# Patient Record
Sex: MALE | Race: WHITE | NOT HISPANIC OR LATINO | Employment: OTHER | URBAN - NONMETROPOLITAN AREA
[De-identification: names, ages, dates, MRNs, and addresses within clinical notes are randomized per-mention and may not be internally consistent; named-entity substitution may affect disease eponyms.]

---

## 2017-01-20 ENCOUNTER — OFFICE VISIT (OUTPATIENT)
Dept: CARDIOLOGY | Facility: CLINIC | Age: 62
End: 2017-01-20

## 2017-01-20 VITALS
BODY MASS INDEX: 41.72 KG/M2 | HEART RATE: 56 BPM | SYSTOLIC BLOOD PRESSURE: 140 MMHG | HEIGHT: 72 IN | WEIGHT: 308 LBS | DIASTOLIC BLOOD PRESSURE: 88 MMHG

## 2017-01-20 DIAGNOSIS — R00.2 PALPITATIONS: Primary | ICD-10-CM

## 2017-01-20 DIAGNOSIS — I34.0 NONRHEUMATIC MITRAL (VALVE) INSUFFICIENCY: ICD-10-CM

## 2017-01-20 DIAGNOSIS — E78.5 HYPERLIPIDEMIA, UNSPECIFIED HYPERLIPIDEMIA TYPE: ICD-10-CM

## 2017-01-20 DIAGNOSIS — I77.810 DILATED AORTIC ROOT (HCC): ICD-10-CM

## 2017-01-20 DIAGNOSIS — R06.02 SOB (SHORTNESS OF BREATH): ICD-10-CM

## 2017-01-20 DIAGNOSIS — I10 ESSENTIAL HYPERTENSION: ICD-10-CM

## 2017-01-20 DIAGNOSIS — I48.0 PAROXYSMAL ATRIAL FIBRILLATION (HCC): ICD-10-CM

## 2017-01-20 PROCEDURE — 99213 OFFICE O/P EST LOW 20 MIN: CPT | Performed by: INTERNAL MEDICINE

## 2017-01-20 PROCEDURE — 93000 ELECTROCARDIOGRAM COMPLETE: CPT | Performed by: INTERNAL MEDICINE

## 2017-01-20 NOTE — MR AVS SNAPSHOT
Raghav Olivier   1/20/2017 9:45 AM   Office Visit    Dept Phone:  [phone]   Encounter #:  [id number]    Provider:  Leo Page MD   Department:  [facility] CARDIOLOGY                Your Full Care Plan              Today's Medication Changes          These changes are accurate as of: 1/20/17 10:16 AM.  If you have any questions, ask your nurse or doctor.               Stop taking medication(s)listed here:     raNITIdine 150 MG tablet   Commonly known as:  ZANTAC   Stopped by:  Leo Page MD                      Your Updated Medication List          This list is accurate as of: 1/20/17 10:16 AM.  Always use your most recent med list.                amiodarone 200 MG tablet   Commonly known as:  PACERONE       aspirin 81 MG chewable tablet       diclofenac 50 MG EC tablet   Commonly known as:  VOLTAREN       hydrochlorothiazide 12.5 MG tablet   Commonly known as:  HYDRODIURIL       ibuprofen 200 MG tablet   Commonly known as:  ADVIL,MOTRIN       losartan 100 MG tablet   Commonly known as:  COZAAR       rosuvastatin 20 MG tablet   Commonly known as:  CRESTOR               We Performed the Following     ECG 12 Lead     Hepatic Function Panel       You Were Diagnosed With        Codes Comments    Palpitations    -  Primary ICD-10-CM: R00.2  ICD-9-CM: 785.1     Dilated aortic root     ICD-10-CM: I77.810  ICD-9-CM: 447.71     Hyperlipidemia, unspecified hyperlipidemia type     ICD-10-CM: E78.5  ICD-9-CM: 272.4     Essential hypertension     ICD-10-CM: I10  ICD-9-CM: 401.9     Nonrheumatic mitral (valve) insufficiency     ICD-10-CM: I34.0  ICD-9-CM: 424.0     SOB (shortness of breath)     ICD-10-CM: R06.02  ICD-9-CM: 786.05     Paroxysmal atrial fibrillation     ICD-10-CM: I48.0  ICD-9-CM: 427.31       Instructions     None    Patient Instructions History      Upcoming Appointments     Visit Type Date Time Department    OFFICE VISIT 1/20/2017  9:45 AM [facility]  "[facility]    FOLLOW UP 2017  9:00 AM [facility]    OFFICE VISIT 2017 10:15 AM [facility]     [facility] [employer] [facility] allows you to send messages to your doctor, view your test results, renew your prescriptions, schedule appointments, and more. To sign up, go to [employer] and click on the Sign Up Now link in the New User? box. Enter your [employer] Activation Code exactly as it appears below along with the last four digits of your Social Security Number and your Date of Birth () to complete the sign-up process. If you do not sign up before the expiration date, you must request a new code.    [employer] Activation Code: [email or web address]  Expires: 2/3/2017 10:16 AM    If you have questions, you can email [email or web address] or call [phone] to talk to our [employer] staff. Remember, [employer] is NOT to be used for urgent needs. For medical emergencies, dial 911.               Other Info from Your Visit           Your Appointments     2017  9:00 AM CDT   Follow Up with SLEEP CLINIC [facility] (--)    [address] [place] [address] [address] [place] [address] [phone]           Arrive 15 minutes prior to appointment.            2017 10:15 AM CDT   Office Visit with Leo Page MD   [facility] CARDIOLOGY (--)    [address] [address]  [facility] [address] [phone]           Arrive 15 minutes prior to appointment.              Allergies     Penicillins      Vancomycin        Vital Signs     Blood Pressure Pulse Height Weight Body Mass Index Smoking Status    140/88 56 72\" (182.9 cm) 308 lb (140 kg) 41.77 kg/m2 Never Smoker      Problems and Diagnoses Noted     Atrial fibrillation (irregular heartbeat)    Dilated aortic root    High blood pressure    High cholesterol or triglycerides    Nonrheumatic mitral (valve) insufficiency    Palpitations    SOB (shortness of breath)        "

## 2017-01-20 NOTE — PROGRESS NOTES
Raghav Olivier  61 y.o. male    01/20/2017  1. Palpitations    2. Dilated aortic root    3. Hyperlipidemia, unspecified hyperlipidemia type    4. Essential hypertension    5. Nonrheumatic mitral (valve) insufficiency    6. SOB (shortness of breath)    7. Paroxysmal atrial fibrillation        History of Present Illness: Routine follow-up with history of paroxysmal atrial fibrillations maintain sinus rhythm with the help of amiodarone and mild to moderately dilated aortic root with diameter 4.2.    61 years old gentleman with a BMI of 41 and history of hypertension and paroxysmal atrial fibrillation who was maintained sinus rhythm with the help of amiodarone.  The last echocardiographic study in July 2016 reported normal left ventricle systolic function with trace mitral and tricuspid regurgitation and dilated aortic root with diameter 4.2.  Had poorly function test done but a year ago reported abnormal PFT.  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  No fever or chills reported.  No palpitations or fluttering nauseous vomiting and diarrhea was reported.  No intermittent claudication was reported.  No dysuria or hematuria syncope or near syncopal episode was reported.  EKG done and finding discussed with the patient.          SUBJECTIVE    Allergies   Allergen Reactions   • Penicillins    • Vancomycin          Past Medical History   Diagnosis Date   • Atrial fibrillation    • Hyperlipidemia    • Hypertension    • Nonrheumatic mitral (valve) insufficiency    • Palpitations    • SOB (shortness of breath)          Past Surgical History   Procedure Laterality Date   • Appendectomy     • Knee surgery     • Cardioversion     • Ep study           No family history on file.      Social History     Social History   • Marital status:      Spouse name: N/A   • Number of children: N/A   • Years of education: N/A     Occupational History   • Not on file.  "    Social History Main Topics   • Smoking status: Never Smoker   • Smokeless tobacco: Never Used   • Alcohol use No   • Drug use: No   • Sexual activity: Defer     Other Topics Concern   • Not on file     Social History Narrative         Current Outpatient Prescriptions   Medication Sig Dispense Refill   • amiodarone (PACERONE) 200 MG tablet      • aspirin 81 MG chewable tablet Chew 81 mg Daily.     • diclofenac (VOLTAREN) 50 MG EC tablet      • hydrochlorothiazide (HYDRODIURIL) 12.5 MG tablet Take 12.5 mg by mouth Daily.     • ibuprofen (ADVIL,MOTRIN) 200 MG tablet Take 200 mg by mouth Every 6 (Six) Hours As Needed for mild pain (1-3).     • losartan (COZAAR) 100 MG tablet      • rosuvastatin (CRESTOR) 20 MG tablet Take 20 mg by mouth Daily.       No current facility-administered medications for this visit.          OBJECTIVE    Visit Vitals   • /88   • Pulse 56   • Ht 72\" (182.9 cm)   • Wt (!) 308 lb (140 kg)   • BMI 41.77 kg/m2           Review of Systems     Constitutional:  Denies recent weight loss, weight gain, fever or chills, no change in exercise tolerance     HENT:  Denies any hearing loss, epistaxis, hoarseness, or difficulty speaking.     Eyes: Wears eyeglasses or contact lenses     Respiratory:  Denies dyspnea with exertion,no cough, wheezing, or hemoptysis.     Cardiovascular:SEE HPI    Gastrointestinal:  Denies change in bowel habits, dyspepsia, ulcer disease, hematochezia, or melena.     Endocrine: Negative for cold intolerance, heat intolerance, polydipsia, polyphagia and polyuria. Denies any history of weight change, heat/cold intolerance, polydipsia, polyuria     Genitourinary: Negative.      Musculoskeletal:  history of arthritic symptoms or back problems     Skin:  Denies any change in hair or nails, rashes, or skin lesions.     Allergic/Immunologic: Negative.  Negative for environmental allergies, food allergies and immunocompromised state.     Neurological:  Denies any history of " recurrent headaches, strokes, TIA, or seizure disorder.     Hematological: Denies any food allergies, seasonal allergies, bleeding disorders, or lymphadenopathy.     Psychiatric/Behavioral: Denies any history of depression, substance abuse, or change in cognitive function.         Physical Exam     Constitutional: Cooperative, alert and oriented, well-developed, well-nourished, in no acute distress.     HENT:   Head: Normocephalic, normal hair patterns, no masses or tenderness.  Ears, Nose, and Throat: No gross abnormalities. No pallor or cyanosis. Dentition good.   Eyes: EOMS intact, PERRL, conjunctivae and lids unremarkable. Fundoscopic exam and visual fields not performed.   Neck: No palpable masses or adenopathy, no thyromegaly, no JVD, carotid pulses are full and equal bilaterally and without  Bruits.     Cardiovascular: Regular rhythm, S1 and S2 normal, no S3 or S4. Apical impulse not displaced. No murmurs, gallops, or rubs detected.     Pulmonary/Chest: Chest: normal symmetry, no tenderness to palpation, normal respiratory excursion, no intercostal retraction, no use of accessory muscles.            Pulmonary: Normal breath sounds. No rales or ronchi.    Abdominal: Abdomen soft, bowel sounds normoactive, no masses, no hepatosplenomegaly, non-tender, no bruits.     Musculoskeletal: No deformities, clubbing, cyanosis, erythema, or edema observed. There are no spinal abnormalities noted. Normal muscle strength and tone. Pulses full and equal in all extremities, no bruits auscultated.     Neurological: No gross motor or sensory deficits noted, affect appropriate, oriented to time, person, place.     Skin: Warm and dry to the touch, no apparent skin lesions or masses noted.     Psychiatric: He has a normal mood and affect. His behavior is normal. Judgment and thought content normal.           ECG 12 Lead  Date/Time: 1/20/2017 10:17 AM  Performed by: SKYE SOTO  Authorized by: SKYE SOTO   Comparison: not  compared with previous ECG   Rhythm: sinus rhythm  Comments: Sinus rhythm at 56 bpm with a nonspecific interventricular conduction delay and QRS duration of 228 ms.  Normal QTc interval.  And normal IL.              No results found for: WBC, HGB, HCT, MCV, PLT  No results found for: GLUCOSE, BUN, CREATININE, EGFRIFNONA, EGFRIFAFRI, BCR, CO2, CALCIUM, PROTENTOTREF, ALBUMIN, LABIL2, AST, ALT  No results found for: CHOL  No results found for: TRIG  No results found for: HDL  No results found for: LDLCALC  No results found for: LDL  No results found for: HDLLDLRATIO  No components found for: CHOLHDL  No results found for: HGBA1C  No results found for: TSH, L0DVZMR, T2PQMKS, THYROIDAB        ASSESSMENT AND PLAN clinically there is no sign of any acute cardiac decompensation.  Good blood pressure control and being treated with the losartan 100 mg.  Amiodarone given the history of paroxysmal atrial fibrillation to keep him in sinus rhythm.  Aspirin 81 mg for same reason. Cresttou for hyperlipidemia.  I will arrange hepatic and lipid and TSH level as a part of amiodarone monitoring and the patient will continue losartan hydrochlorothiazide for hypertension with good blood pressure control.  We'll see him back in 6 month R depends on patient clinical condition.  And arrange an echocardiographic study in 6 month to reassess the aortic root Sr. last echo was in July 2016.  Risk factor lifestyle modification discussed.  Discussed with the patient and the EP study and ablations but he want to continue amiodarone at present.      Diagnoses and all orders for this visit:    Palpitations    Dilated aortic root    Hyperlipidemia, unspecified hyperlipidemia type    Essential hypertension    Nonrheumatic mitral (valve) insufficiency    SOB (shortness of breath)    Paroxysmal atrial fibrillation  -     Hepatic Function Panel  -     TSH; Future  -     ECG 12 Lead        Leo Page MD  1/20/2017  10:13 AM

## 2017-01-25 ENCOUNTER — RESULTS ENCOUNTER (OUTPATIENT)
Dept: CARDIOLOGY | Facility: CLINIC | Age: 62
End: 2017-01-25

## 2017-01-25 DIAGNOSIS — I48.0 PAROXYSMAL ATRIAL FIBRILLATION (HCC): ICD-10-CM

## 2017-07-31 ENCOUNTER — ANESTHESIA EVENT (OUTPATIENT)
Dept: GASTROENTEROLOGY | Facility: HOSPITAL | Age: 62
End: 2017-07-31

## 2017-07-31 ENCOUNTER — HOSPITAL ENCOUNTER (OUTPATIENT)
Facility: HOSPITAL | Age: 62
Setting detail: HOSPITAL OUTPATIENT SURGERY
Discharge: HOME | End: 2017-07-31
Attending: INTERNAL MEDICINE | Admitting: INTERNAL MEDICINE

## 2017-07-31 ENCOUNTER — ANESTHESIA (OUTPATIENT)
Dept: GASTROENTEROLOGY | Facility: HOSPITAL | Age: 62
End: 2017-07-31

## 2017-07-31 VITALS
RESPIRATION RATE: 18 BRPM | SYSTOLIC BLOOD PRESSURE: 110 MMHG | TEMPERATURE: 97.7 F | WEIGHT: 293.25 LBS | DIASTOLIC BLOOD PRESSURE: 65 MMHG | HEART RATE: 58 BPM | HEIGHT: 74 IN | BODY MASS INDEX: 37.64 KG/M2 | OXYGEN SATURATION: 96 %

## 2017-07-31 DIAGNOSIS — K63.5 BENIGN COLON POLYP: ICD-10-CM

## 2017-07-31 PROCEDURE — 25010000002 PROPOFOL 10 MG/ML EMULSION: Performed by: NURSE ANESTHETIST, CERTIFIED REGISTERED

## 2017-07-31 PROCEDURE — 25010000002 MIDAZOLAM PER 1 MG: Performed by: NURSE ANESTHETIST, CERTIFIED REGISTERED

## 2017-07-31 PROCEDURE — 88305 TISSUE EXAM BY PATHOLOGIST: CPT | Performed by: PATHOLOGY

## 2017-07-31 PROCEDURE — 88305 TISSUE EXAM BY PATHOLOGIST: CPT | Performed by: INTERNAL MEDICINE

## 2017-07-31 RX ORDER — ONDANSETRON 2 MG/ML
4 INJECTION INTRAMUSCULAR; INTRAVENOUS ONCE AS NEEDED
Status: DISCONTINUED | OUTPATIENT
Start: 2017-07-31 | End: 2017-07-31 | Stop reason: HOSPADM

## 2017-07-31 RX ORDER — PROMETHAZINE HYDROCHLORIDE 25 MG/ML
12.5 INJECTION, SOLUTION INTRAMUSCULAR; INTRAVENOUS ONCE AS NEEDED
Status: DISCONTINUED | OUTPATIENT
Start: 2017-07-31 | End: 2017-07-31 | Stop reason: HOSPADM

## 2017-07-31 RX ORDER — DEXTROSE AND SODIUM CHLORIDE 5; .45 G/100ML; G/100ML
30 INJECTION, SOLUTION INTRAVENOUS CONTINUOUS
Status: DISCONTINUED | OUTPATIENT
Start: 2017-07-31 | End: 2017-07-31 | Stop reason: HOSPADM

## 2017-07-31 RX ORDER — PROPOFOL 10 MG/ML
VIAL (ML) INTRAVENOUS AS NEEDED
Status: DISCONTINUED | OUTPATIENT
Start: 2017-07-31 | End: 2017-07-31 | Stop reason: SURG

## 2017-07-31 RX ORDER — PROMETHAZINE HYDROCHLORIDE 25 MG/1
25 TABLET ORAL ONCE AS NEEDED
Status: DISCONTINUED | OUTPATIENT
Start: 2017-07-31 | End: 2017-07-31 | Stop reason: HOSPADM

## 2017-07-31 RX ORDER — MIDAZOLAM HYDROCHLORIDE 1 MG/ML
INJECTION INTRAMUSCULAR; INTRAVENOUS AS NEEDED
Status: DISCONTINUED | OUTPATIENT
Start: 2017-07-31 | End: 2017-07-31 | Stop reason: SURG

## 2017-07-31 RX ORDER — PROMETHAZINE HYDROCHLORIDE 25 MG/1
25 SUPPOSITORY RECTAL ONCE AS NEEDED
Status: DISCONTINUED | OUTPATIENT
Start: 2017-07-31 | End: 2017-07-31 | Stop reason: HOSPADM

## 2017-07-31 RX ORDER — DEXAMETHASONE SODIUM PHOSPHATE 4 MG/ML
8 INJECTION, SOLUTION INTRA-ARTICULAR; INTRALESIONAL; INTRAMUSCULAR; INTRAVENOUS; SOFT TISSUE ONCE AS NEEDED
Status: DISCONTINUED | OUTPATIENT
Start: 2017-07-31 | End: 2017-07-31 | Stop reason: HOSPADM

## 2017-07-31 RX ADMIN — PROPOFOL 50 MG: 10 INJECTION, EMULSION INTRAVENOUS at 14:31

## 2017-07-31 RX ADMIN — PROPOFOL 70 MG: 10 INJECTION, EMULSION INTRAVENOUS at 14:24

## 2017-07-31 RX ADMIN — DEXTROSE AND SODIUM CHLORIDE 30 ML/HR: 5; 450 INJECTION, SOLUTION INTRAVENOUS at 13:55

## 2017-07-31 RX ADMIN — MIDAZOLAM 2 MG: 1 INJECTION INTRAMUSCULAR; INTRAVENOUS at 14:20

## 2017-07-31 NOTE — ANESTHESIA POSTPROCEDURE EVALUATION
Patient: Raghav Olivier    Procedure Summary     Date Anesthesia Start Anesthesia Stop Room / Location    07/31/17 1423 1415 [facility] ENDOSCOPY 2 / [facility] ENDOSCOPY       Procedure Diagnosis Surgeon Provider    COLONOSCOPY (N/A ) Benign colon polyp  (Benign colon polyp [K63.5]) DO Cara Vela CRNA          Anesthesia Type: MAC  Last vitals  BP   149/87 (07/31/17 1348)    Temp   97.3 °F (36.3 °C) (07/31/17 1348)    Pulse   56 (07/31/17 1348)   Resp   18 (07/31/17 1348)    SpO2   98 % (07/31/17 1348)      Post Anesthesia Care and Evaluation    Patient location during evaluation: bedside  Level of consciousness: awake  Pain score: 0  Pain management: adequate  Airway patency: patent  Anesthetic complications: No anesthetic complications  PONV Status: none  Cardiovascular status: acceptable  Respiratory status: acceptable  Hydration status: acceptable

## 2017-07-31 NOTE — ANESTHESIA PREPROCEDURE EVALUATION
Anesthesia Evaluation     Patient summary reviewed and Nursing notes reviewed   NPO Solid Status: > 8 hours  NPO Liquid Status: > 4 hours     Airway   Mallampati: III  TM distance: >3 FB  Neck ROM: full  possible difficult intubation  Dental - normal exam     Pulmonary - normal exam   (+) shortness of breath,   Cardiovascular - normal exam    (+) hypertension, dysrhythmias Atrial Fib, PVD, hyperlipidemia    ROS comment: Hx A-fib Ablation    Neuro/Psych  GI/Hepatic/Renal/Endo    (+) obesity,      Musculoskeletal     Abdominal  - normal exam   Substance History      OB/GYN          Other                                      Anesthesia Plan    ASA 3     MAC     Anesthetic plan and risks discussed with patient.

## 2017-08-02 LAB
LAB AP CASE REPORT: NORMAL
Lab: NORMAL
PATH REPORT.FINAL DX SPEC: NORMAL
PATH REPORT.GROSS SPEC: NORMAL

## 2017-08-14 ENCOUNTER — OFFICE VISIT (OUTPATIENT)
Dept: CARDIOLOGY | Facility: CLINIC | Age: 62
End: 2017-08-14

## 2017-08-14 VITALS
BODY MASS INDEX: 38.63 KG/M2 | DIASTOLIC BLOOD PRESSURE: 78 MMHG | WEIGHT: 301 LBS | SYSTOLIC BLOOD PRESSURE: 130 MMHG | HEIGHT: 74 IN | HEART RATE: 80 BPM

## 2017-08-14 DIAGNOSIS — E78.5 HYPERLIPIDEMIA, UNSPECIFIED HYPERLIPIDEMIA TYPE: ICD-10-CM

## 2017-08-14 DIAGNOSIS — I34.0 NONRHEUMATIC MITRAL (VALVE) INSUFFICIENCY: ICD-10-CM

## 2017-08-14 DIAGNOSIS — I77.810 DILATED AORTIC ROOT (HCC): ICD-10-CM

## 2017-08-14 DIAGNOSIS — I48.0 PAROXYSMAL ATRIAL FIBRILLATION (HCC): ICD-10-CM

## 2017-08-14 DIAGNOSIS — I10 ESSENTIAL HYPERTENSION: ICD-10-CM

## 2017-08-14 DIAGNOSIS — R00.2 PALPITATIONS: Primary | ICD-10-CM

## 2017-08-14 DIAGNOSIS — R06.02 SOB (SHORTNESS OF BREATH): ICD-10-CM

## 2017-08-14 PROCEDURE — 93000 ELECTROCARDIOGRAM COMPLETE: CPT | Performed by: INTERNAL MEDICINE

## 2017-08-14 PROCEDURE — 99213 OFFICE O/P EST LOW 20 MIN: CPT | Performed by: INTERNAL MEDICINE

## 2017-08-14 NOTE — PROGRESS NOTES
Raghav Olivier  62 y.o. male    08/14/2017  1. Palpitations    2. Paroxysmal atrial fibrillation    3. Essential hypertension    4. Hyperlipidemia, unspecified hyperlipidemia type    5. Dilated aortic root    6. Nonrheumatic mitral (valve) insufficiency    7. SOB (shortness of breath)        History of Present Illness      62 years old gentleman with a BMI of 38.6 and body weight 137 clinical last and the body weight was 133 kg with a BMI 37.6 and history of hypertension and paroxysmal atrial fibrillation who was maintained sinus rhythm with the help of amiodarone, Discontinued due to history of black lung and associated shortness breath..  The last echocardiographic study in July 2016 reported normal left ventricle systolic function with trace mitral and tricuspid regurgitation and  Mildly dilated aortic root with diameter 4.2. Abnormal  PFT more than one year ago.  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  No fever or chills reported.  No palpitations or fluttering nauseous vomiting and diarrhea was reported.  No intermittent claudication was reported.  No dysuria or hematuria syncope or near syncopal episode was reported.  EKG done and finding discussed with the patient      SUBJECTIVE    Allergies   Allergen Reactions   • Penicillins    • Vancomycin          Past Medical History:   Diagnosis Date   • Atrial fibrillation    • Hyperlipidemia    • Hypertension    • Nonrheumatic mitral (valve) insufficiency    • Palpitations    • Sleep apnea    • SOB (shortness of breath)          Past Surgical History:   Procedure Laterality Date   • APPENDECTOMY     • CARDIOVERSION     • COLONOSCOPY N/A 7/31/2017    Procedure: COLONOSCOPY;  Surgeon: Billy Robbins DO;  Location: [facility] ENDOSCOPY;  Service:    • EP STUDY     • KNEE SURGERY           No family history on file.      Social History     Social History   • Marital status:      Spouse  "name: N/A   • Number of children: N/A   • Years of education: N/A     Occupational History   • Not on file.     Social History Main Topics   • Smoking status: Never Smoker   • Smokeless tobacco: Never Used   • Alcohol use No   • Drug use: No   • Sexual activity: Defer     Other Topics Concern   • Not on file     Social History Narrative         Current Outpatient Prescriptions   Medication Sig Dispense Refill   • diclofenac (VOLTAREN) 50 MG EC tablet      • hydrochlorothiazide (HYDRODIURIL) 12.5 MG tablet Take 12.5 mg by mouth Daily.     • ibuprofen (ADVIL,MOTRIN) 200 MG tablet Take 200 mg by mouth Every 6 (Six) Hours As Needed for mild pain (1-3).     • losartan (COZAAR) 100 MG tablet      • rosuvastatin (CRESTOR) 20 MG tablet Take 20 mg by mouth Daily.       No current facility-administered medications for this visit.          OBJECTIVE    /78  Pulse 80  Ht 74\" (188 cm)  Wt (!) 301 lb (137 kg)  BMI 38.65 kg/m2        Review of Systems     Constitutional:  Denies recent weight loss, weight gain, fever or chills, no change in exercise tolerance     HENT:  Denies any hearing loss, epistaxis, hoarseness, or difficulty speaking.     Eyes: Wears eyeglasses or contact lenses     Respiratory:  Denies dyspnea with exertion,no cough, wheezing, or hemoptysis.     Cardiovascular: Negative for palpations, chest pain, orthopnea, PND, peripheral edema, syncope, or claudication.     Gastrointestinal:  Denies change in bowel habits, dyspepsia, ulcer disease, hematochezia, or melena.     Endocrine: Negative for cold intolerance, heat intolerance, polydipsia, polyphagia and polyuria. Denies any history of weight change, heat/cold intolerance, polydipsia, polyuria     Genitourinary: Negative.      Musculoskeletal: Denies any history of arthritic symptoms or back problems     Skin:  Denies any change in hair or nails, rashes, or skin lesions.     Allergic/Immunologic: Negative.  Negative for environmental allergies, food " allergies and immunocompromised state.     Neurological:  Denies any history of recurrent headaches, strokes, TIA, or seizure disorder.     Hematological: Denies any food allergies, seasonal allergies, bleeding disorders, or lymphadenopathy.     Psychiatric/Behavioral: Denies any history of depression, substance abuse, or change in cognitive function.         Physical Exam     Constitutional: Cooperative, alert and oriented, well-developed, well-nourished, in no acute distress.     HENT:   Head: Normocephalic, normal hair patterns, no masses or tenderness.  Ears, Nose, and Throat: No gross abnormalities. No pallor or cyanosis. Dentition good.   Eyes: EOMS intact, PERRL, conjunctivae and lids unremarkable. Fundoscopic exam and visual fields not performed.   Neck: No palpable masses or adenopathy, no thyromegaly, no JVD, carotid pulses are full and equal bilaterally and without  Bruits.     Cardiovascular: Regular rhythm, S1 and S2 normal, no S3 or S4. Apical impulse not displaced. No murmurs, gallops, or rubs detected.     Pulmonary/Chest: Chest: normal symmetry, no tenderness to palpation, normal respiratory excursion, no intercostal retraction, no use of accessory muscles.            Pulmonary: Normal breath sounds. No rales or ronchi.    Abdominal: Abdomen soft, bowel sounds normoactive, no masses, no hepatosplenomegaly, non-tender, no bruits.     Musculoskeletal: No deformities, clubbing, cyanosis, erythema, or edema observed. There are no spinal abnormalities noted. Normal muscle strength and tone. Pulses full and equal in all extremities, no bruits auscultated.     Neurological: No gross motor or sensory deficits noted, affect appropriate, oriented to time, person, place.     Skin: Warm and dry to the touch, no apparent skin lesions or masses noted.     Psychiatric: He has a normal mood and affect. His behavior is normal. Judgment and thought content normal.           ECG 12 Lead  Date/Time: 8/14/2017 3:02  PM  Performed by: SKYE SOTO  Authorized by: SKYE SOTO   Comparison: not compared with previous ECG   Rhythm: sinus rhythm              No results found for: WBC, HGB, HCT, MCV, PLT  No results found for: GLUCOSE, BUN, CREATININE, EGFRIFNONA, EGFRIFAFRI, BCR, CO2, CALCIUM, PROTENTOTREF, ALBUMIN, LABIL2, AST, ALT  No results found for: CHOL  No results found for: TRIG  No results found for: HDL  No results found for: LDLCALC  No results found for: LDL  No results found for: HDLLDLRATIO  No components found for: CHOLHDL  No results found for: HGBA1C  Lab Results   Component Value Date    TSH 2.07 01/20/2017           ASSESSMENT AND PLan    #1 paroxysmal atrial fibrillation #2 hypertension with hypertensive heart disease #3 mildly dilated aortic root diameter 4.2 #4 Black lung with abnormal PFT is requiring discontinuation of amiodarone No. 4 hyperlipidemia #5 exogenous obesity with BMI 38.6 previous BMI 37.6    Clinically no evidence of any congestive heart failure based the clinical history physical finding.  Evidence of any active ischemia.  EKG done and finding discussed with the patient in normal sinus rhythm without any acute ST-T wave changes.  Patient to is in sinus rhythm without any antiarrhythmic medications.  I briefly discussed with the EP study ablation he is not considering at this stage.  Patient was educated regarding lifestyle modification eating healthy food and cutting the total amount of caloric intake.  He will continue Crestor for management of hyperlipidemia, losartan and diuretics for hypertension with good blood pressure control.  Will arrange an echocardiographic study and see him in 6 month the last echo  in 2016 given the dilated aortic root with diameter 4.2    Diagnoses and all orders for this visit:    Palpitations  -     ECG 12 Lead    Paroxysmal atrial fibrillation  -     ECG 12 Lead    Essential hypertension    Hyperlipidemia, unspecified hyperlipidemia type    Dilated  aortic root    Nonrheumatic mitral (valve) insufficiency    SOB (shortness of breath)        Leo Page MD  8/14/2017  2:55 PM

## 2017-08-30 LAB
BH CV ECHO MEAS - ACS: 2.1 CM
BH CV ECHO MEAS - AO MAX PG (FULL): 5.1 MMHG
BH CV ECHO MEAS - AO MAX PG: 8.9 MMHG
BH CV ECHO MEAS - AO MEAN PG (FULL): 3 MMHG
BH CV ECHO MEAS - AO MEAN PG: 5 MMHG
BH CV ECHO MEAS - AO ROOT AREA (BSA CORRECTED): 1.6
BH CV ECHO MEAS - AO ROOT AREA: 13.2 CM^2
BH CV ECHO MEAS - AO ROOT DIAM: 4.1 CM
BH CV ECHO MEAS - AO V2 MAX: 149 CM/SEC
BH CV ECHO MEAS - AO V2 MEAN: 101 CM/SEC
BH CV ECHO MEAS - AO V2 VTI: 35.6 CM
BH CV ECHO MEAS - BSA(HAYCOCK): 2.7 M^2
BH CV ECHO MEAS - BSA: 2.6 M^2
BH CV ECHO MEAS - BZI_BMI: 38.6 KILOGRAMS/M^2
BH CV ECHO MEAS - BZI_METRIC_HEIGHT: 188 CM
BH CV ECHO MEAS - BZI_METRIC_WEIGHT: 136.5 KG
BH CV ECHO MEAS - EDV(CUBED): 110.6 ML
BH CV ECHO MEAS - EDV(TEICH): 107.5 ML
BH CV ECHO MEAS - EF(CUBED): 67.5 %
BH CV ECHO MEAS - EF(TEICH): 59 %
BH CV ECHO MEAS - EPSS: 0.9 CM
BH CV ECHO MEAS - ESV(CUBED): 35.9 ML
BH CV ECHO MEAS - ESV(TEICH): 44.1 ML
BH CV ECHO MEAS - FS: 31.3 %
BH CV ECHO MEAS - IVS/LVPW: 1.1
BH CV ECHO MEAS - IVSD: 1.4 CM
BH CV ECHO MEAS - LA DIMENSION: 4.4 CM
BH CV ECHO MEAS - LA/AO: 1.1
BH CV ECHO MEAS - LV MASS(C)D: 259.6 GRAMS
BH CV ECHO MEAS - LV MASS(C)DI: 100.4 GRAMS/M^2
BH CV ECHO MEAS - LV MAX PG: 3.8 MMHG
BH CV ECHO MEAS - LV MEAN PG: 2 MMHG
BH CV ECHO MEAS - LV V1 MAX: 97.8 CM/SEC
BH CV ECHO MEAS - LV V1 MEAN: 61.6 CM/SEC
BH CV ECHO MEAS - LV V1 VTI: 24.5 CM
BH CV ECHO MEAS - LVIDD: 4.8 CM
BH CV ECHO MEAS - LVIDS: 3.3 CM
BH CV ECHO MEAS - LVPWD: 1.3 CM
BH CV ECHO MEAS - MR MAX PG: 31.4 MMHG
BH CV ECHO MEAS - MR MAX VEL: 280 CM/SEC
BH CV ECHO MEAS - MV A MAX VEL: 78 CM/SEC
BH CV ECHO MEAS - MV E MAX VEL: 88.4 CM/SEC
BH CV ECHO MEAS - MV E/A: 1.1
BH CV ECHO MEAS - PA MAX PG: 3.8 MMHG
BH CV ECHO MEAS - PA MEAN PG: 2 MMHG
BH CV ECHO MEAS - PA V2 MAX: 97.9 CM/SEC
BH CV ECHO MEAS - PA V2 MEAN: 72.2 CM/SEC
BH CV ECHO MEAS - PA V2 VTI: 26.4 CM
BH CV ECHO MEAS - PI END-D VEL: 99.1 CM/SEC
BH CV ECHO MEAS - RAP SYSTOLE: 10 MMHG
BH CV ECHO MEAS - RVDD: 3.9 CM
BH CV ECHO MEAS - RVSP: 25.6 MMHG
BH CV ECHO MEAS - SI(AO): 181.8 ML/M^2
BH CV ECHO MEAS - SI(CUBED): 28.9 ML/M^2
BH CV ECHO MEAS - SI(TEICH): 24.5 ML/M^2
BH CV ECHO MEAS - SV(AO): 470 ML
BH CV ECHO MEAS - SV(CUBED): 74.7 ML
BH CV ECHO MEAS - SV(TEICH): 63.4 ML
BH CV ECHO MEAS - TR MAX VEL: 197 CM/SEC
LV EF 2D ECHO EST: 60 %

## 2017-09-05 ENCOUNTER — TELEPHONE (OUTPATIENT)
Dept: CARDIOLOGY | Facility: CLINIC | Age: 62
End: 2017-09-05

## 2018-01-16 ENCOUNTER — APPOINTMENT (OUTPATIENT)
Dept: GENERAL RADIOLOGY | Facility: HOSPITAL | Age: 63
End: 2018-01-16

## 2018-01-16 ENCOUNTER — HOSPITAL ENCOUNTER (OUTPATIENT)
Facility: HOSPITAL | Age: 63
Setting detail: OBSERVATION
Discharge: HOME | End: 2018-01-17
Attending: EMERGENCY MEDICINE | Admitting: FAMILY MEDICINE

## 2018-01-16 DIAGNOSIS — R07.9 CHEST PAIN, UNSPECIFIED TYPE: Primary | ICD-10-CM

## 2018-01-16 PROBLEM — R07.89 MID STERNAL CHEST PAIN: Status: ACTIVE | Noted: 2018-01-16

## 2018-01-16 PROBLEM — Z99.89 OSA ON CPAP: Status: ACTIVE | Noted: 2018-01-16

## 2018-01-16 PROBLEM — G47.33 OSA ON CPAP: Status: ACTIVE | Noted: 2018-01-16

## 2018-01-16 PROBLEM — IMO0001 CLASS 2 OBESITY WITH SERIOUS COMORBIDITY AND BODY MASS INDEX (BMI) OF 39.0 TO 39.9 IN ADULT: Status: ACTIVE | Noted: 2018-01-16

## 2018-01-16 LAB
ALBUMIN SERPL-MCNC: 4.4 G/DL (ref 3.4–4.8)
ALBUMIN/GLOB SERPL: 1.6 G/DL (ref 1.1–1.8)
ALP SERPL-CCNC: 43 U/L (ref 38–126)
ALT SERPL W P-5'-P-CCNC: 70 U/L (ref 21–72)
ANION GAP SERPL CALCULATED.3IONS-SCNC: 11 MMOL/L (ref 5–15)
AST SERPL-CCNC: 39 U/L (ref 17–59)
BASOPHILS # BLD AUTO: 0.03 10*3/MM3 (ref 0–0.2)
BASOPHILS NFR BLD AUTO: 0.3 % (ref 0–2)
BILIRUB SERPL-MCNC: 0.3 MG/DL (ref 0.2–1.3)
BUN BLD-MCNC: 21 MG/DL (ref 7–21)
BUN/CREAT SERPL: 16.4 (ref 7–25)
CALCIUM SPEC-SCNC: 9.9 MG/DL (ref 8.4–10.2)
CHLORIDE SERPL-SCNC: 103 MMOL/L (ref 95–110)
CO2 SERPL-SCNC: 25 MMOL/L (ref 22–31)
CREAT BLD-MCNC: 1.28 MG/DL (ref 0.7–1.3)
DEPRECATED RDW RBC AUTO: 42.8 FL (ref 35.1–43.9)
EOSINOPHIL # BLD AUTO: 0.07 10*3/MM3 (ref 0–0.7)
EOSINOPHIL NFR BLD AUTO: 0.7 % (ref 0–7)
ERYTHROCYTE [DISTWIDTH] IN BLOOD BY AUTOMATED COUNT: 13.1 % (ref 11.5–14.5)
GFR SERPL CREATININE-BSD FRML MDRD: 57 ML/MIN/1.73 (ref 49–113)
GLOBULIN UR ELPH-MCNC: 2.7 GM/DL (ref 2.3–3.5)
GLUCOSE BLD-MCNC: 79 MG/DL (ref 60–100)
HCT VFR BLD AUTO: 39.2 % (ref 39–49)
HGB BLD-MCNC: 13.1 G/DL (ref 13.7–17.3)
HOLD SPECIMEN: NORMAL
HOLD SPECIMEN: NORMAL
IMM GRANULOCYTES # BLD: 0.03 10*3/MM3 (ref 0–0.02)
IMM GRANULOCYTES NFR BLD: 0.3 % (ref 0–0.5)
INR PPP: 0.93 (ref 0.8–1.2)
LYMPHOCYTES # BLD AUTO: 2.35 10*3/MM3 (ref 0.6–4.2)
LYMPHOCYTES NFR BLD AUTO: 24.1 % (ref 10–50)
MCH RBC QN AUTO: 29.8 PG (ref 26.5–34)
MCHC RBC AUTO-ENTMCNC: 33.4 G/DL (ref 31.5–36.3)
MCV RBC AUTO: 89.3 FL (ref 80–98)
MONOCYTES # BLD AUTO: 0.71 10*3/MM3 (ref 0–0.9)
MONOCYTES NFR BLD AUTO: 7.3 % (ref 0–12)
NEUTROPHILS # BLD AUTO: 6.55 10*3/MM3 (ref 2–8.6)
NEUTROPHILS NFR BLD AUTO: 67.3 % (ref 37–80)
NT-PROBNP SERPL-MCNC: 34.7 PG/ML (ref 0–900)
PLATELET # BLD AUTO: 223 10*3/MM3 (ref 150–450)
PMV BLD AUTO: 10 FL (ref 8–12)
POTASSIUM BLD-SCNC: 3.9 MMOL/L (ref 3.5–5.1)
PROT SERPL-MCNC: 7.1 G/DL (ref 6.3–8.6)
PROTHROMBIN TIME: 12.4 SECONDS (ref 11.1–15.3)
RBC # BLD AUTO: 4.39 10*6/MM3 (ref 4.37–5.74)
SODIUM BLD-SCNC: 139 MMOL/L (ref 137–145)
TROPONIN I SERPL-MCNC: 0.02 NG/ML
TROPONIN I SERPL-MCNC: 0.03 NG/ML
WBC NRBC COR # BLD: 9.74 10*3/MM3 (ref 3.2–9.8)
WHOLE BLOOD HOLD SPECIMEN: NORMAL
WHOLE BLOOD HOLD SPECIMEN: NORMAL

## 2018-01-16 PROCEDURE — G0378 HOSPITAL OBSERVATION PER HR: HCPCS

## 2018-01-16 PROCEDURE — 85610 PROTHROMBIN TIME: CPT | Performed by: EMERGENCY MEDICINE

## 2018-01-16 PROCEDURE — 93005 ELECTROCARDIOGRAM TRACING: CPT | Performed by: EMERGENCY MEDICINE

## 2018-01-16 PROCEDURE — 84484 ASSAY OF TROPONIN QUANT: CPT | Performed by: EMERGENCY MEDICINE

## 2018-01-16 PROCEDURE — 85025 COMPLETE CBC W/AUTO DIFF WBC: CPT | Performed by: EMERGENCY MEDICINE

## 2018-01-16 PROCEDURE — 99284 EMERGENCY DEPT VISIT MOD MDM: CPT

## 2018-01-16 PROCEDURE — 83880 ASSAY OF NATRIURETIC PEPTIDE: CPT | Performed by: EMERGENCY MEDICINE

## 2018-01-16 PROCEDURE — 80053 COMPREHEN METABOLIC PANEL: CPT | Performed by: EMERGENCY MEDICINE

## 2018-01-16 PROCEDURE — 93010 ELECTROCARDIOGRAM REPORT: CPT | Performed by: INTERNAL MEDICINE

## 2018-01-16 PROCEDURE — 94799 UNLISTED PULMONARY SVC/PX: CPT

## 2018-01-16 PROCEDURE — 94660 CPAP INITIATION&MGMT: CPT

## 2018-01-16 PROCEDURE — 71045 X-RAY EXAM CHEST 1 VIEW: CPT

## 2018-01-16 RX ORDER — SUCRALFATE 1 G/1
1 TABLET ORAL
Status: DISCONTINUED | OUTPATIENT
Start: 2018-01-16 | End: 2018-01-17 | Stop reason: HOSPADM

## 2018-01-16 RX ORDER — ONDANSETRON 4 MG/1
4 TABLET, ORALLY DISINTEGRATING ORAL EVERY 6 HOURS PRN
Status: DISCONTINUED | OUTPATIENT
Start: 2018-01-16 | End: 2018-01-17 | Stop reason: HOSPADM

## 2018-01-16 RX ORDER — PANTOPRAZOLE SODIUM 40 MG/1
40 TABLET, DELAYED RELEASE ORAL
Status: DISCONTINUED | OUTPATIENT
Start: 2018-01-17 | End: 2018-01-17 | Stop reason: HOSPADM

## 2018-01-16 RX ORDER — HYDROCHLOROTHIAZIDE 25 MG/1
12.5 TABLET ORAL DAILY
Status: DISCONTINUED | OUTPATIENT
Start: 2018-01-17 | End: 2018-01-17 | Stop reason: HOSPADM

## 2018-01-16 RX ORDER — SODIUM CHLORIDE 0.9 % (FLUSH) 0.9 %
1-10 SYRINGE (ML) INJECTION AS NEEDED
Status: DISCONTINUED | OUTPATIENT
Start: 2018-01-16 | End: 2018-01-17 | Stop reason: HOSPADM

## 2018-01-16 RX ORDER — ONDANSETRON 4 MG/1
4 TABLET, FILM COATED ORAL EVERY 6 HOURS PRN
Status: DISCONTINUED | OUTPATIENT
Start: 2018-01-16 | End: 2018-01-17 | Stop reason: HOSPADM

## 2018-01-16 RX ORDER — ACETAMINOPHEN 325 MG/1
650 TABLET ORAL EVERY 4 HOURS PRN
Status: DISCONTINUED | OUTPATIENT
Start: 2018-01-16 | End: 2018-01-17 | Stop reason: HOSPADM

## 2018-01-16 RX ORDER — SENNA AND DOCUSATE SODIUM 50; 8.6 MG/1; MG/1
2 TABLET, FILM COATED ORAL NIGHTLY
Status: DISCONTINUED | OUTPATIENT
Start: 2018-01-16 | End: 2018-01-17 | Stop reason: HOSPADM

## 2018-01-16 RX ORDER — SODIUM CHLORIDE 0.9 % (FLUSH) 0.9 %
10 SYRINGE (ML) INJECTION AS NEEDED
Status: DISCONTINUED | OUTPATIENT
Start: 2018-01-16 | End: 2018-01-16

## 2018-01-16 RX ORDER — ASPIRIN 81 MG/1
81 TABLET ORAL DAILY
Status: DISCONTINUED | OUTPATIENT
Start: 2018-01-17 | End: 2018-01-17 | Stop reason: HOSPADM

## 2018-01-16 RX ORDER — ROSUVASTATIN CALCIUM 20 MG/1
20 TABLET, COATED ORAL DAILY
Status: DISCONTINUED | OUTPATIENT
Start: 2018-01-16 | End: 2018-01-17 | Stop reason: HOSPADM

## 2018-01-16 RX ORDER — NITROGLYCERIN 0.4 MG/1
0.4 TABLET SUBLINGUAL
Status: DISCONTINUED | OUTPATIENT
Start: 2018-01-16 | End: 2018-01-17 | Stop reason: HOSPADM

## 2018-01-16 RX ORDER — ASPIRIN 325 MG
325 TABLET ORAL ONCE
Status: DISCONTINUED | OUTPATIENT
Start: 2018-01-16 | End: 2018-01-16

## 2018-01-16 RX ORDER — LOSARTAN POTASSIUM 50 MG/1
100 TABLET ORAL
Status: DISCONTINUED | OUTPATIENT
Start: 2018-01-16 | End: 2018-01-17 | Stop reason: HOSPADM

## 2018-01-16 RX ORDER — ASPIRIN 81 MG/1
81 TABLET, CHEWABLE ORAL ONCE
Status: COMPLETED | OUTPATIENT
Start: 2018-01-16 | End: 2018-01-16

## 2018-01-16 RX ORDER — ONDANSETRON 2 MG/ML
4 INJECTION INTRAMUSCULAR; INTRAVENOUS EVERY 6 HOURS PRN
Status: DISCONTINUED | OUTPATIENT
Start: 2018-01-16 | End: 2018-01-17 | Stop reason: HOSPADM

## 2018-01-16 RX ADMIN — LOSARTAN POTASSIUM 100 MG: 50 TABLET, FILM COATED ORAL at 22:21

## 2018-01-16 RX ADMIN — SENNOSIDES AND DOCUSATE SODIUM 2 TABLET: 8.6; 5 TABLET ORAL at 22:09

## 2018-01-16 RX ADMIN — ROSUVASTATIN CALCIUM 20 MG: 20 TABLET, FILM COATED ORAL at 22:22

## 2018-01-16 RX ADMIN — ASPIRIN 81 MG 81 MG: 81 TABLET ORAL at 22:19

## 2018-01-16 RX ADMIN — SUCRALFATE 1 G: 1 TABLET ORAL at 22:09

## 2018-01-16 NOTE — ED PROVIDER NOTES
Subjective   HPI Comments: Patient presents from her Department chief complaint of chest pain patient has a history of hypertension hyperlipidemia A. Fib.  He woke up this morning with substernal pressure burning that was nonradiating associated with some slight nausea this and is lasted about 30 minutes and then resolved he was out shoveling snow this afternoon when he developed the pain again again substernal burning pain was nonradiating with associated shortness of breath of the symptoms as I have started to usha at this time the second episode of pain started around 2:30 in the afternoon he denies any diaphoresis syncope palpitations he denies any lower show any swelling trauma or injury to his chest denies any cough runny nose respiratory complaints he is unsure of his last stress test      History provided by:  Patient   used: No        Review of Systems   Constitutional: Negative.    HENT: Negative.    Eyes: Negative.    Respiratory: Positive for shortness of breath. Negative for apnea, cough, choking, chest tightness, wheezing and stridor.    Gastrointestinal: Positive for nausea. Negative for abdominal distention, abdominal pain, anal bleeding, blood in stool, constipation, diarrhea, rectal pain and vomiting.   Genitourinary: Negative.    Musculoskeletal: Negative.    Skin: Negative.    Neurological: Negative.        Past Medical History:   Diagnosis Date   • Atrial fibrillation    • Hyperlipidemia    • Hypertension    • Nonrheumatic mitral (valve) insufficiency    • Palpitations    • Sleep apnea    • SOB (shortness of breath)        Allergies   Allergen Reactions   • Penicillins    • Vancomycin        Past Surgical History:   Procedure Laterality Date   • APPENDECTOMY     • CARDIOVERSION     • COLONOSCOPY N/A 7/31/2017    Procedure: COLONOSCOPY;  Surgeon: Billy Robbins DO;  Location: [facility] ENDOSCOPY;  Service:    • EP STUDY     • KNEE SURGERY         History reviewed. No  pertinent family history.    Social History     Social History   • Marital status:      Spouse name: N/A   • Number of children: N/A   • Years of education: N/A     Social History Main Topics   • Smoking status: Never Smoker   • Smokeless tobacco: Never Used   • Alcohol use No   • Drug use: No   • Sexual activity: Defer     Other Topics Concern   • None     Social History Narrative           Objective   Physical Exam   Constitutional: He is oriented to person, place, and time. He appears well-developed and well-nourished.   HENT:   Head: Normocephalic and atraumatic.   Nose: Nose normal.   Mouth/Throat: Oropharynx is clear and moist.   Eyes: Conjunctivae and EOM are normal. Pupils are equal, round, and reactive to light.   Neck: Normal range of motion. Neck supple. No JVD present.   Cardiovascular: Normal rate, regular rhythm, normal heart sounds and intact distal pulses.  Exam reveals no gallop and no friction rub.    No murmur heard.  Pulmonary/Chest: Effort normal and breath sounds normal. No stridor. No respiratory distress. He has no wheezes. He has no rales. He exhibits no tenderness.   Abdominal: Soft. Bowel sounds are normal. He exhibits no distension and no mass. There is no tenderness. There is no rebound and no guarding. No hernia.   Musculoskeletal: Normal range of motion. He exhibits no edema, tenderness or deformity.   Neurological: He is alert and oriented to person, place, and time.   Skin: Skin is warm and dry. No rash noted. No erythema. No pallor.   Nursing note and vitals reviewed.      ECG 12 Lead    Date/Time: 1/16/2018 5:32 PM  Performed by: NEO SILVEIRA  Authorized by: NEO SILVEIRA   Rhythm: sinus rhythm  Rate: normal  BPM: 73  Clinical impression: non-specific ECG               ED Course  ED Course        Labs Reviewed   CBC WITH AUTO DIFFERENTIAL - Abnormal; Notable for the following:        Result Value    Hemoglobin 13.1 (*)     Immature Grans, Absolute 0.03 (*)      All other components within normal limits   TROPONIN (IN-HOUSE) - Normal   COMPREHENSIVE METABOLIC PANEL - Normal   BNP (IN-HOUSE) - Normal   PROTIME-INR - Normal    Narrative:     Therapeutic range for most indications is 2.0-3.0 INR,  or 2.5-3.5 for mechanical heart valves.   RAINBOW DRAW    Narrative:     The following orders were created for panel order [place] Draw.  Procedure                               Abnormality         Status                     ---------                               -----------         ------                     Light Blue Top[[id number]]                                   Final result               Green Top (Gel)[[id number]]                                  Final result               Lavender Top[[id number]]                                     Final result               Gold Top - SST[[id number]]                                   Final result                 Please view results for these tests on the individual orders.   TROPONIN (IN-HOUSE)   CBC AND DIFFERENTIAL    Narrative:     The following orders were created for panel order CBC & Differential.  Procedure                               Abnormality         Status                     ---------                               -----------         ------                     CBC Auto Differential[[id number]]        Abnormal            Final result                 Please view results for these tests on the individual orders.   LIGHT BLUE TOP   GREEN TOP   LAVENDER TOP   GOLD TOP - SST       XR Chest 1 View   Final Result   CONCLUSION: No evidence of active disease.      Electronically signed by:  Vadim Giordano MD  1/16/2018 4:44 PM [facility]   Workstation: [id number]                        MDM  Number of Diagnoses or Management Options  Chest pain, unspecified type:   Patient Progress  Patient progress: (Patient's pain is improved at this time at this time plan is to admit to [facility] service for chest pain rule out and have aspirin prior  to)      Final diagnoses:   Chest pain, unspecified type            Augie Chery MD  01/16/18 9827

## 2018-01-17 VITALS
WEIGHT: 306.44 LBS | BODY MASS INDEX: 39.33 KG/M2 | SYSTOLIC BLOOD PRESSURE: 157 MMHG | TEMPERATURE: 99 F | DIASTOLIC BLOOD PRESSURE: 70 MMHG | HEART RATE: 77 BPM | RESPIRATION RATE: 16 BRPM | OXYGEN SATURATION: 98 % | HEIGHT: 74 IN

## 2018-01-17 PROBLEM — R07.89 MID STERNAL CHEST PAIN: Status: RESOLVED | Noted: 2018-01-16 | Resolved: 2018-01-17

## 2018-01-17 LAB
ANION GAP SERPL CALCULATED.3IONS-SCNC: 10 MMOL/L (ref 5–15)
BASOPHILS # BLD AUTO: 0.03 10*3/MM3 (ref 0–0.2)
BASOPHILS NFR BLD AUTO: 0.4 % (ref 0–2)
BUN BLD-MCNC: 20 MG/DL (ref 7–21)
BUN/CREAT SERPL: 17.4 (ref 7–25)
CALCIUM SPEC-SCNC: 9.5 MG/DL (ref 8.4–10.2)
CHLORIDE SERPL-SCNC: 103 MMOL/L (ref 95–110)
CO2 SERPL-SCNC: 27 MMOL/L (ref 22–31)
CREAT BLD-MCNC: 1.15 MG/DL (ref 0.7–1.3)
DEPRECATED RDW RBC AUTO: 43.9 FL (ref 35.1–43.9)
EOSINOPHIL # BLD AUTO: 0.14 10*3/MM3 (ref 0–0.7)
EOSINOPHIL NFR BLD AUTO: 1.8 % (ref 0–7)
ERYTHROCYTE [DISTWIDTH] IN BLOOD BY AUTOMATED COUNT: 13.3 % (ref 11.5–14.5)
GFR SERPL CREATININE-BSD FRML MDRD: 64 ML/MIN/1.73 (ref 49–113)
GLUCOSE BLD-MCNC: 113 MG/DL (ref 60–100)
HCT VFR BLD AUTO: 39.7 % (ref 39–49)
HGB BLD-MCNC: 12.9 G/DL (ref 13.7–17.3)
IMM GRANULOCYTES # BLD: 0.04 10*3/MM3 (ref 0–0.02)
IMM GRANULOCYTES NFR BLD: 0.5 % (ref 0–0.5)
LYMPHOCYTES # BLD AUTO: 1.84 10*3/MM3 (ref 0.6–4.2)
LYMPHOCYTES NFR BLD AUTO: 24 % (ref 10–50)
MCH RBC QN AUTO: 29.3 PG (ref 26.5–34)
MCHC RBC AUTO-ENTMCNC: 32.5 G/DL (ref 31.5–36.3)
MCV RBC AUTO: 90.2 FL (ref 80–98)
MONOCYTES # BLD AUTO: 0.66 10*3/MM3 (ref 0–0.9)
MONOCYTES NFR BLD AUTO: 8.6 % (ref 0–12)
NEUTROPHILS # BLD AUTO: 4.97 10*3/MM3 (ref 2–8.6)
NEUTROPHILS NFR BLD AUTO: 64.7 % (ref 37–80)
PLATELET # BLD AUTO: 208 10*3/MM3 (ref 150–450)
PMV BLD AUTO: 9.6 FL (ref 8–12)
POTASSIUM BLD-SCNC: 4.1 MMOL/L (ref 3.5–5.1)
RBC # BLD AUTO: 4.4 10*6/MM3 (ref 4.37–5.74)
SODIUM BLD-SCNC: 140 MMOL/L (ref 137–145)
TROPONIN I SERPL-MCNC: 0.02 NG/ML
TROPONIN I SERPL-MCNC: 0.02 NG/ML
WBC NRBC COR # BLD: 7.68 10*3/MM3 (ref 3.2–9.8)

## 2018-01-17 PROCEDURE — 84484 ASSAY OF TROPONIN QUANT: CPT | Performed by: FAMILY MEDICINE

## 2018-01-17 PROCEDURE — 85025 COMPLETE CBC W/AUTO DIFF WBC: CPT | Performed by: FAMILY MEDICINE

## 2018-01-17 PROCEDURE — 80048 BASIC METABOLIC PNL TOTAL CA: CPT | Performed by: FAMILY MEDICINE

## 2018-01-17 PROCEDURE — 99219 PR INITIAL OBSERVATION CARE/DAY 50 MINUTES: CPT | Performed by: FAMILY MEDICINE

## 2018-01-17 PROCEDURE — 94799 UNLISTED PULMONARY SVC/PX: CPT

## 2018-01-17 PROCEDURE — G0378 HOSPITAL OBSERVATION PER HR: HCPCS

## 2018-01-17 RX ORDER — ASPIRIN 81 MG/1
81 TABLET ORAL DAILY
Qty: 30 TABLET | Refills: 2 | Status: SHIPPED | OUTPATIENT
Start: 2018-01-18 | End: 2021-07-27

## 2018-01-17 RX ORDER — NITROGLYCERIN 0.4 MG/1
0.4 TABLET SUBLINGUAL
Qty: 30 TABLET | Refills: 1 | Status: SHIPPED | OUTPATIENT
Start: 2018-01-17

## 2018-01-17 RX ADMIN — SUCRALFATE 1 G: 1 TABLET ORAL at 06:33

## 2018-01-17 RX ADMIN — PANTOPRAZOLE SODIUM 40 MG: 40 TABLET, DELAYED RELEASE ORAL at 06:33

## 2018-01-17 RX ADMIN — HYDROCHLOROTHIAZIDE 12.5 MG: 25 TABLET ORAL at 08:56

## 2018-01-17 RX ADMIN — ASPIRIN 81 MG: 81 TABLET, COATED ORAL at 08:55

## 2018-01-17 NOTE — DISCHARGE SUMMARY
DISCHARGE SUMMARY    NAME: Raghav Olivier   PHYSICIAN: Jacquie Kenney MD  : 1955  MRN: [id number]    ADMITTED: 2018   DISCHARGED: 18    ADMISSION DIAGNOSES:  Active Hospital Problems (** Indicates Principal Problem)    Diagnosis Date Noted   • TAMARA on CPAP [G47.33, Z99.89] 2018   • Class 2 obesity with serious comorbidity and body mass index (BMI) of 39.0 to 39.9 in adult [E66.9, Z68.39] 2018   • Essential hypertension [I10] 2017   • Hyperlipidemia [E78.5] 2017      Resolved Hospital Problems    Diagnosis Date Noted Date Resolved   • **Mid sternal chest pain [R07.89] 2018     DISCHARGE DIAGNOSES:   Active Hospital Problems (** Indicates Principal Problem)    Diagnosis Date Noted   • TAMARA on CPAP [G47.33, Z99.89] 2018   • Class 2 obesity with serious comorbidity and body mass index (BMI) of 39.0 to 39.9 in adult [E66.9, Z68.39] 2018   • Essential hypertension [I10] 2017   • Hyperlipidemia [E78.5] 2017      Resolved Hospital Problems    Diagnosis Date Noted Date Resolved   • **Mid sternal chest pain [R07.89] 2018       SERVICE: Family Medicine. Attending Dr Carnes, Resident Jacquie Kenney MD    CONSULTS:   Consult Orders (all)     Start     Ordered    18  Inpatient Consult to Nutrition  Once     Comments:  Patient is approaching morbid obesity and has multiple cardiac risk factors. May benefit from information of appropriate diet.   Provider:  (Not yet assigned)    18 2353          PROCEDURES:   Xr Chest 1 View    Result Date: 2018  Chest single view. CLINICAL INDICATION: Shortness of breath . Chest pain protocol. COMPARISON: None FINDINGS: Cardiac silhouette is normal in size. Pulmonary vascularity is unremarkable. No focal infiltrate or consolidation.  No pleural effusion.  No pneumothorax.     CONCLUSION: No evidence of active disease. Electronically signed by:  Vadim Giordano MD   1/16/2018 4:44 PM [facility] Workstation: [id number]    HISTORY OF PRESENT ILLNESS:   Raghav Olivier is a 62 y.o. male who presents for chest pain that started yesterday. Patient stated that the pain was mainly localized to the central portion of his chest, described as a pressure-like sensation, that is intermittent in nature and non radiating. He stated symptoms started after he was shoveling snow for approximately 2-3 hours. In addition, he stated that he did also eat a heavy meal today that could have flared up GERD-like symptoms. With his chest pain, he denied any dizziness, lightheadedness, palpitations, or SOA. In the ED, cardiac enzymes were negative and ECG revealed no ST-T wave abnormalities.         DIAGNOSTIC DATA:   Lab Results (last 24 hours)     Procedure Component Value Units Date/Time    Troponin [[id number]]  (Normal) Collected:  01/16/18 1638    Specimen:  Blood Updated:  01/16/18 1723     Troponin I 0.023 ng/mL     CBC & Differential [[id number]] Collected:  01/16/18 1638    Specimen:  Blood Updated:  01/16/18 1701    Narrative:       The following orders were created for panel order CBC & Differential.  Procedure                               Abnormality         Status                     ---------                               -----------         ------                     CBC Auto Differential[[id number]]        Abnormal            Final result                 Please view results for these tests on the individual orders.    Comprehensive Metabolic Panel [[id number]]  (Normal) Collected:  01/16/18 1638    Specimen:  Blood Updated:  01/16/18 1712     Glucose 79 mg/dL      BUN 21 mg/dL      Creatinine 1.28 mg/dL      Sodium 139 mmol/L      Potassium 3.9 mmol/L      Chloride 103 mmol/L      CO2 25.0 mmol/L      Calcium 9.9 mg/dL      Total Protein 7.1 g/dL      Albumin 4.40 g/dL      ALT (SGPT) 70 U/L      AST (SGOT) 39 U/L      Alkaline Phosphatase 43 U/L      Total Bilirubin 0.3 mg/dL      eGFR Non   Amer 57 mL/min/1.73      Globulin 2.7 gm/dL      A/G Ratio 1.6 g/dL      BUN/Creatinine Ratio 16.4     Anion Gap 11.0 mmol/L     BNP [[id number]]  (Normal) Collected:  01/16/18 1638    Specimen:  Blood Updated:  01/16/18 1723     proBNP 34.7 pg/mL     Protime-INR [[id number]]  (Normal) Collected:  01/16/18 1638    Specimen:  Blood Updated:  01/16/18 1718     Protime 12.4 Seconds      INR 0.93    Narrative:       Therapeutic range for most indications is 2.0-3.0 INR,  or 2.5-3.5 for mechanical heart valves.    CBC Auto Differential [[id number]]  (Abnormal) Collected:  01/16/18 1638    Specimen:  Blood Updated:  01/16/18 1701     WBC 9.74 10*3/mm3      RBC 4.39 10*6/mm3      Hemoglobin 13.1 (L) g/dL      Hematocrit 39.2 %      MCV 89.3 fL      MCH 29.8 pg      MCHC 33.4 g/dL      RDW 13.1 %      RDW-SD 42.8 fl      MPV 10.0 fL      Platelets 223 10*3/mm3      Neutrophil % 67.3 %      Lymphocyte % 24.1 %      Monocyte % 7.3 %      Eosinophil % 0.7 %      Basophil % 0.3 %      Immature Grans % 0.3 %      Neutrophils, Absolute 6.55 10*3/mm3      Lymphocytes, Absolute 2.35 10*3/mm3      Monocytes, Absolute 0.71 10*3/mm3      Eosinophils, Absolute 0.07 10*3/mm3      Basophils, Absolute 0.03 10*3/mm3      Immature Grans, Absolute 0.03 (H) 10*3/mm3     Troponin [[id number]]  (Normal) Collected:  01/16/18 2007    Specimen:  Blood Updated:  01/16/18 2047     Troponin I 0.031 ng/mL     Troponin [[id number]]  (Normal) Collected:  01/17/18 0251    Specimen:  Blood Updated:  01/17/18 0336     Troponin I 0.019 ng/mL     Basic Metabolic Panel [[id number]]  (Abnormal) Collected:  01/17/18 0751    Specimen:  Blood Updated:  01/17/18 0812     Glucose 113 (H) mg/dL      BUN 20 mg/dL      Creatinine 1.15 mg/dL      Sodium 140 mmol/L      Potassium 4.1 mmol/L      Chloride 103 mmol/L      CO2 27.0 mmol/L      Calcium 9.5 mg/dL      eGFR Non African Amer 64 mL/min/1.73      BUN/Creatinine Ratio 17.4     Anion Gap 10.0 mmol/L     CBC Auto  Differential [[id number]]  (Abnormal) Collected:  01/17/18 0751    Specimen:  Blood Updated:  01/17/18 0801     WBC 7.68 10*3/mm3      RBC 4.40 10*6/mm3      Hemoglobin 12.9 (L) g/dL      Hematocrit 39.7 %      MCV 90.2 fL      MCH 29.3 pg      MCHC 32.5 g/dL      RDW 13.3 %      RDW-SD 43.9 fl      MPV 9.6 fL      Platelets 208 10*3/mm3      Neutrophil % 64.7 %      Lymphocyte % 24.0 %      Monocyte % 8.6 %      Eosinophil % 1.8 %      Basophil % 0.4 %      Immature Grans % 0.5 %      Neutrophils, Absolute 4.97 10*3/mm3      Lymphocytes, Absolute 1.84 10*3/mm3      Monocytes, Absolute 0.66 10*3/mm3      Eosinophils, Absolute 0.14 10*3/mm3      Basophils, Absolute 0.03 10*3/mm3      Immature Grans, Absolute 0.04 (H) 10*3/mm3     Troponin [[id number]]  (Normal) Collected:  01/17/18 0751    Specimen:  Blood Updated:  01/17/18 0823     Troponin I 0.017 ng/mL            HOSPITAL COURSE:  He was observed overnight. He had a heart score of 3 based on age and risk factors. EKG was normal. He was monitored on telemetry with on acute events. He was continued on aspirin and statin. He was provided with CPAP as he uses at home. He has no more events of chest pain in the hospital. He was started on nitroglycerin as needed for chest pain. He will need to follow up with PCP within a week after discharge. Patient stated he sees a cardiologist, and has an appointment in February.     DISCHARGE CONDITION:   Stable    DISPOSITION:  Home or Self Care    DISCHARGE MEDICATIONS   Richard Olivierald Gera   Home Medication Instructions WILLIAM:[id number]    Printed on:01/17/18 8148   Medication Information                      aspirin 81 MG EC tablet  Take 1 tablet by mouth Daily.             diclofenac (VOLTAREN) 50 MG EC tablet               hydrochlorothiazide (HYDRODIURIL) 12.5 MG tablet  Take 12.5 mg by mouth Daily.             ibuprofen (ADVIL,MOTRIN) 200 MG tablet  Take 200 mg by mouth Every 6 (Six) Hours As Needed for mild pain (1-3).              losartan (COZAAR) 100 MG tablet               nitroglycerin (NITROSTAT) 0.4 MG SL tablet  Place 1 tablet under the tongue Every 5 (Five) Minutes As Needed for Chest Pain. Take no more than 3 doses in 15 minutes.             rosuvastatin (CRESTOR) 20 MG tablet  Take 20 mg by mouth Daily.                 INSTRUCTIONS:  Activity:   Activity Instructions     Activity as Tolerated                   Diet:   Diet Instructions     Advance Diet As Tolerated                   Special instructions: Patient instructed to call MD or return to ED with worsening shortness of breath, chest pain, fever greater than 100.4 degrees F or any other medical concerns..    FOLLOW UP:   Follow-up Information     Follow up with Kerline Clifford MD. Go on 1/24/2018.    Specialty:  Family Medicine    Why:  WEDNESDAY JANUARY 24TH 3:30 HOSPITAL FOLLOW UP    Contact information:    [facility] [facility] [address] [phone]            PENDING TEST RESULTS AT DISCHARGE      Time: 30 minutes    Dr Carnes is the attending at time of discharge, he is aware of the patient's status and agrees with the above discharge summary.              This document has been electronically signed by Jacquie Kenney MD on January 17, 2018 1:49 PM

## 2018-01-17 NOTE — PROGRESS NOTES
FAMILY MEDICINE DAILY PROGRESS NOTE  NAME: Raghav Olivier  : 1955  MRN: [id number]      LOS: 0 days     PROVIDER OF SERVICE: Jacquie Kenney MD    Chief Complaint: Mid sternal chest pain    Subjective:     Interval History:  History taken from: patient    Patient did not have any chest pain overnight. He has no complaints.     Review of Systems:   Review of Systems   Constitutional: Negative for chills, diaphoresis and fever.   HENT: Negative for sneezing and sore throat.    Eyes: Negative for pain and discharge.   Respiratory: Negative for cough and shortness of breath.    Gastrointestinal: Negative for constipation, diarrhea, nausea and vomiting.   Endocrine: Negative for cold intolerance and heat intolerance.   Genitourinary: Negative for difficulty urinating, dysuria, frequency and urgency.   Musculoskeletal: Negative for arthralgias and myalgias.   Skin: Negative for color change and pallor.   Allergic/Immunologic: Negative for environmental allergies and food allergies.   Neurological: Negative for dizziness, syncope and weakness.   Psychiatric/Behavioral: Negative for confusion and sleep disturbance.       Objective:     Vital Signs  Temp:  [97.7 °F (36.5 °C)-98.2 °F (36.8 °C)] 98.2 °F (36.8 °C)  Heart Rate:  [50-74] 65  Resp:  [16-20] 16  BP: ()/(51-80) 133/75  Body mass index is 39.34 kg/(m^2).    Physical Exam  Physical Exam   Constitutional: He is oriented to person, place, and time. He appears well-developed and well-nourished. No distress.   HENT:   Head: Normocephalic and atraumatic.   Nose: Nose normal.   Eyes: Conjunctivae and EOM are normal. Pupils are equal, round, and reactive to light.   Neck: Normal range of motion. Neck supple.   Cardiovascular: Normal rate, regular rhythm and normal heart sounds.    No murmur heard.  Pulmonary/Chest: Effort normal and breath sounds normal. No respiratory distress. He has no wheezes. He has no rales.   Abdominal: Soft. Bowel sounds  are normal. He exhibits no distension. There is no tenderness. There is no guarding.   Musculoskeletal: Normal range of motion.   Neurological: He is alert and oriented to person, place, and time.   Skin: Skin is warm and dry.   Psychiatric: He has a normal mood and affect. His behavior is normal.   Vitals reviewed.      Medication Review    Current Facility-Administered Medications:   •  acetaminophen (TYLENOL) tablet 650 mg, 650 mg, Oral, Q4H PRN, Megan Mckinney MD  •  [COMPLETED] aspirin chewable tablet 81 mg, 81 mg, Oral, Once, 81 mg at 01/16/18 2219 **AND** aspirin EC tablet 81 mg, 81 mg, Oral, Daily, Megan Mckinney MD  •  hydrochlorothiazide (HYDRODIURIL) tablet 12.5 mg, 12.5 mg, Oral, Daily, Megan Mckinney MD  •  losartan (COZAAR) tablet 100 mg, 100 mg, Oral, Q24H, Megan Mckinney MD, 100 mg at 01/16/18 2221  •  metoprolol tartrate (LOPRESSOR) injection 5 mg, 5 mg, Intravenous, Q15 Min PRN, Megan Mckinney MD  •  nitroglycerin (NITROSTAT) SL tablet 0.4 mg, 0.4 mg, Sublingual, Q5 Min PRN, Megan Mckinney MD  •  ondansetron (ZOFRAN) tablet 4 mg, 4 mg, Oral, Q6H PRN **OR** ondansetron ODT (ZOFRAN-ODT) disintegrating tablet 4 mg, 4 mg, Oral, Q6H PRN **OR** ondansetron (ZOFRAN) injection 4 mg, 4 mg, Intravenous, Q6H PRN, Megan Mckinney MD  •  pantoprazole (PROTONIX) EC tablet 40 mg, 40 mg, Oral, Q AM, Megan Mckinney MD, 40 mg at 01/17/18 0633  •  rosuvastatin (CRESTOR) tablet 20 mg, 20 mg, Oral, Daily, Megan Mckinney MD, 20 mg at 01/16/18 2222  •  sennosides-docusate sodium (SENOKOT-S) 8.6-50 MG tablet 2 tablet, 2 tablet, Oral, Nightly, Megan Mckinney MD, 2 tablet at 01/16/18 2268  •  sodium chloride 0.9 % flush 1-10 mL, 1-10 mL, Intravenous, PRN, Megan Mckinney MD  •  sucralfate (CARAFATE) tablet 1 g, 1 g, Oral, 4x Daily AC & at Bedtime, Megan Mckinney MD, 1 g at 01/17/18 0603     Diagnostic Data    Lab Results (last 24 hours)     Procedure  Component Value Units Date/Time    CBC & Differential [[id number]] Collected:  01/16/18 1638    Specimen:  Blood Updated:  01/16/18 1701    Narrative:       The following orders were created for panel order CBC & Differential.  Procedure                               Abnormality         Status                     ---------                               -----------         ------                     CBC Auto Differential[[id number]]        Abnormal            Final result                 Please view results for these tests on the individual orders.    CBC Auto Differential [[id number]]  (Abnormal) Collected:  01/16/18 1638    Specimen:  Blood Updated:  01/16/18 1701     WBC 9.74 10*3/mm3      RBC 4.39 10*6/mm3      Hemoglobin 13.1 (L) g/dL      Hematocrit 39.2 %      MCV 89.3 fL      MCH 29.8 pg      MCHC 33.4 g/dL      RDW 13.1 %      RDW-SD 42.8 fl      MPV 10.0 fL      Platelets 223 10*3/mm3      Neutrophil % 67.3 %      Lymphocyte % 24.1 %      Monocyte % 7.3 %      Eosinophil % 0.7 %      Basophil % 0.3 %      Immature Grans % 0.3 %      Neutrophils, Absolute 6.55 10*3/mm3      Lymphocytes, Absolute 2.35 10*3/mm3      Monocytes, Absolute 0.71 10*3/mm3      Eosinophils, Absolute 0.07 10*3/mm3      Basophils, Absolute 0.03 10*3/mm3      Immature Grans, Absolute 0.03 (H) 10*3/mm3     Comprehensive Metabolic Panel [[id number]]  (Normal) Collected:  01/16/18 1638    Specimen:  Blood Updated:  01/16/18 1712     Glucose 79 mg/dL      BUN 21 mg/dL      Creatinine 1.28 mg/dL      Sodium 139 mmol/L      Potassium 3.9 mmol/L      Chloride 103 mmol/L      CO2 25.0 mmol/L      Calcium 9.9 mg/dL      Total Protein 7.1 g/dL      Albumin 4.40 g/dL      ALT (SGPT) 70 U/L      AST (SGOT) 39 U/L      Alkaline Phosphatase 43 U/L      Total Bilirubin 0.3 mg/dL      eGFR Non African Amer 57 mL/min/1.73      Globulin 2.7 gm/dL      A/G Ratio 1.6 g/dL      BUN/Creatinine Ratio 16.4     Anion Gap 11.0 mmol/L     Protime-INR [[id number]]   (Normal) Collected:  01/16/18 1638    Specimen:  Blood Updated:  01/16/18 1718     Protime 12.4 Seconds      INR 0.93    Narrative:       Therapeutic range for most indications is 2.0-3.0 INR,  or 2.5-3.5 for mechanical heart valves.    Troponin [[id number]]  (Normal) Collected:  01/16/18 1638    Specimen:  Blood Updated:  01/16/18 1723     Troponin I 0.023 ng/mL     BNP [[id number]]  (Normal) Collected:  01/16/18 1638    Specimen:  Blood Updated:  01/16/18 1723     proBNP 34.7 pg/mL     [place] Draw [[id number]] Collected:  01/16/18 1638    Specimen:  Blood Updated:  01/16/18 1746    Narrative:       The following orders were created for panel order [place] Draw.  Procedure                               Abnormality         Status                     ---------                               -----------         ------                     Light Blue Top[[id number]]                                   Final result               Green Top (Gel)[[id number]]                                  Final result               Lavender Top[[id number]]                                     Final result               Gold Top - SST[[id number]]                                   Final result                 Please view results for these tests on the individual orders.    Light Blue Top [[id number]] Collected:  01/16/18 1638    Specimen:  Blood Updated:  01/16/18 1746     Extra Tube hold for add-on      Auto resulted       Green Top (Gel) [[id number]] Collected:  01/16/18 1638    Specimen:  Blood Updated:  01/16/18 1746     Extra Tube Hold for add-ons.      Auto resulted.       Lavender Top [[id number]] Collected:  01/16/18 1638    Specimen:  Blood Updated:  01/16/18 1746     Extra Tube hold for add-on      Auto resulted       Gold Top - SST [[id number]] Collected:  01/16/18 1638    Specimen:  Blood Updated:  01/16/18 1746     Extra Tube Hold for add-ons.      Auto resulted.       Troponin [[id number]]  (Normal) Collected:  01/16/18 2007    Specimen:   Blood Updated:  01/16/18 2047     Troponin I 0.031 ng/mL     Troponin [[id number]]  (Normal) Collected:  01/17/18 0251    Specimen:  Blood Updated:  01/17/18 0336     Troponin I 0.019 ng/mL     Basic Metabolic Panel [[id number]] Collected:  01/17/18 0751    Specimen:  Blood Updated:  01/17/18 0755    CBC Auto Differential [[id number]] Collected:  01/17/18 0751    Specimen:  Blood Updated:  01/17/18 0755    Troponin [[id number]] Collected:  01/17/18 0751    Specimen:  Blood Updated:  01/17/18 0755            I reviewed the patient's new clinical results.    Assessment/Plan:     Active Hospital Problems (** Indicates Principal Problem)    Diagnosis Date Noted   • **Mid sternal chest pain [R07.89] 01/16/2018     -Heart score: 3 based on age and risk factors (obesity, hypertension, hyperlipidemia)  -EKG in the ED revealed normal sinus rhythm and no ST-T segment abnormalities  -Initial troponin in the ED was negative; we'll continue to monitor troponin levels  -Continue antiplatelet therapy with aspirin, continue statin therapy  -We'll monitor cardiac activity via telemetry  -We'll provide oxygen therapy as needed and nitroglycerin as needed  -We'll provide GI cocktail for potential GI etiology to chest pain     • TAMARA on CPAP [G47.33, Z99.89] 01/16/2018     -Patient is compliant with CPAP at home; will provide CPAP during inpatient course     • Class 2 obesity with serious comorbidity and body mass index (BMI) of 39.0 to 39.9 in adult [E66.9, Z68.39] 01/16/2018     -We'll consult dietitian for information on weight loss and calorie counting     • Essential hypertension [I10] 01/20/2017     -Continue home antihypertensive therapy; losartan 100 mg and hydrochlorothiazide 12.5 mg  -Monitor blood pressure per hospital protocol  -When necessary hydralazine for systolic blood pressure greater than 160     • Hyperlipidemia [E78.5] 01/20/2017     -Continue statin therapy  -Last lipid panel was in October 2017; cholesterol appeared  to be well controlled with statin therapy  Component Name Value Ref Range   Total Cholesterol 156 0 - 200 mg/dL   Triglycerides 157 20 - 200 mg/dL   HDL Cholesterol 29 29 - 71 mg/dL   LDL 96  mg/dL           Resolved Hospital Problems    Diagnosis Date Noted Date Resolved   No resolved problems to display.       DVT prophylaxis: SCD/TEDS  Code status is Full Code    Plan for disposition:home possibly today       Time: 30 minutes        This document has been electronically signed by Jacquie Kenney MD on January 17, 2018 7:57 AM

## 2018-01-17 NOTE — H&P
HISTORY AND PHYSICAL  NAME: Raghav Olivier  : 1955  MRN: [id number]    DATE OF ADMISSION: 18    DATE & TIME SEEN: 18 6:59 PM    PCP: Kerline Clifford MD    CODE STATUS: Full Code    CHIEF COMPLAINT  Chief Complaint   Patient presents with   • Chest Pain     HPI:  Raghav Olivier is a 62 y.o. obese  male who presents with complaints of non radiating, midsternal chest pain that awoke him from his sleep this morning and lasted approximately 2-3 seconds; though he does express some chest comfort that persisted throughout the day.  Patient experienced the midsternal chest pain again, lasting a few seconds, while he was shoveling snow this afternoon.  He denies any unexplained diaphoresis, dizziness, lightheadedness, nausea, or bitter/metallic taste in his mouth.  He took Lennie-[place], an aspirin, and Prilosec but cannot comment on any alleviating or exacerbating factors.  He does report a history of GERD that was treated with antacids prior to starting therapy with his CPAP for TAMARA resolved his GI symptoms.  Patient did not have anything to eat today but he did have a heavy spaghetti dinner with red sauce and onions.    Given his cardiac risk factors of paroxysmal atrial fibrillation, essential hypertension, hyperlipidemia, and obesity patient came to the ED with his wife for further evaluation.  The patient's initial workup in the ED was unremarkable.  EKG revealed normal sinus rhythm with no ST segment abnormalities.  Initial troponin was negative.  And patient's reported chest pain had resolved by the time he was evaluated by the admission team.  Given his cardiac risk factors, decision was made to admit the patient overnight for observation.    CONCURRENT MEDICAL HISTORY:  Past Medical History:   Diagnosis Date   • Atrial fibrillation    • Hyperlipidemia    • Hypertension    • Nonrheumatic mitral (valve) insufficiency    • Palpitations    • Sleep apnea    • SOB (shortness of breath)         PAST SURGICAL HISTORY:  Past Surgical History:   Procedure Laterality Date   • APPENDECTOMY     • CARDIOVERSION     • COLONOSCOPY N/A 7/31/2017    Procedure: COLONOSCOPY;  Surgeon: Billy Robbins DO;  Location: [facility] ENDOSCOPY;  Service:    • EP STUDY     • KNEE SURGERY Right    • SHOULDER SURGERY Left 2009       FAMILY HISTORY:  Family History   Problem Relation Age of Onset   • Cancer Mother      colon   • Cancer Father      prostate   • Dementia Father         SOCIAL HISTORY:  Social History     Social History   • Marital status:      Spouse name: N/A   • Number of children: N/A   • Years of education: N/A     Occupational History   • Not on file.     Social History Main Topics   • Smoking status: Former Smoker     Packs/day: 0.25     Years: 4.00     Types: Cigarettes     Start date: 1969     Quit date: 1973   • Smokeless tobacco: Never Used   • Alcohol use No   • Drug use: No   • Sexual activity: Yes     Partners: Female     Other Topics Concern   • Not on file     Social History Narrative    Patient is a retired . 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        HOME MEDICATIONS:  No current facility-administered medications on file prior to encounter.      Current Outpatient Prescriptions on File Prior to Encounter   Medication Sig Dispense Refill   • diclofenac (VOLTAREN) 50 MG EC tablet      • hydrochlorothiazide (HYDRODIURIL) 12.5 MG tablet Take 12.5 mg by mouth Daily.     • losartan (COZAAR) 100 MG tablet      • rosuvastatin (CRESTOR) 20 MG tablet Take 20 mg by mouth Daily.     • ibuprofen (ADVIL,MOTRIN) 200 MG tablet Take 200 mg by mouth Every 6 (Six) Hours As Needed for mild pain (1-3).         ALLERGIES:  Penicillins and Vancomycin    REVIEW OF SYSTEMS  Review of Systems   Constitutional: Positive for diaphoresis (while shoveling snow). Negative for chills, fatigue and fever.   HENT: Negative for rhinorrhea, sneezing and sore throat.    Eyes: Negative for discharge, redness and visual  disturbance.   Respiratory: Negative for cough and shortness of breath.    Cardiovascular: Positive for chest pain and leg swelling. Negative for palpitations.   Gastrointestinal: Negative for constipation, diarrhea, nausea and vomiting.   Genitourinary: Negative for difficulty urinating, dysuria and hematuria.   Neurological: Positive for headaches (due to lack of coffee). Negative for dizziness and light-headedness.   Psychiatric/Behavioral: Negative for agitation, confusion and sleep disturbance (sleeping well with CPAP).       PHYSICAL EXAM:  Temp:  [97.9 °F (36.6 °C)] 97.9 °F (36.6 °C)  Heart Rate:  [60-74] 60  Resp:  [20] 20  BP: (140-176)/(77-80) 140/77  Body mass index is 39.42 kg/(m^2).  Physical Exam   Constitutional: He is oriented to person, place, and time. He appears well-developed and well-nourished. No distress.   HENT:   Head: Normocephalic and atraumatic.   Right Ear: Tympanic membrane and ear canal normal.   Left Ear: Tympanic membrane and ear canal normal.   Nose: Nose normal.   Mouth/Throat: Oropharynx is clear and moist. No oropharyngeal exudate.   Eyes: Conjunctivae and EOM are normal. Pupils are equal, round, and reactive to light. No scleral icterus.   Neck: Normal range of motion. Neck supple. No tracheal deviation present. No thyromegaly present.   Cardiovascular: Normal rate, regular rhythm, normal heart sounds and intact distal pulses.    Pulmonary/Chest: Effort normal and breath sounds normal. He has no wheezes. He has no rales.   Abdominal: Bowel sounds are normal. There is tenderness in the epigastric area.   Obese abdomen.    Musculoskeletal: He exhibits no tenderness or deformity.   Lymphadenopathy:     He has no cervical adenopathy.   Neurological: He is alert and oriented to person, place, and time. No cranial nerve deficit.   Skin: Skin is warm and dry. He is not diaphoretic.   Psychiatric: He has a normal mood and affect. His behavior is normal. Judgment and thought content  normal.   Nursing note and vitals reviewed.      DIAGNOSTIC DATA:   Lab Results (last 24 hours)     Procedure Component Value Units Date/Time    CBC & Differential [[id number]] Collected:  01/16/18 1638    Specimen:  Blood Updated:  01/16/18 1701    Narrative:       The following orders were created for panel order CBC & Differential.  Procedure                               Abnormality         Status                     ---------                               -----------         ------                     CBC Auto Differential[[id number]]        Abnormal            Final result                 Please view results for these tests on the individual orders.    CBC Auto Differential [[id number]]  (Abnormal) Collected:  01/16/18 1638    Specimen:  Blood Updated:  01/16/18 1701     WBC 9.74 10*3/mm3      RBC 4.39 10*6/mm3      Hemoglobin 13.1 (L) g/dL      Hematocrit 39.2 %      MCV 89.3 fL      MCH 29.8 pg      MCHC 33.4 g/dL      RDW 13.1 %      RDW-SD 42.8 fl      MPV 10.0 fL      Platelets 223 10*3/mm3      Neutrophil % 67.3 %      Lymphocyte % 24.1 %      Monocyte % 7.3 %      Eosinophil % 0.7 %      Basophil % 0.3 %      Immature Grans % 0.3 %      Neutrophils, Absolute 6.55 10*3/mm3      Lymphocytes, Absolute 2.35 10*3/mm3      Monocytes, Absolute 0.71 10*3/mm3      Eosinophils, Absolute 0.07 10*3/mm3      Basophils, Absolute 0.03 10*3/mm3      Immature Grans, Absolute 0.03 (H) 10*3/mm3     Comprehensive Metabolic Panel [[id number]]  (Normal) Collected:  01/16/18 1638    Specimen:  Blood Updated:  01/16/18 1712     Glucose 79 mg/dL      BUN 21 mg/dL      Creatinine 1.28 mg/dL      Sodium 139 mmol/L      Potassium 3.9 mmol/L      Chloride 103 mmol/L      CO2 25.0 mmol/L      Calcium 9.9 mg/dL      Total Protein 7.1 g/dL      Albumin 4.40 g/dL      ALT (SGPT) 70 U/L      AST (SGOT) 39 U/L      Alkaline Phosphatase 43 U/L      Total Bilirubin 0.3 mg/dL      eGFR Non African Amer 57 mL/min/1.73      Globulin 2.7 gm/dL       A/G Ratio 1.6 g/dL      BUN/Creatinine Ratio 16.4     Anion Gap 11.0 mmol/L     Protime-INR [[id number]]  (Normal) Collected:  01/16/18 1638    Specimen:  Blood Updated:  01/16/18 1718     Protime 12.4 Seconds      INR 0.93    Narrative:       Therapeutic range for most indications is 2.0-3.0 INR,  or 2.5-3.5 for mechanical heart valves.    Troponin [[id number]]  (Normal) Collected:  01/16/18 1638    Specimen:  Blood Updated:  01/16/18 1723     Troponin I 0.023 ng/mL     BNP [[id number]]  (Normal) Collected:  01/16/18 1638    Specimen:  Blood Updated:  01/16/18 1723     proBNP 34.7 pg/mL     [place] Draw [[id number]] Collected:  01/16/18 1638    Specimen:  Blood Updated:  01/16/18 1746    Narrative:       The following orders were created for panel order [place] Draw.  Procedure                               Abnormality         Status                     ---------                               -----------         ------                     Light Blue Top[[id number]]                                   Final result               Green Top (Gel)[[id number]]                                  Final result               Lavender Top[[id number]]                                     Final result               Gold Top - SST[[id number]]                                   Final result                 Please view results for these tests on the individual orders.    Light Blue Top [[id number]] Collected:  01/16/18 1638    Specimen:  Blood Updated:  01/16/18 1746     Extra Tube hold for add-on      Auto resulted       Green Top (Gel) [[id number]] Collected:  01/16/18 1638    Specimen:  Blood Updated:  01/16/18 1746     Extra Tube Hold for add-ons.      Auto resulted.       Lavender Top [[id number]] Collected:  01/16/18 1638    Specimen:  Blood Updated:  01/16/18 1746     Extra Tube hold for add-on      Auto resulted       Gold Top - SST [[id number]] Collected:  01/16/18 1638    Specimen:  Blood Updated:  01/16/18 1746     Extra Tube Hold for  add-ons.      Auto resulted.              Imaging Results (last 24 hours)     Procedure Component Value Units Date/Time    XR Chest 1 View [[id number]] Collected:  01/16/18 1625     Updated:  01/16/18 1645    Narrative:       Chest single view.       CLINICAL INDICATION: Shortness of breath . Chest pain protocol.    COMPARISON: None    FINDINGS: Cardiac silhouette is normal in size. Pulmonary  vascularity is unremarkable.     No focal infiltrate or consolidation.  No pleural effusion.  No  pneumothorax.      Impression:       CONCLUSION: No evidence of active disease.    Electronically signed by:  Vadim Giordano MD  1/16/2018 4:44 PM CST  Workstation: [id number]        I reviewed the patient's new clinical results.    ASSESSMENT AND PLAN: This is a 62 y.o. male with:    Active Hospital Problems (** Indicates Principal Problem)    Diagnosis Date Noted   • **Mid sternal chest pain [R07.89] 01/16/2018     Priority: High     -Heart score: 3 based on age and risk factors (obesity, hypertension, hyperlipidemia)  -EKG in the ED revealed normal sinus rhythm and no ST-T segment abnormalities  -Initial troponin in the ED was negative; we'll continue to monitor troponin levels  -Continue antiplatelet therapy with aspirin, continue statin therapy  -We'll monitor cardiac activity via telemetry  -We'll provide oxygen therapy as needed and nitroglycerin as needed  -We'll provide GI cocktail for potential GI etiology to chest pain     • TAMARA on CPAP [G47.33, Z99.89] 01/16/2018     Priority: Medium     -Patient is compliant with CPAP at home; will provide CPAP during inpatient course     • Class 2 obesity with serious comorbidity and body mass index (BMI) of 39.0 to 39.9 in adult [E66.9, Z68.39] 01/16/2018     Priority: Medium     -We'll consult dietitian for information on weight loss and calorie counting     • Essential hypertension [I10] 01/20/2017     Priority: Medium     -Continue home antihypertensive therapy; losartan 100 mg and  hydrochlorothiazide 12.5 mg  -Monitor blood pressure per hospital protocol  -When necessary hydralazine for systolic blood pressure greater than 160     • Hyperlipidemia [E78.5] 01/20/2017     Priority: Medium     -Continue statin therapy  -Last lipid panel was in October 2017; cholesterol appeared to be well controlled with statin therapy  Component Name Value Ref Range   Total Cholesterol 156 0 - 200 mg/dL   Triglycerides 157 20 - 200 mg/dL   HDL Cholesterol 29 29 - 71 mg/dL   LDL 96  mg/dL           Resolved Hospital Problems    Diagnosis Date Noted Date Resolved   No resolved problems to display.       We will continue to monitor the patient's course and adjust our care accordingly.     DVT prophylaxis: SCDs/DIAMANTE  Code status is Full Code  [facility] # [id number], reviewed and consistent with patient reported medications.    Anticipated length of stay: 1 day    I discussed the patients findings and my recommendations with patient, family and primary care team.     Dr. Javier Kimball is the attending on record at time of admission; he is aware of the patient's status and agrees with the above history and physical.    Signature  Megan Mckinney MD  [facility] Family Medicine Resident, PGYII        This document has been electronically signed by Megan Mckinney MD on January 16, 2018 6:59 PM

## 2018-01-17 NOTE — CONSULTS
"Adult Nutrition  Assessment    Patient Name:  Raghav Olivier  YOB: 1955  MRN: [id number]  Admit Date:  1/16/2018    Assessment Date:  1/17/2018    Comments:  BMI 39 with pt at 158% IBW.  Making [facility] Choices for a Healthier Weight used to provide ed regarding Wt Loss diet and diet copy given.  Pt discharged.          Reason for Assessment       01/17/18 1347    Reason for Assessment    Reason For Assessment/Visit physician consult   Wt Loss diet    Identified At Risk By Screening Criteria need for education                  Anthropometrics       01/17/18 1348    Anthropometrics    Height 188 cm (74.02\")    Weight (!)  139 kg (306 lb 7 oz)    Ideal Body Weight (IBW)    Ideal Body Weight (IBW), Male (kg) 87.7    % Ideal Body Weight 158.82    Body Mass Index (BMI)    BMI (kg/m2) 39.41    BMI Grade 35 - 39.9 - obesity - grade II                Estimated/Assessed Needs       01/17/18 1349    Calculation Measurements    Weight Used For Calculations 99.5 kg (219 lb 5.7 oz)    Height Used for Calculations 1.88 m (6' 2.02\")    Estimated/Assessed Energy Needs    Energy Need Method [place]-St Jeor    Age 62    RMR ([place]-St. Jeor Equation) 1855    Activity Factors ([place] St Jeor)  Out of bed, ambulatory  1.3    Total estimated needs ([place] St. Jeor) 2411    Estimated/Assessed Protein Needs    Weight Used for Protein Calculation 99.5 kg (219 lb 5.7 oz)    Estimated Protein Range 99 - 119    Estimated/Assessed Fluid Needs    Fluid Need Method --   2985                Electronically signed by:  Adia Howard RD  01/17/18 1:54 PM  "

## 2018-01-17 NOTE — PLAN OF CARE
Problem: Patient Care Overview (Adult)  Goal: Plan of Care Review   01/16/18 2021 01/17/18 0451   Patient Care Overview   Progress --  improving   Coping/Psychosocial Response Interventions   Plan Of Care Reviewed With patient;spouse --        Problem: Acute Coronary Syndrome (ACS) (Adult)  Goal: Signs and Symptoms of Listed Potential Problems Will be Absent or Manageable (Acute Coronary Syndrome)  Outcome: Ongoing (interventions implemented as appropriate)   01/17/18 0451   Acute Coronary Syndrome (ACS)   Problems Assessed (Acute Coronary Syndrome (ACS)) all   Problems Present (Acute Coronary Syndrome (ACS)) none

## 2018-01-17 NOTE — H&P
Mid sternal chest pain    Date of Admission: 1/16/2018  Subjective:     Raghav Olivier is a 62 y.o. male who presents for chest pain that started yesterday. Patient states that the pain was mainly localized to the central portion of his chest, described as a pressure-like sensation, that is intermittent in nature and non radiating. He states symptoms started after he was shoveling snow for approximately 2-3 hours. In addition, he states that he did also eat a heavy meal today that could have flared up GERD-like symptoms. With his chest pain, he denies any dizziness, lightheadedness, palpitations, or SOA. In the ED, cardiac enzymes were negative and ECG revealed no ST-T wave abnormalities.       The following portions of the patient's history were reviewed and updated as appropriate: allergies, current medications, past family history, past medical history, past social history, past surgical history and problem list.    Concurrent Medical Hx:  Past Medical History:   Diagnosis Date   • Atrial fibrillation    • Hyperlipidemia    • Hypertension    • Nonrheumatic mitral (valve) insufficiency    • Palpitations    • Sleep apnea    • SOB (shortness of breath)        Past Surgical Hx:  Past Surgical History:   Procedure Laterality Date   • APPENDECTOMY     • CARDIOVERSION     • COLONOSCOPY N/A 7/31/2017    Procedure: COLONOSCOPY;  Surgeon: Billy Robbins DO;  Location: [facility] ENDOSCOPY;  Service:    • EP STUDY     • KNEE SURGERY Right    • SHOULDER SURGERY Left 2009     Family Hx:  Family History   Problem Relation Age of Onset   • Cancer Mother      colon   • Cancer Father      prostate   • Dementia Father       Social History:  Social History     Social History   • Marital status:      Spouse name: N/A   • Number of children: N/A   • Years of education: N/A     Occupational History   • Not on file.     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        Home Medications:  No current facility-administered medications on file prior to encounter.      Current Outpatient Prescriptions on File Prior to Encounter   Medication Sig Dispense Refill   • diclofenac (VOLTAREN) 50 MG EC tablet      • hydrochlorothiazide (HYDRODIURIL) 12.5 MG tablet Take 12.5 mg by mouth Daily.     • losartan (COZAAR) 100 MG tablet      • rosuvastatin (CRESTOR) 20 MG tablet Take 20 mg by mouth Daily.     • ibuprofen (ADVIL,MOTRIN) 200 MG tablet Take 200 mg by mouth Every 6 (Six) Hours As Needed for mild pain (1-3).         Allergies:  Penicillins and Vancomycin  I reviewed the patient's new clinical results.  I reviewed the patient's other test results and agree with the interpretation  Review of Systems  Review of Systems   Constitutional: Negative for appetite change, chills and fever.   HENT: Negative for congestion, ear pain, rhinorrhea, sneezing and sore throat.    Respiratory: Negative for cough and shortness of breath.    Cardiovascular: Positive for chest pain. Negative for palpitations and leg swelling.   Gastrointestinal: Negative for diarrhea, nausea and vomiting.   Endocrine: Negative for polyphagia and polyuria.   Musculoskeletal: Negative for back pain and neck pain.   Skin: Negative for color change and rash.   Neurological: Negative for dizziness, syncope and weakness.   Psychiatric/Behavioral: Negative for agitation, confusion and dysphoric mood.       Objective:     /75 (BP Location: Left arm, Patient Position: Lying)  Pulse 65  Temp 98.2 °F (36.8 °C) (Temporal Artery )   Resp 16  Ht 188 cm (74\")  Wt (!) 139 kg (306 lb 6.4 oz)  SpO2 98%  BMI 39.34 " kg/m2  Physical Exam   Constitutional: He is oriented to person, place, and time. He appears well-developed and well-nourished.   Cardiovascular: Normal rate, regular rhythm and normal heart sounds.  Exam reveals no gallop and no friction rub.    No murmur heard.  Pulmonary/Chest: Effort normal and breath sounds normal. No respiratory distress. He has no wheezes. He has no rales.   Abdominal: Soft. Bowel sounds are normal. There is no tenderness.   Musculoskeletal: Normal range of motion. He exhibits no edema.   Neurological: He is alert and oriented to person, place, and time.   Skin: Skin is warm and dry.   Psychiatric: He has a normal mood and affect. His behavior is normal.   Vitals reviewed.    I reviewed the patient's new clinical results.  I reviewed the patient's other test results.  Assessment/Plan:     Active Hospital Problems (** Indicates Principal Problem)    Diagnosis Date Noted   • **Mid sternal chest pain [R07.89] 01/16/2018     -Heart score: 3 based on age and risk factors (obesity, hypertension, hyperlipidemia)  -EKG in the ED revealed normal sinus rhythm and no ST-T segment abnormalities  -Continue antiplatelet therapy with aspirin, continue statin therapy  -We'll monitor cardiac activity via telemetry  -We'll provide oxygen therapy as needed and nitroglycerin as needed  -We'll provide GI cocktail for potential GI etiology to chest pain     • TAMARA on CPAP [G47.33, Z99.89] 01/16/2018     -Patient is compliant with CPAP at home; will provide CPAP during inpatient course     • Class 2 obesity with serious comorbidity and body mass index (BMI) of 39.0 to 39.9 in adult [E66.9, Z68.39] 01/16/2018     -We'll consult dietitian for information on weight loss and calorie counting     • Essential hypertension [I10] 01/20/2017     -Continue home antihypertensive therapy; losartan 100 mg and hydrochlorothiazide 12.5 mg  -Monitor blood pressure per hospital protocol  -When necessary hydralazine for systolic  blood pressure greater than 160     • Hyperlipidemia [E78.5] 01/20/2017     -Continue statin therapy  -Last lipid panel was in October 2017; cholesterol appeared to be well controlled with statin therapy  Component Name Value Ref Range   Total Cholesterol 156 0 - 200 mg/dL   Triglycerides 157 20 - 200 mg/dL   HDL Cholesterol 29 29 - 71 mg/dL   LDL 96  mg/dL           Resolved Hospital Problems    Diagnosis Date Noted Date Resolved   No resolved problems to display.         I have seen and examined the patient.  I have reviewed the notes, assessments, and/or procedures performed by Dr. Mckinney, I concur with her/his documentation and assessment and plan for Raghav Olivier.      This document has been electronically signed by Javier Kimball MD on January 17, 2018 10:18 AM

## 2018-04-18 ENCOUNTER — OFFICE VISIT (OUTPATIENT)
Dept: CARDIOLOGY | Facility: CLINIC | Age: 63
End: 2018-04-18

## 2018-04-18 VITALS
SYSTOLIC BLOOD PRESSURE: 159 MMHG | WEIGHT: 312 LBS | HEART RATE: 83 BPM | BODY MASS INDEX: 40.04 KG/M2 | OXYGEN SATURATION: 98 % | HEIGHT: 74 IN | DIASTOLIC BLOOD PRESSURE: 80 MMHG

## 2018-04-18 DIAGNOSIS — Z99.89 OSA ON CPAP: ICD-10-CM

## 2018-04-18 DIAGNOSIS — I77.810 DILATED AORTIC ROOT (HCC): ICD-10-CM

## 2018-04-18 DIAGNOSIS — I10 ESSENTIAL HYPERTENSION: ICD-10-CM

## 2018-04-18 DIAGNOSIS — IMO0001 CLASS 2 OBESITY DUE TO EXCESS CALORIES WITH SERIOUS COMORBIDITY AND BODY MASS INDEX (BMI) OF 39.0 TO 39.9 IN ADULT: ICD-10-CM

## 2018-04-18 DIAGNOSIS — G47.33 OSA ON CPAP: ICD-10-CM

## 2018-04-18 DIAGNOSIS — I34.0 NONRHEUMATIC MITRAL (VALVE) INSUFFICIENCY: ICD-10-CM

## 2018-04-18 DIAGNOSIS — I48.0 PAROXYSMAL ATRIAL FIBRILLATION (HCC): ICD-10-CM

## 2018-04-18 DIAGNOSIS — R07.9 CHEST PAIN, UNSPECIFIED TYPE: Primary | ICD-10-CM

## 2018-04-18 DIAGNOSIS — R00.2 PALPITATIONS: Primary | ICD-10-CM

## 2018-04-18 DIAGNOSIS — E78.5 HYPERLIPIDEMIA, UNSPECIFIED HYPERLIPIDEMIA TYPE: ICD-10-CM

## 2018-04-18 PROCEDURE — 93000 ELECTROCARDIOGRAM COMPLETE: CPT | Performed by: INTERNAL MEDICINE

## 2018-04-18 PROCEDURE — 99214 OFFICE O/P EST MOD 30 MIN: CPT | Performed by: INTERNAL MEDICINE

## 2018-04-18 RX ORDER — METOPROLOL SUCCINATE 50 MG/1
50 TABLET, EXTENDED RELEASE ORAL DAILY
Qty: 2 TABLET | Refills: 0 | Status: SHIPPED | OUTPATIENT
Start: 2018-04-18 | End: 2018-10-22

## 2018-04-18 NOTE — PROGRESS NOTES
Raghav Olivier  62 y.o. male    04/18/2018  1. Palpitations    2. Paroxysmal atrial fibrillation    3. Nonrheumatic mitral (valve) insufficiency    4. Essential hypertension    5. Hyperlipidemia, unspecified hyperlipidemia type    6. Class 2 obesity due to excess calories with serious comorbidity and body mass index (BMI) of 39.0 to 39.9 in adult    7. TAMARA on CPAP    8. Dilated aortic root    9      epigastric and retrosternal chest discomfort    History of Present Illness    62 years old gentleman with a BMI of 40 and previous BMI 37.6Presented for evaluations of epigastric and lower retrosternal chest pain described as a nagging discomfort with a similar episode in December 2017.  The patient was offered risk stratification however he refused to get it done.  He having this discomfort off-and-on for about 2 week or so and never experienced such symptoms previously.   with history of hypertension and paroxysmal atrial fibrillation who was maintained sinus rhythm with the help of amiodarone, Discontinued due to history of black lung and associated shortness breath..  The last echocardiographic study in July 2016 reported normal left ventricle systolic function with trace mitral and tricuspid regurgitation and  Mildly dilated aortic root with diameter 4.2. Abnormal  PFT more than one year ago.  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  No fever or chills reported.  No palpitations or fluttering nauseous vomiting and diarrhea was reported.  No intermittent claudication was reported.  No dysuria or hematuria syncope or near syncopal episode was reported.      · ECHO 8/2014  ·   · Left ventricular systolic function is normal. Estimated EF = 60%.  · Left atrial cavity size is mildly dilated.  · The left ventricular cavity is borderline dilated.  · Left ventricular diastolic dysfunction (grade I) consistent with impaired  relaxation.  SUBJECTIVE    Allergies   Allergen Reactions   • Penicillins    • Vancomycin          Past Medical History:   Diagnosis Date   • Atrial fibrillation    • Hyperlipidemia    • Hypertension    • Nonrheumatic mitral (valve) insufficiency    • Palpitations    • Sleep apnea    • SOB (shortness of breath)          Past Surgical History:   Procedure Laterality Date   • APPENDECTOMY     • CARDIOVERSION     • COLONOSCOPY N/A 7/31/2017    Procedure: COLONOSCOPY;  Surgeon: Billy Robbins DO;  Location: [facility] ENDOSCOPY;  Service:    • EP STUDY     • KNEE SURGERY Right    • SHOULDER SURGERY Left 2009         Family History   Problem Relation Age of Onset   • Cancer Mother      colon   • Cancer Father      prostate   • Dementia Father          Social History     Social History   • Marital status:      Spouse name: N/A   • Number of children: N/A   • Years of education: N/A     Occupational History   • Not on file.     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          Current Outpatient Prescriptions   Medication Sig Dispense Refill   • aspirin 81 MG EC tablet Take 1 tablet by mouth Daily. 30 tablet 2   • diclofenac (VOLTAREN) 50 MG EC tablet      • hydrochlorothiazide (HYDRODIURIL) 12.5 MG tablet Take 12.5 mg by mouth Daily.     • ibuprofen (ADVIL,MOTRIN) 200 MG tablet Take 200 mg by mouth Every 6 (Six) Hours As Needed for mild pain (1-3).     • nitroglycerin (NITROSTAT) 0.4 MG SL tablet Place 1 tablet under the tongue Every 5 (Five) Minutes As Needed for Chest Pain. Take no more than 3 doses in 15 minutes. 30 tablet 1   • losartan (COZAAR)  "100 MG tablet      • rosuvastatin (CRESTOR) 20 MG tablet Take 20 mg by mouth Daily.       No current facility-administered medications for this visit.          OBJECTIVE    /80   Pulse 83   Ht 188 cm (74.02\")   Wt (!) 142 kg (312 lb)   SpO2 98%   BMI 40.04 kg/m²         Review of Systems     Constitutional:  Denies recent weight loss, weight gain, fever or chills, no change in exercise tolerance     HENT:  Denies any hearing loss, epistaxis, hoarseness, or difficulty speaking.     Eyes: Wears eyeglasses or contact lenses     Respiratory:  Denies dyspnea with exertion,no cough, wheezing, or hemoptysis.     Cardiovascular: See H&P.     Gastrointestinal:  Denies change in bowel habits, dyspepsia, ulcer disease, hematochezia, or melena.     Endocrine: Negative for cold intolerance, heat intolerance, polydipsia, polyphagia and polyuria. Denies any history of weight change, heat/cold intolerance, polydipsia, polyuria     Genitourinary: Negative.      Musculoskeletal: Denies any history of arthritic symptoms or back problems     Skin:  Denies any change in hair or nails, rashes, or skin lesions.     Allergic/Immunologic: Negative.  Negative for environmental allergies, food allergies and immunocompromised state.     Neurological:  Denies any history of recurrent headaches, strokes, TIA, or seizure disorder.     Hematological: Denies any food allergies, seasonal allergies, bleeding disorders, or lymphadenopathy.     Psychiatric/Behavioral: Denies any history of depression, substance abuse, or change in cognitive function.         Physical Exam     Constitutional: Cooperative, alert and oriented, well-developed, well-nourished, in no acute distress.     HENT:   Head: Normocephalic, normal hair patterns, no masses or tenderness.  Ears, Nose, and Throat: No gross abnormalities. No pallor or cyanosis. Dentition good.   Eyes: EOMS intact, PERRL, conjunctivae and lids unremarkable. Fundoscopic exam and visual fields " not performed.   Neck: No palpable masses or adenopathy, no thyromegaly, no JVD, carotid pulses are full and equal bilaterally and without  Bruits.     Cardiovascular: Regular rhythm, S1 and S2 normal, no S3 or S4. Apical impulse not displaced. No murmurs, gallops, or rubs detected.     Pulmonary/Chest: Chest: normal symmetry, no tenderness to palpation, normal respiratory excursion, no intercostal retraction, no use of accessory muscles.            Pulmonary: Normal breath sounds. No rales or ronchi.    Abdominal: Abdomen soft, bowel sounds normoactive, no masses, no hepatosplenomegaly, non-tender, no bruits.     Musculoskeletal: No deformities, clubbing, cyanosis, erythema, or edema observed. There are no spinal abnormalities noted. Normal muscle strength and tone. Pulses full and equal in all extremities, no bruits auscultated.     Neurological: No gross motor or sensory deficits noted, affect appropriate, oriented to time, person, place.     Skin: Warm and dry to the touch, no apparent skin lesions or masses noted.     Psychiatric: He has a normal mood and affect. His behavior is normal. Judgment and thought content normal.           ECG 12 Lead  Date/Time: 4/18/2018 11:55 AM  Performed by: SKYE SOTO  Authorized by: SKYE SOTO   Comparison: not compared with previous ECG   Rhythm: sinus rhythm              Lab Results   Component Value Date    WBC 7.68 01/17/2018    HGB 12.9 (L) 01/17/2018    HCT 39.7 01/17/2018    MCV 90.2 01/17/2018     01/17/2018     Lab Results   Component Value Date    GLUCOSE 113 (H) 01/17/2018    BUN 20 01/17/2018    CREATININE 1.15 01/17/2018    EGFRIFNONA 64 01/17/2018    BCR 17.4 01/17/2018    CO2 27.0 01/17/2018    CALCIUM 9.5 01/17/2018    ALBUMIN 4.40 01/16/2018    LABIL2 1.6 01/16/2018    AST 39 01/16/2018    ALT 70 01/16/2018     No results found for: CHOL  No results found for: TRIG  No results found for: HDL  No components found for: LDLCALC  No results found  for: LDL  No results found for: HDLLDLRATIO  No components found for: CHOLHDL  No results found for: HGBA1C  Lab Results   Component Value Date    TSH 2.07 01/20/2017           ASSESSMENT AND PLAN    #1 paroxysmal atrial fibrillation #2 hypertension with hypertensive heart disease #3 mildly dilated aortic root diameter 4.2 #4 Black lung with abnormal PFT is requiring discontinuation of amiodarone No. 4 hyperlipidemia #5 exogenous obesity with BMI 40 previous BMI 37.6#6 epigastric and retrosternal chest discomfort described as a nagging feeling     Clinically no evidence of any congestive heart failure based the clinical history physical finding.  No Evidence of any active ischemia.  EKG done and finding discussed with the patient in normal sinus rhythm without any acute ST-T wave changes.  Patient to is in sinus rhythm without any antiarrhythmic medications.  I briefly discussed with the EP study ablation he is not considering at this stage.  Patient was educated regarding lifestyle modification eating healthy food and cutting the total amount of caloric intake.  He will continue Crestor for management of hyperlipidemia, losartan and diuretics for hypertension with good blood pressure control.  Will arrange an echocardiographic study  the last echo  in 2016 given the dilated aortic root with diameter 4.  Given the risk factors of hypertension, hyperlipidemia, obesity with BMI of 40 male gender and age of 58 with chest and epigastric discomfort seemed appropriate to further risk stratify.  We'll arrange CT coronary angiogram for definitive  ruling out CAD.  Pros and cons and risk related to contrast discussed with the patient' and pros and cons and risks associated with treadmill stress test . Risk Factor lifestyle modification discussed.-Diet explained given the history of hypertension and hyperlipidemia EKG done and finding discussed the patient.  Sinus rhythm with a nonspecific ST-T wave changes.  Raghav was seen  today for follow-up.    Diagnoses and all orders for this visit:    Palpitations    Paroxysmal atrial fibrillation    Nonrheumatic mitral (valve) insufficiency    Essential hypertension    Hyperlipidemia, unspecified hyperlipidemia type    Class 2 obesity due to excess calories with serious comorbidity and body mass index (BMI) of 39.0 to 39.9 in adult    TAMARA on CPAP    Dilated aortic root        Leo Page MD  4/18/2018  11:34 AM

## 2018-04-27 ENCOUNTER — LAB (OUTPATIENT)
Dept: LAB | Facility: HOSPITAL | Age: 63
End: 2018-04-27

## 2018-04-27 ENCOUNTER — HOSPITAL ENCOUNTER (OUTPATIENT)
Dept: CT IMAGING | Facility: HOSPITAL | Age: 63
Discharge: HOME | End: 2018-04-27
Admitting: INTERNAL MEDICINE

## 2018-04-27 DIAGNOSIS — R07.9 CHEST PAIN, UNSPECIFIED TYPE: ICD-10-CM

## 2018-04-27 LAB
ALBUMIN SERPL-MCNC: 4.2 G/DL (ref 3.4–4.8)
ALBUMIN/GLOB SERPL: 1.4 G/DL (ref 1.1–1.8)
ALP SERPL-CCNC: 36 U/L (ref 38–126)
ALT SERPL W P-5'-P-CCNC: 69 U/L (ref 21–72)
ANION GAP SERPL CALCULATED.3IONS-SCNC: 11 MMOL/L (ref 5–15)
AST SERPL-CCNC: 41 U/L (ref 17–59)
BILIRUB SERPL-MCNC: 0.3 MG/DL (ref 0.2–1.3)
BUN BLD-MCNC: 21 MG/DL (ref 7–21)
BUN/CREAT SERPL: 17.9 (ref 7–25)
CALCIUM SPEC-SCNC: 8.9 MG/DL (ref 8.4–10.2)
CHLORIDE SERPL-SCNC: 103 MMOL/L (ref 95–110)
CO2 SERPL-SCNC: 27 MMOL/L (ref 22–31)
CREAT BLD-MCNC: 1.17 MG/DL (ref 0.7–1.3)
GFR SERPL CREATININE-BSD FRML MDRD: 63 ML/MIN/1.73 (ref 49–113)
GLOBULIN UR ELPH-MCNC: 3 GM/DL (ref 2.3–3.5)
GLUCOSE BLD-MCNC: 104 MG/DL (ref 60–100)
POTASSIUM BLD-SCNC: 4.1 MMOL/L (ref 3.5–5.1)
PROT SERPL-MCNC: 7.2 G/DL (ref 6.3–8.6)
SODIUM BLD-SCNC: 141 MMOL/L (ref 137–145)

## 2018-04-27 PROCEDURE — 36415 COLL VENOUS BLD VENIPUNCTURE: CPT

## 2018-04-27 PROCEDURE — 75574 CT ANGIO HRT W/3D IMAGE: CPT

## 2018-04-27 PROCEDURE — 80053 COMPREHEN METABOLIC PANEL: CPT

## 2018-04-27 PROCEDURE — 0 IOPAMIDOL PER 1 ML: Performed by: INTERNAL MEDICINE

## 2018-04-27 RX ADMIN — IOPAMIDOL 90 ML: 755 INJECTION, SOLUTION INTRAVENOUS at 10:41

## 2018-05-09 ENCOUNTER — TELEPHONE (OUTPATIENT)
Dept: CARDIOLOGY | Facility: CLINIC | Age: 63
End: 2018-05-09

## 2018-10-22 ENCOUNTER — OFFICE VISIT (OUTPATIENT)
Dept: FAMILY MEDICINE CLINIC | Facility: CLINIC | Age: 63
End: 2018-10-22

## 2018-10-22 VITALS
OXYGEN SATURATION: 97 % | SYSTOLIC BLOOD PRESSURE: 142 MMHG | HEART RATE: 72 BPM | WEIGHT: 313.4 LBS | HEIGHT: 74 IN | DIASTOLIC BLOOD PRESSURE: 82 MMHG | RESPIRATION RATE: 18 BRPM | BODY MASS INDEX: 40.22 KG/M2 | TEMPERATURE: 98.2 F

## 2018-10-22 DIAGNOSIS — E66.01 CLASS 3 SEVERE OBESITY DUE TO EXCESS CALORIES WITH SERIOUS COMORBIDITY AND BODY MASS INDEX (BMI) OF 40.0 TO 44.9 IN ADULT (HCC): ICD-10-CM

## 2018-10-22 DIAGNOSIS — J30.2 SEASONAL ALLERGIES: Primary | ICD-10-CM

## 2018-10-22 PROBLEM — E66.813 CLASS 3 SEVERE OBESITY DUE TO EXCESS CALORIES WITH SERIOUS COMORBIDITY AND BODY MASS INDEX (BMI) OF 40.0 TO 44.9 IN ADULT: Status: ACTIVE | Noted: 2018-01-16

## 2018-10-22 PROCEDURE — 99203 OFFICE O/P NEW LOW 30 MIN: CPT | Performed by: NURSE PRACTITIONER

## 2018-10-22 RX ORDER — CETIRIZINE HYDROCHLORIDE 10 MG/1
10 TABLET ORAL DAILY
Qty: 30 TABLET | Refills: 0 | Status: SHIPPED | OUTPATIENT
Start: 2018-10-22 | End: 2019-09-04

## 2018-10-22 NOTE — PATIENT INSTRUCTIONS
"DASH Eating Plan  DASH stands for \"Dietary Approaches to Stop Hypertension.\" The DASH eating plan is a healthy eating plan that has been shown to reduce high blood pressure (hypertension). It may also reduce your risk for type 2 diabetes, heart disease, and stroke. The DASH eating plan may also help with weight loss.  What are tips for following this plan?  General guidelines  · Avoid eating more than 2,300 mg (milligrams) of salt (sodium) a day. If you have hypertension, you may need to reduce your sodium intake to 1,500 mg a day.  · Limit alcohol intake to no more than 1 drink a day for nonpregnant women and 2 drinks a day for men. One drink equals 12 oz of beer, 5 oz of wine, or 1½ oz of hard liquor.  · Work with your health care provider to maintain a healthy body weight or to lose weight. Ask what an ideal weight is for you.  · Get at least 30 minutes of exercise that causes your heart to beat faster (aerobic exercise) most days of the week. Activities may include walking, swimming, or biking.  · Work with your health care provider or diet and nutrition specialist (dietitian) to adjust your eating plan to your individual calorie needs.  Reading food labels  · Check food labels for the amount of sodium per serving. Choose foods with less than 5 percent of the Daily Value of sodium. Generally, foods with less than 300 mg of sodium per serving fit into this eating plan.  · To find whole grains, look for the word \"whole\" as the first word in the ingredient list.  Shopping  · Buy products labeled as \"low-sodium\" or \"no salt added.\"  · Buy fresh foods. Avoid canned foods and premade or frozen meals.  Cooking  · Avoid adding salt when cooking. Use salt-free seasonings or herbs instead of table salt or sea salt. Check with your health care provider or pharmacist before using salt substitutes.  · Do not tellez foods. Cook foods using healthy methods such as baking, boiling, grilling, and broiling instead.  · Cook with " heart-healthy oils, such as olive, canola, soybean, or sunflower oil.  Meal planning    · Eat a balanced diet that includes:  ? 5 or more servings of fruits and vegetables each day. At each meal, try to fill half of your plate with fruits and vegetables.  ? Up to 6-8 servings of whole grains each day.  ? Less than 6 oz of lean meat, poultry, or fish each day. A 3-oz serving of meat is about the same size as a deck of cards. One egg equals 1 oz.  ? 2 servings of low-fat dairy each day.  ? A serving of nuts, seeds, or beans 5 times each week.  ? Heart-healthy fats. Healthy fats called Omega-3 fatty acids are found in foods such as flaxseeds and coldwater fish, like sardines, salmon, and mackerel.  · Limit how much you eat of the following:  ? Canned or prepackaged foods.  ? Food that is high in trans fat, such as fried foods.  ? Food that is high in saturated fat, such as fatty meat.  ? Sweets, desserts, sugary drinks, and other foods with added sugar.  ? Full-fat dairy products.  · Do not salt foods before eating.  · Try to eat at least 2 vegetarian meals each week.  · Eat more home-cooked food and less restaurant, buffet, and fast food.  · When eating at a restaurant, ask that your food be prepared with less salt or no salt, if possible.  What foods are recommended?  The items listed may not be a complete list. Talk with your dietitian about what dietary choices are best for you.  Grains  Whole-grain or whole-wheat bread. Whole-grain or whole-wheat pasta. Brown rice. Oatmeal. Quinoa. Bulgur. Whole-grain and low-sodium cereals. Bailey bread. Low-fat, low-sodium crackers. Whole-wheat flour tortillas.  Vegetables  Fresh or frozen vegetables (raw, steamed, roasted, or grilled). Low-sodium or reduced-sodium tomato and vegetable juice. Low-sodium or reduced-sodium tomato sauce and tomato paste. Low-sodium or reduced-sodium canned vegetables.  Fruits  All fresh, dried, or frozen fruit. Canned fruit in natural juice (without  added sugar).  Meat and other protein foods  Skinless chicken or turkey. Ground chicken or turkey. Pork with fat trimmed off. Fish and seafood. Egg whites. Dried beans, peas, or lentils. Unsalted nuts, nut butters, and seeds. Unsalted canned beans. Lean cuts of beef with fat trimmed off. Low-sodium, lean deli meat.  Dairy  Low-fat (1%) or fat-free (skim) milk. Fat-free, low-fat, or reduced-fat cheeses. Nonfat, low-sodium ricotta or cottage cheese. Low-fat or nonfat yogurt. Low-fat, low-sodium cheese.  Fats and oils  Soft margarine without trans fats. Vegetable oil. Low-fat, reduced-fat, or light mayonnaise and salad dressings (reduced-sodium). Canola, safflower, olive, soybean, and sunflower oils. Avocado.  Seasoning and other foods  Herbs. Spices. Seasoning mixes without salt. Unsalted popcorn and pretzels. Fat-free sweets.  What foods are not recommended?  The items listed may not be a complete list. Talk with your dietitian about what dietary choices are best for you.  Grains  Baked goods made with fat, such as croissants, muffins, or some breads. Dry pasta or rice meal packs.  Vegetables  Creamed or fried vegetables. Vegetables in a cheese sauce. Regular canned vegetables (not low-sodium or reduced-sodium). Regular canned tomato sauce and paste (not low-sodium or reduced-sodium). Regular tomato and vegetable juice (not low-sodium or reduced-sodium). Pickles. Olives.  Fruits  Canned fruit in a light or heavy syrup. Fried fruit. Fruit in cream or butter sauce.  Meat and other protein foods  Fatty cuts of meat. Ribs. Fried meat. White. Sausage. Bologna and other processed lunch meats. Salami. Fatback. Hotdogs. Bratwurst. Salted nuts and seeds. Canned beans with added salt. Canned or smoked fish. Whole eggs or egg yolks. Chicken or turkey with skin.  Dairy  Whole or 2% milk, cream, and half-and-half. Whole or full-fat cream cheese. Whole-fat or sweetened yogurt. Full-fat cheese. Nondairy creamers. Whipped toppings.  Processed cheese and cheese spreads.  Fats and oils  Butter. Stick margarine. Lard. Shortening. Ghee. White fat. Tropical oils, such as coconut, palm kernel, or palm oil.  Seasoning and other foods  Salted popcorn and pretzels. Onion salt, garlic salt, seasoned salt, table salt, and sea salt. Worcestershire sauce. Tartar sauce. Barbecue sauce. Teriyaki sauce. Soy sauce, including reduced-sodium. Steak sauce. Canned and packaged gravies. Fish sauce. Oyster sauce. Cocktail sauce. Horseradish that you find on the shelf. Ketchup. Mustard. Meat flavorings and tenderizers. Bouillon cubes. Hot sauce and Tabasco sauce. Premade or packaged marinades. Premade or packaged taco seasonings. Relishes. Regular salad dressings.  Where to find more information:  · National Heart, Lung, and Blood [place]: www.nhlbi.nih.gov  · American Heart Association: www.heart.org  Summary  · The DASH eating plan is a healthy eating plan that has been shown to reduce high blood pressure (hypertension). It may also reduce your risk for type 2 diabetes, heart disease, and stroke.  · With the DASH eating plan, you should limit salt (sodium) intake to 2,300 mg a day. If you have hypertension, you may need to reduce your sodium intake to 1,500 mg a day.  · When on the DASH eating plan, aim to eat more fresh fruits and vegetables, whole grains, lean proteins, low-fat dairy, and heart-healthy fats.  · Work with your health care provider or diet and nutrition specialist (dietitian) to adjust your eating plan to your individual calorie needs.  This information is not intended to replace advice given to you by your health care provider. Make sure you discuss any questions you have with your health care provider.  Document Released: 12/06/2012 Document Revised: 12/11/2017 Document Reviewed: 12/11/2017  [employer] Interactive Patient Education © 2018 [employer]  Allergic Rhinitis, Adult  Allergic rhinitis is an allergic reaction that affects the mucous  membrane inside the nose. It causes sneezing, a runny or stuffy nose, and the feeling of mucus going down the back of the throat (postnasal drip). Allergic rhinitis can be mild to severe.  There are two types of allergic rhinitis:  · Seasonal. This type is also called hay fever. It happens only during certain seasons.  · Perennial. This type can happen at any time of the year.    What are the causes?  This condition happens when the body's defense system (immune system) responds to certain harmless substances called allergens as though they were germs.   Seasonal allergic rhinitis is triggered by pollen, which can come from grasses, trees, and weeds. Perennial allergic rhinitis may be caused by:  · House dust mites.  · Pet dander.  · Mold spores.    What are the signs or symptoms?  Symptoms of this condition include:  · Sneezing.  · Runny or stuffy nose (nasal congestion).  · Postnasal drip.  · Itchy nose.  · Tearing of the eyes.  · Trouble sleeping.  · Daytime sleepiness.    How is this diagnosed?  This condition may be diagnosed based on:  · Your medical history.  · A physical exam.  · Tests to check for related conditions, such as:  ? Asthma.  ? Pink eye.  ? Ear infection.  ? Upper respiratory infection.  · Tests to find out which allergens trigger your symptoms. These may include skin or blood tests.    How is this treated?  There is no cure for this condition, but treatment can help control symptoms. Treatment may include:  · Taking medicines that block allergy symptoms, such as antihistamines. Medicine may be given as a shot, nasal spray, or pill.  · Avoiding the allergen.  · Desensitization. This treatment involves getting ongoing shots until your body becomes less sensitive to the allergen. This treatment may be done if other treatments do not help.  · If taking medicine and avoiding the allergen does not work, new, stronger medicines may be prescribed.    Follow these instructions at home:  · Find out what  you are allergic to. Common allergens include smoke, dust, and pollen.  · Avoid the things you are allergic to. These are some things you can do to help avoid allergens:  ? Replace carpet with wood, tile, or vinyl yemi. Carpet can trap dander and dust.  ? Do not smoke. Do not allow smoking in your home.  ? Change your heating and air conditioning filter at least once a month.  ? During allergy season:  § Keep windows closed as much as possible.  § Plan outdoor activities when pollen counts are lowest. This is usually during the evening hours.  § When coming indoors, change clothing and shower before sitting on furniture or bedding.  · Take over-the-counter and prescription medicines only as told by your health care provider.  · Keep all follow-up visits as told by your health care provider. This is important.  Contact a health care provider if:  · You have a fever.  · You develop a persistent cough.  · You make whistling sounds when you breathe (you wheeze).  · Your symptoms interfere with your normal daily activities.  Get help right away if:  · You have shortness of breath.  Summary  · This condition can be managed by taking medicines as directed and avoiding allergens.  · Contact your health care provider if you develop a persistent cough or fever.  · During allergy season, keep windows closed as much as possible.  This information is not intended to replace advice given to you by your health care provider. Make sure you discuss any questions you have with your health care provider.  Document Released: 09/12/2002 Document Revised: 01/25/2018 Document Reviewed: 01/25/2018  [employer] Interactive Patient Education © 2018 [employer]

## 2018-10-22 NOTE — PROGRESS NOTES
"Chief Complaint   Patient presents with   • Eye Drainage     Bilateral eyes itching.        HISTORY/ HPI:  Raghav Olivier is a 63 y.o.  male who presents today for an acute complaint of bilateral occular pruritis with intermittent clear lacrimation, gradual onset over the past 2 weeks; has used OTC eye drop and Zyrtec only with minimal relief; denies aggravating factors; symptoms are worse in the mornings; additionally reports \"scabbed, crusted areas\" to bilateral upper eyelids; denies diplopia or blurred vision, photophobia, purulent occular drainage, or matting of eyelashes; currently rates severity of symptoms 5 /10, states symptoms have decreased since initial onset; no known exposure to similar symptoms; reports intermittent seasonal allergies; additional medical history of Atrial fibrillation; Hyperlipidemia; Hypertension; mitral (valve) insufficiency; Palpitations; Morbid obesity, and Sleep apnea; former smokes, denies use of ETOH or illicit drugs; denies recent illnesses or additional concerns at this time.    Raghav Olivier  has a past medical history of Atrial fibrillation (CMS/[facility]); Hyperlipidemia; Hypertension; Nonrheumatic mitral (valve) insufficiency; Palpitations; Sleep apnea; and SOB (shortness of breath).    Allergies   Allergen Reactions   • Penicillins    • Vancomycin        Current Outpatient Prescriptions:   •  aspirin 81 MG EC tablet, Take 1 tablet by mouth Daily., Disp: 30 tablet, Rfl: 2  •  diclofenac (VOLTAREN) 50 MG EC tablet, , Disp: , Rfl:   •  hydrochlorothiazide (HYDRODIURIL) 12.5 MG tablet, Take 12.5 mg by mouth Daily., Disp: , Rfl:   •  ibuprofen (ADVIL,MOTRIN) 200 MG tablet, Take 200 mg by mouth Every 6 (Six) Hours As Needed for mild pain (1-3)., Disp: , Rfl:   •  losartan (COZAAR) 100 MG tablet, , Disp: , Rfl:   •  nitroglycerin (NITROSTAT) 0.4 MG SL tablet, Place 1 tablet under the tongue Every 5 (Five) Minutes As Needed for Chest Pain. Take no more than 3 doses in " 15 minutes., Disp: 30 tablet, Rfl: 1  Past Medical History:   Diagnosis Date   • Atrial fibrillation (CMS/HCC)    • Hyperlipidemia    • Hypertension    • Nonrheumatic mitral (valve) insufficiency    • Palpitations    • Sleep apnea    • SOB (shortness of breath)      Past Surgical History:   Procedure Laterality Date   • APPENDECTOMY     • CARDIOVERSION     • COLONOSCOPY N/A 7/31/2017    Procedure: COLONOSCOPY;  Surgeon: Billy Robbins DO;  Location: [facility] ENDOSCOPY;  Service:    • EP STUDY     • KNEE SURGERY Right    • SHOULDER SURGERY Left 2009     Social History     Social History   • Marital status:      Social History Main Topics   • Smoking status: Former Smoker     Packs/day: 0.25     Years: 4.00     Types: Cigarettes     Start date: 1969     Quit date: 1973   • Smokeless tobacco: Never Used   • Alcohol use No   • Drug use: No   • Sexual activity: Yes     Partners: Female     Other Topics Concern   • Not on file     Social History Narrative    Patient is a retired . 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      Family History   Problem Relation Age of Onset   • Cancer Mother         colon   • Cancer Father         prostate   • Dementia Father      Family history, surgical history, past medical history, Allergies and med's reviewed with patient today and updated in [facility] EMR.     ROS:  Review of Systems   Constitutional: Negative.  Negative for activity change, appetite change, chills, diaphoresis, fatigue, fever and unexpected weight change.   HENT: Positive for rhinorrhea (clear) and sneezing. Negative for congestion, ear discharge, ear pain, facial swelling, hearing loss, postnasal drip, sinus pain, sinus pressure, sore throat and trouble swallowing.    Eyes: Positive for discharge, redness and itching. Negative for photophobia, pain and visual disturbance.   Respiratory: Negative.  Negative for cough, chest tightness, shortness of breath and wheezing.    Cardiovascular: Negative.  Negative for  "chest pain, palpitations and leg swelling.   Gastrointestinal: Negative.    Endocrine: Negative.    Genitourinary: Negative.    Musculoskeletal: Negative.  Negative for joint swelling, neck pain and neck stiffness.   Skin: Negative.  Negative for color change, pallor, rash and wound.   Allergic/Immunologic: Positive for environmental allergies.   Neurological: Negative.  Negative for dizziness, syncope, weakness, light-headedness, numbness and headaches.   Hematological: Negative.  Negative for adenopathy. Does not bruise/bleed easily.   Psychiatric/Behavioral: Negative.  Negative for self-injury, sleep disturbance and suicidal ideas. The patient is not nervous/anxious.    All other systems reviewed and are negative.    OBJECTIVE:  Vitals:    10/22/18 1047   BP: 142/82   BP Location: Left arm   Patient Position: Sitting   Cuff Size: Large Adult   Pulse: 72   Resp: 18   Temp: 98.2 °F (36.8 °C)   TempSrc: Oral   SpO2: 97%   Weight: (!) 142 kg (313 lb 6.4 oz)   Height: 188 cm (74.02\")     Physical Exam   Constitutional: He is oriented to person, place, and time. He appears well-developed and well-nourished. He is cooperative.  Non-toxic appearance. He does not have a sickly appearance. He does not appear ill. No distress.   Weight 313.6 LBS; BMI 40.2   HENT:   Head: Normocephalic.   Right Ear: Hearing, external ear and ear canal normal. No drainage, swelling or tenderness. No foreign bodies. No mastoid tenderness. Tympanic membrane is injected. Tympanic membrane is not scarred, not perforated, not erythematous, not retracted and not bulging. No middle ear effusion. No decreased hearing is noted.   Left Ear: Hearing, tympanic membrane, external ear and ear canal normal. No drainage, swelling or tenderness. No foreign bodies. No mastoid tenderness. Tympanic membrane is not injected, not scarred, not perforated, not erythematous, not retracted and not bulging.  No middle ear effusion. No decreased hearing is noted. "   Nose: Mucosal edema (boggy) present. No rhinorrhea. Right sinus exhibits no maxillary sinus tenderness and no frontal sinus tenderness. Left sinus exhibits no maxillary sinus tenderness and no frontal sinus tenderness.   Mouth/Throat: Uvula is midline, oropharynx is clear and moist and mucous membranes are normal. He does not have dentures. No oral lesions. No uvula swelling or dental caries. No oropharyngeal exudate, posterior oropharyngeal edema, posterior oropharyngeal erythema or tonsillar abscesses. Tonsils are 0 on the right. Tonsils are 0 on the left. No tonsillar exudate.   Eyes: Pupils are equal, round, and reactive to light. EOM and lids are normal. Right eye exhibits no discharge, no exudate and no hordeolum. No foreign body present in the right eye. Left eye exhibits no discharge, no exudate and no hordeolum. No foreign body present in the left eye. Right conjunctiva is injected (mildly injected ). Right conjunctiva has no hemorrhage. Left conjunctiva is injected (mildly injected). Left conjunctiva has no hemorrhage. No scleral icterus. Right eye exhibits normal extraocular motion and no nystagmus. Left eye exhibits normal extraocular motion and no nystagmus.   Lichenification of bilateral upper eyelids.  No warmth. No discharge or drainage observed.    Neck: Trachea normal and full passive range of motion without pain. Neck supple. No tracheal tenderness, no spinous process tenderness and no muscular tenderness present. No neck rigidity. Normal range of motion present. No Brudzinski's sign noted. No thyroid mass and no thyromegaly present.   Cardiovascular: Normal rate, regular rhythm, normal heart sounds and normal pulses.  PMI is not displaced.  Exam reveals no gallop, no distant heart sounds and no friction rub.    No murmur heard.  Pulmonary/Chest: Effort normal and breath sounds normal. No accessory muscle usage. No apnea, no tachypnea and no bradypnea. No respiratory distress. He has no  decreased breath sounds. He has no wheezes. He has no rhonchi. He has no rales. He exhibits no tenderness.   Lymphadenopathy:     He has no cervical adenopathy.   Neurological: He is alert and oriented to person, place, and time.   Skin: Skin is warm, dry and intact. No rash noted. He is not diaphoretic. No cyanosis. Nails show no clubbing.   Psychiatric: He has a normal mood and affect. His speech is normal and behavior is normal. Thought content normal.   Nursing note and vitals reviewed.    ASSESSMENT/ PLAN:  Raghav was seen today for eye drainage.    Diagnoses and all orders for this visit:    Seasonal allergies  -     cetirizine (zyrTEC) 10 MG tablet; Take 1 tablet by mouth Daily.  -     Olopatadine HCl (PAZEO) 0.7 % solution; Apply 1 drop to eye(s) as directed by provider Daily.    Class 3 severe obesity due to excess calories with serious comorbidity and body mass index (BMI) of 40.0 to 44.9 in adult (CMS/[facility])    Orders Placed Today:   New Medications Ordered This Visit   Medications   • cetirizine (zyrTEC) 10 MG tablet     Sig: Take 1 tablet by mouth Daily.     Dispense:  30 tablet     Refill:  0   • Olopatadine HCl (PAZEO) 0.7 % solution     Sig: Apply 1 drop to eye(s) as directed by provider Daily.     Dispense:  0.5 mL     Refill:  0     NDC: 8562-7312-46  LOT: [id number]  EXP: 08/2019      Management Plan:     1.  Seasonal allergies  Rx provided for Zyrtec.  Sample Rx of Pazeo provided.  B/R/AE and use discussed.  Discussed oral antibiotics not typically recommended for this process.  The patient voiced understanding.  Allergy recommendations discussed.  Would change pillowcases and sheets minimum weekly.  Consider anti-allergenic coverings for sheets/pillowcases.  Keep pets out of room of sleeping as able.  Use home circulation instead of windows open.  I encouraged a thin application of OTC antibiotic ointment to be applied to bilateral eyelids BID.  Avoiding direct contact with eye.  Apply cool  compresses to bilateral eyes when able for itch.  Avoid touching or rubbing your eyes. Proper hand washing encouraged.  Return to the clinic as needed for any additional concerns or if symptoms do not resolve or worsen.    2.  Obesity  The patient is ill today, we will continue to educate the patient on the topics of diet and exercise with the goal of weight loss and preventative illness with each scheduled appointment.  Information about the DASH diet provided in AVS.    Risks/benefits of current and new medications discussed with the patient and or family today.  The patient/family are aware and accept that if there any side effects they should call or return to clinic as soon as possible.  Appropriate F/U discussed for topics addressed today. All questions were answered to the satisfactory state of patient/family.  Should symptoms fail to improve or worsen they agree to call or return to clinic or to go to the ER. Education handouts were offered on any new Rx if requested.  Discussed the importance of following up with any needed screening tests/labs/specialist appointments and any requested follow-up recommended by me today.  Importance of maintaining follow-up discussed and patient accepts that missed appointments can delay diagnosis and potentially lead to worsening of conditions.    An After Visit Summary was printed and given to the patient at discharge.    Follow-up: Return if symptoms worsen or fail to improve.    Neo Fernandez, EMPERATRIZ 10/22/2018 10:51 AM  This note was electronically signed.

## 2018-10-30 ENCOUNTER — OFFICE VISIT (OUTPATIENT)
Dept: FAMILY MEDICINE CLINIC | Facility: CLINIC | Age: 63
End: 2018-10-30

## 2018-10-30 VITALS
HEIGHT: 74 IN | WEIGHT: 315 LBS | OXYGEN SATURATION: 98 % | RESPIRATION RATE: 18 BRPM | SYSTOLIC BLOOD PRESSURE: 140 MMHG | DIASTOLIC BLOOD PRESSURE: 80 MMHG | HEART RATE: 56 BPM | BODY MASS INDEX: 40.43 KG/M2 | TEMPERATURE: 98.5 F

## 2018-10-30 DIAGNOSIS — Z91.09 ENVIRONMENTAL ALLERGIES: Primary | ICD-10-CM

## 2018-10-30 DIAGNOSIS — H01.116 CONTACT DERMATITIS OF LEFT EYELID: ICD-10-CM

## 2018-10-30 DIAGNOSIS — H01.113 CONTACT DERMATITIS OF RIGHT EYELID: ICD-10-CM

## 2018-10-30 PROCEDURE — 99213 OFFICE O/P EST LOW 20 MIN: CPT | Performed by: FAMILY MEDICINE

## 2018-10-30 PROCEDURE — 96372 THER/PROPH/DIAG INJ SC/IM: CPT | Performed by: FAMILY MEDICINE

## 2018-10-30 RX ORDER — RANITIDINE HCL 75 MG
75 TABLET ORAL 2 TIMES DAILY PRN
Qty: 60 TABLET | Refills: 0 | Status: SHIPPED | OUTPATIENT
Start: 2018-10-30 | End: 2019-06-11

## 2018-10-30 RX ORDER — DEXAMETHASONE SODIUM PHOSPHATE 10 MG/ML
10 INJECTION INTRAMUSCULAR; INTRAVENOUS ONCE
Status: COMPLETED | OUTPATIENT
Start: 2018-10-30 | End: 2018-10-30

## 2018-10-30 RX ADMIN — DEXAMETHASONE SODIUM PHOSPHATE 10 MG: 10 INJECTION INTRAMUSCULAR; INTRAVENOUS at 08:01

## 2018-10-30 NOTE — PROGRESS NOTES
"Subjective cc: eyelid red and swollen   Raghav Olivier is a 63 y.o. male who presents with complaint of itchy red eyelids. He was seen approximately 1 week ago and has tried OTC eye drops, antibacterial drops, 2 doses of left over prednisone and zyrtec without much relief. He is scratching them so much he had developed small scabs on his eyelids.      HTN  HLD  TAMARA  Obesity  Atrial Fibrillation  No diabetes     Eye Problem    Both eyes are affected.This is a new problem. The current episode started 1 to 4 weeks ago. The problem occurs constantly. The problem has been unchanged. There was no injury mechanism. The pain is at a severity of 0/10. The patient is experiencing no pain. There is no known exposure to pink eye. He does not wear contacts. Associated symptoms include an eye discharge (watery) and itching. Pertinent negatives include no blurred vision, double vision, eye redness, fever, foreign body sensation, nausea, photophobia, recent URI or vomiting. He has tried eye drops for the symptoms. The treatment provided no relief.        The following portions of the patient's history were reviewed and updated as appropriate: allergies, current medications, past family history, past medical history, past social history, past surgical history and problem list.        Review of Systems   Constitutional: Negative for chills and fever.   Eyes: Positive for discharge (watery) and itching. Negative for blurred vision, double vision, photophobia, pain, redness and visual disturbance.   Gastrointestinal: Negative for nausea and vomiting.       Objective   Blood pressure 140/80, pulse 56, temperature 98.5 °F (36.9 °C), temperature source Oral, resp. rate 18, height 188 cm (74.01\"), weight (!) 143 kg (315 lb 6.4 oz), SpO2 98 %.  Physical Exam   Constitutional: He is oriented to person, place, and time. He appears well-developed and well-nourished. He is active.  Non-toxic appearance. He has a sickly appearance. No " distress.   HENT:   Head: Normocephalic and atraumatic.   Right Ear: Hearing, external ear and ear canal normal. Tympanic membrane is scarred.   Left Ear: Hearing, external ear and ear canal normal. Tympanic membrane is scarred.   Nose: Nose normal. Right sinus exhibits no maxillary sinus tenderness and no frontal sinus tenderness. Left sinus exhibits no maxillary sinus tenderness and no frontal sinus tenderness.   Mouth/Throat: Oropharynx is clear and moist and mucous membranes are normal. No oropharyngeal exudate.   Eyes: Pupils are equal, round, and reactive to light. EOM are normal. Right eye exhibits discharge. Right eye exhibits no chemosis and no hordeolum. No foreign body present in the right eye. Left eye exhibits discharge. Left eye exhibits no chemosis and no hordeolum. No foreign body present in the left eye. Right conjunctiva is injected. Left conjunctiva is injected. No scleral icterus.   Eyelids red, puffy bilaterally, pt continuously itching and dabbing his eyes with kleenex   Neck: Normal range of motion. No tracheal deviation present. No thyromegaly present.   Cardiovascular: Normal rate and regular rhythm.    Pulmonary/Chest: Effort normal and breath sounds normal. No stridor. No respiratory distress. He has no wheezes.   Lymphadenopathy:     He has no cervical adenopathy.   Neurological: He is alert and oriented to person, place, and time.   Skin: Skin is warm and dry. He is not diaphoretic.   Psychiatric: He has a normal mood and affect. His behavior is normal. Judgment and thought content normal.   Nursing note and vitals reviewed.      Assessment/Plan   Problems Addressed this Visit     None      Visit Diagnoses     Environmental allergies    -  Primary    Contact dermatitis of left eyelid        Relevant Medications    dexamethasone (DECADRON) injection 10 mg (Completed)    Contact dermatitis of right eyelid        Relevant Medications    dexamethasone (DECADRON) injection 10 mg (Completed)           PLAN:     #1 environmental allergies: new, not controlled, advised patient that I feel this is allergies due to the redness, itching, and swelling of his bilateral eyelids.  Patient denies putting anything new on his face.  Advised to use cold compresses, change from Zyrtec to either Claritin or Allegra, Eucrisa topically to the eyelids, steroid shot in office today.  Advised on risk benefits and side effects of these medications.  Advised that if patient was to develop any photophobia or visual complaints he should return to the office.  Considered corneal abrasion, glaucoma, or other ocular complaints but feels is unlikely given patient's denial of photophobia, foreign body sensation, or any visual decline.          This document has been electronically signed by Stephanie Sebastian MD on October 30, 2018 8:40 AM

## 2018-11-15 ENCOUNTER — TELEPHONE (OUTPATIENT)
Dept: FAMILY MEDICINE CLINIC | Facility: CLINIC | Age: 63
End: 2018-11-15

## 2018-11-26 DIAGNOSIS — J30.2 SEASONAL ALLERGIES: ICD-10-CM

## 2018-11-26 RX ORDER — RANITIDINE HCL 75 MG
TABLET ORAL
Qty: 60 TABLET | Refills: 0 | OUTPATIENT
Start: 2018-11-26

## 2018-11-26 RX ORDER — CETIRIZINE HYDROCHLORIDE 10 MG/1
TABLET ORAL
Qty: 30 TABLET | Refills: 0 | OUTPATIENT
Start: 2018-11-26

## 2018-11-30 ENCOUNTER — OFFICE VISIT (OUTPATIENT)
Dept: FAMILY MEDICINE CLINIC | Facility: CLINIC | Age: 63
End: 2018-11-30

## 2018-11-30 VITALS
BODY MASS INDEX: 39.94 KG/M2 | DIASTOLIC BLOOD PRESSURE: 80 MMHG | OXYGEN SATURATION: 98 % | WEIGHT: 311.2 LBS | SYSTOLIC BLOOD PRESSURE: 120 MMHG | HEIGHT: 74 IN | TEMPERATURE: 98.2 F | HEART RATE: 72 BPM

## 2018-11-30 DIAGNOSIS — B00.1 FEVER BLISTER: ICD-10-CM

## 2018-11-30 DIAGNOSIS — I10 ESSENTIAL HYPERTENSION: Primary | ICD-10-CM

## 2018-11-30 DIAGNOSIS — M15.9 PRIMARY OSTEOARTHRITIS INVOLVING MULTIPLE JOINTS: ICD-10-CM

## 2018-11-30 DIAGNOSIS — E78.2 MIXED HYPERLIPIDEMIA: ICD-10-CM

## 2018-11-30 PROCEDURE — 99214 OFFICE O/P EST MOD 30 MIN: CPT | Performed by: FAMILY MEDICINE

## 2018-11-30 RX ORDER — ROSUVASTATIN CALCIUM 20 MG/1
20 TABLET, COATED ORAL DAILY
Qty: 90 TABLET | Refills: 1 | Status: SHIPPED | OUTPATIENT
Start: 2018-11-30 | End: 2019-03-20 | Stop reason: SDUPTHER

## 2018-11-30 RX ORDER — LORATADINE 10 MG/1
10 TABLET ORAL DAILY
COMMUNITY
End: 2019-01-04 | Stop reason: ALTCHOICE

## 2018-11-30 RX ORDER — LOSARTAN POTASSIUM 100 MG/1
100 TABLET ORAL DAILY
Qty: 90 TABLET | Refills: 1 | Status: SHIPPED | OUTPATIENT
Start: 2018-11-30 | End: 2019-06-11

## 2018-11-30 RX ORDER — VALACYCLOVIR HYDROCHLORIDE 1 G/1
1000 TABLET, FILM COATED ORAL DAILY
Qty: 30 TABLET | Refills: 1 | Status: SHIPPED | OUTPATIENT
Start: 2018-11-30 | End: 2019-09-04

## 2018-11-30 RX ORDER — VALACYCLOVIR HYDROCHLORIDE 1 G/1
1000 TABLET, FILM COATED ORAL
COMMUNITY
Start: 2017-06-09 | End: 2018-11-30 | Stop reason: SDUPTHER

## 2018-11-30 RX ORDER — HYDROCHLOROTHIAZIDE 12.5 MG/1
12.5 TABLET ORAL DAILY
Qty: 90 TABLET | Refills: 1 | Status: SHIPPED | OUTPATIENT
Start: 2018-11-30 | End: 2019-06-15 | Stop reason: SDUPTHER

## 2018-12-30 ENCOUNTER — APPOINTMENT (OUTPATIENT)
Dept: CT IMAGING | Facility: HOSPITAL | Age: 63
End: 2018-12-30

## 2018-12-30 ENCOUNTER — HOSPITAL ENCOUNTER (EMERGENCY)
Facility: HOSPITAL | Age: 63
Discharge: HOME | End: 2018-12-30
Attending: EMERGENCY MEDICINE | Admitting: EMERGENCY MEDICINE

## 2018-12-30 VITALS
WEIGHT: 229.4 LBS | HEART RATE: 63 BPM | OXYGEN SATURATION: 96 % | DIASTOLIC BLOOD PRESSURE: 92 MMHG | RESPIRATION RATE: 18 BRPM | HEIGHT: 75 IN | BODY MASS INDEX: 28.52 KG/M2 | SYSTOLIC BLOOD PRESSURE: 162 MMHG | TEMPERATURE: 98.3 F

## 2018-12-30 DIAGNOSIS — N20.0 KIDNEY STONE: Primary | ICD-10-CM

## 2018-12-30 LAB
ALBUMIN SERPL-MCNC: 4.2 G/DL (ref 3.5–5)
ALBUMIN/GLOB SERPL: 1.4 G/DL (ref 1.1–2.5)
ALP SERPL-CCNC: 33 U/L (ref 24–120)
ALT SERPL W P-5'-P-CCNC: 55 U/L (ref 0–54)
ANION GAP SERPL CALCULATED.3IONS-SCNC: 11 MMOL/L (ref 4–13)
AST SERPL-CCNC: 44 U/L (ref 7–45)
BACTERIA UR QL AUTO: ABNORMAL /HPF
BASOPHILS # BLD AUTO: 0.06 10*3/MM3 (ref 0–0.2)
BASOPHILS NFR BLD AUTO: 0.7 % (ref 0–2)
BILIRUB SERPL-MCNC: 0.5 MG/DL (ref 0.1–1)
BILIRUB UR QL STRIP: NEGATIVE
BUN BLD-MCNC: 19 MG/DL (ref 5–21)
BUN/CREAT SERPL: 15.3 (ref 7–25)
CALCIUM SPEC-SCNC: 9.2 MG/DL (ref 8.4–10.4)
CHLORIDE SERPL-SCNC: 105 MMOL/L (ref 98–110)
CLARITY UR: CLEAR
CO2 SERPL-SCNC: 27 MMOL/L (ref 24–31)
COLOR UR: YELLOW
CREAT BLD-MCNC: 1.24 MG/DL (ref 0.5–1.4)
DEPRECATED RDW RBC AUTO: 42.3 FL (ref 40–54)
EOSINOPHIL # BLD AUTO: 0.06 10*3/MM3 (ref 0–0.7)
EOSINOPHIL NFR BLD AUTO: 0.7 % (ref 0–4)
ERYTHROCYTE [DISTWIDTH] IN BLOOD BY AUTOMATED COUNT: 12.9 % (ref 12–15)
GFR SERPL CREATININE-BSD FRML MDRD: 59 ML/MIN/1.73
GLOBULIN UR ELPH-MCNC: 3.1 GM/DL
GLUCOSE BLD-MCNC: 110 MG/DL (ref 70–100)
GLUCOSE UR STRIP-MCNC: NEGATIVE MG/DL
HCT VFR BLD AUTO: 40.3 % (ref 40–52)
HGB BLD-MCNC: 13.2 G/DL (ref 14–18)
HGB UR QL STRIP.AUTO: ABNORMAL
HYALINE CASTS UR QL AUTO: ABNORMAL /LPF
IMM GRANULOCYTES # BLD AUTO: 0.04 10*3/MM3 (ref 0–0.03)
IMM GRANULOCYTES NFR BLD AUTO: 0.5 % (ref 0–5)
KETONES UR QL STRIP: NEGATIVE
LEUKOCYTE ESTERASE UR QL STRIP.AUTO: NEGATIVE
LYMPHOCYTES # BLD AUTO: 1.52 10*3/MM3 (ref 0.72–4.86)
LYMPHOCYTES NFR BLD AUTO: 17.5 % (ref 15–45)
MCH RBC QN AUTO: 29.5 PG (ref 28–32)
MCHC RBC AUTO-ENTMCNC: 32.8 G/DL (ref 33–36)
MCV RBC AUTO: 90 FL (ref 82–95)
MONOCYTES # BLD AUTO: 0.73 10*3/MM3 (ref 0.19–1.3)
MONOCYTES NFR BLD AUTO: 8.4 % (ref 4–12)
NEUTROPHILS # BLD AUTO: 6.27 10*3/MM3 (ref 1.87–8.4)
NEUTROPHILS NFR BLD AUTO: 72.2 % (ref 39–78)
NITRITE UR QL STRIP: NEGATIVE
NRBC BLD AUTO-RTO: 0 /100 WBC (ref 0–0)
PH UR STRIP.AUTO: 5.5 [PH] (ref 5–8)
PLATELET # BLD AUTO: 244 10*3/MM3 (ref 130–400)
PMV BLD AUTO: 10 FL (ref 6–12)
POTASSIUM BLD-SCNC: 4.1 MMOL/L (ref 3.5–5.3)
PROT SERPL-MCNC: 7.3 G/DL (ref 6.3–8.7)
PROT UR QL STRIP: NEGATIVE
RBC # BLD AUTO: 4.48 10*6/MM3 (ref 4.8–5.9)
RBC # UR: ABNORMAL /HPF
REF LAB TEST METHOD: ABNORMAL
SODIUM BLD-SCNC: 143 MMOL/L (ref 135–145)
SP GR UR STRIP: 1.02 (ref 1–1.03)
SQUAMOUS #/AREA URNS HPF: ABNORMAL /HPF
UROBILINOGEN UR QL STRIP: ABNORMAL
WBC NRBC COR # BLD: 8.68 10*3/MM3 (ref 4.8–10.8)
WBC UR QL AUTO: ABNORMAL /HPF

## 2018-12-30 PROCEDURE — 85025 COMPLETE CBC W/AUTO DIFF WBC: CPT | Performed by: EMERGENCY MEDICINE

## 2018-12-30 PROCEDURE — 80053 COMPREHEN METABOLIC PANEL: CPT | Performed by: EMERGENCY MEDICINE

## 2018-12-30 PROCEDURE — 99283 EMERGENCY DEPT VISIT LOW MDM: CPT

## 2018-12-30 PROCEDURE — 81001 URINALYSIS AUTO W/SCOPE: CPT | Performed by: EMERGENCY MEDICINE

## 2018-12-30 PROCEDURE — 74176 CT ABD & PELVIS W/O CONTRAST: CPT

## 2018-12-30 RX ORDER — SODIUM CHLORIDE 0.9 % (FLUSH) 0.9 %
10 SYRINGE (ML) INJECTION AS NEEDED
Status: DISCONTINUED | OUTPATIENT
Start: 2018-12-30 | End: 2018-12-30 | Stop reason: HOSPADM

## 2018-12-30 RX ORDER — HYDROCODONE BITARTRATE AND ACETAMINOPHEN 7.5; 325 MG/1; MG/1
1 TABLET ORAL EVERY 8 HOURS PRN
Qty: 12 TABLET | Refills: 0 | Status: SHIPPED | OUTPATIENT
Start: 2018-12-30 | End: 2019-06-11

## 2018-12-30 RX ORDER — ONDANSETRON 4 MG/1
4 TABLET, ORALLY DISINTEGRATING ORAL EVERY 8 HOURS PRN
Qty: 12 TABLET | Refills: 0 | Status: SHIPPED | OUTPATIENT
Start: 2018-12-30 | End: 2019-06-11

## 2018-12-30 RX ORDER — TAMSULOSIN HYDROCHLORIDE 0.4 MG/1
1 CAPSULE ORAL DAILY
Qty: 30 CAPSULE | Refills: 0 | Status: SHIPPED | OUTPATIENT
Start: 2018-12-30 | End: 2019-06-11

## 2018-12-30 RX ADMIN — SODIUM CHLORIDE 500 ML: 9 INJECTION, SOLUTION INTRAVENOUS at 14:09

## 2019-01-03 ENCOUNTER — NURSE TRIAGE (OUTPATIENT)
Dept: CALL CENTER | Facility: HOSPITAL | Age: 64
End: 2019-01-03

## 2019-01-04 ENCOUNTER — TELEPHONE (OUTPATIENT)
Dept: FAMILY MEDICINE CLINIC | Facility: CLINIC | Age: 64
End: 2019-01-04

## 2019-01-04 ENCOUNTER — OFFICE VISIT (OUTPATIENT)
Dept: FAMILY MEDICINE CLINIC | Facility: CLINIC | Age: 64
End: 2019-01-04

## 2019-01-04 VITALS
BODY MASS INDEX: 39.17 KG/M2 | SYSTOLIC BLOOD PRESSURE: 146 MMHG | WEIGHT: 315 LBS | HEART RATE: 58 BPM | DIASTOLIC BLOOD PRESSURE: 80 MMHG | RESPIRATION RATE: 18 BRPM | OXYGEN SATURATION: 97 % | HEIGHT: 75 IN | TEMPERATURE: 98.1 F

## 2019-01-04 DIAGNOSIS — B34.9 ACUTE VIRAL SYNDROME: Primary | ICD-10-CM

## 2019-01-04 DIAGNOSIS — R68.83 CHILLS: ICD-10-CM

## 2019-01-04 DIAGNOSIS — R39.9 LOWER URINARY TRACT SYMPTOMS (LUTS): ICD-10-CM

## 2019-01-04 LAB
BILIRUB BLD-MCNC: NEGATIVE MG/DL
CLARITY, POC: CLEAR
COLOR UR: YELLOW
EXPIRATION DATE: NORMAL
FLUAV AG NPH QL: NEGATIVE
FLUBV AG NPH QL: NEGATIVE
GLUCOSE UR STRIP-MCNC: NEGATIVE MG/DL
INTERNAL CONTROL: NORMAL
KETONES UR QL: NEGATIVE
LEUKOCYTE EST, POC: NEGATIVE
Lab: NORMAL
NITRITE UR-MCNC: NEGATIVE MG/ML
PH UR: 6 [PH] (ref 5–8)
PROT UR STRIP-MCNC: NEGATIVE MG/DL
RBC # UR STRIP: NEGATIVE /UL
SP GR UR: 1.02 (ref 1–1.03)
UROBILINOGEN UR QL: NORMAL

## 2019-01-04 PROCEDURE — 87804 INFLUENZA ASSAY W/OPTIC: CPT | Performed by: FAMILY MEDICINE

## 2019-01-04 PROCEDURE — 99213 OFFICE O/P EST LOW 20 MIN: CPT | Performed by: FAMILY MEDICINE

## 2019-01-04 PROCEDURE — 81003 URINALYSIS AUTO W/O SCOPE: CPT | Performed by: FAMILY MEDICINE

## 2019-01-04 NOTE — TELEPHONE ENCOUNTER
Called and spoke to arthur. Dr. Perez's recommendation was to go to the ER because he would need a CT if he has kidney stones.

## 2019-01-04 NOTE — PROGRESS NOTES
Subjective cc: fever and chills   Raghav Olivier is a 63 y.o. male who presents that he felt bad yesterday, temp up to 101 yesterday, chills. He was advised to go to ED last night but he did not want to go. He reports some pain in mid to lower back at the time - not severe per pt. No fever or chills today.  He feels back to normal.     He also complains of right shoulder pain from a previous injury.     Chills   This is a new problem. The current episode started yesterday. The problem occurs rarely. The problem has been resolved. Associated symptoms include arthralgias (chronic right shoulder pain), chills and a fever (yesterday). Pertinent negatives include no abdominal pain, change in bowel habit, chest pain, congestion, coughing, diaphoresis, headaches, rash, sore throat, swollen glands, urinary symptoms, vomiting or weakness. Nothing aggravates the symptoms. He has tried nothing for the symptoms. The treatment provided significant relief.        The following portions of the patient's history were reviewed and updated as appropriate: allergies, current medications, past family history, past medical history, past social history, past surgical history and problem list.        Review of Systems   Constitutional: Positive for chills and fever (yesterday). Negative for diaphoresis.   HENT: Negative for congestion and sore throat.    Respiratory: Negative for cough.    Cardiovascular: Negative for chest pain.   Gastrointestinal: Negative for abdominal pain, blood in stool, change in bowel habit, constipation, diarrhea and vomiting.   Genitourinary: Negative for decreased urine volume, difficulty urinating, dysuria, flank pain, hematuria and urgency.   Musculoskeletal: Positive for arthralgias (chronic right shoulder pain) and back pain.   Skin: Negative for rash.   Neurological: Negative for weakness and headaches.       Objective   Blood pressure 146/80, pulse 58, temperature 98.1 °F (36.7 °C), temperature source  "Oral, resp. rate 18, height 190.5 cm (75\"), weight (!) 143 kg (315 lb 9.6 oz), SpO2 97 %.  Physical Exam   Constitutional: He is oriented to person, place, and time. He appears well-developed and well-nourished. He is active and cooperative.  Non-toxic appearance. He does not have a sickly appearance. No distress.   HENT:   Head: Normocephalic and atraumatic.   Right Ear: Hearing, tympanic membrane, external ear and ear canal normal.   Left Ear: Hearing, tympanic membrane, external ear and ear canal normal.   Nose: Nose normal.   Mouth/Throat: Oropharynx is clear and moist. No oropharyngeal exudate.   Eyes: Conjunctivae and EOM are normal. Right eye exhibits no discharge. Left eye exhibits no discharge.   Neck: Normal range of motion. No tracheal deviation present. No thyromegaly present.   Cardiovascular: Normal rate, regular rhythm and intact distal pulses.   Pulmonary/Chest: Effort normal and breath sounds normal. No stridor. No respiratory distress. He has no wheezes. He exhibits no tenderness.   Abdominal: Soft. Bowel sounds are normal. He exhibits no distension. There is no tenderness.   Lymphadenopathy:     He has no cervical adenopathy.   Neurological: He is alert and oriented to person, place, and time.   Skin: Skin is warm and dry. He is not diaphoretic.   Psychiatric: He has a normal mood and affect. His behavior is normal. Judgment and thought content normal.   Nursing note and vitals reviewed.    Lab Results (most recent)     Procedure Component Value Units Date/Time    POCT Influenza A/B [[id number]]  (Normal) Collected:  01/04/19 1556    Specimen:  Swab Updated:  01/04/19 1556     Rapid Influenza A Ag Negative     Rapid Influenza B Ag Negative     Internal Control Passed     Lot Number [id number]     Expiration Date 03 21 2021    POCT urinalysis dipstick, automated [[id number]]  (Normal) Collected:  01/04/19 1517    Specimen:  Urine Updated:  01/04/19 1519     Color Yellow     Clarity, UA Clear     " Specific Gravity  1.020     pH, Urine 6.0     Leukocytes Negative     Nitrite, UA Negative     Protein, POC Negative mg/dL      Glucose, UA Negative mg/dL      Ketones, UA Negative     Urobilinogen, UA Normal     Bilirubin Negative     Blood, UA Negative          Assessment/Plan   Problems Addressed this Visit     None      Visit Diagnoses     Acute viral syndrome    -  Primary    Lower urinary tract symptoms (LUTS)        Relevant Orders    POCT urinalysis dipstick, automated (Completed)    Chills        Relevant Orders    POCT Influenza A/B (Completed)        PLAN:     #1 acute viral syndrome: new, resolved, pt reports symptoms that would be consistent with viral syndrome, due to abrupt onset and chills did flu swab which was negative, UA was done due to recent dx of kidney stone and back pain - no blood, advised pt that it is possible he could have passed a stone, however there is no blood in his urine currently, he appears well on exam, states he feels back to normal, vitals stable, UA negative for signs of infection, he is passing urine without difficulty, advised on warning signs to watch for and return if they occur    Pt complains of right shoulder pain from an old injury - not acute - advised pt to make an appt to discuss this further.           This document has been electronically signed by Stephanie Sebastian MD on January 16, 2019 10:36 AM

## 2019-01-04 NOTE — TELEPHONE ENCOUNTER
Pt's wife called and stated pt is having severe pain with kidney stone and a fever and needed to know what they needed to do.  Hospital stated if Pt can't get in immediately then they need to go to the ER.    They would like a call back asap.      Thanks.

## 2019-01-04 NOTE — TELEPHONE ENCOUNTER
"States he was seen 12/30/18 for kidney stone in ER. States he was given pain meds, nausea meds, and pain meds. States left flank discomfort earlier today. Denies vomiting, nausea. States he is tired and weak, states has fever and chills. States no frequency, pain with urination, no strong or foul urine odor. States chills and fever started this afternoon. Discussed care advice. States she is going to see if any other symptoms develop or if fever improves. Instructed s/s that require immediate attention, she verbalized understanding.    Reason for Disposition  • Fever > 100.5 F (38.1 C)    Additional Information  • Negative: Shock suspected (e.g., cold/pale/clammy skin, too weak to stand, low BP, rapid pulse)  • Negative: Sounds like a life-threatening emergency to the triager  • Negative: Followed a genital injury (e.g., penis, scrotum)  • Negative: Taking antibiotic for urinary tract infection (UTI)  • Negative: Pain in scrotum is main symptom  • Negative: [1] Unable to urinate (or only a few drops) > 4 hours AND     [2] bladder feels very full (e.g., feels blocked with strong urge to urinate; palpable bladder)  • Negative: [1] Constant pain in scrotum or testicle AND [2] present > 1 hour  • Negative: Swollen scrotum  • Negative: Patient sounds very sick or weak to the triager  • Negative: [1] SEVERE pain with urination  (e.g., excruciating) AND [2] not improved after 2 hours of pain medicine (e.g., acetaminophen or ibuprofen)    Answer Assessment - Initial Assessment Questions  1. SEVERITY: \"How bad is the pain?\"  (e.g., Scale 1-10; mild, moderate, or severe)    - MILD (1-3): complains slightly about urination hurting    - MODERATE (4-7): interferes with normal activities      - SEVERE (8-10): excruciating, unwilling or unable to urinate because of the pain       2  2. FREQUENCY: \"How many times have you had painful urination today?\"       no  3. PATTERN: \"Is pain present every time you urinate or just sometimes?\" " "      no  4. ONSET: \"When did the painful urination start?\"       no  5. FEVER: \"Do you have a fever?\" If so, ask: \"What is your temperature, how was it measured, and when did it start?\"      101  6. PAST UTI: \"Have you had a urine infection before?\" If so, ask: \"When was the last time?\" and \"What happened that time?\"       unknown  7. CAUSE: \"What do you think is causing the painful urination?\"       Has stone  8. OTHER SYMPTOMS: \"Do you have any other symptoms?\" (e.g., flank pain, penile discharge, scrotal pain, blood in urine)      n/a    Protocols used: URINATION PAIN - MALE-ADULT-AH      "

## 2019-01-09 DIAGNOSIS — N20.0 KIDNEY STONE: Primary | ICD-10-CM

## 2019-01-09 NOTE — PROGRESS NOTES
Mr. Olivier is 63 y.o. male    CHIEF COMPLAINT: I am here for a kidney stone.    HPI  Urolithiasis  Patient complains of right flank pain. This started 11 days(s) ago. The pain is described as colicky with radiation to the lower abdomen. Associated manifestations include nausea and vomiting. Severity, when pain is present,  is rated at 9/10 moderate relief has been experienced from watchful waiting. The patient has no pior history of urolithiasis.     The following portions of the patient's history were reviewed and updated as appropriate: allergies, current medications, past family history, past medical history, past social history, past surgical history and problem list.      Review of Systems   Constitutional: Negative for appetite change and fever.   HENT: Negative for hearing loss and sore throat.    Eyes: Negative for pain and redness.   Respiratory: Negative for cough and shortness of breath.    Cardiovascular: Negative for chest pain and leg swelling.   Gastrointestinal: Negative for anal bleeding, nausea and vomiting.   Endocrine: Negative for cold intolerance and heat intolerance.   Genitourinary: Positive for flank pain (right side). Negative for dysuria, frequency, hematuria and urgency.   Musculoskeletal: Negative for joint swelling and myalgias.   Skin: Negative for color change and rash.   Allergic/Immunologic: Negative for food allergies and immunocompromised state.   Neurological: Negative for dizziness and speech difficulty.   Hematological: Negative for adenopathy. Does not bruise/bleed easily.   Psychiatric/Behavioral: Negative for dysphoric mood and suicidal ideas.     Current Outpatient Medications:   •  aspirin 81 MG EC tablet, Take 1 tablet by mouth Daily., Disp: 30 tablet, Rfl: 2  •  cetirizine (zyrTEC) 10 MG tablet, Take 1 tablet by mouth Daily., Disp: 30 tablet, Rfl: 0  •  diclofenac (VOLTAREN) 50 MG EC tablet, Take 1 tablet by mouth 2 (Two) Times a Day., Disp: 180 tablet, Rfl: 1  •   hydrochlorothiazide (HYDRODIURIL) 12.5 MG tablet, Take 1 tablet by mouth Daily., Disp: 90 tablet, Rfl: 1  •  HYDROcodone-acetaminophen (NORCO) 7.5-325 MG per tablet, Take 1 tablet by mouth Every 8 (Eight) Hours As Needed for Moderate Pain ., Disp: 12 tablet, Rfl: 0  •  ibuprofen (ADVIL,MOTRIN) 200 MG tablet, Take 200 mg by mouth Every 6 (Six) Hours As Needed for mild pain (1-3)., Disp: , Rfl:   •  losartan (COZAAR) 100 MG tablet, Take 1 tablet by mouth Daily., Disp: 90 tablet, Rfl: 1  •  nitroglycerin (NITROSTAT) 0.4 MG SL tablet, Place 1 tablet under the tongue Every 5 (Five) Minutes As Needed for Chest Pain. Take no more than 3 doses in 15 minutes., Disp: 30 tablet, Rfl: 1  •  Olopatadine HCl (PAZEO) 0.7 % solution, Apply 1 drop to eye(s) as directed by provider Daily., Disp: 0.5 mL, Rfl: 0  •  ondansetron ODT (ZOFRAN-ODT) 4 MG disintegrating tablet, Take 1 tablet by mouth Every 8 (Eight) Hours As Needed for Nausea or Vomiting., Disp: 12 tablet, Rfl: 0  •  raNITIdine (ZANTAC) 75 MG tablet, Take 1 tablet by mouth 2 (Two) Times a Day As Needed (allergy)., Disp: 60 tablet, Rfl: 0  •  rosuvastatin (CRESTOR) 20 MG tablet, Take 1 tablet by mouth Daily., Disp: 90 tablet, Rfl: 1  •  tamsulosin (FLOMAX) 0.4 MG capsule 24 hr capsule, Take 1 capsule by mouth Daily., Disp: 30 capsule, Rfl: 0  •  valACYclovir (VALTREX) 1000 MG tablet, Take 1 tablet by mouth Daily., Disp: 30 tablet, Rfl: 1    Past Medical History:   Diagnosis Date   • Atrial fibrillation (CMS/HCC)    • Hyperlipidemia    • Hypertension    • Nonrheumatic mitral (valve) insufficiency    • Palpitations    • Sleep apnea    • SOB (shortness of breath)        Past Surgical History:   Procedure Laterality Date   • APPENDECTOMY     • CARDIOVERSION     • CHOLECYSTECTOMY     • COLONOSCOPY N/A 7/31/2017    Procedure: COLONOSCOPY;  Surgeon: Billy Robbins DO;  Location: [facility] ENDOSCOPY;  Service:    • EP STUDY     • KNEE SURGERY Right    • SHOULDER SURGERY Left 2009  "      Social History     Socioeconomic History   • Marital status:      Spouse name: Not on file   • Number of children: Not on file   • Years of education: Not on file   • Highest education level: GED or equivalent   Social Needs   • Financial resource strain: Not hard at all   • Food insecurity - worry: Never true   • Food insecurity - inability: Never true   • Transportation needs - medical: No   • Transportation needs - non-medical: No   Tobacco Use   • Smoking status: Former Smoker     Packs/day: 0.25     Years: 4.00     Pack years: 1.00     Types: Cigarettes     Start date:      Last attempt to quit:      Years since quittin.0   • Smokeless tobacco: Never Used   Substance and Sexual Activity   • Alcohol use: No     Frequency: Never     Binge frequency: Never   • Drug use: No   • Sexual activity: Yes     Partners: Female   Social History Narrative    Patient is a retired . 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        Family History   Problem Relation Age of Onset   • Cancer Mother         colon   • Cancer Father         prostate   • Dementia Father          /70   Temp 98 °F (36.7 °C)   Ht 190.5 cm (75\")   Wt (!) 141 kg (311 lb 6.4 oz)   BMI 38.92 kg/m²       Physical Exam  Constitutional: Well nourished, Well developed; No apparent distress; Vital reviewed as above  Psychiatric: Appropriate affect; Alert and oriented  Eyes: Unremarkable  Musculoskeletal: Normal gait and station  GI: Abdomen is soft, non-tender  Respiratory: No distress; Unlabored movement; No accessory musculature needed with symmetric movements  Skin: No pallor or diaphoresis  Lymphatic: No adenopathy neck or groin    Data  Results for orders placed or performed in visit on 01/10/19   POC Urinalysis Dipstick, Multipro   Result Value Ref Range    Color Yellow Yellow, Straw, Dark Yellow, Jeri    Clarity, UA Clear Clear    Glucose, UA Negative Negative, 1000 mg/dL (3+) mg/dL    Bilirubin Negative Negative    " Ketones, UA Trace (A) Negative    Specific Gravity  1.025 1.005 - 1.030    Blood, UA Negative Negative    pH, Urine 6.0 5.0 - 8.0    Protein, POC Negative Negative mg/dL    Urobilinogen, UA Normal Normal    Nitrite, UA Negative Negative    Leukocytes Negative Negative     EXAM: CT Abdomen and Pelvis without contrast      12/30/2018 3:20 PM CST  INDICATION: Flank pain, stone disease suspected  COMPARISON: None  TECHNIQUE:  Abdomen and pelvis were scanned utilizing a multidetector helical  scanner from the diaphragm to the ischial tuberosities without  administration of IV contrast. Coronal and sagittal reformations were  obtained. [Routine protocol is performed.]  Radiation dose equals DLP 1019 mGy-cm.  Automated exposure control dose  reduction technique was implemented.   FINDINGS:  LINES and TUBES: None.  LOWER THORAX: No focal consolidation. No pleural effusion. No  pneumothorax.  HEPATOBILIARY:  No focal hepatic lesions.   No liver laceration.   No  biliary ductal dilatation. Severe hepatosteatosis.  GALLBLADDER: Surgically absent.  SPLEEN:  No splenomegaly.    No splenic laceration.   PANCREAS:  No focal masses or ductal dilatation.   ADRENALS:  No adrenal nodules.   KIDNEYS/URETERS: No hydronephrosis.   A 6 cm hypodense lesion along the  upper pole of the right kidney is favored to be cysts. There is also a  4.4 cm hypodense lesion associated with the left renal pelvis, favored  to be a parapelvic cysts..  A 3 mm stone at the right UVJ (image 138,  series 2 and 107, series 3), without significant hydroureter or  hydronephrosis..   GI TRACT:  No abnormal distention, wall thickening, or evidence of bowel  obstruction.   There is no acute appendicitis. Significant colonic  diverticulosis, without convincing evidence of acute diverticulitis..   PELVIC ORGANS/BLADDER:  Otherwise, no acute pathology.   LYMPH NODES:  No pelvic wall, retroperitoneal or mesenteric  lymphadenopathy by size criteria. No inguinal  lymphadenopathy by size  criteria..   VESSELS:  Atherosclerotic disease. No aortic aneurysm. No evidence of  acute aortic injury..   PERITONEUM / RETROPERITONEUM:  No free air or fluid.   BONES:  No acute osseous pathology. Multilevel degenerative changes,  worse at L5-S1..   SOFT TISSUES:  Unremarkable.      IMPRESSION:  1.  3 mm stone at the right UVJ, without obstructive changes.  2.  Severe hepatosteatosis.  3.  Colonic diverticulosis, without evidence of acute diverticulitis.  4.  Bilateral renal hypodensities, most likely cysts. If indicated a  nonemergent renal ultrasound may be performed.     This report was finalized on 12/30/2018 15:26 by Dr. Deanna Mckinnon MD.  These images were made available to me to review independently.  I did review the radiologist's report described above with regard to the urologic findings.  This is a very small stone with mild hydronephrosis.  I will see any other stones.  I agree that the bilateral hypodensities.  B renal cysts and did not need further evaluation.  /Raghav Pablo MD        Patient's Body mass index is 38.92 kg/m². BMI is above normal parameters. Recommendations include: educational material.      Assessment and Plan  Diagnoses and all orders for this visit:    Right ureteral calculus  -     POC Urinalysis Dipstick, Multipro    Renal cyst, right    - He appears to have passed this stone.  - Needs renal US to show there is no residual hydronephrosis and confirm the cystic nature of the renal masses. If solid component will need a contrasted CT.     (Please note that portions of this note were completed with a voice recognition program.)  Raghav Pablo MD  01/10/19  10:55 AM      Cc: Kerilne Clifford MD

## 2019-01-10 ENCOUNTER — OFFICE VISIT (OUTPATIENT)
Dept: UROLOGY | Facility: CLINIC | Age: 64
End: 2019-01-10

## 2019-01-10 ENCOUNTER — HOSPITAL ENCOUNTER (OUTPATIENT)
Dept: GENERAL RADIOLOGY | Facility: HOSPITAL | Age: 64
Discharge: HOME | End: 2019-01-10
Attending: UROLOGY | Admitting: UROLOGY

## 2019-01-10 VITALS
SYSTOLIC BLOOD PRESSURE: 160 MMHG | BODY MASS INDEX: 38.72 KG/M2 | HEIGHT: 75 IN | TEMPERATURE: 98 F | WEIGHT: 311.4 LBS | DIASTOLIC BLOOD PRESSURE: 70 MMHG

## 2019-01-10 DIAGNOSIS — N20.1 RIGHT URETERAL CALCULUS: Primary | ICD-10-CM

## 2019-01-10 DIAGNOSIS — N28.1 RENAL CYST, RIGHT: ICD-10-CM

## 2019-01-10 LAB
BILIRUB BLD-MCNC: NEGATIVE MG/DL
CLARITY, POC: CLEAR
COLOR UR: YELLOW
GLUCOSE UR STRIP-MCNC: NEGATIVE MG/DL
KETONES UR QL: ABNORMAL
LEUKOCYTE EST, POC: NEGATIVE
NITRITE UR-MCNC: NEGATIVE MG/ML
PH UR: 6 [PH] (ref 5–8)
PROT UR STRIP-MCNC: NEGATIVE MG/DL
RBC # UR STRIP: NEGATIVE /UL
SP GR UR: 1.02 (ref 1–1.03)
UROBILINOGEN UR QL: NORMAL

## 2019-01-10 PROCEDURE — 99204 OFFICE O/P NEW MOD 45 MIN: CPT | Performed by: UROLOGY

## 2019-01-10 PROCEDURE — 81003 URINALYSIS AUTO W/O SCOPE: CPT | Performed by: UROLOGY

## 2019-01-10 PROCEDURE — 74018 RADEX ABDOMEN 1 VIEW: CPT

## 2019-01-10 NOTE — PATIENT INSTRUCTIONS
BMI for Adults  Body mass index (BMI) is a number that is calculated from a person's weight and height. In most adults, the number is used to find how much of an adult's weight is made up of fat. BMI is not as accurate as a direct measure of body fat.  How is BMI calculated?  BMI is calculated by dividing weight in kilograms by height in meters squared. It can also be calculated by dividing weight in pounds by height in inches squared, then multiplying the resulting number by 703. Charts are available to help you find your BMI quickly and easily without doing this calculation.  How is BMI interpreted?  Health care professionals use BMI charts to identify whether an adult is underweight, at a normal weight, or overweight based on the following guidelines:  · Underweight: BMI less than 18.5.  · Normal weight: BMI between 18.5 and 24.9.  · Overweight: BMI between 25 and 29.9.  · Obese: BMI of 30 and above.    BMI is usually interpreted the same for males and females.  Weight includes both fat and muscle, so someone with a muscular build, such as an athlete, may have a BMI that is higher than 24.9. In cases like these, BMI may not accurately depict body fat. To determine if excess body fat is the cause of a BMI of 25 or higher, further assessments may need to be done by a health care provider.  Why is BMI a useful tool?  BMI is used to identify a possible weight problem that may be related to a medical problem or may increase the risk for medical problems. BMI can also be used to promote changes to reach a healthy weight.  This information is not intended to replace advice given to you by your health care provider. Make sure you discuss any questions you have with your health care provider.  Document Released: 08/29/2005 Document Revised: 04/27/2017 Document Reviewed: 05/15/2015  [employer] Patient Education © 2018 [employer]  \  You should hydrate with water, lemonade, and soft drinks that are high in  citrate (Diet Sprite, Diet 7-UP, Diet Fresca, Diet Mountain Dew, Diet John Yellow) to increase the urine volume as well as the amount of citrate that is in the urine. Avoid tea, excessive amounts of caffeine and alcohol.     You should consider measuring your urine output to make at least 2.5 liters of urine per day. Be sure to hydrate when urine appears dark, concentrated or excessively colored.     Most patients do not need to restrict calcium in your diet. A moderate amount of calcium is believed to reduce calcium oxalate absorption in the intestine.     Limit the amount of non-dairy protein consumed to two palm sized servings per day.     Limit sodium intake to 2 grams each day.     Increase number of servings with fruits and vegetables to make urine less acidic (more basic) and increase citrate in the urine.     High-oxalate foods to limit, if you eat them, are:   Spinach.   Bran flakes.   Rhubarb.   Beets.   Potato chips.   French fries.   Nuts and nut butters.  Tea

## 2019-01-11 ENCOUNTER — HOSPITAL ENCOUNTER (OUTPATIENT)
Dept: ULTRASOUND IMAGING | Facility: HOSPITAL | Age: 64
Discharge: HOME | End: 2019-01-11
Attending: UROLOGY | Admitting: UROLOGY

## 2019-01-11 DIAGNOSIS — N20.1 RIGHT URETERAL CALCULUS: ICD-10-CM

## 2019-01-11 DIAGNOSIS — N28.1 RENAL CYST, RIGHT: ICD-10-CM

## 2019-01-11 PROCEDURE — 76775 US EXAM ABDO BACK WALL LIM: CPT

## 2019-03-20 DIAGNOSIS — E78.2 MIXED HYPERLIPIDEMIA: ICD-10-CM

## 2019-03-21 RX ORDER — ROSUVASTATIN CALCIUM 20 MG/1
20 TABLET, COATED ORAL DAILY
Qty: 90 TABLET | Refills: 1 | Status: SHIPPED | OUTPATIENT
Start: 2019-03-21 | End: 2019-10-07 | Stop reason: SDUPTHER

## 2019-06-11 ENCOUNTER — OFFICE VISIT (OUTPATIENT)
Dept: FAMILY MEDICINE CLINIC | Facility: CLINIC | Age: 64
End: 2019-06-11

## 2019-06-11 ENCOUNTER — RESULTS ENCOUNTER (OUTPATIENT)
Dept: FAMILY MEDICINE CLINIC | Facility: CLINIC | Age: 64
End: 2019-06-11

## 2019-06-11 VITALS
HEART RATE: 64 BPM | DIASTOLIC BLOOD PRESSURE: 87 MMHG | WEIGHT: 314.4 LBS | HEIGHT: 75 IN | SYSTOLIC BLOOD PRESSURE: 152 MMHG | BODY MASS INDEX: 39.09 KG/M2 | RESPIRATION RATE: 19 BRPM | TEMPERATURE: 97.8 F | OXYGEN SATURATION: 95 %

## 2019-06-11 DIAGNOSIS — I10 ESSENTIAL HYPERTENSION: ICD-10-CM

## 2019-06-11 DIAGNOSIS — R73.9 HYPERGLYCEMIA: ICD-10-CM

## 2019-06-11 DIAGNOSIS — R53.83 FATIGUE, UNSPECIFIED TYPE: ICD-10-CM

## 2019-06-11 DIAGNOSIS — E66.01 MORBID OBESITY (HCC): ICD-10-CM

## 2019-06-11 DIAGNOSIS — L08.9 INFECTED ABRASION OF RIGHT FOOT, INITIAL ENCOUNTER: Primary | ICD-10-CM

## 2019-06-11 DIAGNOSIS — Z12.5 SPECIAL SCREENING FOR MALIGNANT NEOPLASM OF PROSTATE: ICD-10-CM

## 2019-06-11 DIAGNOSIS — S90.811A INFECTED ABRASION OF RIGHT FOOT, INITIAL ENCOUNTER: Primary | ICD-10-CM

## 2019-06-11 PROCEDURE — 99214 OFFICE O/P EST MOD 30 MIN: CPT | Performed by: NURSE PRACTITIONER

## 2019-06-11 RX ORDER — LOSARTAN POTASSIUM 50 MG/1
50 TABLET ORAL DAILY
Qty: 90 TABLET | Refills: 1 | Status: SHIPPED | OUTPATIENT
Start: 2019-06-11 | End: 2020-06-09

## 2019-06-11 NOTE — PROGRESS NOTES
Chief Complaint   Patient presents with   • Fatigue        Subjective   Raghav Olivier is a 64 y.o.  male who presents today for fatigue.    HPI:  FATIGUE:  Two weeks ago he had profound fatigue while on vacation.  He would sit down and go to sleep.  He was taking blood pressure medication (losartan) and he thinks this was the problem.  His wife was checking his b/p once they got home and it was low (110/60).  He stopped taking his medicine and the fatigue has resolved.   MED REFILLS:  He needs refills on all his medications.  SORE FOOT:  Patient describes a sore area on the sole of the right foot.  It hurts to walk.  He has used antibiotic ointment on it.  There is no drainage.    Raghav Olivier  has a past medical history of Atrial fibrillation (CMS/HCC), Diverticulitis, Hyperlipidemia, Hypertension, Kidney stones, Nonrheumatic mitral (valve) insufficiency, Palpitations, Sleep apnea, and SOB (shortness of breath).    Allergies   Allergen Reactions   • Penicillins Unknown (See Comments)     Pt states is was a childhood allergy   • Vancomycin Itching       Current Outpatient Medications:   •  aspirin 81 MG EC tablet, Take 1 tablet by mouth Daily., Disp: 30 tablet, Rfl: 2  •  cetirizine (zyrTEC) 10 MG tablet, Take 1 tablet by mouth Daily. (Patient taking differently: Take 10 mg by mouth Every Other Day.), Disp: 30 tablet, Rfl: 0  •  hydrochlorothiazide (HYDRODIURIL) 12.5 MG tablet, Take 1 tablet by mouth Daily., Disp: 90 tablet, Rfl: 1  •  ibuprofen (ADVIL,MOTRIN) 200 MG tablet, Take 200 mg by mouth Every 6 (Six) Hours As Needed for mild pain (1-3)., Disp: , Rfl:   •  nitroglycerin (NITROSTAT) 0.4 MG SL tablet, Place 1 tablet under the tongue Every 5 (Five) Minutes As Needed for Chest Pain. Take no more than 3 doses in 15 minutes., Disp: 30 tablet, Rfl: 1  •  rosuvastatin (CRESTOR) 20 MG tablet, TAKE 1 TABLET BY MOUTH DAILY, Disp: 90 tablet, Rfl: 1  •  valACYclovir (VALTREX) 1000 MG tablet, Take 1  tablet by mouth Daily., Disp: 30 tablet, Rfl: 1  •  diclofenac (VOLTAREN) 50 MG EC tablet, Take 1 tablet by mouth 2 (Two) Times a Day., Disp: 180 tablet, Rfl: 1  •  losartan (COZAAR) 50 MG tablet, Take 1 tablet by mouth Daily., Disp: 90 tablet, Rfl: 1  •  silver sulfadiazine (SILVADENE) 1 % cream, Apply  topically to the appropriate area as directed 2 (Two) Times a Day., Disp: 50 g, Rfl: 5  Past Medical History:   Diagnosis Date   • Atrial fibrillation (CMS/HCC)    • Diverticulitis    • Hyperlipidemia    • Hypertension    • Kidney stones    • Nonrheumatic mitral (valve) insufficiency    • Palpitations    • Sleep apnea    • SOB (shortness of breath)      Past Surgical History:   Procedure Laterality Date   • APPENDECTOMY     • CARDIOVERSION     • CHOLECYSTECTOMY     • COLONOSCOPY N/A 2017    Procedure: COLONOSCOPY;  Surgeon: Billy Robbins DO;  Location: [facility] ENDOSCOPY;  Service:    • EP STUDY     • KNEE SURGERY Right    • SHOULDER SURGERY Left      Social History     Socioeconomic History   • Marital status:      Spouse name: Not on file   • Number of children: Not on file   • Years of education: Not on file   • Highest education level: GED or equivalent   Social Needs   • Financial resource strain: Not hard at all   • Food insecurity:     Worry: Never true     Inability: Never true   • Transportation needs:     Medical: No     Non-medical: No   Tobacco Use   • Smoking status: Former Smoker     Packs/day: 0.25     Years: 4.00     Pack years: 1.00     Types: Cigarettes     Start date:      Last attempt to quit:      Years since quittin.4   • Smokeless tobacco: Never Used   Substance and Sexual Activity   • Alcohol use: No     Frequency: Never     Binge frequency: Never   • Drug use: No   • Sexual activity: Yes     Partners: Female   Lifestyle   • Physical activity:     Days per week: 0 days     Minutes per session: 0 min   • Stress: Only a little   Social History Narrative    Patient  "is a retired . 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      Family History   Problem Relation Age of Onset   • Cancer Mother         colon   • Cancer Father         prostate   • Dementia Father        Family history, surgical history, past medical history, Allergies and med's reviewed with patient today and updated in [facility] EMR.     ROS:  Review of Systems   Constitutional: Positive for fatigue. Negative for fever and unexpected weight change.   HENT: Negative.  Negative for facial swelling, sore throat and trouble swallowing.    Eyes: Negative.  Negative for photophobia, discharge and visual disturbance.   Respiratory: Negative.  Negative for cough, chest tightness and shortness of breath.    Cardiovascular: Negative.  Negative for chest pain and palpitations.   Gastrointestinal: Negative.  Negative for abdominal pain, diarrhea, nausea and vomiting.   Endocrine: Negative.  Negative for polydipsia, polyphagia and polyuria.   Genitourinary: Negative.  Negative for dysuria, flank pain and frequency.   Musculoskeletal: Positive for arthralgias. Negative for back pain, gait problem and neck pain.   Skin: Negative.  Negative for rash.   Allergic/Immunologic: Negative.    Neurological: Negative.  Negative for dizziness, light-headedness and headaches.   Hematological: Negative.    Psychiatric/Behavioral: Negative.  Negative for self-injury and suicidal ideas.       OBJECTIVE:  Vitals:    06/11/19 0831   BP: 152/87   BP Location: Left arm   Patient Position: Sitting   Cuff Size: Large Adult   Pulse: 64   Resp: 19   Temp: 97.8 °F (36.6 °C)   TempSrc: Temporal   SpO2: 95%   Weight: (!) 143 kg (314 lb 6.4 oz)   Height: 190.5 cm (75\")     Physical Exam   Constitutional: He is oriented to person, place, and time. He appears well-developed and well-nourished. No distress.   HENT:   Head: Normocephalic and atraumatic.   Eyes: Conjunctivae and EOM are normal. Pupils are equal, round, and reactive to light.   Neck: Normal range of " motion. Neck supple.   Cardiovascular: Normal rate, regular rhythm, normal heart sounds and intact distal pulses.   No murmur heard.  Pulmonary/Chest: Effort normal and breath sounds normal. No respiratory distress.   Abdominal: Soft. Bowel sounds are normal. He exhibits no distension. There is no tenderness.   Musculoskeletal: Normal range of motion. He exhibits no edema.   Neurological: He is alert and oriented to person, place, and time.   Skin: Skin is warm and dry. Capillary refill takes less than 2 seconds. He is not diaphoretic. No erythema.   The mid plantar surface of the right foot, lateral side, has a horizontal fissure ( 2cm ) within a calloused area.  There is no erythema.  There is no drainage.   Psychiatric: He has a normal mood and affect. His behavior is normal. Judgment and thought content normal.   Nursing note and vitals reviewed.      ASSESSMENT/ PLAN:    Raghav was seen today for fatigue.    Diagnoses and all orders for this visit:    Infected abrasion of right foot, initial encounter  -     silver sulfadiazine (SILVADENE) 1 % cream; Apply  topically to the appropriate area as directed 2 (Two) Times a Day.    Essential hypertension  -     losartan (COZAAR) 50 MG tablet; Take 1 tablet by mouth Daily.  -     Comprehensive metabolic panel; Future  -     CBC w AUTO Differential; Future    Fatigue, unspecified type  -     Comprehensive metabolic panel; Future  -     CBC w AUTO Differential; Future  -     T4; Future  -     TSH; Future    Hyperglycemia  -     Comprehensive metabolic panel; Future  -     Hemoglobin A1c; Future    Morbid obesity (CMS/HCC)    Special screening for malignant neoplasm of prostate  -     PSA SCREENING; Future        Orders Placed Today:     New Medications Ordered This Visit   Medications   • silver sulfadiazine (SILVADENE) 1 % cream     Sig: Apply  topically to the appropriate area as directed 2 (Two) Times a Day.     Dispense:  50 g     Refill:  5   • losartan (COZAAR) 50  MG tablet     Sig: Take 1 tablet by mouth Daily.     Dispense:  90 tablet     Refill:  1     Patient does not need at present.  He will take 1/2 of remaining prescription daily.        Management Plan:     An After Visit Summary was printed and given to the patient at discharge.    Follow-up: Return in about 3 months (around 9/11/2019) for Next scheduled follow up.    Jeannette Kinney, EMPERATRIZ 6/11/2019 9:37 AM  This note was electronically signed.

## 2019-06-15 DIAGNOSIS — I10 ESSENTIAL HYPERTENSION: ICD-10-CM

## 2019-06-17 RX ORDER — HYDROCHLOROTHIAZIDE 12.5 MG/1
TABLET ORAL
Qty: 30 TABLET | Refills: 0 | Status: SHIPPED | OUTPATIENT
Start: 2019-06-17 | End: 2019-06-26 | Stop reason: SDUPTHER

## 2019-06-26 DIAGNOSIS — I10 ESSENTIAL HYPERTENSION: ICD-10-CM

## 2019-06-26 RX ORDER — HYDROCHLOROTHIAZIDE 12.5 MG/1
12.5 TABLET ORAL DAILY
Qty: 90 TABLET | Refills: 1 | Status: SHIPPED | OUTPATIENT
Start: 2019-06-26 | End: 2020-01-13

## 2019-08-01 DIAGNOSIS — M15.9 PRIMARY OSTEOARTHRITIS INVOLVING MULTIPLE JOINTS: ICD-10-CM

## 2019-08-01 NOTE — TELEPHONE ENCOUNTER
Pt called to discuss medications. He advised that he was previously on a medication that he would like to resume daily. Pt would like new script of this medication called in to [facility]. Pt has been using aleve over the counter to help with issues that prior medication treats. He is checking to see if resuming this medication is okay with Mrs. Jeannette Kinney.

## 2019-09-04 ENCOUNTER — OFFICE VISIT (OUTPATIENT)
Dept: FAMILY MEDICINE CLINIC | Facility: CLINIC | Age: 64
End: 2019-09-04

## 2019-09-04 VITALS
BODY MASS INDEX: 38.67 KG/M2 | SYSTOLIC BLOOD PRESSURE: 140 MMHG | OXYGEN SATURATION: 97 % | WEIGHT: 311 LBS | HEART RATE: 88 BPM | DIASTOLIC BLOOD PRESSURE: 80 MMHG | HEIGHT: 75 IN

## 2019-09-04 DIAGNOSIS — M54.2 CERVICAL PAIN: ICD-10-CM

## 2019-09-04 DIAGNOSIS — J01.00 ACUTE NON-RECURRENT MAXILLARY SINUSITIS: ICD-10-CM

## 2019-09-04 DIAGNOSIS — M62.838 CERVICAL PARASPINAL MUSCLE SPASM: Primary | ICD-10-CM

## 2019-09-04 DIAGNOSIS — R59.0 SUBMANDIBULAR LYMPHADENOPATHY: ICD-10-CM

## 2019-09-04 DIAGNOSIS — H65.03 BILATERAL ACUTE SEROUS OTITIS MEDIA, RECURRENCE NOT SPECIFIED: ICD-10-CM

## 2019-09-04 PROCEDURE — 99214 OFFICE O/P EST MOD 30 MIN: CPT | Performed by: FAMILY MEDICINE

## 2019-09-04 RX ORDER — DESLORATADINE 5 MG/1
5 TABLET ORAL DAILY
Qty: 30 TABLET | Refills: 5 | Status: SHIPPED | OUTPATIENT
Start: 2019-09-04

## 2019-09-04 RX ORDER — SULFAMETHOXAZOLE AND TRIMETHOPRIM 800; 160 MG/1; MG/1
1 TABLET ORAL 2 TIMES DAILY
Qty: 20 TABLET | Refills: 0 | Status: SHIPPED | OUTPATIENT
Start: 2019-09-04 | End: 2021-07-27

## 2019-09-04 RX ORDER — CYCLOBENZAPRINE HCL 10 MG
10 TABLET ORAL 3 TIMES DAILY PRN
Qty: 30 TABLET | Refills: 0 | Status: SHIPPED | OUTPATIENT
Start: 2019-09-04 | End: 2021-11-05

## 2019-09-04 NOTE — PROGRESS NOTES
"OFFICE VISIT NOTE:    Raghav Olivier is a 64 y.o. male who presents today for Neck Pain and Rash.     Had a sore throat about a week ago, and after that developed a left-sided neck pain and now down both sides of the skull and back of neck. Went to [facility] before the neck pain began, for the URI and was given a shot without help for the neck, and no trauma recalled. \"Just woke up one day with the nick pain.\" No numbness or tingling in arms. No fever noted by him, but low grade noted at [facility] then. Is feeling a bit better today.   Both ears, more on left, feel full, and is having a bit of a clear to mucoid cough lately.       Neck Pain    This is a new problem. The current episode started in the past 7 days. The problem occurs daily. The problem has been waxing and waning. The pain is associated with nothing. The pain is present in the left side, right side and anterior neck. The quality of the pain is described as aching, cramping and burning. The pain is severe. The symptoms are aggravated by position, bending and twisting. The pain is worse during the day. Stiffness is present all day. Associated symptoms include headaches. Pertinent negatives include no chest pain, fever or weight loss. He has tried acetaminophen, bed rest and home exercises for the symptoms. The treatment provided moderate relief.   Facial Swelling   This is a new (below left jaw) problem. The current episode started 1 to 4 weeks ago. Associated symptoms include headaches and neck pain. Pertinent negatives include no abdominal pain, chest pain, fatigue, fever or rash. He has tried nothing for the symptoms. The treatment provided mild relief.        Past medical/surgical history, Family history, Social history, Allergies and Medications have been reviewed with the patient today and are updated in [facility] EMR. See below.    Past Medical History:   Diagnosis Date   • Atrial fibrillation (CMS/HCC)    • Diverticulitis    • Hyperlipidemia    • " Hypertension    • Kidney stones    • Nonrheumatic mitral (valve) insufficiency    • Palpitations    • Sleep apnea    • SOB (shortness of breath)      Past Surgical History:   Procedure Laterality Date   • APPENDECTOMY     • CARDIOVERSION     • CHOLECYSTECTOMY     • COLONOSCOPY N/A 2017    Procedure: COLONOSCOPY;  Surgeon: Billy Robbins DO;  Location: [facility] ENDOSCOPY;  Service:    • EP STUDY     • KNEE SURGERY Right    • SHOULDER SURGERY Left      Family History   Problem Relation Age of Onset   • Cancer Mother         colon   • Cancer Father         prostate   • Dementia Father      Social History     Tobacco Use   • Smoking status: Former Smoker     Packs/day: 0.25     Years: 4.00     Pack years: 1.00     Types: Cigarettes     Start date:      Last attempt to quit:      Years since quittin.7   • Smokeless tobacco: Never Used   Substance Use Topics   • Alcohol use: No     Frequency: Never     Binge frequency: Never   • Drug use: No       Allergies:  Penicillins and Vancomycin    Current Meds:    Current Outpatient Medications:   •  aspirin 81 MG EC tablet, Take 1 tablet by mouth Daily., Disp: 30 tablet, Rfl: 2  •  diclofenac (VOLTAREN) 50 MG EC tablet, Take 1 tablet by mouth 2 (Two) Times a Day., Disp: 180 tablet, Rfl: 1  •  hydrochlorothiazide (HYDRODIURIL) 12.5 MG tablet, Take 1 tablet by mouth Daily., Disp: 90 tablet, Rfl: 1  •  ibuprofen (ADVIL,MOTRIN) 200 MG tablet, Take 200 mg by mouth Every 6 (Six) Hours As Needed for mild pain (1-3)., Disp: , Rfl:   •  losartan (COZAAR) 50 MG tablet, Take 1 tablet by mouth Daily., Disp: 90 tablet, Rfl: 1  •  nitroglycerin (NITROSTAT) 0.4 MG SL tablet, Place 1 tablet under the tongue Every 5 (Five) Minutes As Needed for Chest Pain. Take no more than 3 doses in 15 minutes., Disp: 30 tablet, Rfl: 1  •  Pseudoephedrine-Ibuprofen (ADVIL COLD & SINUS LIQUI-GELS PO), Take 1 capsule by mouth 2 (Two) Times a Day As Needed., Disp: , Rfl:   •  rosuvastatin  "(CRESTOR) 20 MG tablet, TAKE 1 TABLET BY MOUTH DAILY, Disp: 90 tablet, Rfl: 1  •  silver sulfadiazine (SILVADENE) 1 % cream, Apply  topically to the appropriate area as directed 2 (Two) Times a Day., Disp: 50 g, Rfl: 5  •  cyclobenzaprine (FLEXERIL) 10 MG tablet, Take 1 tablet by mouth 3 (Three) Times a Day As Needed for Muscle Spasms., Disp: 30 tablet, Rfl: 0  •  desloratadine (CLARINEX) 5 MG tablet, Take 1 tablet by mouth Daily., Disp: 30 tablet, Rfl: 5  •  sulfamethoxazole-trimethoprim (BACTRIM DS,SEPTRA DS) 800-160 MG per tablet, Take 1 tablet by mouth 2 (Two) Times a Day., Disp: 20 tablet, Rfl: 0    Review of Systems:  Review of Systems   Constitutional: Negative for activity change, appetite change, fatigue, fever, unexpected weight gain and unexpected weight loss.   HENT: Positive for facial swelling.    Respiratory: Negative for shortness of breath.    Cardiovascular: Negative for chest pain.   Gastrointestinal: Negative for abdominal pain.   Genitourinary: Negative for difficulty urinating.   Musculoskeletal: Positive for neck pain.   Skin: Negative for rash.   Neurological: Negative for syncope and headache.       Physical Examination:  Vital Signs:  /80 (BP Location: Left arm, Patient Position: Sitting, Cuff Size: Adult)   Pulse 88   Ht 190.5 cm (75\")   Wt (!) 141 kg (311 lb)   SpO2 97%   BMI 38.87 kg/m²   Physical Exam   Constitutional: He is oriented to person, place, and time. He appears well-developed and well-nourished. No distress.   HENT:   Head: Normocephalic and atraumatic.   Mouth/Throat: Oropharynx is clear and moist.   Neck: Normal range of motion. Neck supple. No JVD present. No tracheal deviation present.   Cardiovascular: Normal rate, regular rhythm, normal heart sounds and intact distal pulses.   Pulmonary/Chest: Effort normal and breath sounds normal. No respiratory distress.   Abdominal: Soft. He exhibits no distension. There is no tenderness.   Musculoskeletal: Normal range " of motion. He exhibits no edema.   Lymphadenopathy:     He has no cervical adenopathy.   Neurological: He is alert and oriented to person, place, and time. No cranial nerve deficit.   Skin: Skin is warm and dry. Capillary refill takes less than 2 seconds. No rash noted.   Psychiatric: He has a normal mood and affect. His behavior is normal.   Nursing note and vitals reviewed.      Procedures    ASSESSMENT/ PLAN:        Problem List Items Addressed This Visit     None      Visit Diagnoses     Cervical paraspinal muscle spasm    -  Primary    Submandibular lymphadenopathy        Relevant Medications    sulfamethoxazole-trimethoprim (BACTRIM DS,SEPTRA DS) 800-160 MG per tablet    desloratadine (CLARINEX) 5 MG tablet    cyclobenzaprine (FLEXERIL) 10 MG tablet    Acute non-recurrent maxillary sinusitis        Relevant Medications    sulfamethoxazole-trimethoprim (BACTRIM DS,SEPTRA DS) 800-160 MG per tablet    desloratadine (CLARINEX) 5 MG tablet    cyclobenzaprine (FLEXERIL) 10 MG tablet    Bilateral acute serous otitis media, recurrence not specified        Relevant Medications    sulfamethoxazole-trimethoprim (BACTRIM DS,SEPTRA DS) 800-160 MG per tablet    desloratadine (CLARINEX) 5 MG tablet    cyclobenzaprine (FLEXERIL) 10 MG tablet    Cervical pain        Relevant Orders    XR Spine Cervical Complete 4 or 5 View (Completed)                   Specific Patient Instructions:  MEDICATION Instructions: Encouraged patient to continue routine medicines as prescribed and maintain compliance. Patient instructed to report any adverse side effects or reactions to medicines promptly to the office. Patient instructed to make us aware of any OTC or herbal meds or supplement use.  DIET Recommendations: No new recommendations regarding diet/restrictions.  EXERCISE Instructions: No new recommendations.    Patient's Body mass index is 38.87 kg/m². BMI is above normal parameters. Recommendations include: exercise counseling and  nutrition counseling.      SMOKING Recommendations: N/A  HEALTH MAINTENANCE:  N/A  MISCELLANEOUS Instructions: N/A      Medications ordered or changed this visit:  New Medications Ordered This Visit   Medications   • sulfamethoxazole-trimethoprim (BACTRIM DS,SEPTRA DS) 800-160 MG per tablet     Sig: Take 1 tablet by mouth 2 (Two) Times a Day.     Dispense:  20 tablet     Refill:  0   • desloratadine (CLARINEX) 5 MG tablet     Sig: Take 1 tablet by mouth Daily.     Dispense:  30 tablet     Refill:  5   • cyclobenzaprine (FLEXERIL) 10 MG tablet     Sig: Take 1 tablet by mouth 3 (Three) Times a Day As Needed for Muscle Spasms.     Dispense:  30 tablet     Refill:  0        FOLLOW-UP:  Return if symptoms worsen or fail to improve, for Recheck.    I discussed the patients findings and my recommendations with patient.  An After Visit Summary (AVS) was printed and given to the patient at discharge.      Jay Perez MD, FAAFP  9/6/2019

## 2019-09-04 NOTE — PATIENT INSTRUCTIONS
Sinusitis, Adult  Sinusitis is soreness and inflammation of your sinuses. Sinuses are hollow spaces in the bones around your face. Your sinuses are located:  · Around your eyes.  · In the middle of your forehead.  · Behind your nose.  · In your cheekbones.  Your sinuses and nasal passages are lined with a stringy fluid (mucus). Mucus normally drains out of your sinuses. When your nasal tissues become inflamed or swollen, mucus can become trapped or blocked. Blocked or trapped mucus makes it difficult for air to flow through your sinuses. This allows bacteria, viruses, and funguses to grow, which leads to infection. Most infections of the sinuses are caused by a virus.  Sinusitis can develop quickly. It can last for 7?10 days (acute) or for more than 12 weeks (chronic). Sinusitis often develops after a cold.  What are the causes?  This condition is caused by anything that creates swelling in the sinuses or stops mucus from draining, including:  · Allergies.  · Asthma.  · Bacterial or viral infection.  · Abnormally shaped bones between the nasal passages.  · Nasal growths that contain mucus (nasal polyps).  · Narrow sinus openings.  · Pollutants, such as chemicals or irritants in the air.  · A foreign object stuck in the nose.  · A fungal infection. This is rare.  What increases the risk?  The following factors may make you more likely to develop this condition:  · Having allergies or asthma.  · Having had a recent cold or respiratory tract infection.  · Having structural deformities or blockages in your nose or sinuses.  · Having a weak immune system.  · Doing a lot of swimming or diving.  · Overusing nasal sprays.  · Smoking.  What are the signs or symptoms?  The main symptoms of this condition are pain and a feeling of pressure around the affected sinuses. Other symptoms include:  · Upper toothache.  · Earache.  · Headache.  · Bad breath.  · Decreased sense of smell and taste.  · A cough that may get worse at  night.  · Fatigue.  · Fever.  · Thick drainage from your nose. The drainage is often green and it may contain pus (purulent).  · Stuffy nose or congestion.  · Postnasal drip. This is when extra mucus collects in the throat or back of the nose.  · Swelling and warmth over the affected sinuses.  · Sore throat.  · Sensitivity to light.  How is this diagnosed?  This condition is diagnosed based on symptoms, a medical history, and a physical exam. To find out if your condition is acute or chronic, your health care provider may:  · Look in your nose for signs of nasal polyps.  · Tap over the affected sinus to check for signs of infection.  · View the inside of your sinuses using an imaging device that has a light attached (endoscope).  If your health care provider suspects that you have chronic sinusitis, you may also:  · Be tested for allergies.  · Have a sample of mucus taken from your nose (nasal culture) and checked for bacteria.  · Have a mucus sample examined to see if your sinusitis is related to an allergy.  If your sinusitis does not respond to treatment and it lasts longer than 8 weeks, you may have an MRI or CT scan to check your sinuses. These scans also help to determine how severe your infection is.  In rare cases, a bone biopsy may be done to rule out more serious types of fungal sinus disease.  How is this treated?  Treatment for sinusitis depends on the cause and whether your condition is chronic or acute. If a virus is causing your sinusitis, your symptoms will go away on their own within 10 days. You may be given medicines to relieve your symptoms, including:  · Topical nasal decongestants. They shrink swollen nasal passages and let mucus drain from your sinuses.  · Antihistamines. These drugs block inflammation that is triggered by allergies. This can help to ease swelling in your nose and sinuses.  · Topical nasal corticosteroids. These are nasal sprays that ease inflammation and swelling in your nose  and sinuses.  · Nasal saline washes. These rinses can help to get rid of thick mucus in your nose.  If your condition is caused by bacteria, your health care provider may recommend waiting to see if your symptoms improve. Most bacterial infections will get better without antibiotic medicine. If you have a severe infection or a weak immune system, you may be prescribed antibiotics.  Surgery may be needed to correct underlying conditions, such as narrow nasal passages. Surgery may also be needed to remove polyps.  Follow these instructions at home:  Medicines  · Take, use, or apply over-the-counter and prescription medicines only as told by your health care provider. These may include nasal sprays.  · If you were prescribed an antibiotic, take it as told by your health care provider. Do not stop taking the antibiotic even if you start to feel better.  Hydrate and Humidify  · Drink enough water to keep your urine clear or pale yellow. Staying hydrated will help to thin your mucus.  · Use a cool mist humidifier to keep the humidity level in your home above 50%.  · Inhale steam for 10-15 minutes, 3-4 times a day or as told by your health care provider. You can do this in the bathroom while a hot shower is running.  · Limit your exposure to cool or dry air.  Rest  · Rest as much as possible.  · Sleep with your head raised (elevated).  · Make sure to get enough sleep each night.  General instructions  · Apply a warm, moist washcloth to your face 3-4 times a day or as told by your health care provider. This will help with discomfort.  · Wash your hands often with soap and water to reduce your exposure to viruses and other germs. If soap and water are not available, use hand .  · Do not smoke. Avoid being around people who are smoking (secondhand smoke).  · Keep all follow-up visits as told by your health care provider. This is important.  Contact a health care provider if:  · You have a fever.  · Your symptoms get  worse.  · Your symptoms do not improve within 10 days.  Get help right away if:  · You have a severe headache.  · You have persistent vomiting.  · You have pain or swelling around your face or eyes.  · You have vision problems.  · You develop confusion.  · Your neck is stiff.  · You have trouble breathing.  This information is not intended to replace advice given to you by your health care provider. Make sure you discuss any questions you have with your health care provider.  Document Released: 12/18/2006 Document Revised: 06/28/2018 Document Reviewed: 10/12/2016  [employer] Interactive Patient Education © 2019 [employer]      Cervical Strain and Sprain Rehab  Ask your health care provider which exercises are safe for you. Do exercises exactly as told by your health care provider and adjust them as directed. It is normal to feel mild stretching, pulling, tightness, or discomfort as you do these exercises, but you should stop right away if you feel sudden pain or your pain gets worse. Do not begin these exercises until told by your health care provider.  Stretching and range of motion exercises  These exercises warm up your muscles and joints and improve the movement and flexibility of your neck. These exercises also help to relieve pain, numbness, and tingling.  Exercise A: Cervical side bend    1. Using good posture, sit on a stable chair or stand up.  2. Without moving your shoulders, slowly tilt your left / right ear to your shoulder until you feel a stretch in your neck muscles. You should be looking straight ahead.  3. Hold for __________ seconds.  4. Repeat with the other side of your neck.  Repeat __________ times. Complete this exercise __________ times a day.  Exercise B: Cervical rotation    1. Using good posture, sit on a stable chair or stand up.  2. Slowly turn your head to the side as if you are looking over your left / right shoulder.  ? Keep your eyes level with the ground.  ? Stop when you feel a  stretch along the side and the back of your neck.  3. Hold for __________ seconds.  4. Repeat this by turning to your other side.  Repeat __________ times. Complete this exercise __________ times a day.  Exercise C: Thoracic extension and pectoral stretch  1. Roll a towel or a small blanket so it is about 4 inches (10 cm) in diameter.  2. Lie down on your back on a firm surface.  3. Put the towel lengthwise, under your spine in the middle of your back. It should not be not under your shoulder blades. The towel should line up with your spine from your middle back to your lower back.  4. Put your hands behind your head and let your elbows fall out to your sides.  5. Hold for __________ seconds.  Repeat __________ times. Complete this exercise __________ times a day.  Strengthening exercises  These exercises build strength and endurance in your neck. Endurance is the ability to use your muscles for a long time, even after your muscles get tired.  Exercise D: Upper cervical flexion, isometric  1. Lie on your back with a thin pillow behind your head and a small rolled-up towel under your neck.  2. Gently tuck your chin toward your chest and nod your head down to look toward your feet. Do not lift your head off the pillow.  3. Hold for __________ seconds.  4. Release the tension slowly. Relax your neck muscles completely before you repeat this exercise.  Repeat __________ times. Complete this exercise __________ times a day.  Exercise E: Cervical extension, isometric    1. Stand about 6 inches (15 cm) away from a wall, with your back facing the wall.  2. Place a soft object, about 6-8 inches (15-20 cm) in diameter, between the back of your head and the wall. A soft object could be a small pillow, a ball, or a folded towel.  3. Gently tilt your head back and press into the soft object. Keep your jaw and forehead relaxed.  4. Hold for __________ seconds.  5. Release the tension slowly. Relax your neck muscles completely  before you repeat this exercise.  Repeat __________ times. Complete this exercise __________ times a day.  Posture and body mechanics    Body mechanics refers to the movements and positions of your body while you do your daily activities. Posture is part of body mechanics. Good posture and healthy body mechanics can help to relieve stress in your body's tissues and joints. Good posture means that your spine is in its natural S-curve position (your spine is neutral), your shoulders are pulled back slightly, and your head is not tipped forward. The following are general guidelines for applying improved posture and body mechanics to your everyday activities.  Standing  · When standing, keep your spine neutral and keep your feet about hip-width apart. Keep a slight bend in your knees. Your ears, shoulders, and hips should line up.  · When you do a task in which you  one place for a long time, place one foot up on a stable object that is 2-4 inches (5-10 cm) high, such as a footstool. This helps keep your spine neutral.  Sitting    · When sitting, keep your spine neutral and your keep feet flat on the floor. Use a footrest, if necessary, and keep your thighs parallel to the floor. Avoid rounding your shoulders, and avoid tilting your head forward.  · When working at a desk or a computer, keep your desk at a height where your hands are slightly lower than your elbows. Slide your chair under your desk so you are close enough to maintain good posture.  · When working at a computer, place your monitor at a height where you are looking straight ahead and you do not have to tilt your head forward or downward to look at the screen.  Resting  When lying down and resting, avoid positions that are most painful for you. Try to support your neck in a neutral position. You can use a contour pillow or a small rolled-up towel. Your pillow should support your neck but not push on it.  This information is not intended to replace  advice given to you by your health care provider. Make sure you discuss any questions you have with your health care provider.  Document Released: 12/18/2006 Document Revised: 08/24/2017 Document Reviewed: 11/23/2016  [employer] Interactive Patient Education © 2019 [employer]

## 2019-10-07 DIAGNOSIS — E78.2 MIXED HYPERLIPIDEMIA: ICD-10-CM

## 2019-10-07 RX ORDER — ROSUVASTATIN CALCIUM 20 MG/1
TABLET, COATED ORAL
Qty: 90 TABLET | Refills: 1 | Status: SHIPPED | OUTPATIENT
Start: 2019-10-07 | End: 2020-04-01

## 2019-12-15 DIAGNOSIS — B00.1 FEVER BLISTER: ICD-10-CM

## 2019-12-16 RX ORDER — VALACYCLOVIR HYDROCHLORIDE 1 G/1
TABLET, FILM COATED ORAL
Qty: 30 TABLET | Refills: 1 | Status: SHIPPED | OUTPATIENT
Start: 2019-12-16 | End: 2020-01-10

## 2020-01-09 DIAGNOSIS — B00.1 FEVER BLISTER: ICD-10-CM

## 2020-01-10 RX ORDER — VALACYCLOVIR HYDROCHLORIDE 1 G/1
TABLET, FILM COATED ORAL
Qty: 30 TABLET | Refills: 1 | Status: SHIPPED | OUTPATIENT
Start: 2020-01-10

## 2020-01-13 DIAGNOSIS — I10 ESSENTIAL HYPERTENSION: ICD-10-CM

## 2020-01-13 RX ORDER — HYDROCHLOROTHIAZIDE 12.5 MG/1
TABLET ORAL
Qty: 90 TABLET | Refills: 1 | Status: SHIPPED | OUTPATIENT
Start: 2020-01-13 | End: 2022-12-02 | Stop reason: SDUPTHER

## 2020-01-19 ENCOUNTER — APPOINTMENT (OUTPATIENT)
Dept: GENERAL RADIOLOGY | Facility: HOSPITAL | Age: 65
End: 2020-01-19

## 2020-01-19 ENCOUNTER — APPOINTMENT (OUTPATIENT)
Dept: CT IMAGING | Facility: HOSPITAL | Age: 65
End: 2020-01-19

## 2020-01-19 ENCOUNTER — HOSPITAL ENCOUNTER (EMERGENCY)
Facility: HOSPITAL | Age: 65
Discharge: HOME | End: 2020-01-19
Attending: EMERGENCY MEDICINE | Admitting: EMERGENCY MEDICINE

## 2020-01-19 VITALS
DIASTOLIC BLOOD PRESSURE: 75 MMHG | TEMPERATURE: 98.4 F | HEIGHT: 75 IN | WEIGHT: 300 LBS | OXYGEN SATURATION: 93 % | SYSTOLIC BLOOD PRESSURE: 134 MMHG | HEART RATE: 71 BPM | BODY MASS INDEX: 37.3 KG/M2 | RESPIRATION RATE: 17 BRPM

## 2020-01-19 DIAGNOSIS — J18.9 PNEUMONIA OF LEFT LOWER LOBE DUE TO INFECTIOUS ORGANISM: Primary | ICD-10-CM

## 2020-01-19 DIAGNOSIS — M15.9 PRIMARY OSTEOARTHRITIS INVOLVING MULTIPLE JOINTS: ICD-10-CM

## 2020-01-19 LAB
APPEARANCE CSF: CLEAR
APTT PPP: 33.6 SECONDS (ref 24.1–35)
BILIRUB UR QL STRIP: NEGATIVE
CLARITY UR: CLEAR
COLOR CSF: COLORLESS
COLOR SPUN CSF: COLORLESS
COLOR UR: YELLOW
D-LACTATE SERPL-SCNC: 1.4 MMOL/L (ref 0.5–2)
GLUCOSE CSF-MCNC: 78 MG/DL (ref 40–70)
GLUCOSE UR STRIP-MCNC: NEGATIVE MG/DL
HGB UR QL STRIP.AUTO: NEGATIVE
HOLD SPECIMEN: NORMAL
INR PPP: 1.09 (ref 0.91–1.09)
KETONES UR QL STRIP: NEGATIVE
LEUKOCYTE ESTERASE UR QL STRIP.AUTO: NEGATIVE
METHOD: ABNORMAL
NITRITE UR QL STRIP: NEGATIVE
NUC CELL # CSF MANUAL: 3 /MM3 (ref 0–8)
PH UR STRIP.AUTO: 7.5 [PH] (ref 5–8)
PROT CSF-MCNC: 33.3 MG/DL (ref 15–45)
PROT UR QL STRIP: NEGATIVE
PROTHROMBIN TIME: 14.5 SECONDS (ref 11.9–14.6)
RBC # CSF MANUAL: 13 /MM3 (ref 0–0)
S PYO AG THROAT QL: NEGATIVE
SP GR UR STRIP: 1.01 (ref 1–1.03)
SPECIMEN VOL CSF: 7 ML
TUBE # CSF: 4
UROBILINOGEN UR QL STRIP: NORMAL

## 2020-01-19 PROCEDURE — 85730 THROMBOPLASTIN TIME PARTIAL: CPT | Performed by: NURSE PRACTITIONER

## 2020-01-19 PROCEDURE — 80053 COMPREHEN METABOLIC PANEL: CPT | Performed by: NURSE PRACTITIONER

## 2020-01-19 PROCEDURE — 99285 EMERGENCY DEPT VISIT HI MDM: CPT

## 2020-01-19 PROCEDURE — 96375 TX/PRO/DX INJ NEW DRUG ADDON: CPT

## 2020-01-19 PROCEDURE — 87070 CULTURE OTHR SPECIMN AEROBIC: CPT | Performed by: NURSE PRACTITIONER

## 2020-01-19 PROCEDURE — 89050 BODY FLUID CELL COUNT: CPT | Performed by: NURSE PRACTITIONER

## 2020-01-19 PROCEDURE — 25010000002 CEFTRIAXONE PER 250 MG: Performed by: NURSE PRACTITIONER

## 2020-01-19 PROCEDURE — 25010000003 LIDOCAINE 1 % SOLUTION

## 2020-01-19 PROCEDURE — 87015 SPECIMEN INFECT AGNT CONCNTJ: CPT | Performed by: NURSE PRACTITIONER

## 2020-01-19 PROCEDURE — 85610 PROTHROMBIN TIME: CPT | Performed by: NURSE PRACTITIONER

## 2020-01-19 PROCEDURE — 83605 ASSAY OF LACTIC ACID: CPT | Performed by: NURSE PRACTITIONER

## 2020-01-19 PROCEDURE — 84157 ASSAY OF PROTEIN OTHER: CPT | Performed by: NURSE PRACTITIONER

## 2020-01-19 PROCEDURE — 82945 GLUCOSE OTHER FLUID: CPT | Performed by: NURSE PRACTITIONER

## 2020-01-19 PROCEDURE — 96365 THER/PROPH/DIAG IV INF INIT: CPT

## 2020-01-19 PROCEDURE — 25010000002 DEXAMETHASONE PER 1 MG: Performed by: NURSE PRACTITIONER

## 2020-01-19 PROCEDURE — 87205 SMEAR GRAM STAIN: CPT | Performed by: NURSE PRACTITIONER

## 2020-01-19 PROCEDURE — 85025 COMPLETE CBC W/AUTO DIFF WBC: CPT | Performed by: NURSE PRACTITIONER

## 2020-01-19 PROCEDURE — 87081 CULTURE SCREEN ONLY: CPT | Performed by: NURSE PRACTITIONER

## 2020-01-19 PROCEDURE — 70450 CT HEAD/BRAIN W/O DYE: CPT

## 2020-01-19 PROCEDURE — 81003 URINALYSIS AUTO W/O SCOPE: CPT | Performed by: NURSE PRACTITIONER

## 2020-01-19 PROCEDURE — 87040 BLOOD CULTURE FOR BACTERIA: CPT | Performed by: NURSE PRACTITIONER

## 2020-01-19 PROCEDURE — 71045 X-RAY EXAM CHEST 1 VIEW: CPT

## 2020-01-19 PROCEDURE — 87880 STREP A ASSAY W/OPTIC: CPT | Performed by: NURSE PRACTITIONER

## 2020-01-19 RX ORDER — ACETAMINOPHEN 500 MG
1000 TABLET ORAL ONCE
Status: COMPLETED | OUTPATIENT
Start: 2020-01-19 | End: 2020-01-19

## 2020-01-19 RX ORDER — CEFUROXIME AXETIL 500 MG/1
500 TABLET ORAL 2 TIMES DAILY
Qty: 20 TABLET | Refills: 0 | Status: SHIPPED | OUTPATIENT
Start: 2020-01-19 | End: 2021-07-27

## 2020-01-19 RX ORDER — SODIUM CHLORIDE 0.9 % (FLUSH) 0.9 %
10 SYRINGE (ML) INJECTION AS NEEDED
Status: DISCONTINUED | OUTPATIENT
Start: 2020-01-19 | End: 2020-01-19 | Stop reason: HOSPADM

## 2020-01-19 RX ORDER — DEXAMETHASONE SODIUM PHOSPHATE 10 MG/ML
10 INJECTION INTRAMUSCULAR; INTRAVENOUS ONCE
Status: COMPLETED | OUTPATIENT
Start: 2020-01-19 | End: 2020-01-19

## 2020-01-19 RX ORDER — AZITHROMYCIN 250 MG/1
250 TABLET, FILM COATED ORAL DAILY
Qty: 6 TABLET | Refills: 0 | Status: SHIPPED | OUTPATIENT
Start: 2020-01-19 | End: 2020-11-03

## 2020-01-19 RX ADMIN — DEXAMETHASONE SODIUM PHOSPHATE 10 MG: 10 INJECTION INTRAMUSCULAR; INTRAVENOUS at 11:45

## 2020-01-19 RX ADMIN — SODIUM CHLORIDE 500 ML: 9 INJECTION, SOLUTION INTRAVENOUS at 12:09

## 2020-01-19 RX ADMIN — ACETAMINOPHEN 1000 MG: 500 TABLET, FILM COATED ORAL at 11:40

## 2020-01-19 RX ADMIN — CEFTRIAXONE SODIUM 2 G: 2 INJECTION, POWDER, FOR SOLUTION INTRAMUSCULAR; INTRAVENOUS at 12:08

## 2020-01-21 LAB — BACTERIA SPEC AEROBE CULT: NORMAL

## 2020-01-22 LAB
BACTERIA SPEC AEROBE CULT: NORMAL
GRAM STN SPEC: NORMAL
GRAM STN SPEC: NORMAL

## 2020-01-24 LAB
BACTERIA SPEC AEROBE CULT: NORMAL
BACTERIA SPEC AEROBE CULT: NORMAL

## 2020-04-01 DIAGNOSIS — E78.2 MIXED HYPERLIPIDEMIA: ICD-10-CM

## 2020-04-01 RX ORDER — ROSUVASTATIN CALCIUM 20 MG/1
TABLET, COATED ORAL
Qty: 90 TABLET | Refills: 1 | Status: SHIPPED | OUTPATIENT
Start: 2020-04-01

## 2020-06-09 ENCOUNTER — OFFICE VISIT (OUTPATIENT)
Dept: CARDIOLOGY | Facility: CLINIC | Age: 65
End: 2020-06-09

## 2020-06-09 VITALS
OXYGEN SATURATION: 98 % | WEIGHT: 311.8 LBS | HEIGHT: 75 IN | DIASTOLIC BLOOD PRESSURE: 76 MMHG | SYSTOLIC BLOOD PRESSURE: 110 MMHG | HEART RATE: 67 BPM | BODY MASS INDEX: 38.77 KG/M2

## 2020-06-09 DIAGNOSIS — E78.2 MIXED HYPERLIPIDEMIA: ICD-10-CM

## 2020-06-09 DIAGNOSIS — I10 ESSENTIAL HYPERTENSION: ICD-10-CM

## 2020-06-09 DIAGNOSIS — R00.2 PALPITATIONS: ICD-10-CM

## 2020-06-09 DIAGNOSIS — I48.0 PAROXYSMAL ATRIAL FIBRILLATION (HCC): Primary | ICD-10-CM

## 2020-06-09 DIAGNOSIS — I77.810 DILATED AORTIC ROOT (HCC): ICD-10-CM

## 2020-06-09 DIAGNOSIS — I34.0 NONRHEUMATIC MITRAL (VALVE) INSUFFICIENCY: ICD-10-CM

## 2020-06-09 PROCEDURE — 99213 OFFICE O/P EST LOW 20 MIN: CPT | Performed by: INTERNAL MEDICINE

## 2020-06-09 PROCEDURE — 93000 ELECTROCARDIOGRAM COMPLETE: CPT | Performed by: INTERNAL MEDICINE

## 2020-06-09 RX ORDER — VALSARTAN 80 MG/1
80 TABLET ORAL DAILY
COMMUNITY
End: 2021-09-23 | Stop reason: SDUPTHER

## 2020-06-09 NOTE — PROGRESS NOTES
Raghav Olivier  65 y.o. male    06/09/2020  1. Paroxysmal atrial fibrillation (CMS/HCC)    2. Palpitations    3. Nonrheumatic mitral (valve) insufficiency    4. Essential hypertension    5. Mixed hyperlipidemia    6. Dilated aortic root (CMS/HCC)        History of Present Illness:    Body mass index is 38.97 kg/m². BMI is above normal parameters. Recommendations include: exercise counseling, nutrition counseling and referral to primary care.        65 years old gentleman with with previous history of nagging discomfort in the epigastric region discomfort with a similar episode in December 2017.    Patient refuses risk stratification.  But CT coronary angiogram in 2018 no significant CAD reported    with history of hypertension and paroxysmal atrial fibrillation who was maintained sinus rhythm with the help of amiodarone, Discontinued due to history of black lung and associated shortness breath..  The last echocardiographic study in July 2016 reported normal left ventricle systolic function with trace mitral and tricuspid regurgitation and  Mildly dilated aortic root with diameter 4.2.  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  No fever or chills reported.  No palpitations or fluttering nauseous vomiting and diarrhea was reported.  No intermittent claudication was reported.  No dysuria or hematuria syncope or near syncopal episode was reported.       CT coronary angiogram 2018    CT FUNCTIONAL ANALYSIS:  Ejection Fraction     56 %  Diastolic Volume     188 ml  Systolic Volume      82 ml  Stroke Volume        106 ml  Cardiac Output        5.1 L/minute     IMPRESSION:  CONCLUSION:   1.  Examination is limited due to poor contrast concentration in  the coronary arteries, however the left main, LAD, circumflex,  and RCA all appear patent. Due to small vessel size and  limitations  due to contrast, cannot exclude severe stenosis in  the distal LAD, and there is a questionable moderate to severe  stenosis in the mid RCA due to noncalcific plaque.  2.  Circumflex there is no definite stenosis.  3.  Two ramus intermedius arteries are present, both of which  appear patent.  4.  Normal ejection fraction of 56%.  5.  Codominant coronary anatomy.    Echo 5/4/2018  · Left ventricular systolic function is normal. Estimated EF = 65%.  · Left ventricular diastolic dysfunction (grade I) consistent with impaired relaxation.  · Left atrial cavity size is borderline dilated.  · Borderline dilation of the aortic root is present (3.9 cms)    · ECHO 8/2014  ·    · Left ventricular systolic function is normal. Estimated EF = 60%.  · Left atrial cavity size is mildly dilated.  · The left ventricular cavity is borderline dilated.  · Left ventricular diastolic dysfunction (grade I) consistent with impaired relaxation.    SUBJECTIVE:    Allergies   Allergen Reactions   • Penicillins Unknown (See Comments)     Pt states is was a childhood allergy   • Vancomycin Itching         Past Medical History:   Diagnosis Date   • Atrial fibrillation (CMS/HCC)    • Diverticulitis    • Hyperlipidemia    • Hypertension    • Kidney stones    • Nonrheumatic mitral (valve) insufficiency    • Palpitations    • Sleep apnea    • SOB (shortness of breath)          Past Surgical History:   Procedure Laterality Date   • APPENDECTOMY     • CARDIOVERSION     • CHOLECYSTECTOMY     • COLONOSCOPY N/A 7/31/2017    Procedure: COLONOSCOPY;  Surgeon: Billy Robbins DO;  Location: [facility] ENDOSCOPY;  Service:    • EP STUDY     • KNEE SURGERY Right    • SHOULDER SURGERY Left 2009         Family History   Problem Relation Age of Onset   • Cancer Mother         colon   • Cancer Father         prostate   • Dementia Father          Social History     Socioeconomic History   • Marital status:      Spouse name: Not on file   • Number of children:  Not on file   • Years of education: Not on file   • Highest education level: GED or equivalent   Social Needs   • Financial resource strain: Not hard at all   • Food insecurity:     Worry: Never true     Inability: Never true   • Transportation needs:     Medical: No     Non-medical: No   Tobacco Use   • Smoking status: Former Smoker     Packs/day: 0.25     Years: 4.00     Pack years: 1.00     Types: Cigarettes     Start date:      Last attempt to quit:      Years since quittin.4   • Smokeless tobacco: Never Used   Substance and Sexual Activity   • Alcohol use: No     Frequency: Never     Binge frequency: Never   • Drug use: No   • Sexual activity: Yes     Partners: Female   Lifestyle   • Physical activity:     Days per week: 0 days     Minutes per session: 0 min   • Stress: Only a little   Social History Narrative    Patient is a retired . 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          Current Outpatient Medications   Medication Sig Dispense Refill   • aspirin 81 MG EC tablet Take 1 tablet by mouth Daily. 30 tablet 2   • diclofenac (VOLTAREN) 50 MG EC tablet TAKE 1 TABLET BY MOUTH TWICE A DAY (Patient taking differently: Daily.) 180 tablet 1   • hydroCHLOROthiazide (HYDRODIURIL) 12.5 MG tablet TAKE 1 TABLET BY MOUTH EVERY DAY 90 tablet 1   • nitroglycerin (NITROSTAT) 0.4 MG SL tablet Place 1 tablet under the tongue Every 5 (Five) Minutes As Needed for Chest Pain. Take no more than 3 doses in 15 minutes. 30 tablet 1   • rosuvastatin (CRESTOR) 20 MG tablet TAKE 1 TABLET BY MOUTH EVERY DAY 90 tablet 1   • valACYclovir (VALTREX) 1000 MG tablet TAKE 1 TABLET BY MOUTH EVERY DAY 30 tablet 1   • azithromycin (ZITHROMAX) 250 MG tablet Take 1 tablet by mouth Daily. Take 2 tablets the first day, then 1 tablet daily for 4 days. 6 tablet 0   • cefuroxime (CEFTIN) 500 MG tablet Take 1 tablet by mouth 2 (Two) Times a Day. 20 tablet 0   • cyclobenzaprine (FLEXERIL) 10 MG tablet Take 1 tablet by mouth 3 (Three)  "Times a Day As Needed for Muscle Spasms. 30 tablet 0   • desloratadine (CLARINEX) 5 MG tablet Take 1 tablet by mouth Daily. 30 tablet 5   • silver sulfadiazine (SILVADENE) 1 % cream Apply  topically to the appropriate area as directed 2 (Two) Times a Day. 50 g 5   • sulfamethoxazole-trimethoprim (BACTRIM DS,SEPTRA DS) 800-160 MG per tablet Take 1 tablet by mouth 2 (Two) Times a Day. 20 tablet 0   • valsartan (DIOVAN) 80 MG tablet Take 80 mg by mouth Daily.       No current facility-administered medications for this visit.            Review of Systems:     Constitutional:  Denies recent weight loss, weight gain, fever or chills, no change in exercise tolerance.     HENT:  Denies any hearing loss, epistaxis, hoarseness, or difficulty speaking.     Eyes: No blurring    Respiratory: Baseline shortness of breath    Cardiovascular: See H&P    Gastrointestinal:  Denies change in bowel habits, dyspepsia, ulcer disease, hematochezia, or melena.     Endocrine: Negative for cold intolerance, heat intolerance, polydipsia, polyphagia and polyuria. Denies any history of weight change, polydipsia, polyuria.     Genitourinary: Negative.      Musculoskeletal: Denies any history of arthritic symptoms or back problems.     Skin:  Denies any change in hair or nails, rashes, or skin lesions.     Allergic/Immunologic: Negative.  Negative for environmental allergies, food allergies and immunocompromised state.     Neurological:  Denies any history of recurrent headaches, strokes, TIA, or seizure disorder.     Hematological: Denies any food allergies, seasonal allergies, bleeding disorders, or lymphadenopathy.     Psychiatric/Behavioral: Denies any history of depression, substance abuse, or change in cognitive function.       OBJECTIVE:    /76 (BP Location: Left arm, Patient Position: Sitting, Cuff Size: Adult)   Pulse 67   Ht 190.5 cm (75\")   Wt (!) 141 kg (311 lb 12.8 oz)   SpO2 98%   BMI 38.97 kg/m²       Physical Exam: "     Constitutional: Cooperative, alert and oriented, well-developed, well-nourished, in no acute distress.     HENT:   Head: Normocephalic, normal hair patterns, no masses or tenderness.  Ears, Nose, and Throat: No gross abnormalities. No pallor or cyanosis. Dentition good.   Eyes: EOMS intact, PERRL, conjunctivae and lids unremarkable. Fundoscopic exam and visual fields not performed.   Neck: No palpable masses or adenopathy, no thyromegaly, no JVD, carotid pulses are full and equal bilaterally and without  Bruits.     Cardiovascular: Regular rhythm, S1 and S2 normal, no S3 or S4. Apical impulse not displaced. No murmurs, gallops, or rubs detected.     Pulmonary/Chest: Chest: normal symmetry, no tenderness to palpation, normal respiratory excursion, no intercostal retraction, no use of accessory muscles. Pulmonary: Normal breath sounds. No rales or rhonchi.    Abdominal: Abdomen soft, bowel sounds normoactive, no masses, no hepatosplenomegaly, non-tender, no bruits.     Musculoskeletal: No deformities, clubbing, cyanosis, erythema, or edema observed. There are no spinal abnormalities noted. Normal muscle strength and tone. Pulses full and equal in all extremities, no bruits auscultated.     Neurological: No gross motor or sensory deficits noted, affect appropriate, oriented to time, person, place.     Skin: Warm and dry to the touch, no apparent skin lesions or masses noted.     Psychiatric: He has a normal mood and affect. His behavior is normal. Judgment and thought content normal.         Procedures      Lab Results   Component Value Date    WBC 24.16 (H) 01/19/2020    HGB 14.2 01/19/2020    HCT 42.2 01/19/2020    MCV 89.2 01/19/2020     01/19/2020     Lab Results   Component Value Date    GLUCOSE 140 (H) 01/19/2020    BUN 15 01/19/2020    CREATININE 1.42 (H) 01/19/2020    EGFRIFNONA 50 (L) 01/19/2020    BCR 10.6 01/19/2020    CO2 29.0 01/19/2020    CALCIUM 9.5 01/19/2020    ALBUMIN 4.60 01/19/2020    AST  34 01/19/2020    ALT 51 (H) 01/19/2020     No results found for: CHOL  No results found for: TRIG  No results found for: HDL  No components found for: LDLCALC  No results found for: LDL  No results found for: HDLLDLRATIO  No components found for: CHOLHDL  No results found for: HGBA1C  Lab Results   Component Value Date    TSH 2.07 01/20/2017           ASSESSMENT AND PLAN:    #1 paroxysmal atrial fibrillation   #2 hypertension with hypertensive heart disease   #3 mildly dilated aortic root diameter 4.2 #4 Black lung with abnormal PFT is requiring discontinuation of amiodarone   No. 4 hyperlipidemia   #5 exogenous obesity    65 years old patient last time was evaluated by me in 2018 with borderline dilated aortic root on previous echo with history of chest pain underwent CT coronary angiogram no CAD reported had history of paroxysmal atrial fibrillation not interested in long-term oral anticoagulation pros and cons of this option discussed.  He want to continue aspirin at this stage.  He was recently switched from losartan to Diovan and think is causing his a problem of palpitations.  From clinical description most likely mechanism seem to be PVCs the way he described.  I offered further evaluation with event monitor again not interested.  The significant of low carbohydrate, low-fat, DASH diet graded exercise discussed with the patient.  Recommend to take over-the-counter Mg OXIDE 400 mg on alternate day.  Currently the patient is on Crestor for hyperlipidemia, valsartan for management of hypertension with hypertensive heart disease significant of low carbohydrate, low-fat, DASH diet and graded exercise discussed with the patient        Raghav was seen today for irregular heart beat.    Diagnoses and all orders for this visit:    Paroxysmal atrial fibrillation (CMS/HCC)  -     ECG 12 Lead    Palpitations    Nonrheumatic mitral (valve) insufficiency    Essential hypertension    Mixed hyperlipidemia    Dilated aortic  root (CMS/[facility])          Leo Page MD  6/9/2020  14:05

## 2020-07-13 DIAGNOSIS — M15.9 PRIMARY OSTEOARTHRITIS INVOLVING MULTIPLE JOINTS: ICD-10-CM

## 2020-07-13 NOTE — TELEPHONE ENCOUNTER
Patient over due for annual since 2017.  Please schedule.  Only 30 day supply will be given until appt is completed

## 2020-08-17 ENCOUNTER — APPOINTMENT (OUTPATIENT)
Dept: GENERAL RADIOLOGY | Facility: HOSPITAL | Age: 65
End: 2020-08-17

## 2020-08-17 ENCOUNTER — HOSPITAL ENCOUNTER (EMERGENCY)
Facility: HOSPITAL | Age: 65
Discharge: HOME | End: 2020-08-17
Attending: INTERNAL MEDICINE | Admitting: INTERNAL MEDICINE

## 2020-08-17 VITALS
SYSTOLIC BLOOD PRESSURE: 130 MMHG | HEIGHT: 74 IN | HEART RATE: 97 BPM | WEIGHT: 308 LBS | BODY MASS INDEX: 39.53 KG/M2 | RESPIRATION RATE: 14 BRPM | OXYGEN SATURATION: 97 % | TEMPERATURE: 98.7 F | DIASTOLIC BLOOD PRESSURE: 90 MMHG

## 2020-08-17 DIAGNOSIS — I20.8 STABLE ANGINA (HCC): Primary | ICD-10-CM

## 2020-08-17 LAB
ALBUMIN SERPL-MCNC: 4.3 G/DL (ref 3.5–5.2)
ALBUMIN/GLOB SERPL: 1.6 G/DL
ALP SERPL-CCNC: 35 U/L (ref 39–117)
ALT SERPL W P-5'-P-CCNC: 43 U/L (ref 1–41)
ANION GAP SERPL CALCULATED.3IONS-SCNC: 13 MMOL/L (ref 5–15)
AST SERPL-CCNC: 40 U/L (ref 1–40)
BASOPHILS # BLD AUTO: 0.07 10*3/MM3 (ref 0–0.2)
BASOPHILS NFR BLD AUTO: 0.8 % (ref 0–1.5)
BILIRUB SERPL-MCNC: 0.2 MG/DL (ref 0–1.2)
BUN SERPL-MCNC: 22 MG/DL (ref 8–23)
BUN/CREAT SERPL: 16.4 (ref 7–25)
CALCIUM SPEC-SCNC: 10 MG/DL (ref 8.6–10.5)
CHLORIDE SERPL-SCNC: 106 MMOL/L (ref 98–107)
CO2 SERPL-SCNC: 27 MMOL/L (ref 22–29)
CREAT SERPL-MCNC: 1.34 MG/DL (ref 0.76–1.27)
D DIMER PPP FEU-MCNC: 0.38 MG/L (FEU) (ref 0–0.5)
DEPRECATED RDW RBC AUTO: 44.4 FL (ref 37–54)
EOSINOPHIL # BLD AUTO: 0.1 10*3/MM3 (ref 0–0.4)
EOSINOPHIL NFR BLD AUTO: 1.2 % (ref 0.3–6.2)
ERYTHROCYTE [DISTWIDTH] IN BLOOD BY AUTOMATED COUNT: 13.5 % (ref 12.3–15.4)
GFR SERPL CREATININE-BSD FRML MDRD: 53 ML/MIN/1.73
GLOBULIN UR ELPH-MCNC: 2.7 GM/DL
GLUCOSE SERPL-MCNC: 116 MG/DL (ref 65–99)
HCT VFR BLD AUTO: 42 % (ref 37.5–51)
HGB BLD-MCNC: 13.8 G/DL (ref 13–17.7)
HOLD SPECIMEN: NORMAL
HOLD SPECIMEN: NORMAL
IMM GRANULOCYTES # BLD AUTO: 0.03 10*3/MM3 (ref 0–0.05)
IMM GRANULOCYTES NFR BLD AUTO: 0.4 % (ref 0–0.5)
LYMPHOCYTES # BLD AUTO: 2.36 10*3/MM3 (ref 0.7–3.1)
LYMPHOCYTES NFR BLD AUTO: 28.4 % (ref 19.6–45.3)
MCH RBC QN AUTO: 29.6 PG (ref 26.6–33)
MCHC RBC AUTO-ENTMCNC: 32.9 G/DL (ref 31.5–35.7)
MCV RBC AUTO: 90.1 FL (ref 79–97)
MONOCYTES # BLD AUTO: 0.77 10*3/MM3 (ref 0.1–0.9)
MONOCYTES NFR BLD AUTO: 9.3 % (ref 5–12)
NEUTROPHILS NFR BLD AUTO: 4.99 10*3/MM3 (ref 1.7–7)
NEUTROPHILS NFR BLD AUTO: 59.9 % (ref 42.7–76)
NRBC BLD AUTO-RTO: 0 /100 WBC (ref 0–0.2)
PLATELET # BLD AUTO: 243 10*3/MM3 (ref 140–450)
PMV BLD AUTO: 10 FL (ref 6–12)
POTASSIUM SERPL-SCNC: 4.4 MMOL/L (ref 3.5–5.2)
PROT SERPL-MCNC: 7 G/DL (ref 6–8.5)
RBC # BLD AUTO: 4.66 10*6/MM3 (ref 4.14–5.8)
SODIUM SERPL-SCNC: 146 MMOL/L (ref 136–145)
TROPONIN T SERPL-MCNC: <0.01 NG/ML (ref 0–0.03)
TROPONIN T SERPL-MCNC: <0.01 NG/ML (ref 0–0.03)
WBC # BLD AUTO: 8.32 10*3/MM3 (ref 3.4–10.8)
WHOLE BLOOD HOLD SPECIMEN: NORMAL
WHOLE BLOOD HOLD SPECIMEN: NORMAL

## 2020-08-17 PROCEDURE — 93005 ELECTROCARDIOGRAM TRACING: CPT | Performed by: INTERNAL MEDICINE

## 2020-08-17 PROCEDURE — 93010 ELECTROCARDIOGRAM REPORT: CPT | Performed by: INTERNAL MEDICINE

## 2020-08-17 PROCEDURE — 71045 X-RAY EXAM CHEST 1 VIEW: CPT

## 2020-08-17 PROCEDURE — 85025 COMPLETE CBC W/AUTO DIFF WBC: CPT

## 2020-08-17 PROCEDURE — 84484 ASSAY OF TROPONIN QUANT: CPT | Performed by: INTERNAL MEDICINE

## 2020-08-17 PROCEDURE — 99284 EMERGENCY DEPT VISIT MOD MDM: CPT

## 2020-08-17 PROCEDURE — 85379 FIBRIN DEGRADATION QUANT: CPT | Performed by: INTERNAL MEDICINE

## 2020-08-17 PROCEDURE — 36415 COLL VENOUS BLD VENIPUNCTURE: CPT

## 2020-08-17 PROCEDURE — 84484 ASSAY OF TROPONIN QUANT: CPT

## 2020-08-17 PROCEDURE — 80053 COMPREHEN METABOLIC PANEL: CPT

## 2020-08-17 PROCEDURE — 93005 ELECTROCARDIOGRAM TRACING: CPT | Performed by: EMERGENCY MEDICINE

## 2020-08-17 RX ORDER — ASPIRIN 81 MG/1
324 TABLET, CHEWABLE ORAL ONCE
Status: DISCONTINUED | OUTPATIENT
Start: 2020-08-17 | End: 2020-08-17

## 2020-08-17 RX ORDER — ASPIRIN 81 MG/1
243 TABLET, CHEWABLE ORAL ONCE
Status: COMPLETED | OUTPATIENT
Start: 2020-08-17 | End: 2020-08-17

## 2020-08-17 RX ORDER — SODIUM CHLORIDE 0.9 % (FLUSH) 0.9 %
10 SYRINGE (ML) INJECTION AS NEEDED
Status: DISCONTINUED | OUTPATIENT
Start: 2020-08-17 | End: 2020-08-17 | Stop reason: HOSPADM

## 2020-08-17 RX ADMIN — ASPIRIN 243 MG: 81 TABLET, CHEWABLE ORAL at 19:47

## 2020-08-18 NOTE — ED PROVIDER NOTES
Subjective   Mr. Olivier is a very pleasant 65-year-old gentleman with known history of atrial fibrillation and nonrheumatic valvular disease who also has hypertension and hyperlipidemia and follows with Dr. Holcomb from cardiology who states that over the last couple of days he has had a couple of episodes of chest pain that was short-lived he states that today that him and his wife had been going up and down the stairs down to the basement to work on doing some eusebio and he had had some chest pain that was retrosternal and radiating into his back it resolved on its own but given the fact of its persistence the patient came to the emergency room for further evaluation.           Review of Systems   Constitutional: Negative for chills and fever.   HENT: Negative for congestion, ear pain and sinus pressure.    Eyes: Negative for photophobia, pain and visual disturbance.   Respiratory: Positive for shortness of breath. Negative for cough, chest tightness and wheezing.    Cardiovascular: Positive for chest pain. Negative for palpitations.   Gastrointestinal: Negative for abdominal pain, diarrhea and nausea.   Endocrine: Negative for cold intolerance and heat intolerance.   Genitourinary: Negative for difficulty urinating and urgency.   Musculoskeletal: Negative for arthralgias, joint swelling and myalgias.   Skin: Negative for color change and wound.   Neurological: Negative for dizziness and headaches.   Hematological: Negative for adenopathy. Does not bruise/bleed easily.   Psychiatric/Behavioral: Negative for agitation, behavioral problems, confusion and decreased concentration.       Past Medical History:   Diagnosis Date   • Atrial fibrillation (CMS/HCC)    • Diverticulitis    • Hyperlipidemia    • Hypertension    • Kidney stones    • Nonrheumatic mitral (valve) insufficiency    • Palpitations    • Sleep apnea    • SOB (shortness of breath)        Allergies   Allergen Reactions   • Penicillins Unknown (See Comments)      Pt states is was a childhood allergy   • Vancomycin Itching       Past Surgical History:   Procedure Laterality Date   • APPENDECTOMY     • CARDIOVERSION     • CHOLECYSTECTOMY     • COLONOSCOPY N/A 2017    Procedure: COLONOSCOPY;  Surgeon: Billy Robbins DO;  Location: [facility] ENDOSCOPY;  Service:    • EP STUDY     • KNEE SURGERY Right    • SHOULDER SURGERY Left        Family History   Problem Relation Age of Onset   • Cancer Mother         colon   • Cancer Father         prostate   • Dementia Father        Social History     Socioeconomic History   • Marital status:      Spouse name: Not on file   • Number of children: Not on file   • Years of education: Not on file   • Highest education level: GED or equivalent   Social Needs   • Financial resource strain: Not hard at all   • Food insecurity:     Worry: Never true     Inability: Never true   • Transportation needs:     Medical: No     Non-medical: No   Tobacco Use   • Smoking status: Former Smoker     Packs/day: 0.25     Years: 4.00     Pack years: 1.00     Types: Cigarettes     Start date:      Last attempt to quit:      Years since quittin.6   • Smokeless tobacco: Never Used   Substance and Sexual Activity   • Alcohol use: No     Frequency: Never     Binge frequency: Never   • Drug use: No   • Sexual activity: Yes     Partners: Female   Lifestyle   • Physical activity:     Days per week: 0 days     Minutes per session: 0 min   • Stress: Only a little   Social History Narrative    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            Objective   Physical Exam   Constitutional: He is oriented to person, place, and time. He appears well-developed and well-nourished.   HENT:   Head: Normocephalic and atraumatic.   Mouth/Throat: Oropharynx is clear and moist.   Eyes: Pupils are equal, round, and reactive to light. EOM are normal.   Neck: Normal range of motion. Neck supple.   Cardiovascular: Normal rate, regular  rhythm, normal heart sounds and intact distal pulses.   Pulmonary/Chest: Effort normal and breath sounds normal.   Abdominal: Soft. Bowel sounds are normal. There is no tenderness.   Musculoskeletal: Normal range of motion. He exhibits no edema.   Neurological: He is alert and oriented to person, place, and time. He has normal reflexes. No cranial nerve deficit.   Skin: Skin is warm and dry. No rash noted.   Psychiatric: He has a normal mood and affect. His behavior is normal. Thought content normal.   Nursing note and vitals reviewed.      Procedures           ED Course  ED Course as of Aug 17 2004   Mon Aug 17, 2020   2003  Patient is now been here and been observed for 3-1/2 hours and is now chest pain-free he has 2 sets of cardiac biomarkers that are within normal limits I have discussed with the patient that he needs some type of stress imaging and that we could either admit him to the hospital to have a stress test tomorrow or he can follow-up with his primary care provider for an outpatient stress test the patient states that he is pain-free and feels good he would prefer to go home but states that if he has any recurrence of symptoms he will present back to the hospital for further evaluation.    [RW]   2003 Patient's heart score is 5    [RW]      ED Course User Index  [RW] Russell Carballo MD                                           [facility]    Final diagnoses:   Stable angina (CMS/[facility])            Russell Carballo MD  08/17/20 2003       Russell Carballo MD  08/17/20 2004

## 2020-10-19 ENCOUNTER — APPOINTMENT (OUTPATIENT)
Dept: CARDIOLOGY | Facility: HOSPITAL | Age: 65
End: 2020-10-19

## 2020-10-19 ENCOUNTER — APPOINTMENT (OUTPATIENT)
Dept: CT IMAGING | Facility: HOSPITAL | Age: 65
End: 2020-10-19

## 2020-10-19 ENCOUNTER — HOSPITAL ENCOUNTER (EMERGENCY)
Facility: HOSPITAL | Age: 65
Discharge: HOME | End: 2020-10-19
Admitting: EMERGENCY MEDICINE

## 2020-10-19 ENCOUNTER — ANESTHESIA (OUTPATIENT)
Dept: CARDIOLOGY | Facility: HOSPITAL | Age: 65
End: 2020-10-19

## 2020-10-19 ENCOUNTER — ANESTHESIA EVENT (OUTPATIENT)
Dept: CARDIOLOGY | Facility: HOSPITAL | Age: 65
End: 2020-10-19

## 2020-10-19 ENCOUNTER — APPOINTMENT (OUTPATIENT)
Dept: GENERAL RADIOLOGY | Facility: HOSPITAL | Age: 65
End: 2020-10-19

## 2020-10-19 VITALS
TEMPERATURE: 98.3 F | HEIGHT: 74 IN | WEIGHT: 306 LBS | HEART RATE: 62 BPM | BODY MASS INDEX: 39.27 KG/M2 | OXYGEN SATURATION: 98 % | DIASTOLIC BLOOD PRESSURE: 80 MMHG | RESPIRATION RATE: 20 BRPM | SYSTOLIC BLOOD PRESSURE: 122 MMHG

## 2020-10-19 DIAGNOSIS — I48.0 PAROXYSMAL ATRIAL FIBRILLATION (HCC): ICD-10-CM

## 2020-10-19 DIAGNOSIS — I48.91 ATRIAL FIBRILLATION, UNSPECIFIED TYPE (HCC): Primary | ICD-10-CM

## 2020-10-19 LAB
ALBUMIN SERPL-MCNC: 4.3 G/DL (ref 3.5–5.2)
ALBUMIN/GLOB SERPL: 1.6 G/DL
ALP SERPL-CCNC: 37 U/L (ref 39–117)
ALT SERPL W P-5'-P-CCNC: 57 U/L (ref 1–41)
ANION GAP SERPL CALCULATED.3IONS-SCNC: 14 MMOL/L (ref 5–15)
ARTERIAL PATENCY WRIST A: ABNORMAL
AST SERPL-CCNC: 42 U/L (ref 1–40)
ATMOSPHERIC PRESS: 754 MMHG
BASE EXCESS BLDA CALC-SCNC: -3.3 MMOL/L (ref 0–2)
BASOPHILS # BLD AUTO: 0.04 10*3/MM3 (ref 0–0.2)
BASOPHILS NFR BLD AUTO: 0.5 % (ref 0–1.5)
BDY SITE: ABNORMAL
BILIRUB SERPL-MCNC: <0.2 MG/DL (ref 0–1.2)
BODY TEMPERATURE: 37 C
BUN SERPL-MCNC: 19 MG/DL (ref 8–23)
BUN/CREAT SERPL: 17 (ref 7–25)
CALCIUM SPEC-SCNC: 9.4 MG/DL (ref 8.6–10.5)
CHLORIDE SERPL-SCNC: 105 MMOL/L (ref 98–107)
CO2 SERPL-SCNC: 21 MMOL/L (ref 22–29)
CREAT SERPL-MCNC: 1.12 MG/DL (ref 0.76–1.27)
DEPRECATED RDW RBC AUTO: 43.3 FL (ref 37–54)
EOSINOPHIL # BLD AUTO: 0.08 10*3/MM3 (ref 0–0.4)
EOSINOPHIL NFR BLD AUTO: 1.1 % (ref 0.3–6.2)
ERYTHROCYTE [DISTWIDTH] IN BLOOD BY AUTOMATED COUNT: 13.5 % (ref 12.3–15.4)
GFR SERPL CREATININE-BSD FRML MDRD: 65 ML/MIN/1.73
GLOBULIN UR ELPH-MCNC: 2.7 GM/DL
GLUCOSE SERPL-MCNC: 117 MG/DL (ref 65–99)
HCO3 BLDA-SCNC: 20.7 MMOL/L (ref 20–26)
HCT VFR BLD AUTO: 43.7 % (ref 37.5–51)
HGB BLD-MCNC: 14.5 G/DL (ref 13–17.7)
IMM GRANULOCYTES # BLD AUTO: 0.03 10*3/MM3 (ref 0–0.05)
IMM GRANULOCYTES NFR BLD AUTO: 0.4 % (ref 0–0.5)
INR PPP: 1.94 (ref 0.91–1.09)
LYMPHOCYTES # BLD AUTO: 1.93 10*3/MM3 (ref 0.7–3.1)
LYMPHOCYTES NFR BLD AUTO: 25.8 % (ref 19.6–45.3)
Lab: ABNORMAL
MCH RBC QN AUTO: 29.2 PG (ref 26.6–33)
MCHC RBC AUTO-ENTMCNC: 33.2 G/DL (ref 31.5–35.7)
MCV RBC AUTO: 88.1 FL (ref 79–97)
MODALITY: ABNORMAL
MONOCYTES # BLD AUTO: 0.67 10*3/MM3 (ref 0.1–0.9)
MONOCYTES NFR BLD AUTO: 9 % (ref 5–12)
NEUTROPHILS NFR BLD AUTO: 4.73 10*3/MM3 (ref 1.7–7)
NEUTROPHILS NFR BLD AUTO: 63.2 % (ref 42.7–76)
NRBC BLD AUTO-RTO: 0 /100 WBC (ref 0–0.2)
NT-PROBNP SERPL-MCNC: 170.9 PG/ML (ref 0–900)
PCO2 BLDA: 33.8 MM HG (ref 35–45)
PCO2 TEMP ADJ BLD: 33.8 MM HG (ref 35–45)
PH BLDA: 7.4 PH UNITS (ref 7.35–7.45)
PH, TEMP CORRECTED: 7.4 PH UNITS (ref 7.35–7.45)
PLATELET # BLD AUTO: 243 10*3/MM3 (ref 140–450)
PMV BLD AUTO: 10.1 FL (ref 6–12)
PO2 BLDA: 115 MM HG (ref 83–108)
PO2 TEMP ADJ BLD: 115 MM HG (ref 83–108)
POTASSIUM SERPL-SCNC: 3.9 MMOL/L (ref 3.5–5.2)
PROT SERPL-MCNC: 7 G/DL (ref 6–8.5)
PROTHROMBIN TIME: 21.9 SECONDS (ref 11.9–14.6)
RBC # BLD AUTO: 4.96 10*6/MM3 (ref 4.14–5.8)
SAO2 % BLDCOA: 98.9 % (ref 94–99)
SARS-COV-2 RNA PNL SPEC NAA+PROBE: NOT DETECTED
SODIUM SERPL-SCNC: 140 MMOL/L (ref 136–145)
TROPONIN T SERPL-MCNC: <0.01 NG/ML (ref 0–0.03)
TROPONIN T SERPL-MCNC: <0.01 NG/ML (ref 0–0.03)
TSH SERPL DL<=0.05 MIU/L-ACNC: 2.53 UIU/ML (ref 0.27–4.2)
VENTILATOR MODE: ABNORMAL
WBC # BLD AUTO: 7.48 10*3/MM3 (ref 3.4–10.8)

## 2020-10-19 PROCEDURE — 36600 WITHDRAWAL OF ARTERIAL BLOOD: CPT

## 2020-10-19 PROCEDURE — 85025 COMPLETE CBC W/AUTO DIFF WBC: CPT | Performed by: NURSE PRACTITIONER

## 2020-10-19 PROCEDURE — 80053 COMPREHEN METABOLIC PANEL: CPT | Performed by: NURSE PRACTITIONER

## 2020-10-19 PROCEDURE — 92960 CARDIOVERSION ELECTRIC EXT: CPT

## 2020-10-19 PROCEDURE — 82803 BLOOD GASES ANY COMBINATION: CPT

## 2020-10-19 PROCEDURE — 84443 ASSAY THYROID STIM HORMONE: CPT | Performed by: NURSE PRACTITIONER

## 2020-10-19 PROCEDURE — 84484 ASSAY OF TROPONIN QUANT: CPT | Performed by: NURSE PRACTITIONER

## 2020-10-19 PROCEDURE — 87635 SARS-COV-2 COVID-19 AMP PRB: CPT | Performed by: NURSE PRACTITIONER

## 2020-10-19 PROCEDURE — 85610 PROTHROMBIN TIME: CPT | Performed by: NURSE PRACTITIONER

## 2020-10-19 PROCEDURE — 99283 EMERGENCY DEPT VISIT LOW MDM: CPT | Performed by: NURSE PRACTITIONER

## 2020-10-19 PROCEDURE — 99285 EMERGENCY DEPT VISIT HI MDM: CPT

## 2020-10-19 PROCEDURE — 92960 CARDIOVERSION ELECTRIC EXT: CPT | Performed by: INTERNAL MEDICINE

## 2020-10-19 PROCEDURE — 93010 ELECTROCARDIOGRAM REPORT: CPT | Performed by: INTERNAL MEDICINE

## 2020-10-19 PROCEDURE — 71045 X-RAY EXAM CHEST 1 VIEW: CPT

## 2020-10-19 PROCEDURE — 25010000002 PROPOFOL 10 MG/ML EMULSION: Performed by: NURSE ANESTHETIST, CERTIFIED REGISTERED

## 2020-10-19 PROCEDURE — 0 IOPAMIDOL PER 1 ML: Performed by: NURSE PRACTITIONER

## 2020-10-19 PROCEDURE — 83880 ASSAY OF NATRIURETIC PEPTIDE: CPT | Performed by: NURSE PRACTITIONER

## 2020-10-19 PROCEDURE — 71275 CT ANGIOGRAPHY CHEST: CPT

## 2020-10-19 PROCEDURE — 93005 ELECTROCARDIOGRAM TRACING: CPT

## 2020-10-19 RX ORDER — AMIODARONE HYDROCHLORIDE 200 MG/1
200 TABLET ORAL 2 TIMES DAILY
Qty: 60 TABLET | Refills: 3 | Status: SHIPPED | OUTPATIENT
Start: 2020-10-19 | End: 2021-01-11

## 2020-10-19 RX ORDER — SODIUM CHLORIDE 0.9 % (FLUSH) 0.9 %
10 SYRINGE (ML) INJECTION AS NEEDED
Status: DISCONTINUED | OUTPATIENT
Start: 2020-10-19 | End: 2020-10-19 | Stop reason: HOSPADM

## 2020-10-19 RX ORDER — SODIUM CHLORIDE 9 MG/ML
INJECTION, SOLUTION INTRAVENOUS CONTINUOUS PRN
Status: DISCONTINUED | OUTPATIENT
Start: 2020-10-19 | End: 2020-10-19 | Stop reason: SURG

## 2020-10-19 RX ORDER — PROPOFOL 10 MG/ML
VIAL (ML) INTRAVENOUS AS NEEDED
Status: DISCONTINUED | OUTPATIENT
Start: 2020-10-19 | End: 2020-10-19 | Stop reason: SURG

## 2020-10-19 RX ORDER — SENNOSIDES 8.6 MG
650 CAPSULE ORAL EVERY 8 HOURS PRN
COMMUNITY

## 2020-10-19 RX ADMIN — IOPAMIDOL 100 ML: 755 INJECTION, SOLUTION INTRAVENOUS at 09:35

## 2020-10-19 RX ADMIN — PROPOFOL 30 MG: 10 INJECTION, EMULSION INTRAVENOUS at 16:21

## 2020-10-19 RX ADMIN — SODIUM CHLORIDE: 9 INJECTION, SOLUTION INTRAVENOUS at 16:11

## 2020-10-19 RX ADMIN — PROPOFOL 70 MG: 10 INJECTION, EMULSION INTRAVENOUS at 16:14

## 2020-10-19 RX ADMIN — PROPOFOL 50 MG: 10 INJECTION, EMULSION INTRAVENOUS at 16:17

## 2020-10-19 RX ADMIN — LIDOCAINE HYDROCHLORIDE 50 MG: 20 INJECTION, SOLUTION INTRAVENOUS at 16:14

## 2020-10-19 NOTE — ANESTHESIA POSTPROCEDURE EVALUATION
Patient: Raghav Olivier    Procedure Summary     Date: 10/19/20 Room / Location: [facility] CATH LAB    Anesthesia Start: 1609 Anesthesia Stop: 1625    Procedure: CARDIOVERSION EXTERNAL IN CARDIOLOGY DEPARTMENT Diagnosis: (persistent atrial fibrillation)    Scheduled Providers:  Provider: Jack Perez CRNA    Anesthesia Type: MAC ASA Status: 3          Anesthesia Type: MAC    Vitals  No vitals data found for the desired time range.          Post Anesthesia Care and Evaluation    Patient location during evaluation: PHASE II  Patient participation: complete - patient participated  Level of consciousness: awake and awake and alert  Pain score: 0  Pain management: adequate  Airway patency: patent  Anesthetic complications: No anesthetic complications  PONV Status: none  Cardiovascular status: acceptable  Respiratory status: acceptable  Hydration status: acceptable

## 2020-10-19 NOTE — ANESTHESIA PREPROCEDURE EVALUATION
Anesthesia Evaluation     Patient summary reviewed and Nursing notes reviewed   no history of anesthetic complications:  NPO Solid Status: > 8 hours  NPO Liquid Status: > 8 hours           Airway   Mallampati: I  TM distance: >3 FB  Neck ROM: full  No difficulty expected  Dental      Pulmonary    (+) sleep apnea on CPAP,   Cardiovascular   Exercise tolerance: poor (<4 METS)    (+) hypertension, valvular problems/murmurs MR, dysrhythmias Atrial Fib, hyperlipidemia,     ROS comment: Echo 5/4/18  · Left ventricular systolic function is normal. Estimated EF = 65%.  · Left ventricular diastolic dysfunction (grade I) consistent with impaired relaxation.  · Left atrial cavity size is borderline dilated.  · Borderline dilation of the aortic root is present (3.9 cms)       Recent coronary CT concerning for CAD. Plan for heart cath if patient has ongoing symptoms unable to be treated medically after cardioversion. He complains of dyspnea since diagnosis of afib 1 month ago.  ALso of note, cardiac enzymes today negative x 2    Neuro/Psych  (-) seizures, TIA, CVA  GI/Hepatic/Renal/Endo    (+) obesity,   renal disease stones,   (-) liver disease, diabetes    Musculoskeletal     Abdominal    Substance History      OB/GYN          Other                      Anesthesia Plan    ASA 3     MAC     intravenous induction     Anesthetic plan, all risks, benefits, and alternatives have been provided, discussed and informed consent has been obtained with: patient.

## 2020-10-19 NOTE — DISCHARGE INSTRUCTIONS
- Stop taking metoprolol succinate 25 mg daily.  Is been discussed with the patient and his wife.  They have verbalized understanding.    - Continue Xarelto 20 mg daily with food.

## 2020-10-19 NOTE — ED PROVIDER NOTES
Subjective   Patient is a 65-year-old white male presents the emergency department with shortness of breath and palpitations.  Patient states he does have a history of atrial fibrillation and states he has been in A. fib for about the past month.  He states he started feeling very short of breath this morning.  He denies any chest pain.  He denies any recent illness.  No cough or congestion.  No known exposure to COVID-19.  He denies any nausea or vomiting.  Patient follows with Dr. Kramer.      History provided by:  Patient   used: No        Review of Systems   Constitutional: Negative.    HENT: Negative.    Eyes: Negative.    Respiratory: Negative.    Cardiovascular:        Patient is a 65-year-old white male presents the emergency department with shortness of breath and palpitations.  Patient states he does have a history of atrial fibrillation and states he has been in A. fib for about the past month.  He states he started feeling very short of breath this morning.  He denies any chest pain.  He denies any recent illness.  No cough or congestion.  No known exposure to COVID-19.  He denies any nausea or vomiting.  Patient follows with Dr. Kramer.     Gastrointestinal: Negative.    Endocrine: Negative.    Genitourinary: Negative.    Musculoskeletal: Negative.    Skin: Negative.    Allergic/Immunologic: Negative.    Neurological: Negative.    Hematological: Negative.    Psychiatric/Behavioral: Negative.    All other systems reviewed and are negative.      Past Medical History:   Diagnosis Date   • Atrial fibrillation (CMS/HCC)    • Diverticulitis    • Hyperlipidemia    • Hypertension    • Kidney stones    • Nonrheumatic mitral (valve) insufficiency    • Palpitations    • Sleep apnea    • SOB (shortness of breath)        Allergies   Allergen Reactions   • Penicillins Unknown (See Comments)     Pt states is was a childhood allergy   • Vancomycin Itching       Past Surgical History:   Procedure  Laterality Date   • APPENDECTOMY     • CARDIOVERSION     • CHOLECYSTECTOMY     • COLONOSCOPY N/A 2017    Procedure: COLONOSCOPY;  Surgeon: Billy Robbins DO;  Location: [facility] ENDOSCOPY;  Service:    • EP STUDY     • KNEE SURGERY Right    • SHOULDER SURGERY Left        Family History   Problem Relation Age of Onset   • Cancer Mother         colon   • Cancer Father         prostate   • Dementia Father        Social History     Socioeconomic History   • Marital status:      Spouse name: Not on file   • Number of children: Not on file   • Years of education: Not on file   • Highest education level: GED or equivalent   Social Needs   • Financial resource strain: Not hard at all   • Food insecurity     Worry: Never true     Inability: Never true   • Transportation needs     Medical: No     Non-medical: No   Tobacco Use   • Smoking status: Former Smoker     Packs/day: 0.25     Years: 4.00     Pack years: 1.00     Types: Cigarettes     Start date:      Quit date:      Years since quittin.8   • Smokeless tobacco: Never Used   Substance and Sexual Activity   • Alcohol use: No     Frequency: Never     Binge frequency: Never   • Drug use: No   • Sexual activity: Yes     Partners: Female   Lifestyle   • Physical activity     Days per week: 0 days     Minutes per session: 0 min   • Stress: Only a little   Social History Narrative    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        Prior to Admission medications    Medication Sig Start Date End Date Taking? Authorizing Provider   aspirin 81 MG EC tablet Take 1 tablet by mouth Daily. 18   Irene Zamarripa MD   azithromycin (ZITHROMAX) 250 MG tablet Take 1 tablet by mouth Daily. Take 2 tablets the first day, then 1 tablet daily for 4 days. 20   Tino Harkins DO   cefuroxime (CEFTIN) 500 MG tablet Take 1 tablet by mouth 2 (Two) Times a Day. 20   Tino Harkins DO   cyclobenzaprine (FLEXERIL) 10 MG tablet  "Take 1 tablet by mouth 3 (Three) Times a Day As Needed for Muscle Spasms. 9/4/19   Jay Perez MD   desloratadine (CLARINEX) 5 MG tablet Take 1 tablet by mouth Daily. 9/4/19   Jay Perez MD   diclofenac (VOLTAREN) 50 MG EC tablet TAKE 1 TABLET BY MOUTH TWICE A DAY  Patient taking differently: Daily. 1/20/20   Jeannette Kinney APRN   hydroCHLOROthiazide (HYDRODIURIL) 12.5 MG tablet TAKE 1 TABLET BY MOUTH EVERY DAY 1/13/20   Jay Perez MD   nitroglycerin (NITROSTAT) 0.4 MG SL tablet Place 1 tablet under the tongue Every 5 (Five) Minutes As Needed for Chest Pain. Take no more than 3 doses in 15 minutes. 1/17/18   Irene Zamarripa MD   rosuvastatin (CRESTOR) 20 MG tablet TAKE 1 TABLET BY MOUTH EVERY DAY 4/1/20   Jay Perez MD   silver sulfadiazine (SILVADENE) 1 % cream Apply  topically to the appropriate area as directed 2 (Two) Times a Day. 6/11/19   Jeannette Kinney APRN   sulfamethoxazole-trimethoprim (BACTRIM DS,SEPTRA DS) 800-160 MG per tablet Take 1 tablet by mouth 2 (Two) Times a Day. 9/4/19   Jay Perez MD   valACYclovir (VALTREX) 1000 MG tablet TAKE 1 TABLET BY MOUTH EVERY DAY 1/10/20   Jay Perez MD   valsartan (DIOVAN) 80 MG tablet Take 80 mg by mouth Daily.    Provider, MD Tristen       /80   Pulse 62 Comment: afib  Temp 98.3 °F (36.8 °C)   Resp 20   Ht 188 cm (74\")   Wt (!) 139 kg (306 lb)   SpO2 98%   BMI 39.29 kg/m²     Objective   Physical Exam  Vitals signs and nursing note reviewed.   Constitutional:       Appearance: He is well-developed.      Comments: Patient's O2 sat is 100% on room air however he is very short of breath and speaks in short sentences due to his shortness of breath.   HENT:      Head: Normocephalic and atraumatic.   Eyes:      Conjunctiva/sclera: Conjunctivae normal.      Pupils: Pupils are equal, round, and reactive to light.   Neck:      Musculoskeletal: Normal range of motion and neck supple. "   Cardiovascular:      Rate and Rhythm: Normal rate. Rhythm irregular.      Heart sounds: Normal heart sounds.      Comments: Atrial fibrillation with a rate of 85  Pulmonary:      Effort: Respiratory distress present.      Comments: Speaks in short sentences  Abdominal:      General: Bowel sounds are normal.      Palpations: Abdomen is soft.   Musculoskeletal: Normal range of motion.   Skin:     General: Skin is warm and dry.   Neurological:      Mental Status: He is alert and oriented to person, place, and time.      Deep Tendon Reflexes: Reflexes are normal and symmetric.   Psychiatric:         Behavior: Behavior normal.         Thought Content: Thought content normal.         Judgment: Judgment normal.         Procedures         Lab Results (last 24 hours)     ** No results found for the last 24 hours. **          CT Angiogram Chest   Final Result   1. No acute cardiopulmonary finding. Specifically, no evidence of   pulmonary embolism.   2. Scattered coronary artery calcifications.   This report was finalized on 10/19/2020 10:42 by Dr Asia Jung MD.      XR Chest 1 View   Final Result          ED Course  ED Course as of Oct 26 0800   Mon Oct 19, 2020   1103 Spoke  with merlin with cardiology- will send someone to see pt     [CW]   1224 Spoke with jose g timmons with cardiology. Received a cta of coronary arteries report from [facility]. Have reviewed results with jose g. Report placed in the chart. Conclusion indicates elevated calcium; heavy coronary calcification    [CW]   1443 Patient will be cardioverted today per Dr. Kramer.  He will be transferred to the [facility] at this time. Pt in agreement with care plan    [CW]      ED Course User Index  [CW] Christa Lovett APRN          MDM  Number of Diagnoses or Management Options  Atrial fibrillation, unspecified type (CMS/[facility]): minor     Amount and/or Complexity of Data Reviewed  Clinical lab tests: ordered and reviewed  Tests in the  radiology section of CPT®: ordered and reviewed    Patient Progress  Patient progress: stable      Final diagnoses:   Atrial fibrillation, unspecified type (CMS/HCC)          Christa Lovett, APRN  10/26/20 0800

## 2020-10-19 NOTE — CONSULTS
"[facility] CONSULT NOTE    Referring Provider: EMPERATRIZ Head   Reason for Consultation: Shortness of breath    Chief Complaint   Patient presents with   • Palpitations       Subjective .     History of present illness:  Raghav Olivier is a 65 y.o. male with a known PMH significant for persistent atrial fibrillation, long-term anticoagulation, hypertension, hyperlipidemia, sleep apnea, and obesity.      The patient presented to the emergency department for worsening shortness of breath, fatigue, headaches, palpitations.  The patient reports the symptoms have been ongoing for approximately one month and he relates them to his known atrial fibrillation.  He has been following with cardiology at [facility].      Approximately 4 to 6 weeks ago the patient was started on anticoagulation therapy with plans for outpatient cardioversion which was scheduled for this week.  Patient also had an ischemic work-up.  The family reports to me that he had an inconclusive nuclear perfusion scan followed by a CT of his coronary arteries which was found to be abnormal.      Today he presents with chief complaint of worsening shortness of breath.  He reports he feels like he \" cannot get a breath\" he states this is happened to him before during periods of atrial fibrillation.  He reports having symptomatic atrial fibrillation feeling markedly different while in A. fib.  Due to his persistent symptoms and persistent atrial fibrillation he was due for outpatient cardioversion on Thursday.  This was scheduled with Dr. Kramer at [facility].    His symptoms have been persistent for approximately 1 month.  He notes weakness, headaches, palpitations, shortness of breath.  He was started on metoprolol succinate approximately 4 to 6 weeks ago.  He more recently notes intermittent lightheadedness and with his wife reports intermittent confusion and forgetfulness.    During examination " today the patient reports symptoms as noted above that he correlates with his A. fib.  He denies any chest pain, chest pressure, or chest discomfort.  He recalls similar symptoms in the past during episodes of paroxysmal atrial fibrillation.  While talking to him he had an episode where he felt nauseated, lightheaded, and lost track of his conversation.  His wife reports that this episode is similar to what she refers to regarding his forgetfulness.  Telemetry during that time revealed a 2.86 second pause.  Heart rates have been in the 50s and 60s during my evaluation consistent with atrial fibrillation.    Cardiology has been consulted by the emergency department for further evaluation of the patient.    History  Past Medical History:   Diagnosis Date   • Atrial fibrillation (CMS/HCC)    • Diverticulitis    • Hyperlipidemia    • Hypertension    • Kidney stones    • Nonrheumatic mitral (valve) insufficiency    • Palpitations    • Sleep apnea    • SOB (shortness of breath)    ,   Past Surgical History:   Procedure Laterality Date   • APPENDECTOMY     • CARDIOVERSION     • CHOLECYSTECTOMY     • COLONOSCOPY N/A 2017    Procedure: COLONOSCOPY;  Surgeon: Billy Robbins DO;  Location: [facility] ENDOSCOPY;  Service:    • EP STUDY     • KNEE SURGERY Right    • SHOULDER SURGERY Left    ,   Family History   Problem Relation Age of Onset   • Cancer Mother         colon   • Cancer Father         prostate   • Dementia Father    ,   Social History     Tobacco Use   • Smoking status: Former Smoker     Packs/day: 0.25     Years: 4.00     Pack years: 1.00     Types: Cigarettes     Start date:      Quit date:      Years since quittin.8   • Smokeless tobacco: Never Used   Substance Use Topics   • Alcohol use: No     Frequency: Never     Binge frequency: Never   • Drug use: No   ,     Medications  Current Facility-Administered Medications   Medication Dose Route Frequency Provider Last Rate Last Dose   • sodium  chloride 0.9 % flush 10 mL  10 mL Intravenous PRN Christa Lovett APRN         Current Outpatient Medications   Medication Sig Dispense Refill   • aspirin 81 MG EC tablet Take 1 tablet by mouth Daily. 30 tablet 2   • acetaminophen (TYLENOL) 650 MG 8 hr tablet Take 650 mg by mouth Every 8 (Eight) Hours As Needed for Mild Pain . Takes one tablet in the morning and one at night     • azithromycin (ZITHROMAX) 250 MG tablet Take 1 tablet by mouth Daily. Take 2 tablets the first day, then 1 tablet daily for 4 days. 6 tablet 0   • cefuroxime (CEFTIN) 500 MG tablet Take 1 tablet by mouth 2 (Two) Times a Day. 20 tablet 0   • cyclobenzaprine (FLEXERIL) 10 MG tablet Take 1 tablet by mouth 3 (Three) Times a Day As Needed for Muscle Spasms. 30 tablet 0   • desloratadine (CLARINEX) 5 MG tablet Take 1 tablet by mouth Daily. 30 tablet 5   • diclofenac (VOLTAREN) 50 MG EC tablet TAKE 1 TABLET BY MOUTH TWICE A DAY (Patient taking differently: Daily.) 180 tablet 1   • hydroCHLOROthiazide (HYDRODIURIL) 12.5 MG tablet TAKE 1 TABLET BY MOUTH EVERY DAY 90 tablet 1   • nitroglycerin (NITROSTAT) 0.4 MG SL tablet Place 1 tablet under the tongue Every 5 (Five) Minutes As Needed for Chest Pain. Take no more than 3 doses in 15 minutes. 30 tablet 1   • rivaroxaban (XARELTO) 20 MG tablet Take 20 mg by mouth Daily.     • rosuvastatin (CRESTOR) 20 MG tablet TAKE 1 TABLET BY MOUTH EVERY DAY 90 tablet 1   • silver sulfadiazine (SILVADENE) 1 % cream Apply  topically to the appropriate area as directed 2 (Two) Times a Day. 50 g 5   • sulfamethoxazole-trimethoprim (BACTRIM DS,SEPTRA DS) 800-160 MG per tablet Take 1 tablet by mouth 2 (Two) Times a Day. 20 tablet 0   • valACYclovir (VALTREX) 1000 MG tablet TAKE 1 TABLET BY MOUTH EVERY DAY 30 tablet 1   • valsartan (DIOVAN) 80 MG tablet Take 80 mg by mouth Daily.         Allergies:  Penicillins and Vancomycin    Review of Systems  Review of Systems   Constitution: Positive for malaise/fatigue.  "Negative for diaphoresis and fever.   HENT: Negative for congestion.    Eyes: Negative for visual disturbance.   Cardiovascular: Positive for dyspnea on exertion and palpitations. Negative for chest pain, leg swelling, near-syncope, orthopnea, paroxysmal nocturnal dyspnea and syncope.   Respiratory: Positive for shortness of breath. Negative for cough and wheezing.    Hematologic/Lymphatic: Negative for bleeding problem.   Gastrointestinal: Negative for bloating, abdominal pain, nausea and vomiting.   Neurological: Positive for headaches and light-headedness. Negative for dizziness.   Psychiatric/Behavioral: Negative for altered mental status.       Objective     Physical Exam:  Patient Vitals for the past 24 hrs:   BP Temp Pulse Resp SpO2 Height Weight   10/19/20 1000 125/78 -- 73 -- 97 % -- --   10/19/20 0842 146/81 98 °F (36.7 °C) 86 17 97 % 188 cm (74\") (!) 139 kg (306 lb)     Vitals signs reviewed.   Constitutional:       Appearance: Healthy appearance. Well-developed and well-groomed.      Interventions: Nasal cannula in place.   Neck:      Musculoskeletal: Normal range of motion and neck supple.   Pulmonary:      Effort: Pulmonary effort is normal.      Breath sounds: Normal breath sounds. No wheezing. No rhonchi. No rales.   Chest:      Chest wall: Not tender to palpatation.   Cardiovascular:      Bradycardia present. Irregularly irregular rhythm.      Murmurs: There is no murmur.      No gallop. No rub.   Edema:     Peripheral edema absent.   Abdominal:      General: Bowel sounds are normal.   Musculoskeletal: Normal range of motion.   Skin:     General: Skin is warm and dry.   Neurological:      Mental Status: Alert and oriented to person, place and time.   Psychiatric:         Attention and Perception: Attention and perception normal.         Speech: Speech normal.         Behavior: Behavior is cooperative.         Results Review:   I reviewed the patient's new clinical results.    Lab Results (last 72 " hours)     Procedure Component Value Units Date/Time    Troponin [[id number]]  (Normal) Collected: 10/19/20 1205    Specimen: Blood Updated: 10/19/20 1247     Troponin T <0.010 ng/mL     Narrative:      Troponin T Reference Range:  <= 0.03 ng/mL-   Negative for AMI  >0.03 ng/mL-     Abnormal for myocardial necrosis.  Clinicians would have to utilize clinical acumen, EKG, Troponin and serial changes to determine if it is an Acute Myocardial Infarction or myocardial injury due to an underlying chronic condition.       Results may be falsely decreased if patient taking Biotin.      Blood Gas, Arterial [[id number]]  (Abnormal) Collected: 10/19/20 1010    Specimen: Arterial Blood Updated: 10/19/20 1019     Site Left Brachial     Mitul's Test N/A     pH, Arterial 7.396 pH units      pCO2, Arterial 33.8 mm Hg      Comment: 84 Value below reference range        pO2, Arterial 115.0 mm Hg      Comment: 83 Value above reference range        HCO3, Arterial 20.7 mmol/L      Base Excess, Arterial -3.3 mmol/L      Comment: 84 Value below reference range        O2 Saturation, Arterial 98.9 %      Temperature 37.0 C      Barometric Pressure for Blood Gas 754 mmHg      Modality Room Air     Ventilator Mode NA     Collected by [id number]     Comment: Meter: [id number]     :  [id number]        pCO2, Temperature Corrected 33.8 mm Hg      pH, Temp Corrected 7.396 pH Units      pO2, Temperature Corrected 115 mm Hg     TSH [[id number]]  (Normal) Collected: 10/19/20 0908    Specimen: Blood Updated: 10/19/20 1009     TSH 2.530 uIU/mL     Comprehensive Metabolic Panel [[id number]]  (Abnormal) Collected: 10/19/20 0908    Specimen: Blood Updated: 10/19/20 1004     Glucose 117 mg/dL      BUN 19 mg/dL      Creatinine 1.12 mg/dL      Sodium 140 mmol/L      Potassium 3.9 mmol/L      Comment: Specimen hemolyzed.  Results may be affected.        Chloride 105 mmol/L      CO2 21.0 mmol/L      Calcium 9.4 mg/dL      Total Protein 7.0 g/dL       Albumin 4.30 g/dL      ALT (SGPT) 57 U/L      Comment: Specimen hemolyzed.  Results may be affected.        AST (SGOT) 42 U/L      Comment: Specimen hemolyzed.  Results may be affected.        Alkaline Phosphatase 37 U/L      Total Bilirubin <0.2 mg/dL      eGFR Non African Amer 65 mL/min/1.73      Globulin 2.7 gm/dL      A/G Ratio 1.6 g/dL      BUN/Creatinine Ratio 17.0     Anion Gap 14.0 mmol/L     Narrative:      GFR Normal >60  Chronic Kidney Disease <60  Kidney Failure <15      COVID PRE-OP / PRE-PROCEDURE SCREENING ORDER (NO ISOLATION) - Swab, Nasal Cavity [[id number]]  (Normal) Collected: 10/19/20 0905    Specimen: Swab from Nasal Cavity Updated: 10/19/20 1000    Narrative:      The following orders were created for panel order COVID PRE-OP / PRE-PROCEDURE SCREENING ORDER (NO ISOLATION) - Swab, Nasal Cavity.  Procedure                               Abnormality         Status                     ---------                               -----------         ------                     COVID-19,[facility]...[[id number]]  Normal              Final result                 Please view results for these tests on the individual orders.    COVID-19,[facility] IN-HOUSE,Nasal Swab No Transport Media 3-4 HR TAT - Swab, Nasal Cavity [[id number]]  (Normal) Collected: 10/19/20 0905    Specimen: Swab from Nasal Cavity Updated: 10/19/20 1000     COVID19 Not Detected    Narrative:      Fact sheet for providers: https://www.fda.gov/media/429464/download     Fact sheet for patients: https://www.fda.gov/media/084136/download  Fact sheet for providers: https://www.fda.gov/media/828181/download     Fact sheet for patients: https://www.fda.gov/media/105185/download    BNP [[id number]]  (Normal) Collected: 10/19/20 0908    Specimen: Blood Updated: 10/19/20 0959     proBNP 170.9 pg/mL     Narrative:      Among patients with dyspnea, NT-proBNP is highly sensitive for the detection of acute congestive heart failure. In addition NT-proBNP of  <300 pg/ml effectively rules out acute congestive heart failure with 99% negative predictive value.    Results may be falsely decreased if patient taking Biotin.      Troponin [[id number]]  (Normal) Collected: 10/19/20 0908    Specimen: Blood Updated: 10/19/20 0959     Troponin T <0.010 ng/mL     Narrative:      Troponin T Reference Range:  <= 0.03 ng/mL-   Negative for AMI  >0.03 ng/mL-     Abnormal for myocardial necrosis.  Clinicians would have to utilize clinical acumen, EKG, Troponin and serial changes to determine if it is an Acute Myocardial Infarction or myocardial injury due to an underlying chronic condition.       Results may be falsely decreased if patient taking Biotin.      Protime-INR [[id number]]  (Abnormal) Collected: 10/19/20 0908    Specimen: Blood Updated: 10/19/20 0951     Protime 21.9 Seconds      INR 1.94    CBC & Differential [[id number]]  (Normal) Collected: 10/19/20 0908    Specimen: Blood Updated: 10/19/20 0941    Narrative:      The following orders were created for panel order CBC & Differential.  Procedure                               Abnormality         Status                     ---------                               -----------         ------                     CBC Auto Differential[[id number]]        Normal              Final result                 Please view results for these tests on the individual orders.    CBC Auto Differential [[id number]]  (Normal) Collected: 10/19/20 0908    Specimen: Blood Updated: 10/19/20 0941     WBC 7.48 10*3/mm3      RBC 4.96 10*6/mm3      Hemoglobin 14.5 g/dL      Hematocrit 43.7 %      MCV 88.1 fL      MCH 29.2 pg      MCHC 33.2 g/dL      RDW 13.5 %      RDW-SD 43.3 fl      MPV 10.1 fL      Platelets 243 10*3/mm3      Neutrophil % 63.2 %      Lymphocyte % 25.8 %      Monocyte % 9.0 %      Eosinophil % 1.1 %      Basophil % 0.5 %      Immature Grans % 0.4 %      Neutrophils, Absolute 4.73 10*3/mm3      Lymphocytes, Absolute 1.93 10*3/mm3       Monocytes, Absolute 0.67 10*3/mm3      Eosinophils, Absolute 0.08 10*3/mm3      Basophils, Absolute 0.04 10*3/mm3      Immature Grans, Absolute 0.03 10*3/mm3      nRBC 0.0 /100 WBC           Lab Results   Component Value Date    ECHOEFEST 65 05/04/2018       Imaging Results (Last 72 Hours)     Procedure Component Value Units Date/Time    CT Angiogram Chest [[id number]] Collected: 10/19/20 1038     Updated: 10/19/20 1045    Narrative:      EXAM: CT ANGIOGRAM CHEST- - 10/19/2020 10:31 AM CDT     HISTORY: dyspnea/ afib       COMPARISON: 10/19/2020 chest radiograph.      DOSE LENGTH PRODUCT: 555 mGy cm. Automatic exposure control was utilized  to make radiation dose as low as reasonably achievable.     TECHNIQUE: Enhanced axial CT images obtained with multiplanar reformats.  3D postprocessing, including MIPs, performed and images saved to PACS.     FINDINGS:   AIRWAYS/PULMONARY PARENCHYMA: The central airways are midline and  patent. No suspicious pulmonary mass or nodule. Minimal subsegmental  atelectasis. No pleural effusion or pneumothorax.     VESSELS: There is of contrast limits evaluation of subsegmental  pulmonary arteries. No large central pulmonary artery filling defect.  Aorta normal in course and caliber with mild calcified atherosclerosis.  Phase of contrast limits evaluation.      CARDIAC:  Borderline heart size. No pericardial effusion.  Scattered  coronary artery calcifications.      MEDIASTINUM: There is no mediastinal or hilar lymphadenopathy by CT size  criteria. Esophagus has normal coarse, caliber and wall thickness.     EXTRATHORACIC: The visualized portions of the thyroid gland are  unremarkable. No thoracic inlet or axillary adenopathy. The  extrathoracic soft tissues are within normal limits.      INCLUDED UPPER ABDOMEN: Visualized portion of the liver, pancreas,  spleen, adrenal glands appear within normal limits.     OSSEOUS: There are mild degenerative changes of the visualized spine. No  acute  or suspicious bony finding.           Impression:      1. No acute cardiopulmonary finding. Specifically, no evidence of  pulmonary embolism.  2. Scattered coronary artery calcifications.  This report was finalized on 10/19/2020 10:42 by Dr Asia Jung MD.    XR Chest 1 View [[id number]] Collected: 10/19/20 0905     Updated: 10/19/20 0911    Narrative:      EXAMINATION: XR CHEST 1 VW-     10/19/2020 8:58 AM CDT     HISTORY: chest pain / palpitations/ dyspnea     1 view chest x-ray compared with 8/17/2020.     Magnified heart size.  Normal mediastinum.  Mild-to-moderate thoracic spurring.     Chronic interstitial lung disease with scattered granulomas.     No pneumothorax or heart failure.     Summary:  1. Stable chronic lung changes.  2. No acute abnormality is seen.  This report was finalized on 10/19/2020 09:08 by Dr. Luis Harding MD.          Assessment     1.  Shortness of breath: Secondary to #2  2.  Persistent atrial fibrillation: Rate controlled and anticoagulated  3.  Intermittent symptomatic bradycardia: On beta-blocker therapy  4.  Hypertension: Well-controlled  5.  Hyperlipidemia: On home statin  6.  Long-term anticoagulation: With Xarelto  7.  Sleep apnea: Compliance with CPAP use    Plan     I have discussed the patient's coronary CT results with Dr. Titi Kramer.  The patient has moderate disease noted on CT.  It is felt that the patient would be treated medically with plans for cardiac catheterization if the patient had complaints of ongoing symptoms.  Dr. Kramer agrees that the patient should undergo cardioversion as planned and follow-up with his symptoms as an outpatient.  This was discussed with the patient and his wife.  The patient's wife informs me that she called [facility] regarding the cardioversion after I left the room earlier.  The nurse practitioner at [facility] had that procedure canceled due to his findings on coronary CT.  The patient and his wife would like to undergo  cardioversion under the supervision of Dr. Titi Kramer today plan on following with as outpatient.  He does not plan on following up with Dr. Page.  He will remain n.p.o.  We will have him sent to the close observation unit for plans for cardioversion today per Dr. Titi Kramer.      I have discussed the plans above with Dr. Titi Kramer and EMPERATRIZ Head.    Thank you for the consultation, cardiology will follow up as outpatient after discharge today.    Electronically signed by EMPERATRIZ Moreno, 10/19/20, 2:18 PM CDT.      Please note this cardiology consultation note is the result of a face to face consultation with the patient, in addition to reviewing medical records at length by myself, EMPERATRIZ Moreno.       Time: 50 minutes

## 2020-10-22 ENCOUNTER — TRANSCRIBE ORDERS (OUTPATIENT)
Dept: ADMINISTRATIVE | Facility: HOSPITAL | Age: 65
End: 2020-10-22

## 2020-10-22 DIAGNOSIS — Z01.818 PRE-OP TESTING: Primary | ICD-10-CM

## 2020-10-27 ENCOUNTER — LAB (OUTPATIENT)
Dept: LAB | Facility: HOSPITAL | Age: 65
End: 2020-10-27

## 2020-10-27 LAB — SARS-COV-2 N GENE RESP QL NAA+PROBE: NOT DETECTED

## 2020-10-27 PROCEDURE — 87635 SARS-COV-2 COVID-19 AMP PRB: CPT | Performed by: INTERNAL MEDICINE

## 2020-10-27 PROCEDURE — C9803 HOPD COVID-19 SPEC COLLECT: HCPCS | Performed by: INTERNAL MEDICINE

## 2020-10-29 ENCOUNTER — ANESTHESIA EVENT (OUTPATIENT)
Dept: CARDIOLOGY | Facility: HOSPITAL | Age: 65
End: 2020-10-29

## 2020-10-29 ENCOUNTER — HOSPITAL ENCOUNTER (OUTPATIENT)
Dept: CARDIOLOGY | Facility: HOSPITAL | Age: 65
Discharge: HOME | End: 2020-10-29

## 2020-10-29 ENCOUNTER — ANESTHESIA (OUTPATIENT)
Dept: CARDIOLOGY | Facility: HOSPITAL | Age: 65
End: 2020-10-29

## 2020-10-29 VITALS
SYSTOLIC BLOOD PRESSURE: 135 MMHG | HEART RATE: 56 BPM | WEIGHT: 306 LBS | DIASTOLIC BLOOD PRESSURE: 86 MMHG | TEMPERATURE: 98.6 F | OXYGEN SATURATION: 98 % | BODY MASS INDEX: 39.27 KG/M2 | RESPIRATION RATE: 18 BRPM | HEIGHT: 74 IN

## 2020-10-29 VITALS — OXYGEN SATURATION: 99 % | DIASTOLIC BLOOD PRESSURE: 105 MMHG | SYSTOLIC BLOOD PRESSURE: 147 MMHG | HEART RATE: 58 BPM

## 2020-10-29 DIAGNOSIS — I48.0 PAROXYSMAL ATRIAL FIBRILLATION (HCC): ICD-10-CM

## 2020-10-29 PROCEDURE — 25010000002 PROPOFOL 10 MG/ML EMULSION: Performed by: NURSE ANESTHETIST, CERTIFIED REGISTERED

## 2020-10-29 PROCEDURE — 92960 CARDIOVERSION ELECTRIC EXT: CPT

## 2020-10-29 PROCEDURE — 92960 CARDIOVERSION ELECTRIC EXT: CPT | Performed by: INTERNAL MEDICINE

## 2020-10-29 RX ORDER — PROPOFOL 10 MG/ML
VIAL (ML) INTRAVENOUS AS NEEDED
Status: DISCONTINUED | OUTPATIENT
Start: 2020-10-29 | End: 2020-10-29 | Stop reason: SURG

## 2020-10-29 RX ADMIN — PROPOFOL 20 MG: 10 INJECTION, EMULSION INTRAVENOUS at 15:04

## 2020-10-29 RX ADMIN — LIDOCAINE HYDROCHLORIDE 100 MG: 20 INJECTION, SOLUTION INTRAVENOUS at 14:59

## 2020-10-29 RX ADMIN — PROPOFOL 30 MG: 10 INJECTION, EMULSION INTRAVENOUS at 15:03

## 2020-10-29 RX ADMIN — PROPOFOL 70 MG: 10 INJECTION, EMULSION INTRAVENOUS at 14:58

## 2020-10-29 NOTE — ANESTHESIA POSTPROCEDURE EVALUATION
Patient: Raghav Olivier    Procedure Summary     Date: 10/29/20 Room / Location: [facility] CATH LAB    Anesthesia Start: 1454 Anesthesia Stop: 1508    Procedure: CARDIOVERSION EXTERNAL IN CARDIOLOGY DEPARTMENT Diagnosis:       Paroxysmal atrial fibrillation (CMS/HCC)      (AF)    Scheduled Providers: Titi Kramer MD Provider: ELEANOR Regalado CRNA    Anesthesia Type: MAC ASA Status: 3          Anesthesia Type: MAC    Vitals  Vitals Value Taken Time   /105 10/29/20 1504   Temp     Pulse 58 10/29/20 1508   Resp     SpO2 99 % 10/29/20 1508           Post Anesthesia Care and Evaluation    Patient location during evaluation: PACU  Patient participation: complete - patient participated  Level of consciousness: awake and alert  Pain score: 0  Pain management: adequate  Airway patency: patent  Anesthetic complications: No anesthetic complications    Cardiovascular status: acceptable and stable  Respiratory status: acceptable and unassisted  Hydration status: acceptable

## 2020-10-29 NOTE — ANESTHESIA PREPROCEDURE EVALUATION
Anesthesia Evaluation     NPO Solid Status: > 8 hours  NPO Liquid Status: > 8 hours           Airway   No difficulty expected  Dental      Pulmonary    (+) shortness of breath, sleep apnea,   Cardiovascular     (+) hypertension well controlled, dysrhythmias Atrial Fib, hyperlipidemia,       Neuro/Psych  GI/Hepatic/Renal/Endo    (+) obesity, morbid obesity,  renal disease,     Musculoskeletal     Abdominal    Substance History      OB/GYN          Other                        Anesthesia Plan    ASA 3     MAC     intravenous induction     Anesthetic plan, all risks, benefits, and alternatives have been provided, discussed and informed consent has been obtained with: patient.

## 2020-11-03 ENCOUNTER — OFFICE VISIT (OUTPATIENT)
Dept: CARDIOLOGY | Facility: CLINIC | Age: 65
End: 2020-11-03

## 2020-11-03 VITALS
BODY MASS INDEX: 38.63 KG/M2 | SYSTOLIC BLOOD PRESSURE: 158 MMHG | DIASTOLIC BLOOD PRESSURE: 80 MMHG | OXYGEN SATURATION: 98 % | HEART RATE: 63 BPM | HEIGHT: 74 IN | WEIGHT: 301 LBS

## 2020-11-03 DIAGNOSIS — I10 ESSENTIAL HYPERTENSION: ICD-10-CM

## 2020-11-03 DIAGNOSIS — G47.33 OSA ON CPAP: ICD-10-CM

## 2020-11-03 DIAGNOSIS — E66.01 CLASS 3 SEVERE OBESITY DUE TO EXCESS CALORIES WITH SERIOUS COMORBIDITY AND BODY MASS INDEX (BMI) OF 40.0 TO 44.9 IN ADULT (HCC): ICD-10-CM

## 2020-11-03 DIAGNOSIS — I48.0 PAROXYSMAL ATRIAL FIBRILLATION (HCC): Primary | ICD-10-CM

## 2020-11-03 DIAGNOSIS — E78.2 MIXED HYPERLIPIDEMIA: ICD-10-CM

## 2020-11-03 DIAGNOSIS — Z99.89 OSA ON CPAP: ICD-10-CM

## 2020-11-03 DIAGNOSIS — I77.810 DILATED AORTIC ROOT (HCC): ICD-10-CM

## 2020-11-03 DIAGNOSIS — I34.0 NONRHEUMATIC MITRAL (VALVE) INSUFFICIENCY: ICD-10-CM

## 2020-11-03 DIAGNOSIS — R06.02 SOB (SHORTNESS OF BREATH): ICD-10-CM

## 2020-11-03 DIAGNOSIS — I25.10 CORONARY ARTERY DISEASE INVOLVING NATIVE CORONARY ARTERY OF NATIVE HEART WITHOUT ANGINA PECTORIS: ICD-10-CM

## 2020-11-03 DIAGNOSIS — R00.2 PALPITATIONS: ICD-10-CM

## 2020-11-03 PROCEDURE — 99214 OFFICE O/P EST MOD 30 MIN: CPT | Performed by: INTERNAL MEDICINE

## 2020-11-03 PROCEDURE — 93000 ELECTROCARDIOGRAM COMPLETE: CPT | Performed by: INTERNAL MEDICINE

## 2020-11-03 NOTE — PROGRESS NOTES
Raghav Olivier  [id number]  1955  65 y.o.  male    Referring Provider: Devendra Martinez MD    Reason for  Visit: paroxysmal atrial fibrillation now in sinus rhythm   obstructive sleep apnea   essential hypertension  Hyperlipidemia    short term office follow up after recent encounter   Came to ER with atrial fibrillation and underwent DC cardioversion     Subjective      Overall feels much better   Mild chronic exertional shortness of breath on exertion relieved with rest  No significant cough or wheezing    No palpitations  No associated chest pain  No significant pedal edema    No fever or chills  No significant expectoration    No hemoptysis  No presyncope or syncope    Tolerating current medications well with no untoward side effects   Compliant with prescribed medication regimen. Tries to adhere to cardiac diet.     Joint pain in small, medium and large joints   Per patient intentional weight loss by home scales of  20 lbs   obstructive sleep apnea on CPAP     Effort tolerance limited more by orthopedic rather than cardiac related issues, therefore difficult to assess functional capacity.     Dr Vaughn told him he needs knee and shoulder replacement     History of present illness:  Raghav Olivier is a 65 y.o. yo male with history of paroxysmal atrial fibrillation  who presents today for   Chief Complaint   Patient presents with   • Chest Pain     2 WK ER FU   .    History  Past Medical History:   Diagnosis Date   • Atrial fibrillation (CMS/HCC)    • Diverticulitis    • Hyperlipidemia    • Hypertension    • Kidney stones    • Nonrheumatic mitral (valve) insufficiency    • Palpitations    • Sleep apnea    • SOB (shortness of breath)    ,   Past Surgical History:   Procedure Laterality Date   • APPENDECTOMY     • CARDIOVERSION     • CHOLECYSTECTOMY     • COLONOSCOPY N/A 7/31/2017    Procedure: COLONOSCOPY;  Surgeon: Billy Robbins DO;  Location: [facility] ENDOSCOPY;  Service:    • EP STUDY      • KNEE SURGERY Right    • SHOULDER SURGERY Left    ,   Family History   Problem Relation Age of Onset   • Cancer Mother         colon   • Cancer Father         prostate   • Dementia Father    ,   Social History     Tobacco Use   • Smoking status: Former Smoker     Packs/day: 0.25     Years: 4.00     Pack years: 1.00     Types: Cigarettes     Start date:      Quit date:      Years since quittin.8   • Smokeless tobacco: Never Used   Substance Use Topics   • Alcohol use: No     Frequency: Never     Binge frequency: Never   • Drug use: No   ,     Medications  Current Outpatient Medications   Medication Sig Dispense Refill   • acetaminophen (TYLENOL) 650 MG 8 hr tablet Take 650 mg by mouth Every 8 (Eight) Hours As Needed for Mild Pain . Takes one tablet in the morning and one at night     • amiodarone (PACERONE) 200 MG tablet Take 1 tablet by mouth 2 (Two) Times a Day. 60 tablet 3   • aspirin 81 MG EC tablet Take 1 tablet by mouth Daily. 30 tablet 2   • cefuroxime (CEFTIN) 500 MG tablet Take 1 tablet by mouth 2 (Two) Times a Day. 20 tablet 0   • cyclobenzaprine (FLEXERIL) 10 MG tablet Take 1 tablet by mouth 3 (Three) Times a Day As Needed for Muscle Spasms. 30 tablet 0   • desloratadine (CLARINEX) 5 MG tablet Take 1 tablet by mouth Daily. 30 tablet 5   • hydroCHLOROthiazide (HYDRODIURIL) 12.5 MG tablet TAKE 1 TABLET BY MOUTH EVERY DAY 90 tablet 1   • nitroglycerin (NITROSTAT) 0.4 MG SL tablet Place 1 tablet under the tongue Every 5 (Five) Minutes As Needed for Chest Pain. Take no more than 3 doses in 15 minutes. 30 tablet 1   • rivaroxaban (XARELTO) 20 MG tablet Take 20 mg by mouth Daily.     • rosuvastatin (CRESTOR) 20 MG tablet TAKE 1 TABLET BY MOUTH EVERY DAY 90 tablet 1   • silver sulfadiazine (SILVADENE) 1 % cream Apply  topically to the appropriate area as directed 2 (Two) Times a Day. 50 g 5   • sulfamethoxazole-trimethoprim (BACTRIM DS,SEPTRA DS) 800-160 MG per tablet Take 1 tablet by mouth 2  "(Two) Times a Day. 20 tablet 0   • valACYclovir (VALTREX) 1000 MG tablet TAKE 1 TABLET BY MOUTH EVERY DAY 30 tablet 1   • valsartan (DIOVAN) 80 MG tablet Take 80 mg by mouth Daily.       No current facility-administered medications for this visit.        Allergies:  Penicillins and Vancomycin    Review of Systems  Review of Systems   Constitution: Negative.   HENT: Negative.    Eyes: Negative.    Cardiovascular: Positive for dyspnea on exertion and palpitations. Negative for chest pain, claudication, cyanosis, irregular heartbeat, leg swelling, near-syncope, orthopnea, paroxysmal nocturnal dyspnea and syncope.   Respiratory: Negative.    Endocrine: Negative.    Hematologic/Lymphatic: Negative.    Skin: Negative.    Musculoskeletal: Positive for arthritis and joint pain.   Gastrointestinal: Negative for anorexia.   Genitourinary: Negative.    Neurological: Negative.    Psychiatric/Behavioral: Negative.        Objective     Physical Exam:  /80   Pulse 63   Ht 188 cm (74.02\")   Wt (!) 137 kg (301 lb)   SpO2 98%   BMI 38.63 kg/m²     Physical Exam  Constitutional:       Appearance: He is well-developed.   HENT:      Head: Normocephalic.   Neck:      Musculoskeletal: No edema.      Vascular: Normal carotid pulses. No carotid bruit or JVD.      Trachea: No tracheal tenderness or tracheal deviation.   Cardiovascular:      Rate and Rhythm: Regular rhythm.      Pulses: Normal pulses.      Heart sounds: Normal heart sounds.   Pulmonary:      Effort: Pulmonary effort is normal.      Breath sounds: No stridor.   Abdominal:      General: There is no distension.      Palpations: Abdomen is soft.      Tenderness: There is no abdominal tenderness.   Skin:     General: Skin is warm.   Neurological:      Mental Status: He is alert.      Cranial Nerves: No cranial nerve deficit.      Sensory: No sensory deficit.   Psychiatric:         Speech: Speech normal.         Behavior: Behavior normal.         Results " Review:    Results for orders placed during the hospital encounter of 05/04/18   Adult Transthoracic Echo Complete W/ Cont if Necessary Per Protocol    Narrative · Left ventricular systolic function is normal. Estimated EF = 65%.  · Left ventricular diastolic dysfunction (grade I) consistent with   impaired relaxation.  · Left atrial cavity size is borderline dilated.  · Borderline dilation of the aortic root is present (3.9 cms)         ____________________________________________________________________________________________________________________________________________  Health maintenance and recommendations    Low salt/ HTN/ Heart healthy carbohydrate restricted cardiac diet   The patient is advised to reduce or avoid caffeine or other cardiac stimulants.   Minimize or avoid  NSAID-type medications      Monitor for any signs of bleeding including red or dark stools. Fall precautions.   Advised staying uptodate with immunizations per established standard guidelines.    Offered to give patient  a copy of my notes     Questions were encouraged, asked and answered to the patient's  understanding and satisfaction. Questions if any regarding current medications and side effects, need for refills and importance of compliance to medications stressed.    Reviewed available prior notes, consults, prior visits, laboratory findings, radiology and cardiology relevant reports. Updated chart as applicable. I have reviewed the patient's medical history in detail and updated the computerized patient record as relevant.      Updated patient regarding any new or relevant abnormalities on review of records or any new findings on physical exam. Mentioned to patient about purpose of visit and desirable health short and long term goals and objectives.    Primary to monitor CBC CMP Lipid panel and TSH as  applicable    ___________________________________________________________________________________________________________________________________________         ECG 12 Lead    Date/Time: 11/3/2020 1:44 PM  Performed by: Titi Kramer MD  Authorized by: Titi Kramer MD   Comparison: compared with previous ECG from 10/16/2020  Comparison to previous ECG:  sinus rhythm replaced atrial fibrillation    Rhythm: sinus rhythm  Rate: normal  Conduction: non-specific intraventricular conduction delay  ST Segments: ST segments normal  T Waves: T waves normal  QRS axis: normal  Other: no other findings    Clinical impression: abnormal EKG            Assessment/Plan   Diagnoses and all orders for this visit:    1. Paroxysmal atrial fibrillation (CMS/HCC) (Primary)    2. Nonrheumatic mitral (valve) insufficiency    3. Essential hypertension    4. Mixed hyperlipidemia    5. Dilated aortic root (CMS/HCC)    6. SOB (shortness of breath)    7. TAMARA on CPAP    8. Class 3 severe obesity due to excess calories with serious comorbidity and body mass index (BMI) of 40.0 to 44.9 in adult (CMS/HCC)    9. Palpitations    10. Coronary artery disease involving native coronary artery of native heart without angina pectoris moderate stenosis on CTA coronaries in [place]    Other orders  -     Cancel: ECG 12 Lead  -     ECG 12 Lead        Plan    Discussed results of prior testing with patient  Patient expressed understanding  Encouraged and answered all questions   Discussed with the patient and all questioned fully answered. He will call me if any problems arise.      Had CTA coronaries in [facility] with moderate obstruction   Keep f/u Dr Hernández   continue on anticoagulation  Defer any elective surgeries     Monitor for any signs of bleeding including red or dark stools. Fall precautions.   Patient is asked to monitor BP at home or work, several times per month and return with written values at next office visit.     Decrease  Amiodarone to 200 mg daily   Monitor CBC, CMP, TSH and Lipid Panel by primary  Eye exam, pulmonary function tests   Discussed Amiodarone induced toxicity and required monitoring and close follow up for this         Return in about 3 months (around 2/3/2021).

## 2020-12-01 ENCOUNTER — HOSPITAL ENCOUNTER (OUTPATIENT)
Dept: GENERAL RADIOLOGY | Facility: HOSPITAL | Age: 65
Discharge: HOME | End: 2020-12-01
Admitting: FAMILY MEDICINE

## 2020-12-01 PROCEDURE — 73110 X-RAY EXAM OF WRIST: CPT

## 2021-01-11 RX ORDER — AMIODARONE HYDROCHLORIDE 200 MG/1
TABLET ORAL
Qty: 180 TABLET | Refills: 4 | Status: SHIPPED | OUTPATIENT
Start: 2021-01-11 | End: 2021-07-27

## 2021-02-02 ENCOUNTER — OFFICE VISIT (OUTPATIENT)
Dept: CARDIOLOGY | Facility: CLINIC | Age: 66
End: 2021-02-02

## 2021-02-02 VITALS
DIASTOLIC BLOOD PRESSURE: 72 MMHG | HEIGHT: 74 IN | OXYGEN SATURATION: 98 % | WEIGHT: 297 LBS | HEART RATE: 60 BPM | BODY MASS INDEX: 38.12 KG/M2 | SYSTOLIC BLOOD PRESSURE: 130 MMHG

## 2021-02-02 DIAGNOSIS — I34.0 NONRHEUMATIC MITRAL (VALVE) INSUFFICIENCY: ICD-10-CM

## 2021-02-02 DIAGNOSIS — Z99.89 OSA ON CPAP: ICD-10-CM

## 2021-02-02 DIAGNOSIS — G47.33 OSA ON CPAP: ICD-10-CM

## 2021-02-02 DIAGNOSIS — I10 ESSENTIAL HYPERTENSION: ICD-10-CM

## 2021-02-02 DIAGNOSIS — R00.2 PALPITATIONS: Primary | ICD-10-CM

## 2021-02-02 DIAGNOSIS — I77.810 DILATED AORTIC ROOT (HCC): ICD-10-CM

## 2021-02-02 DIAGNOSIS — E66.01 CLASS 3 SEVERE OBESITY DUE TO EXCESS CALORIES WITH SERIOUS COMORBIDITY AND BODY MASS INDEX (BMI) OF 40.0 TO 44.9 IN ADULT (HCC): ICD-10-CM

## 2021-02-02 DIAGNOSIS — R06.02 SOB (SHORTNESS OF BREATH): ICD-10-CM

## 2021-02-02 DIAGNOSIS — E78.2 MIXED HYPERLIPIDEMIA: ICD-10-CM

## 2021-02-02 DIAGNOSIS — I48.0 PAROXYSMAL ATRIAL FIBRILLATION (HCC): ICD-10-CM

## 2021-02-02 PROCEDURE — 99213 OFFICE O/P EST LOW 20 MIN: CPT | Performed by: INTERNAL MEDICINE

## 2021-07-28 ENCOUNTER — ANESTHESIA EVENT (OUTPATIENT)
Dept: GASTROENTEROLOGY | Facility: HOSPITAL | Age: 66
End: 2021-07-28

## 2021-07-28 ENCOUNTER — HOSPITAL ENCOUNTER (OUTPATIENT)
Facility: HOSPITAL | Age: 66
Setting detail: HOSPITAL OUTPATIENT SURGERY
Discharge: HOME | End: 2021-07-28
Attending: INTERNAL MEDICINE | Admitting: INTERNAL MEDICINE

## 2021-07-28 ENCOUNTER — ANESTHESIA (OUTPATIENT)
Dept: GASTROENTEROLOGY | Facility: HOSPITAL | Age: 66
End: 2021-07-28

## 2021-07-28 VITALS
DIASTOLIC BLOOD PRESSURE: 77 MMHG | TEMPERATURE: 98.2 F | RESPIRATION RATE: 18 BRPM | SYSTOLIC BLOOD PRESSURE: 139 MMHG | HEIGHT: 74 IN | WEIGHT: 294.8 LBS | OXYGEN SATURATION: 98 % | BODY MASS INDEX: 37.83 KG/M2 | HEART RATE: 64 BPM

## 2021-07-28 DIAGNOSIS — R19.5 POSITIVE COLORECTAL CANCER SCREENING USING COLOGUARD TEST: ICD-10-CM

## 2021-07-28 PROCEDURE — 88305 TISSUE EXAM BY PATHOLOGIST: CPT

## 2021-07-28 PROCEDURE — 25010000002 PROPOFOL 10 MG/ML EMULSION: Performed by: NURSE ANESTHETIST, CERTIFIED REGISTERED

## 2021-07-28 RX ORDER — LIDOCAINE HYDROCHLORIDE 20 MG/ML
INJECTION, SOLUTION INTRAVENOUS AS NEEDED
Status: DISCONTINUED | OUTPATIENT
Start: 2021-07-28 | End: 2021-07-28 | Stop reason: SURG

## 2021-07-28 RX ORDER — DEXTROSE AND SODIUM CHLORIDE 5; .45 G/100ML; G/100ML
30 INJECTION, SOLUTION INTRAVENOUS CONTINUOUS PRN
Status: DISCONTINUED | OUTPATIENT
Start: 2021-07-28 | End: 2021-07-28 | Stop reason: HOSPADM

## 2021-07-28 RX ORDER — PROPOFOL 10 MG/ML
VIAL (ML) INTRAVENOUS AS NEEDED
Status: DISCONTINUED | OUTPATIENT
Start: 2021-07-28 | End: 2021-07-28 | Stop reason: SURG

## 2021-07-28 RX ADMIN — PROPOFOL 20 MG: 10 INJECTION, EMULSION INTRAVENOUS at 14:47

## 2021-07-28 RX ADMIN — DEXTROSE AND SODIUM CHLORIDE 30 ML/HR: 5; 450 INJECTION, SOLUTION INTRAVENOUS at 14:21

## 2021-07-28 RX ADMIN — PROPOFOL 40 MG: 10 INJECTION, EMULSION INTRAVENOUS at 14:41

## 2021-07-28 RX ADMIN — PROPOFOL 20 MG: 10 INJECTION, EMULSION INTRAVENOUS at 14:44

## 2021-07-28 RX ADMIN — PROPOFOL 100 MG: 10 INJECTION, EMULSION INTRAVENOUS at 14:38

## 2021-07-28 RX ADMIN — LIDOCAINE HYDROCHLORIDE 50 MG: 20 INJECTION, SOLUTION INTRAVENOUS at 14:38

## 2021-07-28 NOTE — ANESTHESIA POSTPROCEDURE EVALUATION
Patient: Raghav Olivier    Procedure Summary     Date: 07/28/21 Room / Location: [facility] ENDOSCOPY 2 / [facility] ENDOSCOPY    Anesthesia Start: 1434 Anesthesia Stop: 1454    Procedure: COLONOSCOPY (N/A ) Diagnosis:       Positive colorectal cancer screening using Cologuard test      (Positive colorectal cancer screening using Cologuard test [R19.5])    Surgeons: Billy Robbins DO Provider: Joe Carballo CRNA    Anesthesia Type: MAC ASA Status: 3          Anesthesia Type: MAC    Vitals  No vitals data found for the desired time range.          Post Anesthesia Care and Evaluation    Patient location during evaluation: bedside  Patient participation: complete - patient participated  Level of consciousness: sleepy but conscious  Pain score: 0  Pain management: adequate  Airway patency: patent  Anesthetic complications: No anesthetic complications  PONV Status: none  Cardiovascular status: acceptable  Respiratory status: acceptable  Hydration status: acceptable    Comments: 148/84,60,20,95%

## 2021-08-03 ENCOUNTER — OFFICE VISIT (OUTPATIENT)
Dept: CARDIOLOGY | Facility: CLINIC | Age: 66
End: 2021-08-03

## 2021-08-03 VITALS
OXYGEN SATURATION: 96 % | DIASTOLIC BLOOD PRESSURE: 68 MMHG | WEIGHT: 304 LBS | BODY MASS INDEX: 39.01 KG/M2 | HEIGHT: 74 IN | HEART RATE: 54 BPM | SYSTOLIC BLOOD PRESSURE: 136 MMHG

## 2021-08-03 DIAGNOSIS — E66.01 CLASS 3 SEVERE OBESITY DUE TO EXCESS CALORIES WITH SERIOUS COMORBIDITY AND BODY MASS INDEX (BMI) OF 40.0 TO 44.9 IN ADULT (HCC): ICD-10-CM

## 2021-08-03 DIAGNOSIS — I10 ESSENTIAL HYPERTENSION: ICD-10-CM

## 2021-08-03 DIAGNOSIS — I34.0 NONRHEUMATIC MITRAL (VALVE) INSUFFICIENCY: ICD-10-CM

## 2021-08-03 DIAGNOSIS — E78.2 MIXED HYPERLIPIDEMIA: ICD-10-CM

## 2021-08-03 DIAGNOSIS — R06.02 SOB (SHORTNESS OF BREATH): ICD-10-CM

## 2021-08-03 DIAGNOSIS — R00.2 PALPITATIONS: ICD-10-CM

## 2021-08-03 DIAGNOSIS — I25.10 CORONARY ARTERY DISEASE INVOLVING NATIVE CORONARY ARTERY OF NATIVE HEART WITHOUT ANGINA PECTORIS: ICD-10-CM

## 2021-08-03 DIAGNOSIS — Z99.89 OSA ON CPAP: ICD-10-CM

## 2021-08-03 DIAGNOSIS — G47.33 OSA ON CPAP: ICD-10-CM

## 2021-08-03 DIAGNOSIS — I77.810 DILATED AORTIC ROOT (HCC): ICD-10-CM

## 2021-08-03 DIAGNOSIS — I48.0 PAROXYSMAL ATRIAL FIBRILLATION (HCC): Primary | ICD-10-CM

## 2021-08-03 PROCEDURE — 93000 ELECTROCARDIOGRAM COMPLETE: CPT | Performed by: INTERNAL MEDICINE

## 2021-08-03 PROCEDURE — 99214 OFFICE O/P EST MOD 30 MIN: CPT | Performed by: INTERNAL MEDICINE

## 2021-08-03 NOTE — PROGRESS NOTES
Raghav Olivier  [id number]  1955  66 y.o.  male    Referring Provider: Devendra Martinez MD    Reason for  Visit:     paroxysmal atrial fibrillation now in sinus rhythm  obstructive sleep apnea   essential hypertension  Hyperlipidemia    short term office follow up after recent encounter   Came to ER with atrial fibrillation and underwent DC cardioversion in past   Since then has seen Dr Hernández and off Amiodarone   Due to gum bleed stopped Xarelto on his own     Subjective    Overall feels the same   No new events or complaints since last visit   Overall the patient feels no major change from baseline symptoms   Similar symptoms as during last visit      Mild chronic exertional shortness of breath on exertion relieved with rest  No significant cough or wheezing    No palpitations  No associated chest pain  No significant pedal edema    No fever or chills  No significant expectoration    No hemoptysis  No presyncope or syncope    Tolerating current medications well with no untoward side effects   Compliant with prescribed medication regimen. Tries to adhere to cardiac diet.     Joint pain in small, medium and large joints   Per patient intentional weight loss by home scales of 20 lbs in past now gained back 5 lbs  obstructive sleep apnea on CPAP     Effort tolerance limited more by orthopedic rather than cardiac related issues, therefore difficult to assess functional capacity.     Dr Vaughn told him he needs knee and shoulder replacement   He has had an injection and doing OK  Planning to do this later in future     Gum bleeds   He has stopped anticoagulation  Gum bleed has stopped since then     History of present illness:  Raghav Olivier is a 66 y.o. yo male with history of paroxysmal atrial fibrillation  who presents today for   Chief Complaint   Patient presents with   • Palpitations     6 month follow up - patient stated he quite taking Xarelto because he was waking up with blood in his  mouth he has been off of it for 2 weeks    .    History  Past Medical History:   Diagnosis Date   • Atrial fibrillation (CMS/HCC)    • Diverticulitis    • Hyperlipidemia    • Hypertension    • Kidney stones    • Nonrheumatic mitral (valve) insufficiency    • Palpitations    • Sleep apnea    • SOB (shortness of breath)    ,   Past Surgical History:   Procedure Laterality Date   • APPENDECTOMY     • CARDIAC CATHETERIZATION     • CARDIOVERSION     • CHOLECYSTECTOMY     • COLONOSCOPY N/A 2017    Procedure: COLONOSCOPY;  Surgeon: Billy Robbins DO;  Location: [facility] ENDOSCOPY;  Service:    • COLONOSCOPY N/A 2021    Procedure: COLONOSCOPY;  Surgeon: Billy Robbins DO;  Location: [facility] ENDOSCOPY;  Service: Gastroenterology;  Laterality: N/A;   • EP STUDY     • KNEE SURGERY Right    • SHOULDER SURGERY Left    ,   Family History   Problem Relation Age of Onset   • Cancer Mother         colon   • Cancer Father         prostate   • Dementia Father    ,   Social History     Tobacco Use   • Smoking status: Former Smoker     Packs/day: 0.25     Years: 4.00     Pack years: 1.00     Types: Cigarettes     Start date:      Quit date:      Years since quittin.6   • Smokeless tobacco: Never Used   Vaping Use   • Vaping Use: Never used   Substance Use Topics   • Alcohol use: No   • Drug use: No   ,     Medications  Current Outpatient Medications   Medication Sig Dispense Refill   • acetaminophen (TYLENOL) 650 MG 8 hr tablet Take 650 mg by mouth Every 8 (Eight) Hours As Needed for Mild Pain . Takes one tablet in the morning and one at night     • cyclobenzaprine (FLEXERIL) 10 MG tablet Take 1 tablet by mouth 3 (Three) Times a Day As Needed for Muscle Spasms. 30 tablet 0   • desloratadine (CLARINEX) 5 MG tablet Take 1 tablet by mouth Daily. 30 tablet 5   • hydroCHLOROthiazide (HYDRODIURIL) 12.5 MG tablet TAKE 1 TABLET BY MOUTH EVERY DAY (Patient taking differently: Take 12.5 mg by mouth Daily.) 90 tablet  "1   • nitroglycerin (NITROSTAT) 0.4 MG SL tablet Place 1 tablet under the tongue Every 5 (Five) Minutes As Needed for Chest Pain. Take no more than 3 doses in 15 minutes. 30 tablet 1   • rosuvastatin (CRESTOR) 20 MG tablet TAKE 1 TABLET BY MOUTH EVERY DAY (Patient taking differently: Take 30 mg by mouth Every Night.) 90 tablet 1   • silver sulfadiazine (SILVADENE) 1 % cream Apply  topically to the appropriate area as directed 2 (Two) Times a Day. (Patient taking differently: Apply  topically to the appropriate area as directed 2 (Two) Times a Day As Needed.) 50 g 5   • valACYclovir (VALTREX) 1000 MG tablet TAKE 1 TABLET BY MOUTH EVERY DAY 30 tablet 1   • valsartan (DIOVAN) 80 MG tablet Take 80 mg by mouth Daily.       No current facility-administered medications for this visit.       Allergies:  Penicillins and Vancomycin    Review of Systems  Review of Systems   Constitutional: Negative.   HENT: Negative.    Eyes: Negative.    Cardiovascular: Positive for dyspnea on exertion and palpitations. Negative for chest pain, claudication, cyanosis, irregular heartbeat, leg swelling, near-syncope, orthopnea, paroxysmal nocturnal dyspnea and syncope.   Respiratory: Negative.    Endocrine: Negative.    Hematologic/Lymphatic: Negative.    Skin: Negative.    Musculoskeletal: Positive for arthritis and joint pain.   Gastrointestinal: Negative for anorexia.   Genitourinary: Negative.    Neurological: Negative.    Psychiatric/Behavioral: Negative.        Objective     Physical Exam:  /68   Pulse 54   Ht 188 cm (74\")   Wt (!) 138 kg (304 lb)   SpO2 96%   BMI 39.03 kg/m²     Physical Exam  Constitutional:       Appearance: He is well-developed.   HENT:      Head: Normocephalic.   Neck:      Vascular: Normal carotid pulses. No carotid bruit or JVD.      Trachea: No tracheal tenderness or tracheal deviation.   Cardiovascular:      Rate and Rhythm: Regular rhythm.      Pulses: Normal pulses.      Heart sounds: Normal heart " sounds.   Pulmonary:      Effort: Pulmonary effort is normal.      Breath sounds: No stridor.   Abdominal:      General: There is no distension.      Palpations: Abdomen is soft.      Tenderness: There is no abdominal tenderness.   Musculoskeletal:      Cervical back: No edema.   Skin:     General: Skin is warm.   Neurological:      Mental Status: He is alert.      Cranial Nerves: No cranial nerve deficit.      Sensory: No sensory deficit.   Psychiatric:         Speech: Speech normal.         Behavior: Behavior normal.         Results Review:    Results for orders placed during the hospital encounter of 05/04/18    Adult Transthoracic Echo Complete W/ Cont if Necessary Per Protocol    Interpretation Summary  · Left ventricular systolic function is normal. Estimated EF = 65%.  · Left ventricular diastolic dysfunction (grade I) consistent with impaired relaxation.  · Left atrial cavity size is borderline dilated.  · Borderline dilation of the aortic root is present (3.9 cms)     ____________________________________________________________________________________________________________________________________________  Health maintenance and recommendations    Low salt/ HTN/ Heart healthy carbohydrate restricted cardiac diet   The patient is advised to reduce or avoid caffeine or other cardiac stimulants.   Minimize or avoid  NSAID-type medications      Monitor for any signs of bleeding including red or dark stools. Fall precautions.   Advised staying uptodate with immunizations per established standard guidelines.    Offered to give patient  a copy of my notes     Questions were encouraged, asked and answered to the patient's  understanding and satisfaction. Questions if any regarding current medications and side effects, need for refills and importance of compliance to medications stressed.    Reviewed available prior notes, consults, prior visits, laboratory findings, radiology and cardiology relevant reports.  Updated chart as applicable. I have reviewed the patient's medical history in detail and updated the computerized patient record as relevant.      Updated patient regarding any new or relevant abnormalities on review of records or any new findings on physical exam. Mentioned to patient about purpose of visit and desirable health short and long term goals and objectives.    Primary to monitor CBC CMP Lipid panel and TSH as applicable    ___________________________________________________________________________________________________________________________________________         ECG 12 Lead    Date/Time: 8/3/2021 9:37 AM  Performed by: Titi Kramer MD  Authorized by: Titi Kramer MD   Comparison: compared with previous ECG from 11/3/2020  Comparison to previous ECG: Nonspecific STT changes resolved  Rhythm: sinus bradycardia  Rate: bradycardic  Conduction: conduction normal  ST Segments: ST segments normal  T Waves: T waves normal  QRS axis: normal  Other: no other findings    Clinical impression: abnormal EKG            Assessment/Plan   Diagnoses and all orders for this visit:    1. Paroxysmal atrial fibrillation (CMS/HCC) (Primary)  -     Holter Monitor - 72 Hour Up To 15 Days; Future    2. Palpitations    3. SOB (shortness of breath)  -     Adult Transthoracic Echo Complete w/ Color, Spectral and Contrast if necessary per protocol; Future    4. Nonrheumatic mitral (valve) insufficiency    5. Essential hypertension    6. Mixed hyperlipidemia    7. Dilated aortic root (CMS/HCC)    8. TAMARA on CPAP    9. Class 3 severe obesity due to excess calories with serious comorbidity and body mass index (BMI) of 40.0 to 44.9 in adult (CMS/HCC)    10. Coronary artery disease involving native coronary artery of native heart without angina pectoris moderate stenosis on CTA coronaries in [place]    Other orders  -     ECG 12 Lead        Plan      Orders Placed This Encounter   Procedures   • Holter Monitor - 72 Hour Up To 15  Days     Standing Status:   Future     Standing Expiration Date:   8/3/2022     Order Specific Question:   Reason for exam?     Answer:   Palpitations     Order Specific Question:   Reason for exam?     Answer:   AFib     Order Specific Question:   Release to patient     Answer:   Immediate   • ECG 12 Lead     This order was created via procedure documentation     Order Specific Question:   Release to patient     Answer:   Immediate   • Adult Transthoracic Echo Complete w/ Color, Spectral and Contrast if necessary per protocol     Myocardial strain to be performed during echocardiogram as long as technically feasible     Standing Status:   Future     Standing Expiration Date:   8/3/2022     Order Specific Question:   Reason for exam?     Answer:   Dyspnea     Order Specific Question:   Release to patient     Answer:   Immediate   Echo to assess LA volume   outpatient cardiac telemetry for atrial fibrillation surveillance     Had CTA coronaries in [facility] with moderate obstruction   No chest pain, no additional ischemia testing required  Keep f/u Dr Hernández   He is due to see Dr Hernández in May 2021    Due to oral bleeding he has stopped Xarelto  Will get outpatient cardiac telemetry   He understands risk of stroke if off anticoagulation  Start atleast ECASA 81 mg daily regimen given risk factors and monitor for any signs of bleeding including red or dark stools. Fall precautions.      He has seen Dr Hernández and Amiodarone was stopped   He was advised atrial fibrillation ablation by Dr Hernández but he declined     Check BP and heart rates twice daily at least 3x / week, week a month  at home and bring a recording for me to review next visit  If BP >130/85 or < 100/60 persistently over 3 reading 30 mins apart call sooner      Patient was advised to continue CPAP daily.   Patient undecided regarding the Covid vaccine   Urged to consider vaccination further   I can provide more input if required   Recommend  further discussion with primary care provider           Return in about 3 months (around 11/3/2021).

## 2021-08-11 LAB
LAB AP CASE REPORT: NORMAL
PATH REPORT.FINAL DX SPEC: NORMAL

## 2021-09-16 ENCOUNTER — TELEPHONE (OUTPATIENT)
Dept: CARDIOLOGY | Facility: CLINIC | Age: 66
End: 2021-09-16

## 2021-09-16 NOTE — TELEPHONE ENCOUNTER
----- Message from Titi Kramer MD sent at 9/13/2021  8:48 PM CDT -----  Regarding: Abnormal cardiac monitor  Patient has abnormal cardiac monitor with both tachycardia and bradycardiaWould like to see him back sooner

## 2021-09-16 NOTE — TELEPHONE ENCOUNTER
MSG LEFT TO RETURN CALL - PT NEEDS SOONER APPT TO DISCUSS RESULTS - ECHO SCHEDULED FOR 9/20, SO APPT TO BE MADE AFTER THAT DATE

## 2021-09-20 ENCOUNTER — HOSPITAL ENCOUNTER (OUTPATIENT)
Dept: CARDIOLOGY | Facility: HOSPITAL | Age: 66
Discharge: HOME | End: 2021-09-20
Admitting: INTERNAL MEDICINE

## 2021-09-20 VITALS
HEIGHT: 74 IN | WEIGHT: 304 LBS | SYSTOLIC BLOOD PRESSURE: 136 MMHG | DIASTOLIC BLOOD PRESSURE: 88 MMHG | BODY MASS INDEX: 39.01 KG/M2

## 2021-09-20 DIAGNOSIS — R06.02 SOB (SHORTNESS OF BREATH): ICD-10-CM

## 2021-09-20 PROCEDURE — 93306 TTE W/DOPPLER COMPLETE: CPT | Performed by: INTERNAL MEDICINE

## 2021-09-20 PROCEDURE — 25010000002 PERFLUTREN 6.52 MG/ML SUSPENSION: Performed by: INTERNAL MEDICINE

## 2021-09-20 PROCEDURE — 93306 TTE W/DOPPLER COMPLETE: CPT

## 2021-09-20 RX ADMIN — PERFLUTREN 8.48 MG: 6.52 INJECTION, SUSPENSION INTRAVENOUS at 11:26

## 2021-09-23 ENCOUNTER — OFFICE VISIT (OUTPATIENT)
Dept: CARDIOLOGY | Facility: CLINIC | Age: 66
End: 2021-09-23

## 2021-09-23 VITALS
BODY MASS INDEX: 39.01 KG/M2 | HEART RATE: 84 BPM | WEIGHT: 304 LBS | SYSTOLIC BLOOD PRESSURE: 148 MMHG | HEIGHT: 74 IN | DIASTOLIC BLOOD PRESSURE: 90 MMHG | OXYGEN SATURATION: 97 %

## 2021-09-23 DIAGNOSIS — R00.2 PALPITATIONS: ICD-10-CM

## 2021-09-23 DIAGNOSIS — I48.19 PERSISTENT ATRIAL FIBRILLATION (HCC): ICD-10-CM

## 2021-09-23 DIAGNOSIS — E66.01 CLASS 3 SEVERE OBESITY DUE TO EXCESS CALORIES WITH SERIOUS COMORBIDITY AND BODY MASS INDEX (BMI) OF 40.0 TO 44.9 IN ADULT (HCC): ICD-10-CM

## 2021-09-23 DIAGNOSIS — R06.02 SOB (SHORTNESS OF BREATH): ICD-10-CM

## 2021-09-23 DIAGNOSIS — Z99.89 OSA ON CPAP: ICD-10-CM

## 2021-09-23 DIAGNOSIS — I25.10 CORONARY ARTERY DISEASE INVOLVING NATIVE CORONARY ARTERY OF NATIVE HEART WITHOUT ANGINA PECTORIS: Primary | ICD-10-CM

## 2021-09-23 DIAGNOSIS — I51.7 LVH (LEFT VENTRICULAR HYPERTROPHY): ICD-10-CM

## 2021-09-23 DIAGNOSIS — G47.33 OSA ON CPAP: ICD-10-CM

## 2021-09-23 DIAGNOSIS — I10 ESSENTIAL HYPERTENSION: ICD-10-CM

## 2021-09-23 DIAGNOSIS — I77.810 DILATED AORTIC ROOT (HCC): ICD-10-CM

## 2021-09-23 LAB
BH CV ECHO MEAS - AO MAX PG (FULL): 5.6 MMHG
BH CV ECHO MEAS - AO MAX PG: 8.9 MMHG
BH CV ECHO MEAS - AO MEAN PG (FULL): 4 MMHG
BH CV ECHO MEAS - AO MEAN PG: 7 MMHG
BH CV ECHO MEAS - AO ROOT AREA (BSA CORRECTED): 1.5
BH CV ECHO MEAS - AO ROOT AREA: 11.9 CM^2
BH CV ECHO MEAS - AO ROOT DIAM: 3.9 CM
BH CV ECHO MEAS - AO V2 MAX: 149 CM/SEC
BH CV ECHO MEAS - AO V2 MEAN: 125 CM/SEC
BH CV ECHO MEAS - AO V2 VTI: 32 CM
BH CV ECHO MEAS - AVA(I,A): 3.4 CM^2
BH CV ECHO MEAS - AVA(I,D): 3.4 CM^2
BH CV ECHO MEAS - AVA(V,A): 3 CM^2
BH CV ECHO MEAS - AVA(V,D): 3 CM^2
BH CV ECHO MEAS - BSA(HAYCOCK): 2.7 M^2
BH CV ECHO MEAS - BSA: 2.6 M^2
BH CV ECHO MEAS - BZI_BMI: 39 KILOGRAMS/M^2
BH CV ECHO MEAS - BZI_METRIC_HEIGHT: 188 CM
BH CV ECHO MEAS - BZI_METRIC_WEIGHT: 137.9 KG
BH CV ECHO MEAS - EDV(CUBED): 54.4 ML
BH CV ECHO MEAS - EDV(MOD-SP4): 104 ML
BH CV ECHO MEAS - EDV(TEICH): 61.6 ML
BH CV ECHO MEAS - EF(CUBED): 67.7 %
BH CV ECHO MEAS - EF(MOD-SP4): 64.5 %
BH CV ECHO MEAS - EF(TEICH): 60 %
BH CV ECHO MEAS - ESV(CUBED): 17.6 ML
BH CV ECHO MEAS - ESV(MOD-SP4): 36.9 ML
BH CV ECHO MEAS - ESV(TEICH): 24.6 ML
BH CV ECHO MEAS - FS: 31.4 %
BH CV ECHO MEAS - IVS/LVPW: 0.91
BH CV ECHO MEAS - IVSD: 1.5 CM
BH CV ECHO MEAS - LA DIMENSION: 3.9 CM
BH CV ECHO MEAS - LA/AO: 1
BH CV ECHO MEAS - LV DIASTOLIC VOL/BSA (35-75): 40.1 ML/M^2
BH CV ECHO MEAS - LV MASS(C)D: 240.7 GRAMS
BH CV ECHO MEAS - LV MASS(C)DI: 92.7 GRAMS/M^2
BH CV ECHO MEAS - LV MAX PG: 3.3 MMHG
BH CV ECHO MEAS - LV MEAN PG: 3 MMHG
BH CV ECHO MEAS - LV SYSTOLIC VOL/BSA (12-30): 14.2 ML/M^2
BH CV ECHO MEAS - LV V1 MAX: 90.9 CM/SEC
BH CV ECHO MEAS - LV V1 MEAN: 76.8 CM/SEC
BH CV ECHO MEAS - LV V1 VTI: 22.1 CM
BH CV ECHO MEAS - LVIDD: 3.8 CM
BH CV ECHO MEAS - LVIDS: 2.6 CM
BH CV ECHO MEAS - LVLD AP4: 7.7 CM
BH CV ECHO MEAS - LVLS AP4: 6.4 CM
BH CV ECHO MEAS - LVOT AREA (M): 4.9 CM^2
BH CV ECHO MEAS - LVOT AREA: 4.9 CM^2
BH CV ECHO MEAS - LVOT DIAM: 2.5 CM
BH CV ECHO MEAS - LVPWD: 1.7 CM
BH CV ECHO MEAS - MR MAX PG: 101.6 MMHG
BH CV ECHO MEAS - MR MAX VEL: 504 CM/SEC
BH CV ECHO MEAS - MR MEAN PG: 80 MMHG
BH CV ECHO MEAS - MR MEAN VEL: 432 CM/SEC
BH CV ECHO MEAS - MR VTI: 171 CM
BH CV ECHO MEAS - MV DEC SLOPE: 425 CM/SEC^2
BH CV ECHO MEAS - MV DEC TIME: 0.23 SEC
BH CV ECHO MEAS - MV E MAX VEL: 98.7 CM/SEC
BH CV ECHO MEAS - RAP SYSTOLE: 5 MMHG
BH CV ECHO MEAS - RVSP: 35 MMHG
BH CV ECHO MEAS - SI(AO): 147.2 ML/M^2
BH CV ECHO MEAS - SI(CUBED): 14.2 ML/M^2
BH CV ECHO MEAS - SI(LVOT): 41.8 ML/M^2
BH CV ECHO MEAS - SI(MOD-SP4): 25.8 ML/M^2
BH CV ECHO MEAS - SI(TEICH): 14.2 ML/M^2
BH CV ECHO MEAS - SV(AO): 382.3 ML
BH CV ECHO MEAS - SV(CUBED): 36.9 ML
BH CV ECHO MEAS - SV(LVOT): 108.5 ML
BH CV ECHO MEAS - SV(MOD-SP4): 67.1 ML
BH CV ECHO MEAS - SV(TEICH): 37 ML
BH CV ECHO MEAS - TR MAX VEL: 274 CM/SEC

## 2021-09-23 PROCEDURE — 99214 OFFICE O/P EST MOD 30 MIN: CPT | Performed by: INTERNAL MEDICINE

## 2021-09-23 RX ORDER — VALSARTAN 160 MG/1
160 TABLET ORAL DAILY
Qty: 90 TABLET | Refills: 3 | Status: SHIPPED | OUTPATIENT
Start: 2021-09-23 | End: 2022-12-02 | Stop reason: SDUPTHER

## 2021-09-23 NOTE — PROGRESS NOTES
Raghav Olivier  [id number]  1955  66 y.o.  male    Referring Provider: Devendra Martinez MD    Reason for  Visit:     paroxysmal atrial fibrillation now in sinus rhythm  obstructive sleep apnea   essential hypertension  Hyperlipidemia    short term office follow up after recent encounter   Came to ER with atrial fibrillation and underwent DC cardioversion in past   Since then has seen Dr Hernández and off Amiodarone   Due to gum bleed stopped Xarelto on his own and now on Eliquis and tolerating well        Subjective    Mild chronic exertional shortness of breath on exertion relieved with rest  No significant cough or wheezing    No palpitations  No associated chest pain  No significant pedal edema    No fever or chills  No significant expectoration    No hemoptysis  No presyncope or syncope    Tolerating current medications well with no untoward side effects   Compliant with prescribed medication regimen. Tries to adhere to cardiac diet.     No bleeding, excessive bruising, gait instability or fall risks    BP well controlled at home.     BP elevated today       History of present illness:  Raghav Olivier is a 66 y.o. yo male with history of paroxysmal atrial fibrillation  who presents today for   Chief Complaint   Patient presents with   • paroxysmal atrial fibrillation     4wk- holter and ECHO results- some slight CP, patient would like a second opinon on having another CV with Dr Hernández.    .    History  Past Medical History:   Diagnosis Date   • Atrial fibrillation (CMS/HCC)    • Diverticulitis    • Hyperlipidemia    • Hypertension    • Kidney stones    • Nonrheumatic mitral (valve) insufficiency    • Palpitations    • Sleep apnea    • SOB (shortness of breath)    ,   Past Surgical History:   Procedure Laterality Date   • APPENDECTOMY     • CARDIAC CATHETERIZATION     • CARDIOVERSION     • CHOLECYSTECTOMY     • COLONOSCOPY N/A 7/31/2017    Procedure: COLONOSCOPY;  Surgeon: Billy Robbins,  ;  Location: [facility] ENDOSCOPY;  Service:    • COLONOSCOPY N/A 2021    Procedure: COLONOSCOPY;  Surgeon: Billy Robbins DO;  Location: [facility] ENDOSCOPY;  Service: Gastroenterology;  Laterality: N/A;   • EP STUDY     • KNEE SURGERY Right    • SHOULDER SURGERY Left    ,   Family History   Problem Relation Age of Onset   • Cancer Mother         colon   • Cancer Father         prostate   • Dementia Father    ,   Social History     Tobacco Use   • Smoking status: Former Smoker     Packs/day: 0.25     Years: 4.00     Pack years: 1.00     Types: Cigarettes     Start date:      Quit date:      Years since quittin.7   • Smokeless tobacco: Never Used   Vaping Use   • Vaping Use: Never used   Substance Use Topics   • Alcohol use: No   • Drug use: No   ,     Medications  Current Outpatient Medications   Medication Sig Dispense Refill   • acetaminophen (TYLENOL) 650 MG 8 hr tablet Take 650 mg by mouth Every 8 (Eight) Hours As Needed for Mild Pain . Takes one tablet in the morning and one at night     • apixaban (ELIQUIS) 5 MG tablet tablet Take 5 mg by mouth 2 (Two) Times a Day.     • cyclobenzaprine (FLEXERIL) 10 MG tablet Take 1 tablet by mouth 3 (Three) Times a Day As Needed for Muscle Spasms. 30 tablet 0   • desloratadine (CLARINEX) 5 MG tablet Take 1 tablet by mouth Daily. 30 tablet 5   • hydroCHLOROthiazide (HYDRODIURIL) 12.5 MG tablet TAKE 1 TABLET BY MOUTH EVERY DAY (Patient taking differently: Take 12.5 mg by mouth Daily.) 90 tablet 1   • nitroglycerin (NITROSTAT) 0.4 MG SL tablet Place 1 tablet under the tongue Every 5 (Five) Minutes As Needed for Chest Pain. Take no more than 3 doses in 15 minutes. 30 tablet 1   • rosuvastatin (CRESTOR) 20 MG tablet TAKE 1 TABLET BY MOUTH EVERY DAY (Patient taking differently: Take 30 mg by mouth Every Night.) 90 tablet 1   • silver sulfadiazine (SILVADENE) 1 % cream Apply  topically to the appropriate area as directed 2 (Two) Times a Day. (Patient taking  "differently: Apply  topically to the appropriate area as directed 2 (Two) Times a Day As Needed.) 50 g 5   • valACYclovir (VALTREX) 1000 MG tablet TAKE 1 TABLET BY MOUTH EVERY DAY 30 tablet 1   • valsartan (DIOVAN) 160 MG tablet Take 1 tablet by mouth Daily. 90 tablet 3     No current facility-administered medications for this visit.       Allergies:  Penicillins and Vancomycin    Review of Systems  Review of Systems   Constitutional: Negative.   HENT: Negative.    Eyes: Negative.    Cardiovascular: Positive for dyspnea on exertion and palpitations. Negative for chest pain, claudication, cyanosis, irregular heartbeat, leg swelling, near-syncope, orthopnea, paroxysmal nocturnal dyspnea and syncope.   Respiratory: Negative.    Endocrine: Negative.    Hematologic/Lymphatic: Negative.    Skin: Negative.    Musculoskeletal: Positive for arthritis and joint pain.   Gastrointestinal: Negative for anorexia.   Genitourinary: Negative.    Neurological: Negative.    Psychiatric/Behavioral: Negative.        Objective     Physical Exam:  /90   Pulse 84   Ht 188 cm (74\")   Wt (!) 138 kg (304 lb)   SpO2 97%   BMI 39.03 kg/m²     Physical Exam  Constitutional:       Appearance: He is well-developed.   HENT:      Head: Normocephalic.   Neck:      Vascular: Normal carotid pulses. No carotid bruit or JVD.      Trachea: No tracheal tenderness or tracheal deviation.   Cardiovascular:      Rate and Rhythm: Regular rhythm.      Pulses: Normal pulses.      Heart sounds: Normal heart sounds.   Pulmonary:      Effort: Pulmonary effort is normal.      Breath sounds: No stridor.   Abdominal:      General: There is no distension.      Palpations: Abdomen is soft.      Tenderness: There is no abdominal tenderness.   Musculoskeletal:      Cervical back: No edema.   Skin:     General: Skin is warm.   Neurological:      Mental Status: He is alert.      Cranial Nerves: No cranial nerve deficit.      Sensory: No sensory deficit. "   Psychiatric:         Speech: Speech normal.         Behavior: Behavior normal.         Results Review:      Raghav Olivier  Holter Monitor Greater than 7 days up to 15 days  Order# [id number]  Reading physician: Titi Kramer MD Ordering physician: Titi Kramer MD Study date: 8/3/21   Patient Information    Patient Name   Raghav Olivier MRN   [id number] Legal Sex   Male  (Age)   1955 (66 y.o.)   Interpretation Summary       · Average HR: 83. Min HR: 38. Max HR: 216.     · Entire report was reviewed.  Monitoring in days: ~13  · In atrial fibrillation during entire monitoring duration     · Rare premature ventricular contractions with a PVC burden of: < 1%     · No correlated arrhythmia  · No significant pauses                  Conclusion:      In atrial fibrillation during entire monitoring duration  Intermittent rapid ventricular response with rates between  bpm  Multiple pauses total of 2062 longest 7.9 seconds at 1:39 AM               Results for orders placed during the hospital encounter of 21    Adult Transthoracic Echo Complete w/ Color, Spectral and Contrast if necessary per protocol    Interpretation Summary  · Left ventricular ejection fraction appears to be 56 - 60%. Left ventricular systolic function is normal.  · Left ventricular wall thickness is consistent with moderate concentric hypertrophy.  · The following left ventricular wall segments are hypokinetic: apical inferior.  · Estimated right ventricular systolic pressure from tricuspid regurgitation is normal (<35 mmHg).     ____________________________________________________________________________________________________________________________________________  Health maintenance and recommendations    Low salt/ HTN/ Heart healthy carbohydrate restricted cardiac diet   The patient is advised to reduce or avoid caffeine or other cardiac stimulants.   Minimize or avoid  NSAID-type medications      Monitor for any signs  of bleeding including red or dark stools. Fall precautions.   Advised staying uptodate with immunizations per established standard guidelines.    Offered to give patient  a copy of my notes     Questions were encouraged, asked and answered to the patient's  understanding and satisfaction. Questions if any regarding current medications and side effects, need for refills and importance of compliance to medications stressed.    Reviewed available prior notes, consults, prior visits, laboratory findings, radiology and cardiology relevant reports. Updated chart as applicable. I have reviewed the patient's medical history in detail and updated the computerized patient record as relevant.      Updated patient regarding any new or relevant abnormalities on review of records or any new findings on physical exam. Mentioned to patient about purpose of visit and desirable health short and long term goals and objectives.    Primary to monitor CBC CMP Lipid panel and TSH as applicable    ___________________________________________________________________________________________________________________________________________       Procedures    Assessment/Plan   Diagnoses and all orders for this visit:    1. Coronary artery disease involving native coronary artery of native heart without angina pectoris moderate stenosis on CTA coronaries in [place] (Primary)    2. Class 3 severe obesity due to excess calories with serious comorbidity and body mass index (BMI) of 40.0 to 44.9 in adult (CMS/[facility])    3. Essential hypertension    4. Dilated aortic root (CMS/[facility])    5. TAMARA on CPAP    6. SOB (shortness of breath)    7. Persistent atrial fibrillation ([facility])    8. Palpitations    9. LVH (left ventricular hypertrophy) moderate     Other orders  -     valsartan (DIOVAN) 160 MG tablet; Take 1 tablet by mouth Daily.  Dispense: 90 tablet; Refill: 3        Plan    Recommend atrial fibrillation ablation   Defer Flecainide due to coronary artery  disease and moderate left ventricular hypertrophy   Has pauses that are asymptomatic and during mostly when he is sleeping   Sent message to Dr Hernández    Patient expressed understanding  Encouraged and answered all questions   Discussed with the patient and all questioned fully answered. He will call me if any problems arise.   Discussed results of prior testing with patient : echo and outpatient cardiac telemetry      Had CTA coronaries in [facility] with moderate obstruction   No chest pain  Plan on myocardial perfusion scan  In future   Keep f/u Dr Hernández      Due to oral bleeding he has stopped Xarelto but tolerating Eliquis well and no further bleeding   Started by Dr Hernández 09/10/21  He was advised atrial fibrillation ablation by Dr Hernández but he declined in past now willing    Check BP and heart rates twice daily at least 3x / week, week a month  at home and bring a recording for me to review next visit  If BP >130/85 or < 100/60 persistently over 3 reading 30 mins apart call sooner      Patient was advised to continue CPAP daily.     I support the patient's decision to take the Covid -19 vaccine   Had required complete course   No major issues   Now fully immunized    Recommend booster       Needs better BP control    Requested Prescriptions     Signed Prescriptions Disp Refills   • valsartan (DIOVAN) 160 MG tablet 90 tablet 3     Sig: Take 1 tablet by mouth Daily.        Check BP and heart rates twice daily at least 3x / week, week a month  at home and bring a recording for me to review next visit  If BP >130/85 or < 100/60 persistently over 3 reading 30 mins apart call sooner                  Return in about 4 weeks (around 10/21/2021).

## 2021-09-27 ENCOUNTER — TRANSCRIBE ORDERS (OUTPATIENT)
Dept: ADMINISTRATIVE | Facility: HOSPITAL | Age: 66
End: 2021-09-27

## 2021-09-27 DIAGNOSIS — I49.5 SINOATRIAL NODE DYSFUNCTION (HCC): Primary | ICD-10-CM

## 2021-10-01 ENCOUNTER — HOSPITAL ENCOUNTER (OUTPATIENT)
Facility: HOSPITAL | Age: 66
Discharge: HOME | End: 2021-10-01
Attending: INTERNAL MEDICINE | Admitting: INTERNAL MEDICINE

## 2021-10-01 ENCOUNTER — APPOINTMENT (OUTPATIENT)
Dept: GENERAL RADIOLOGY | Facility: HOSPITAL | Age: 66
End: 2021-10-01

## 2021-10-01 VITALS
SYSTOLIC BLOOD PRESSURE: 122 MMHG | RESPIRATION RATE: 17 BRPM | TEMPERATURE: 96.9 F | WEIGHT: 304.24 LBS | OXYGEN SATURATION: 96 % | BODY MASS INDEX: 39.04 KG/M2 | HEART RATE: 86 BPM | HEIGHT: 74 IN | DIASTOLIC BLOOD PRESSURE: 73 MMHG

## 2021-10-01 DIAGNOSIS — I49.5 SINOATRIAL NODE DYSFUNCTION (HCC): ICD-10-CM

## 2021-10-01 LAB
ANION GAP SERPL CALCULATED.3IONS-SCNC: 10 MMOL/L (ref 5–15)
BASOPHILS # BLD AUTO: 0.05 10*3/MM3 (ref 0–0.2)
BASOPHILS NFR BLD AUTO: 0.5 % (ref 0–1.5)
BUN SERPL-MCNC: 21 MG/DL (ref 8–23)
BUN/CREAT SERPL: 20.6 (ref 7–25)
CALCIUM SPEC-SCNC: 9.1 MG/DL (ref 8.6–10.5)
CHLORIDE SERPL-SCNC: 106 MMOL/L (ref 98–107)
CO2 SERPL-SCNC: 28 MMOL/L (ref 22–29)
CREAT SERPL-MCNC: 1.02 MG/DL (ref 0.76–1.27)
DEPRECATED RDW RBC AUTO: 46.3 FL (ref 37–54)
EOSINOPHIL # BLD AUTO: 0.09 10*3/MM3 (ref 0–0.4)
EOSINOPHIL NFR BLD AUTO: 0.9 % (ref 0.3–6.2)
ERYTHROCYTE [DISTWIDTH] IN BLOOD BY AUTOMATED COUNT: 13.3 % (ref 12.3–15.4)
GFR SERPL CREATININE-BSD FRML MDRD: 73 ML/MIN/1.73
GLUCOSE SERPL-MCNC: 120 MG/DL (ref 65–99)
HCT VFR BLD AUTO: 44.8 % (ref 37.5–51)
HGB BLD-MCNC: 14.1 G/DL (ref 13–17.7)
IMM GRANULOCYTES # BLD AUTO: 0.04 10*3/MM3 (ref 0–0.05)
IMM GRANULOCYTES NFR BLD AUTO: 0.4 % (ref 0–0.5)
LYMPHOCYTES # BLD AUTO: 1.53 10*3/MM3 (ref 0.7–3.1)
LYMPHOCYTES NFR BLD AUTO: 16 % (ref 19.6–45.3)
MCH RBC QN AUTO: 29.6 PG (ref 26.6–33)
MCHC RBC AUTO-ENTMCNC: 31.5 G/DL (ref 31.5–35.7)
MCV RBC AUTO: 94.1 FL (ref 79–97)
MONOCYTES # BLD AUTO: 1.04 10*3/MM3 (ref 0.1–0.9)
MONOCYTES NFR BLD AUTO: 10.9 % (ref 5–12)
NEUTROPHILS NFR BLD AUTO: 6.8 10*3/MM3 (ref 1.7–7)
NEUTROPHILS NFR BLD AUTO: 71.3 % (ref 42.7–76)
NRBC BLD AUTO-RTO: 0 /100 WBC (ref 0–0.2)
PLATELET # BLD AUTO: 224 10*3/MM3 (ref 140–450)
PMV BLD AUTO: 10.5 FL (ref 6–12)
POTASSIUM SERPL-SCNC: 3.9 MMOL/L (ref 3.5–5.2)
RBC # BLD AUTO: 4.76 10*6/MM3 (ref 4.14–5.8)
SODIUM SERPL-SCNC: 144 MMOL/L (ref 136–145)
WBC # BLD AUTO: 9.55 10*3/MM3 (ref 3.4–10.8)

## 2021-10-01 PROCEDURE — 99152 MOD SED SAME PHYS/QHP 5/>YRS: CPT | Performed by: INTERNAL MEDICINE

## 2021-10-01 PROCEDURE — C1785 PMKR, DUAL, RATE-RESP: HCPCS | Performed by: INTERNAL MEDICINE

## 2021-10-01 PROCEDURE — 0 IOPAMIDOL PER 1 ML: Performed by: INTERNAL MEDICINE

## 2021-10-01 PROCEDURE — 25010000002 MIDAZOLAM HCL (PF) 5 MG/5ML SOLUTION: Performed by: INTERNAL MEDICINE

## 2021-10-01 PROCEDURE — C1892 INTRO/SHEATH,FIXED,PEEL-AWAY: HCPCS | Performed by: INTERNAL MEDICINE

## 2021-10-01 PROCEDURE — 80048 BASIC METABOLIC PNL TOTAL CA: CPT | Performed by: INTERNAL MEDICINE

## 2021-10-01 PROCEDURE — 25010000002 DIPHENHYDRAMINE PER 50 MG: Performed by: INTERNAL MEDICINE

## 2021-10-01 PROCEDURE — C1898 LEAD, PMKR, OTHER THAN TRANS: HCPCS | Performed by: INTERNAL MEDICINE

## 2021-10-01 PROCEDURE — 99153 MOD SED SAME PHYS/QHP EA: CPT | Performed by: INTERNAL MEDICINE

## 2021-10-01 PROCEDURE — 25010000002 FENTANYL CITRATE (PF) 100 MCG/2ML SOLUTION: Performed by: INTERNAL MEDICINE

## 2021-10-01 PROCEDURE — C1894 INTRO/SHEATH, NON-LASER: HCPCS | Performed by: INTERNAL MEDICINE

## 2021-10-01 PROCEDURE — 71045 X-RAY EXAM CHEST 1 VIEW: CPT

## 2021-10-01 PROCEDURE — 33208 INSRT HEART PM ATRIAL & VENT: CPT | Performed by: INTERNAL MEDICINE

## 2021-10-01 PROCEDURE — 85025 COMPLETE CBC W/AUTO DIFF WBC: CPT | Performed by: INTERNAL MEDICINE

## 2021-10-01 DEVICE — LD PM TENDRIL STS 6F46CM 2088TC46: Type: IMPLANTABLE DEVICE | Site: HEART | Status: FUNCTIONAL

## 2021-10-01 DEVICE — LD PM TENDRIL STS 6F52CM 2088TC52: Type: IMPLANTABLE DEVICE | Site: HEART | Status: FUNCTIONAL

## 2021-10-01 DEVICE — GEN PM ASSURITY MRI DR RF PM2272: Type: IMPLANTABLE DEVICE | Site: CHEST WALL | Status: FUNCTIONAL

## 2021-10-01 RX ORDER — ACETAMINOPHEN 325 MG/1
650 TABLET ORAL EVERY 4 HOURS PRN
Status: DISCONTINUED | OUTPATIENT
Start: 2021-10-01 | End: 2021-10-01 | Stop reason: HOSPADM

## 2021-10-01 RX ORDER — KETOROLAC TROMETHAMINE 15 MG/ML
15 INJECTION, SOLUTION INTRAMUSCULAR; INTRAVENOUS ONCE AS NEEDED
Status: DISCONTINUED | OUTPATIENT
Start: 2021-10-01 | End: 2021-10-01 | Stop reason: HOSPADM

## 2021-10-01 RX ORDER — FENTANYL CITRATE 50 UG/ML
INJECTION, SOLUTION INTRAMUSCULAR; INTRAVENOUS AS NEEDED
Status: DISCONTINUED | OUTPATIENT
Start: 2021-10-01 | End: 2021-10-01 | Stop reason: HOSPADM

## 2021-10-01 RX ORDER — MIDAZOLAM HYDROCHLORIDE 1 MG/ML
INJECTION, SOLUTION INTRAMUSCULAR; INTRAVENOUS AS NEEDED
Status: DISCONTINUED | OUTPATIENT
Start: 2021-10-01 | End: 2021-10-01 | Stop reason: HOSPADM

## 2021-10-01 RX ORDER — ACETAMINOPHEN 650 MG/1
650 SUPPOSITORY RECTAL EVERY 4 HOURS PRN
Status: DISCONTINUED | OUTPATIENT
Start: 2021-10-01 | End: 2021-10-01 | Stop reason: HOSPADM

## 2021-10-01 RX ORDER — SODIUM CHLORIDE 0.9 % (FLUSH) 0.9 %
10 SYRINGE (ML) INJECTION AS NEEDED
Status: DISCONTINUED | OUTPATIENT
Start: 2021-10-01 | End: 2021-10-01 | Stop reason: HOSPADM

## 2021-10-01 RX ORDER — SODIUM CHLORIDE 0.9 % (FLUSH) 0.9 %
3 SYRINGE (ML) INJECTION EVERY 12 HOURS SCHEDULED
Status: DISCONTINUED | OUTPATIENT
Start: 2021-10-01 | End: 2021-10-01 | Stop reason: HOSPADM

## 2021-10-01 RX ORDER — DIPHENHYDRAMINE HYDROCHLORIDE 50 MG/ML
INJECTION INTRAMUSCULAR; INTRAVENOUS AS NEEDED
Status: DISCONTINUED | OUTPATIENT
Start: 2021-10-01 | End: 2021-10-01 | Stop reason: HOSPADM

## 2021-10-01 RX ORDER — LIDOCAINE HYDROCHLORIDE 20 MG/ML
INJECTION, SOLUTION INFILTRATION; PERINEURAL AS NEEDED
Status: DISCONTINUED | OUTPATIENT
Start: 2021-10-01 | End: 2021-10-01 | Stop reason: HOSPADM

## 2021-10-01 RX ORDER — NEOMYCIN AND POLYMYXIN B SULFATES 40; 200000 MG/ML; [USP'U]/ML
SOLUTION IRRIGATION AS NEEDED
Status: DISCONTINUED | OUTPATIENT
Start: 2021-10-01 | End: 2021-10-01 | Stop reason: HOSPADM

## 2021-10-01 NOTE — BRIEF OP NOTE
PACEMAKER IMPLANTATION-SC  Progress Note    Raghav Olivier  10/1/2021    Pre-op Diagnosis:   SINUS NODE DYSFUNCTION       Post-Op Diagnosis Codes:     * Sinoatrial node dysfunction (HCC) [I49.5]    Procedure/CPT® Codes:    99155      Procedure(s):  NEW PACEMAKER IMPLANTATION    Surgeon(s):  Mitch Hernández MD    Anesthesia: Local    Staff:   Scrub Person: David Aguirre Jr.  Documenter: Jeannine López  Invasive Nurse: Russell Orellana RN         Estimated Blood Loss: 10 cc                                Drains: * No LDAs found *    Findings: RV lead placed in mid-septum and RA lead in RAA    Complications: none          Mitch Hernández MD     Date: 10/1/2021  Time: 14:03 CDT

## 2021-10-01 NOTE — H&P
Pt seen and examined. Event monitor documented pauses of up to 7.9 seconds. Patient needing to undergo cardioversion and/or Atrial fibrillation ablation. Will need pacemaker implantation in order to permit further treatment to restore and maintain sinus rhythm given the evidence of severe sinus node dysfunction.     Discussed procedure and risks in detail. He agrees to proceed.

## 2021-11-05 ENCOUNTER — OFFICE VISIT (OUTPATIENT)
Dept: CARDIOLOGY | Facility: CLINIC | Age: 66
End: 2021-11-05

## 2021-11-05 VITALS
SYSTOLIC BLOOD PRESSURE: 122 MMHG | BODY MASS INDEX: 37.8 KG/M2 | HEART RATE: 60 BPM | HEIGHT: 75 IN | WEIGHT: 304 LBS | DIASTOLIC BLOOD PRESSURE: 88 MMHG | OXYGEN SATURATION: 99 %

## 2021-11-05 DIAGNOSIS — G47.33 OSA ON CPAP: ICD-10-CM

## 2021-11-05 DIAGNOSIS — I48.19 PERSISTENT ATRIAL FIBRILLATION (HCC): ICD-10-CM

## 2021-11-05 DIAGNOSIS — I77.810 DILATED AORTIC ROOT (HCC): ICD-10-CM

## 2021-11-05 DIAGNOSIS — Z99.89 OSA ON CPAP: ICD-10-CM

## 2021-11-05 DIAGNOSIS — E78.2 MIXED HYPERLIPIDEMIA: ICD-10-CM

## 2021-11-05 DIAGNOSIS — E66.01 CLASS 3 SEVERE OBESITY DUE TO EXCESS CALORIES WITH SERIOUS COMORBIDITY AND BODY MASS INDEX (BMI) OF 40.0 TO 44.9 IN ADULT (HCC): ICD-10-CM

## 2021-11-05 DIAGNOSIS — I51.7 LVH (LEFT VENTRICULAR HYPERTROPHY): Primary | ICD-10-CM

## 2021-11-05 DIAGNOSIS — I25.10 CORONARY ARTERY DISEASE INVOLVING NATIVE CORONARY ARTERY OF NATIVE HEART WITHOUT ANGINA PECTORIS: ICD-10-CM

## 2021-11-05 PROCEDURE — 99214 OFFICE O/P EST MOD 30 MIN: CPT | Performed by: INTERNAL MEDICINE

## 2021-11-05 PROCEDURE — 93000 ELECTROCARDIOGRAM COMPLETE: CPT | Performed by: INTERNAL MEDICINE

## 2021-11-05 RX ORDER — METHOCARBAMOL 750 MG/1
750 TABLET, FILM COATED ORAL EVERY 8 HOURS PRN
COMMUNITY
Start: 2021-10-27 | End: 2021-11-07

## 2021-11-05 RX ORDER — METOPROLOL SUCCINATE 25 MG/1
TABLET, EXTENDED RELEASE ORAL
COMMUNITY

## 2021-11-05 RX ORDER — FLUTICASONE PROPIONATE 50 MCG
1 SPRAY, SUSPENSION (ML) NASAL AS NEEDED
COMMUNITY

## 2021-11-05 RX ORDER — LIDOCAINE 50 MG/G
PATCH TOPICAL
COMMUNITY
Start: 2021-10-27 | End: 2022-06-30 | Stop reason: HOSPADM

## 2021-11-05 NOTE — PROGRESS NOTES
Raghav Olivier  [id number]  1955  66 y.o.  male    Referring Provider: Devendra Martinez MD    Reason for  Visit:     paroxysmal atrial fibrillation now in sinus rhythm  obstructive sleep apnea   essential hypertension  Hyperlipidemia    short term office follow up after recent encounter   Came to ER with atrial fibrillation and underwent DC cardioversion in past   Since then has seen Dr Hernández and off Amiodarone   Due to gum bleed stopped Xarelto on his own and now on Eliquis and tolerating well        Subjective    Overall feels the same from cardiac standpoint   Main problem is sere low back pain   No new events or complaints since last visit   Overall the patient feels no major change from baseline symptoms   Similar symptoms as during last visit     Mild chronic exertional shortness of breath on exertion relieved with rest  No significant cough or wheezing    No palpitations  No associated chest pain  No significant pedal edema    No fever or chills  No significant expectoration    No hemoptysis  No presyncope or syncope    Tolerating current medications well with no untoward side effects   Compliant with prescribed medication regimen. Tries to adhere to cardiac diet.     No bleeding, excessive bruising, gait instability or fall risks    BP well controlled at home.     BP elevated today       History of present illness:  Raghav Olivier is a 66 y.o. yo male with history of paroxysmal atrial fibrillation  who presents today for   Chief Complaint   Patient presents with   • discharge f/u     recent stay at [place]   .    History  Past Medical History:   Diagnosis Date   • Atrial fibrillation (HCC)    • Diverticulitis    • Hyperlipidemia    • Hypertension    • Kidney stones    • Nonrheumatic mitral (valve) insufficiency    • Palpitations    • Sleep apnea    • SOB (shortness of breath)    ,   Past Surgical History:   Procedure Laterality Date   • APPENDECTOMY     • CARDIAC CATHETERIZATION      • CARDIAC ELECTROPHYSIOLOGY PROCEDURE N/A 10/1/2021    Procedure: NEW PACEMAKER IMPLANTATION;  Surgeon: Mitch Hernández MD;  Location: [facility] CATH INVASIVE LOCATION;  Service: Cardiology;  Laterality: N/A;   • CARDIOVERSION     • CHOLECYSTECTOMY     • COLONOSCOPY N/A 2017    Procedure: COLONOSCOPY;  Surgeon: Billy Robbins DO;  Location: [facility] ENDOSCOPY;  Service:    • COLONOSCOPY N/A 2021    Procedure: COLONOSCOPY;  Surgeon: Billy Robbins DO;  Location: [facility] ENDOSCOPY;  Service: Gastroenterology;  Laterality: N/A;   • EP STUDY     • KNEE SURGERY Right    • SHOULDER SURGERY Left    ,   Family History   Problem Relation Age of Onset   • Cancer Mother         colon   • Cancer Father         prostate   • Dementia Father    ,   Social History     Tobacco Use   • Smoking status: Former Smoker     Packs/day: 0.25     Years: 4.00     Pack years: 1.00     Types: Cigarettes     Start date:      Quit date:      Years since quittin.8   • Smokeless tobacco: Never Used   Vaping Use   • Vaping Use: Never used   Substance Use Topics   • Alcohol use: No   • Drug use: No   ,     Medications  Current Outpatient Medications   Medication Sig Dispense Refill   • acetaminophen (TYLENOL) 650 MG 8 hr tablet Take 650 mg by mouth Every 8 (Eight) Hours As Needed for Mild Pain . Takes one tablet in the morning and one at night     • apixaban (ELIQUIS) 5 MG tablet tablet Take 5 mg by mouth 2 (Two) Times a Day.     • desloratadine (CLARINEX) 5 MG tablet Take 1 tablet by mouth Daily. 30 tablet 5   • Diclofenac Sodium (VOLTAREN) 1 % gel gel      • fluticasone (FLONASE) 50 MCG/ACT nasal spray 1 spray into the nostril(s) as directed by provider.     • hydroCHLOROthiazide (HYDRODIURIL) 12.5 MG tablet TAKE 1 TABLET BY MOUTH EVERY DAY (Patient taking differently: Take 12.5 mg by mouth Daily.) 90 tablet 1   • lidocaine (LIDODERM) 5 %      • methocarbamol (ROBAXIN) 750 MG tablet Take 750 mg by mouth Every 8  "(Eight) Hours As Needed.     • metoprolol succinate XL (TOPROL-XL) 25 MG 24 hr tablet metoprolol succinate ER 25 mg tablet,extended release 24 hr   Take 1 tablet every day by oral route for 90 days.     • nitroglycerin (NITROSTAT) 0.4 MG SL tablet Place 1 tablet under the tongue Every 5 (Five) Minutes As Needed for Chest Pain. Take no more than 3 doses in 15 minutes. 30 tablet 1   • rosuvastatin (CRESTOR) 20 MG tablet TAKE 1 TABLET BY MOUTH EVERY DAY (Patient taking differently: Take 30 mg by mouth Every Night.) 90 tablet 1   • silver sulfadiazine (SILVADENE) 1 % cream Apply  topically to the appropriate area as directed 2 (Two) Times a Day. (Patient taking differently: Apply  topically to the appropriate area as directed 2 (Two) Times a Day As Needed.) 50 g 5   • valACYclovir (VALTREX) 1000 MG tablet TAKE 1 TABLET BY MOUTH EVERY DAY 30 tablet 1   • valsartan (DIOVAN) 160 MG tablet Take 1 tablet by mouth Daily. 90 tablet 3     No current facility-administered medications for this visit.       Allergies:  Penicillins and Vancomycin    Review of Systems  Review of Systems   Constitutional: Negative.   HENT: Negative.    Eyes: Negative.    Cardiovascular: Positive for dyspnea on exertion. Negative for chest pain, claudication, cyanosis, irregular heartbeat, leg swelling, near-syncope, orthopnea, palpitations, paroxysmal nocturnal dyspnea and syncope.   Respiratory: Negative.    Endocrine: Negative.    Hematologic/Lymphatic: Negative.    Skin: Negative.    Musculoskeletal: Positive for arthritis and joint pain.   Gastrointestinal: Negative for anorexia.   Genitourinary: Negative.    Neurological: Negative.    Psychiatric/Behavioral: Negative.        Objective     Physical Exam:  /88   Pulse 60   Ht 190.5 cm (75\")   Wt (!) 138 kg (304 lb)   SpO2 99%   BMI 38.00 kg/m²     Physical Exam  Constitutional:       Appearance: He is well-developed.   HENT:      Head: Normocephalic.   Neck:      Vascular: Normal " carotid pulses. No carotid bruit or JVD.      Trachea: No tracheal tenderness or tracheal deviation.   Cardiovascular:      Rate and Rhythm: Rhythm irregularly irregular.      Pulses: Normal pulses.      Heart sounds: Normal heart sounds.   Pulmonary:      Effort: Pulmonary effort is normal.      Breath sounds: No stridor.   Abdominal:      General: There is no distension.      Palpations: Abdomen is soft.      Tenderness: There is no abdominal tenderness.   Musculoskeletal:      Cervical back: No edema.   Skin:     General: Skin is warm.   Neurological:      Mental Status: He is alert.      Cranial Nerves: No cranial nerve deficit.      Sensory: No sensory deficit.   Psychiatric:         Speech: Speech normal.         Behavior: Behavior normal.         Results Review:      Raghav Olivier  Holter Monitor Greater than 7 days up to 15 days  Order# [id number]  Reading physician: Titi Kramer MD Ordering physician: Titi Kramer MD Study date: 8/3/21   Patient Information    Patient Name   Raghav Olivier MRN   [id number] Legal Sex   Male  (Age)   1955 (66 y.o.)   Interpretation Summary       · Average HR: 83. Min HR: 38. Max HR: 216.     · Entire report was reviewed.  Monitoring in days: ~13  · In atrial fibrillation during entire monitoring duration     · Rare premature ventricular contractions with a PVC burden of: < 1%     · No correlated arrhythmia  · No significant pauses                  Conclusion:      In atrial fibrillation during entire monitoring duration  Intermittent rapid ventricular response with rates between  bpm  Multiple pauses total of 2062 longest 7.9 seconds at 1:39 AM               Results for orders placed during the hospital encounter of 21    Adult Transthoracic Echo Complete w/ Color, Spectral and Contrast if necessary per protocol    Interpretation Summary  · Left ventricular ejection fraction appears to be 56 - 60%. Left ventricular systolic function is normal.  ·  Left ventricular wall thickness is consistent with moderate concentric hypertrophy.  · The following left ventricular wall segments are hypokinetic: apical inferior.  · Estimated right ventricular systolic pressure from tricuspid regurgitation is normal (<35 mmHg).     ____________________________________________________________________________________________________________________________________________  Health maintenance and recommendations    Low salt/ HTN/ Heart healthy carbohydrate restricted cardiac diet   The patient is advised to reduce or avoid caffeine or other cardiac stimulants.   Minimize or avoid  NSAID-type medications      Monitor for any signs of bleeding including red or dark stools. Fall precautions.   Advised staying uptodate with immunizations per established standard guidelines.    Offered to give patient  a copy of my notes     Questions were encouraged, asked and answered to the patient's  understanding and satisfaction. Questions if any regarding current medications and side effects, need for refills and importance of compliance to medications stressed.    Reviewed available prior notes, consults, prior visits, laboratory findings, radiology and cardiology relevant reports. Updated chart as applicable. I have reviewed the patient's medical history in detail and updated the computerized patient record as relevant.      Updated patient regarding any new or relevant abnormalities on review of records or any new findings on physical exam. Mentioned to patient about purpose of visit and desirable health short and long term goals and objectives.    Primary to monitor CBC CMP Lipid panel and TSH as applicable    ___________________________________________________________________________________________________________________________________________         ECG 12 Lead    Date/Time: 11/5/2021 10:46 AM  Performed by: Titi Kramer MD  Authorized by: Titi Kramer MD   Comparison: compared with  previous ECG from 8/3/2021  Comparison to previous ECG: atrial fibrillation replaced  sinus rhythm   Rhythm: atrial fibrillation  Rate: normal  Conduction: incomplete right bundle branch block  QRS axis: normal    Clinical impression: abnormal EKG            Assessment/Plan   Diagnoses and all orders for this visit:    1. LVH (left ventricular hypertrophy) moderate  (Primary)    2. Mixed hyperlipidemia    3. Coronary artery disease involving native coronary artery of native heart without angina pectoris moderate stenosis on CTA coronaries in [place]    4. Class 3 severe obesity due to excess calories with serious comorbidity and body mass index (BMI) of 40.0 to 44.9 in adult ([facility])    5. Persistent atrial fibrillation ([facility])    6. TAMARA on CPAP    7. Dilated aortic root ([facility])    Other orders  -     ECG 12 Lead        Plan      Patient expressed understanding  Encouraged and answered all questions   Discussed with the patient and all questioned fully answered. He will call me if any problems arise.   Discussed results of prior testing with patient : echo   as well electrocardiogram from today       Monitor for any signs of bleeding including red or dark stools as well as easy bruisabilty. Fall precautions.       Monitor cardiac rhythm device function regularly per established schedule or PRN    Dr Hernánedz monitors device   Keep f/u Dr Hernández     Had CTA coronaries in [facility] with moderate obstruction, no critical obstructive CAD   No chest pain  Acceptable cardiovascular risk of planned procedure: spine surgery   Due to be seen in [place] 11/9/21  Can proceed with surgery with usual caution and perioperative hemodynamic and cardiac rhythm monitoring.    Check BP and heart rates twice daily at least 3x / week, week a month  at home and bring a recording for me to review next visit  If BP >130/85 or < 100/60 persistently over 3 reading 30 mins apart call sooner      Patient was advised to continue CPAP  daily.     I support the patient's decision to take the Covid -19 vaccine   Had required complete course   No major issues   Now fully immunized    Recommend booster       Check BP and heart rates twice daily at least 3x / week, week a month  at home and bring a recording for me to review next visit  If BP >130/85 or < 100/60 persistently over 3 reading 30 mins apart call sooner      Follow up with EMPERATRIZ Drew  or myself          Return in about 3 months (around 2/5/2022).

## 2021-12-08 ENCOUNTER — OFFICE VISIT (OUTPATIENT)
Dept: CARDIOLOGY | Facility: CLINIC | Age: 66
End: 2021-12-08

## 2021-12-08 ENCOUNTER — LAB (OUTPATIENT)
Dept: LAB | Facility: HOSPITAL | Age: 66
End: 2021-12-08

## 2021-12-08 VITALS
SYSTOLIC BLOOD PRESSURE: 134 MMHG | BODY MASS INDEX: 38.54 KG/M2 | HEART RATE: 63 BPM | DIASTOLIC BLOOD PRESSURE: 70 MMHG | WEIGHT: 310 LBS | OXYGEN SATURATION: 97 % | HEIGHT: 75 IN

## 2021-12-08 DIAGNOSIS — Z95.0 PRESENCE OF CARDIAC PACEMAKER: ICD-10-CM

## 2021-12-08 DIAGNOSIS — I10 ESSENTIAL HYPERTENSION: ICD-10-CM

## 2021-12-08 DIAGNOSIS — I48.0 PAROXYSMAL ATRIAL FIBRILLATION (HCC): Primary | ICD-10-CM

## 2021-12-08 DIAGNOSIS — I49.5 SINOATRIAL NODE DYSFUNCTION (HCC): ICD-10-CM

## 2021-12-08 DIAGNOSIS — G47.33 OSA ON CPAP: ICD-10-CM

## 2021-12-08 DIAGNOSIS — I48.0 PAROXYSMAL ATRIAL FIBRILLATION (HCC): ICD-10-CM

## 2021-12-08 DIAGNOSIS — R06.02 SOB (SHORTNESS OF BREATH): ICD-10-CM

## 2021-12-08 DIAGNOSIS — I25.10 CORONARY ARTERY DISEASE INVOLVING NATIVE CORONARY ARTERY OF NATIVE HEART WITHOUT ANGINA PECTORIS: ICD-10-CM

## 2021-12-08 DIAGNOSIS — R07.2 PRECORDIAL CHEST PAIN: ICD-10-CM

## 2021-12-08 DIAGNOSIS — Z99.89 OSA ON CPAP: ICD-10-CM

## 2021-12-08 DIAGNOSIS — I77.810 DILATED AORTIC ROOT (HCC): ICD-10-CM

## 2021-12-08 PROBLEM — E66.01 CLASS 3 SEVERE OBESITY DUE TO EXCESS CALORIES WITH SERIOUS COMORBIDITY AND BODY MASS INDEX (BMI) OF 40.0 TO 44.9 IN ADULT (HCC): Status: RESOLVED | Noted: 2018-01-16 | Resolved: 2021-12-08

## 2021-12-08 PROBLEM — E66.813 CLASS 3 SEVERE OBESITY DUE TO EXCESS CALORIES WITH SERIOUS COMORBIDITY AND BODY MASS INDEX (BMI) OF 40.0 TO 44.9 IN ADULT: Status: RESOLVED | Noted: 2018-01-16 | Resolved: 2021-12-08

## 2021-12-08 LAB
ALBUMIN SERPL-MCNC: 4.1 G/DL (ref 3.5–5.2)
ALBUMIN/GLOB SERPL: 1.2 G/DL
ALP SERPL-CCNC: 45 U/L (ref 39–117)
ALT SERPL W P-5'-P-CCNC: 19 U/L (ref 1–41)
ANION GAP SERPL CALCULATED.3IONS-SCNC: 10 MMOL/L (ref 5–15)
AST SERPL-CCNC: 17 U/L (ref 1–40)
BACTERIA UR QL AUTO: ABNORMAL /HPF
BASOPHILS # BLD AUTO: 0.04 10*3/MM3 (ref 0–0.2)
BASOPHILS NFR BLD AUTO: 0.4 % (ref 0–1.5)
BILIRUB SERPL-MCNC: 0.3 MG/DL (ref 0–1.2)
BILIRUB UR QL STRIP: NEGATIVE
BUN SERPL-MCNC: 16 MG/DL (ref 8–23)
BUN/CREAT SERPL: 16.5 (ref 7–25)
CALCIUM SPEC-SCNC: 9.7 MG/DL (ref 8.6–10.5)
CHLORIDE SERPL-SCNC: 104 MMOL/L (ref 98–107)
CLARITY UR: CLEAR
CO2 SERPL-SCNC: 27 MMOL/L (ref 22–29)
COLOR UR: YELLOW
CREAT SERPL-MCNC: 0.97 MG/DL (ref 0.76–1.27)
DEPRECATED RDW RBC AUTO: 48.6 FL (ref 37–54)
EOSINOPHIL # BLD AUTO: 0.07 10*3/MM3 (ref 0–0.4)
EOSINOPHIL NFR BLD AUTO: 0.7 % (ref 0.3–6.2)
ERYTHROCYTE [DISTWIDTH] IN BLOOD BY AUTOMATED COUNT: 14.6 % (ref 12.3–15.4)
GFR SERPL CREATININE-BSD FRML MDRD: 77 ML/MIN/1.73
GLOBULIN UR ELPH-MCNC: 3.5 GM/DL
GLUCOSE SERPL-MCNC: 106 MG/DL (ref 65–99)
GLUCOSE UR STRIP-MCNC: NEGATIVE MG/DL
HCT VFR BLD AUTO: 40.8 % (ref 37.5–51)
HGB BLD-MCNC: 12.4 G/DL (ref 13–17.7)
HGB UR QL STRIP.AUTO: NEGATIVE
HYALINE CASTS UR QL AUTO: ABNORMAL /LPF
IMM GRANULOCYTES # BLD AUTO: 0.04 10*3/MM3 (ref 0–0.05)
IMM GRANULOCYTES NFR BLD AUTO: 0.4 % (ref 0–0.5)
KETONES UR QL STRIP: NEGATIVE
LEUKOCYTE ESTERASE UR QL STRIP.AUTO: NEGATIVE
LYMPHOCYTES # BLD AUTO: 2.01 10*3/MM3 (ref 0.7–3.1)
LYMPHOCYTES NFR BLD AUTO: 19.6 % (ref 19.6–45.3)
MCH RBC QN AUTO: 27.8 PG (ref 26.6–33)
MCHC RBC AUTO-ENTMCNC: 30.4 G/DL (ref 31.5–35.7)
MCV RBC AUTO: 91.5 FL (ref 79–97)
MONOCYTES # BLD AUTO: 0.92 10*3/MM3 (ref 0.1–0.9)
MONOCYTES NFR BLD AUTO: 9 % (ref 5–12)
NEUTROPHILS NFR BLD AUTO: 69.9 % (ref 42.7–76)
NEUTROPHILS NFR BLD AUTO: 7.17 10*3/MM3 (ref 1.7–7)
NITRITE UR QL STRIP: NEGATIVE
NRBC BLD AUTO-RTO: 0 /100 WBC (ref 0–0.2)
NT-PROBNP SERPL-MCNC: 203.8 PG/ML (ref 0–900)
PH UR STRIP.AUTO: 6 [PH] (ref 5–8)
PLATELET # BLD AUTO: 341 10*3/MM3 (ref 140–450)
PMV BLD AUTO: 9.2 FL (ref 6–12)
POTASSIUM SERPL-SCNC: 4.1 MMOL/L (ref 3.5–5.2)
PROT SERPL-MCNC: 7.6 G/DL (ref 6–8.5)
PROT UR QL STRIP: NEGATIVE
RBC # BLD AUTO: 4.46 10*6/MM3 (ref 4.14–5.8)
RBC # UR STRIP: ABNORMAL /HPF
REF LAB TEST METHOD: ABNORMAL
SODIUM SERPL-SCNC: 141 MMOL/L (ref 136–145)
SP GR UR STRIP: 1.02 (ref 1–1.03)
SQUAMOUS #/AREA URNS HPF: ABNORMAL /HPF
TSH SERPL DL<=0.05 MIU/L-ACNC: 1.4 UIU/ML (ref 0.27–4.2)
UROBILINOGEN UR QL STRIP: NORMAL
WBC # UR STRIP: ABNORMAL /HPF
WBC NRBC COR # BLD: 10.25 10*3/MM3 (ref 3.4–10.8)

## 2021-12-08 PROCEDURE — 85025 COMPLETE CBC W/AUTO DIFF WBC: CPT

## 2021-12-08 PROCEDURE — 80053 COMPREHEN METABOLIC PANEL: CPT

## 2021-12-08 PROCEDURE — 93000 ELECTROCARDIOGRAM COMPLETE: CPT | Performed by: INTERNAL MEDICINE

## 2021-12-08 PROCEDURE — 83880 ASSAY OF NATRIURETIC PEPTIDE: CPT

## 2021-12-08 PROCEDURE — 84443 ASSAY THYROID STIM HORMONE: CPT

## 2021-12-08 PROCEDURE — 81001 URINALYSIS AUTO W/SCOPE: CPT

## 2021-12-08 PROCEDURE — 99214 OFFICE O/P EST MOD 30 MIN: CPT | Performed by: INTERNAL MEDICINE

## 2021-12-08 PROCEDURE — 36415 COLL VENOUS BLD VENIPUNCTURE: CPT

## 2021-12-08 RX ORDER — GABAPENTIN 100 MG/1
100 CAPSULE ORAL 4 TIMES DAILY
COMMUNITY
End: 2022-12-02

## 2021-12-08 RX ORDER — BUMETANIDE 0.5 MG/1
0.5 TABLET ORAL 2 TIMES DAILY PRN
Qty: 60 TABLET | Refills: 11 | Status: SHIPPED | OUTPATIENT
Start: 2021-12-08 | End: 2022-12-02

## 2021-12-08 NOTE — PROGRESS NOTES
Raghav Olivier  [id number]  1955  66 y.o.  male    Referring Provider: Devendra Martinez MD    Reason for  Visit:     paroxysmal atrial fibrillation now in sinus rhythm  obstructive sleep apnea   essential hypertension  Hyperlipidemia    short term office follow up after recent encounter   Came to ER with atrial fibrillation and underwent DC cardioversion in past   Since then has seen Dr Hrenández and off Amiodarone   Due to gum bleed stopped Xarelto on his own and now on Eliquis and tolerating well    Patient called to be seen due to new symptoms of increasing pedal edema   Preoperative cardiovascular clearance under general anesthesia for spine surgery Dr Garry Askewbilt    Subjective     Chest pain with mostly at rest but also with exertion,    Moderate substernal,   Pressure like   Chest pain non pleuritic  Chest pain non positional and non gustatory   Lasts less than 5 minutes    Started >3 months ago  Occurs once or twice a week  No associated diaphoresis    No associated nausea  No radiation    Relieved with rest or spontaneously  Not positional    No change with intake of food or antacids  No change with breathing  Mild to moderate associated dyspnea      No associated palpitations  No similar chest pain episodes in the past     Moderate pedal edema   No bleeding, excessive bruising, gait instability or fall risks    sick sinus syndrome s/p pacemaker   Dr Hernández did pacemaker DDD       History of present illness:  Raghav Olivier is a 66 y.o. yo male with  paroxysmal atrial fibrillation  who presents today for   Chief Complaint   Patient presents with   • Coronary Artery Disease     patient states he is having some discomfort and pressure along with palpitations.  started after the pacer insert. sx clearance for spinal procdure at [place] along with swelling.    .    History  Past Medical History:   Diagnosis Date   • Atrial fibrillation (HCC)    • Diverticulitis    • Hyperlipidemia     • Hypertension    • Kidney stones    • Nonrheumatic mitral (valve) insufficiency    • Palpitations    • Sleep apnea    • SOB (shortness of breath)    ,   Past Surgical History:   Procedure Laterality Date   • APPENDECTOMY     • CARDIAC CATHETERIZATION     • CARDIAC ELECTROPHYSIOLOGY PROCEDURE N/A 10/1/2021    Procedure: NEW PACEMAKER IMPLANTATION;  Surgeon: Mitch Hernández MD;  Location: [facility] CATH INVASIVE LOCATION;  Service: Cardiology;  Laterality: N/A;   • CARDIOVERSION     • CHOLECYSTECTOMY     • COLONOSCOPY N/A 2017    Procedure: COLONOSCOPY;  Surgeon: Billy Robbins DO;  Location: [facility] ENDOSCOPY;  Service:    • COLONOSCOPY N/A 2021    Procedure: COLONOSCOPY;  Surgeon: Billy Robbins DO;  Location: [facility] ENDOSCOPY;  Service: Gastroenterology;  Laterality: N/A;   • EP STUDY     • KNEE SURGERY Right    • SHOULDER SURGERY Left    ,   Family History   Problem Relation Age of Onset   • Cancer Mother         colon   • Cancer Father         prostate   • Dementia Father    ,   Social History     Tobacco Use   • Smoking status: Former Smoker     Packs/day: 0.25     Years: 4.00     Pack years: 1.00     Types: Cigarettes     Start date:      Quit date:      Years since quittin.9   • Smokeless tobacco: Never Used   Vaping Use   • Vaping Use: Never used   Substance Use Topics   • Alcohol use: No   • Drug use: No   ,     Medications  Current Outpatient Medications   Medication Sig Dispense Refill   • acetaminophen (TYLENOL) 650 MG 8 hr tablet Take 650 mg by mouth Every 8 (Eight) Hours As Needed for Mild Pain . 1000mg twice a day     • apixaban (ELIQUIS) 5 MG tablet tablet Take 5 mg by mouth 2 (Two) Times a Day.     • desloratadine (CLARINEX) 5 MG tablet Take 1 tablet by mouth Daily. 30 tablet 5   • Diclofenac Sodium (VOLTAREN) 1 % gel gel      • fluticasone (FLONASE) 50 MCG/ACT nasal spray 1 spray into the nostril(s) as directed by provider.     • gabapentin (NEURONTIN) 100 MG  capsule Take 100 mg by mouth 4 (Four) Times a Day.     • hydroCHLOROthiazide (HYDRODIURIL) 12.5 MG tablet TAKE 1 TABLET BY MOUTH EVERY DAY (Patient taking differently: Take 12.5 mg by mouth Daily.) 90 tablet 1   • lidocaine (LIDODERM) 5 %      • metoprolol succinate XL (TOPROL-XL) 25 MG 24 hr tablet metoprolol succinate ER 25 mg tablet,extended release 24 hr   Take 1 tablet every day by oral route for 90 days.     • nitroglycerin (NITROSTAT) 0.4 MG SL tablet Place 1 tablet under the tongue Every 5 (Five) Minutes As Needed for Chest Pain. Take no more than 3 doses in 15 minutes. 30 tablet 1   • rosuvastatin (CRESTOR) 20 MG tablet TAKE 1 TABLET BY MOUTH EVERY DAY (Patient taking differently: Take 20 mg by mouth Every Night.) 90 tablet 1   • valACYclovir (VALTREX) 1000 MG tablet TAKE 1 TABLET BY MOUTH EVERY DAY 30 tablet 1   • valsartan (DIOVAN) 160 MG tablet Take 1 tablet by mouth Daily. 90 tablet 3   • bumetanide (BUMEX) 0.5 MG tablet Take 1 tablet by mouth 2 (Two) Times a Day As Needed (for edema). 60 tablet 11   • silver sulfadiazine (SILVADENE) 1 % cream Apply  topically to the appropriate area as directed 2 (Two) Times a Day. (Patient taking differently: Apply  topically to the appropriate area as directed 2 (Two) Times a Day As Needed.) 50 g 5     No current facility-administered medications for this visit.       Allergies:  Penicillins and Vancomycin    Review of Systems  Review of Systems   Constitutional: Negative.   HENT: Negative.    Eyes: Negative.    Cardiovascular: Positive for dyspnea on exertion and leg swelling. Negative for chest pain, claudication, cyanosis, irregular heartbeat, near-syncope, orthopnea, palpitations, paroxysmal nocturnal dyspnea and syncope.   Respiratory: Negative.    Endocrine: Negative.    Hematologic/Lymphatic: Negative.    Skin: Negative.    Musculoskeletal: Positive for arthritis and joint pain.   Gastrointestinal: Negative for anorexia.   Genitourinary: Negative.   "  Neurological: Negative.    Psychiatric/Behavioral: Negative.        Objective     Physical Exam:  /70   Pulse 63   Ht 190.5 cm (75\")   Wt (!) 141 kg (310 lb)   SpO2 97%   BMI 38.75 kg/m²     Physical Exam  Constitutional:       Appearance: He is well-developed.   HENT:      Head: Normocephalic.   Neck:      Vascular: Normal carotid pulses. No carotid bruit or JVD.      Trachea: No tracheal tenderness or tracheal deviation.   Cardiovascular:      Rate and Rhythm: Rhythm irregularly irregular.      Pulses: Normal pulses.      Heart sounds: Normal heart sounds.   Pulmonary:      Effort: Pulmonary effort is normal.      Breath sounds: No stridor.   Abdominal:      General: There is no distension.      Palpations: Abdomen is soft.      Tenderness: There is no abdominal tenderness.   Musculoskeletal:      Cervical back: No edema.      Right lower le+ Pitting Edema present.      Left lower le+ Pitting Edema present.   Skin:     General: Skin is warm.   Neurological:      Mental Status: He is alert.      Cranial Nerves: No cranial nerve deficit.      Sensory: No sensory deficit.   Psychiatric:         Speech: Speech normal.         Behavior: Behavior normal.         Results Review:      Raghav Olivier  Holter Monitor Greater than 7 days up to 15 days  Order# [id number]  Reading physician: Titi Kramer MD Ordering physician: Titi Kramer MD Study date: 8/3/21   Patient Information    Patient Name   Raghav Olivier MRN   [id number] Legal Sex   Male  (Age)   1955 (66 y.o.)   Interpretation Summary       · Average HR: 83. Min HR: 38. Max HR: 216.     · Entire report was reviewed.  Monitoring in days: ~13  · In atrial fibrillation during entire monitoring duration     · Rare premature ventricular contractions with a PVC burden of: < 1%     · No correlated arrhythmia  · No significant pauses                  Conclusion:      In atrial fibrillation during entire monitoring " duration  Intermittent rapid ventricular response with rates between  bpm  Multiple pauses total of 2062 longest 7.9 seconds at 1:39 AM               Results for orders placed during the hospital encounter of 09/20/21    Adult Transthoracic Echo Complete w/ Color, Spectral and Contrast if necessary per protocol    Interpretation Summary  · Left ventricular ejection fraction appears to be 56 - 60%. Left ventricular systolic function is normal.  · Left ventricular wall thickness is consistent with moderate concentric hypertrophy.  · The following left ventricular wall segments are hypokinetic: apical inferior.  · Estimated right ventricular systolic pressure from tricuspid regurgitation is normal (<35 mmHg).     ____________________________________________________________________________________________________________________________________________  Health maintenance and recommendations    Low salt/ HTN/ Heart healthy carbohydrate restricted cardiac diet   The patient is advised to reduce or avoid caffeine or other cardiac stimulants.   Minimize or avoid  NSAID-type medications      Monitor for any signs of bleeding including red or dark stools. Fall precautions.   Advised staying uptodate with immunizations per established standard guidelines.    Offered to give patient  a copy of my notes     Questions were encouraged, asked and answered to the patient's  understanding and satisfaction. Questions if any regarding current medications and side effects, need for refills and importance of compliance to medications stressed.    Reviewed available prior notes, consults, prior visits, laboratory findings, radiology and cardiology relevant reports. Updated chart as applicable. I have reviewed the patient's medical history in detail and updated the computerized patient record as relevant.      Updated patient regarding any new or relevant abnormalities on review of records or any new findings on physical exam.  Mentioned to patient about purpose of visit and desirable health short and long term goals and objectives.    Primary to monitor CBC CMP Lipid panel and TSH as applicable    ___________________________________________________________________________________________________________________________________________         ECG 12 Lead    Date/Time: 12/8/2021 9:37 AM  Performed by: Titi Kramer MD  Authorized by: Titi Kramer MD   Comparison: compared with previous ECG from 11/5/2021  Comparison to previous ECG: Ventricular rate changed from 89   to 94   beats per minute    Rhythm: atrial fibrillation  Rate: normal  Conduction: conduction normal  ST Segments: ST segments normal  T Waves: T waves normal  QRS axis: normal  Other: no other findings    Clinical impression: abnormal EKG            Assessment/Plan   Diagnoses and all orders for this visit:    1. Paroxysmal atrial fibrillation (HCC) (Primary)  -     CBC & Differential; Future  -     Comprehensive Metabolic Panel; Future  -     TSH; Future    2. BMI 38.0-38.9,adult    3. Coronary artery disease involving native coronary artery of native heart without angina pectoris moderate stenosis on CTA coronaries in [place]    4. Dilated aortic root (HCC)    5. Essential hypertension    6. TAMARA on CPAP    7. Presence of cardiac pacemaker    8. Sinoatrial node dysfunction (HCC)    9. SOB (shortness of breath)  -     BNP; Future  -     Urinalysis With Microscopic - Urine, Clean Catch; Future    10. Precordial chest pain  -     Stress Test With Myocardial Perfusion (1 Day); Future    Other orders  -     bumetanide (BUMEX) 0.5 MG tablet; Take 1 tablet by mouth 2 (Two) Times a Day As Needed (for edema).  Dispense: 60 tablet; Refill: 11  -     ECG 12 Lead        Plan    Start Bumex 0.5 MG bid   Requested Prescriptions     Signed Prescriptions Disp Refills   • bumetanide (BUMEX) 0.5 MG tablet 60 tablet 11     Sig: Take 1 tablet by mouth 2 (Two) Times a Day As Needed (for  edema).      Orders Placed This Encounter   Procedures   • Comprehensive Metabolic Panel     Standing Status:   Future     Standing Expiration Date:   12/8/2022     Order Specific Question:   Release to patient     Answer:   Immediate   • TSH     Standing Status:   Future     Standing Expiration Date:   12/8/2022     Order Specific Question:   Release to patient     Answer:   Immediate   • BNP     Standing Status:   Future     Standing Expiration Date:   12/8/2022     Order Specific Question:   Release to patient     Answer:   Immediate   • Stress Test With Myocardial Perfusion (1 Day)     Standing Status:   Future     Standing Expiration Date:   12/8/2022     Order Specific Question:   What stress agent will be used?     Answer:   Regadenoson (Lexiscan)     Order Specific Question:   Difficulty walking criteria?     Answer:   Musculoskeletal (hips, knees, feet, back, amputee)     Order Specific Question:   Reason for exam?     Answer:   Chest Pain     Order Specific Question:   Release to patient     Answer:   Immediate   • ECG 12 Lead     This order was created via procedure documentation     Order Specific Question:   Release to patient     Answer:   Immediate   • CBC & Differential     Standing Status:   Future     Standing Expiration Date:   12/8/2022     Order Specific Question:   Manual Differential     Answer:   No     Order Specific Question:   Release to patient     Answer:   Immediate   • Urinalysis With Microscopic - Urine, Clean Catch     Standing Status:   Future     Standing Expiration Date:   12/8/2022     Order Specific Question:   Release to patient     Answer:   Immediate      Call for results of above testing.     Weigh yourself frequently, at least weekly, preferably daily, call me if more than 2 pounds a day or 5 pounds a week weight gain.  Flexible diuretic dosing    Monitor cardiac rhythm device function regularly per established schedule or PRN    Dr Hernández monitors device   Keep f/u Dr Hernández    He will call for an appointment     Patient expressed understanding  Encouraged and answered all questions   Discussed with the patient and all questioned fully answered. He will call me if any problems arise.   Discussed results of prior testing with patient : echo   as well electrocardiogram from today          Check BP and heart rates twice daily at least 3x / week, week a month  at home and bring a recording for me to review next visit  If BP >130/85 or < 100/60 persistently over 3 reading 30 mins apart call sooner      Follow up with EMPERATRIZ Drew  or myself          Return in about 2 weeks (around 12/22/2021).

## 2021-12-10 ENCOUNTER — TELEPHONE (OUTPATIENT)
Dept: CARDIOLOGY | Facility: CLINIC | Age: 66
End: 2021-12-10

## 2021-12-14 DIAGNOSIS — Z20.828 EXPOSURE TO THE FLU: Primary | ICD-10-CM

## 2021-12-14 RX ORDER — OSELTAMIVIR PHOSPHATE 75 MG/1
75 CAPSULE ORAL DAILY
Qty: 10 CAPSULE | Refills: 0 | Status: SHIPPED | OUTPATIENT
Start: 2021-12-14 | End: 2021-12-24
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
## 2021-12-20 ENCOUNTER — TELEPHONE (OUTPATIENT)
Dept: CARDIOLOGY | Facility: CLINIC | Age: 66
End: 2021-12-20

## 2021-12-20 NOTE — TELEPHONE ENCOUNTER
PT SCHEDULED FOR A L4-SI LAMINECTOMY ON 1/3/22    SEEN IN OFFICE 12/8/21 FOR CARDIAC CLEARANCE - PREOP TESTING HAS BEEN SCHEDULED FOR 12/23/21    CLEARANCE TO BE DETERMINED AFTER TESTING IS COMPLETE    FORM SCANNED TO CHART

## 2021-12-23 ENCOUNTER — HOSPITAL ENCOUNTER (OUTPATIENT)
Dept: CARDIOLOGY | Facility: HOSPITAL | Age: 66
Discharge: HOME | End: 2021-12-23

## 2021-12-23 VITALS
WEIGHT: 305 LBS | HEIGHT: 75 IN | SYSTOLIC BLOOD PRESSURE: 148 MMHG | DIASTOLIC BLOOD PRESSURE: 90 MMHG | HEART RATE: 88 BPM | BODY MASS INDEX: 37.92 KG/M2

## 2021-12-23 DIAGNOSIS — R07.2 PRECORDIAL CHEST PAIN: ICD-10-CM

## 2021-12-23 LAB
BH CV REST NUCLEAR ISOTOPE DOSE: 10.5 MCI
BH CV STRESS BP STAGE 1: NORMAL
BH CV STRESS COMMENTS STAGE 1: NORMAL
BH CV STRESS DOSE REGADENOSON STAGE 1: 0.4
BH CV STRESS DURATION MIN STAGE 1: 0
BH CV STRESS DURATION SEC STAGE 1: 10
BH CV STRESS HR STAGE 1: 124
BH CV STRESS NUCLEAR ISOTOPE DOSE: 34.7 MCI
BH CV STRESS PROTOCOL 1: NORMAL
BH CV STRESS RECOVERY BP: NORMAL MMHG
BH CV STRESS RECOVERY HR: 85 BPM
BH CV STRESS STAGE 1: 1
LV EF NUC BP: 55 %
MAXIMAL PREDICTED HEART RATE: 152 BPM
PERCENT MAX PREDICTED HR: 81.58 %
STRESS BASELINE BP: NORMAL MMHG
STRESS BASELINE HR: 88 BPM
STRESS PERCENT HR: 96 %
STRESS POST EXERCISE DUR SEC: 10 SEC
STRESS POST PEAK BP: NORMAL MMHG
STRESS POST PEAK HR: 124 BPM
STRESS TARGET HR: 129 BPM

## 2021-12-23 PROCEDURE — 0 TECHNETIUM SESTAMIBI: Performed by: INTERNAL MEDICINE

## 2021-12-23 PROCEDURE — 93017 CV STRESS TEST TRACING ONLY: CPT

## 2021-12-23 PROCEDURE — A9500 TC99M SESTAMIBI: HCPCS | Performed by: INTERNAL MEDICINE

## 2021-12-23 PROCEDURE — 93018 CV STRESS TEST I&R ONLY: CPT | Performed by: INTERNAL MEDICINE

## 2021-12-23 PROCEDURE — 78452 HT MUSCLE IMAGE SPECT MULT: CPT | Performed by: INTERNAL MEDICINE

## 2021-12-23 PROCEDURE — 25010000002 REGADENOSON 0.4 MG/5ML SOLUTION: Performed by: INTERNAL MEDICINE

## 2021-12-23 PROCEDURE — 78452 HT MUSCLE IMAGE SPECT MULT: CPT

## 2021-12-23 RX ADMIN — TECHNETIUM TC 99M SESTAMIBI 1 DOSE: 1 INJECTION INTRAVENOUS at 08:46

## 2021-12-23 RX ADMIN — REGADENOSON 0.4 MG: 0.08 INJECTION, SOLUTION INTRAVENOUS at 10:06

## 2021-12-23 RX ADMIN — TECHNETIUM TC 99M SESTAMIBI 1 DOSE: 1 INJECTION INTRAVENOUS at 10:21

## 2021-12-28 NOTE — TELEPHONE ENCOUNTER
Patient has called wanting to know the status of his SX clearance after his test on 12/23/21. He was told to go ahead and hold his eliquis stating today, for the planned procedure on 1/3/21      Please advise

## 2021-12-29 NOTE — TELEPHONE ENCOUNTER
Desiree from [facility] called for an update on this patient's surgical clearance.  She can be reached at [phone] or her fax is [phone].  Thank you!

## 2022-01-18 ENCOUNTER — OFFICE VISIT (OUTPATIENT)
Dept: CARDIOLOGY | Facility: CLINIC | Age: 67
End: 2022-01-18

## 2022-01-18 DIAGNOSIS — I51.7 LVH (LEFT VENTRICULAR HYPERTROPHY): ICD-10-CM

## 2022-01-18 DIAGNOSIS — I10 ESSENTIAL HYPERTENSION: ICD-10-CM

## 2022-01-18 DIAGNOSIS — Z95.0 PRESENCE OF CARDIAC PACEMAKER: ICD-10-CM

## 2022-01-18 DIAGNOSIS — I34.0 NONRHEUMATIC MITRAL (VALVE) INSUFFICIENCY: ICD-10-CM

## 2022-01-18 DIAGNOSIS — G47.33 OSA ON CPAP: ICD-10-CM

## 2022-01-18 DIAGNOSIS — I49.5 SINOATRIAL NODE DYSFUNCTION: ICD-10-CM

## 2022-01-18 DIAGNOSIS — I25.10 CORONARY ARTERY DISEASE INVOLVING NATIVE CORONARY ARTERY OF NATIVE HEART WITHOUT ANGINA PECTORIS: ICD-10-CM

## 2022-01-18 DIAGNOSIS — I77.810 DILATED AORTIC ROOT: ICD-10-CM

## 2022-01-18 DIAGNOSIS — Z99.89 OSA ON CPAP: ICD-10-CM

## 2022-01-18 DIAGNOSIS — I48.0 PAROXYSMAL ATRIAL FIBRILLATION: Primary | ICD-10-CM

## 2022-01-18 PROCEDURE — 99214 OFFICE O/P EST MOD 30 MIN: CPT | Performed by: INTERNAL MEDICINE

## 2022-01-18 NOTE — PROGRESS NOTES
Raghav Olivier  [id number]  1955  66 y.o.  male    Referring Provider: Devendra Martinez MD    Reason for  Visit:     Paroxysmal atrial fibrillation now in sinus rhythm  obstructive sleep apnea   essential hypertension  Hyperlipidemia    short term office follow up after recent encounter     Came to ER with atrial fibrillation and underwent DC cardioversion in past   Since then has seen Dr Hernández and off Amiodarone     Due to gum bleed stopped Xarelto on his own and now on Eliquis and tolerating well    Patient called to be seen due to new symptoms of increasing pedal edema   Preoperative cardiovascular clearance under general anesthesia for spine surgery Dr Garry Manuel  underwent cardiac cath with following findings and recommendations    Cardiac workup test results as below: stress test      Subjective     chest pain now resolved    No associated nausea  No radiation    Relieved with rest or spontaneously  Not positional    No change with intake of food or antacids  No change with breathing  Mild to moderate associated dyspnea      No associated palpitations  No similar chest pain episodes in the past     Moderate pedal edema in pasty now much better on Bumex  No bleeding, excessive bruising, gait instability or fall risks    sick sinus syndrome s/p pacemaker   Dr Hernández did pacemaker DDD       History of present illness:  Raghav Olivier is a 66 y.o. yo male with paroxysmal atrial fibrillation  who presents today for   No chief complaint on file.  .    History  Past Medical History:   Diagnosis Date   • Atrial fibrillation (HCC)    • Diverticulitis    • Hyperlipidemia    • Hypertension    • Kidney stones    • Nonrheumatic mitral (valve) insufficiency    • Palpitations    • Sleep apnea    • SOB (shortness of breath)    ,   Past Surgical History:   Procedure Laterality Date   • APPENDECTOMY     • CARDIAC CATHETERIZATION     • CARDIAC ELECTROPHYSIOLOGY PROCEDURE N/A 10/1/2021    Procedure:  NEW PACEMAKER IMPLANTATION;  Surgeon: Mitch Hernández MD;  Location: [facility] CATH INVASIVE LOCATION;  Service: Cardiology;  Laterality: N/A;   • CARDIOVERSION     • CHOLECYSTECTOMY     • COLONOSCOPY N/A 2017    Procedure: COLONOSCOPY;  Surgeon: Billy Robbins DO;  Location: [facility] ENDOSCOPY;  Service:    • COLONOSCOPY N/A 2021    Procedure: COLONOSCOPY;  Surgeon: Billy Robbins DO;  Location: [facility] ENDOSCOPY;  Service: Gastroenterology;  Laterality: N/A;   • EP STUDY     • KNEE SURGERY Right    • SHOULDER SURGERY Left    ,   Family History   Problem Relation Age of Onset   • Cancer Mother         colon   • Cancer Father         prostate   • Dementia Father    ,   Social History     Tobacco Use   • Smoking status: Former Smoker     Packs/day: 0.25     Years: 4.00     Pack years: 1.00     Types: Cigarettes     Start date:      Quit date:      Years since quittin.0   • Smokeless tobacco: Never Used   Vaping Use   • Vaping Use: Never used   Substance Use Topics   • Alcohol use: No   • Drug use: No   ,     Medications  Current Outpatient Medications   Medication Sig Dispense Refill   • acetaminophen (TYLENOL) 650 MG 8 hr tablet Take 650 mg by mouth Every 8 (Eight) Hours As Needed for Mild Pain . 1000mg twice a day     • apixaban (ELIQUIS) 5 MG tablet tablet Take 5 mg by mouth 2 (Two) Times a Day.     • bumetanide (BUMEX) 0.5 MG tablet Take 1 tablet by mouth 2 (Two) Times a Day As Needed (for edema). 60 tablet 11   • desloratadine (CLARINEX) 5 MG tablet Take 1 tablet by mouth Daily. 30 tablet 5   • Diclofenac Sodium (VOLTAREN) 1 % gel gel      • fluticasone (FLONASE) 50 MCG/ACT nasal spray 1 spray into the nostril(s) as directed by provider.     • gabapentin (NEURONTIN) 100 MG capsule Take 100 mg by mouth 4 (Four) Times a Day.     • hydroCHLOROthiazide (HYDRODIURIL) 12.5 MG tablet TAKE 1 TABLET BY MOUTH EVERY DAY (Patient taking differently: Take 12.5 mg by mouth Daily.) 90 tablet 1    • lidocaine (LIDODERM) 5 %      • metoprolol succinate XL (TOPROL-XL) 25 MG 24 hr tablet metoprolol succinate ER 25 mg tablet,extended release 24 hr   Take 1 tablet every day by oral route for 90 days.     • nitroglycerin (NITROSTAT) 0.4 MG SL tablet Place 1 tablet under the tongue Every 5 (Five) Minutes As Needed for Chest Pain. Take no more than 3 doses in 15 minutes. 30 tablet 1   • rosuvastatin (CRESTOR) 20 MG tablet TAKE 1 TABLET BY MOUTH EVERY DAY (Patient taking differently: Take 20 mg by mouth Every Night.) 90 tablet 1   • silver sulfadiazine (SILVADENE) 1 % cream Apply  topically to the appropriate area as directed 2 (Two) Times a Day. (Patient taking differently: Apply  topically to the appropriate area as directed 2 (Two) Times a Day As Needed.) 50 g 5   • valACYclovir (VALTREX) 1000 MG tablet TAKE 1 TABLET BY MOUTH EVERY DAY 30 tablet 1   • valsartan (DIOVAN) 160 MG tablet Take 1 tablet by mouth Daily. 90 tablet 3     No current facility-administered medications for this visit.       Allergies:  Penicillins and Vancomycin    Review of Systems  Review of Systems   Constitutional: Negative.   HENT: Negative.    Eyes: Negative.    Cardiovascular: Positive for dyspnea on exertion and leg swelling. Negative for chest pain, claudication, cyanosis, irregular heartbeat, near-syncope, orthopnea, palpitations, paroxysmal nocturnal dyspnea and syncope.   Respiratory: Negative.    Endocrine: Negative.    Hematologic/Lymphatic: Negative.    Skin: Negative.    Musculoskeletal: Positive for arthritis and joint pain.   Gastrointestinal: Negative for anorexia.   Genitourinary: Negative.    Neurological: Negative.    Psychiatric/Behavioral: Negative.        Objective     Physical Exam:  There were no vitals taken for this visit.    Physical Exam  Constitutional:       Appearance: He is well-developed.   HENT:      Head: Normocephalic.   Neck:      Vascular: Normal carotid pulses. No carotid bruit or JVD.      Trachea:  No tracheal tenderness or tracheal deviation.   Cardiovascular:      Rate and Rhythm: Rhythm irregularly irregular.      Pulses: Normal pulses.      Heart sounds: Normal heart sounds.   Pulmonary:      Effort: Pulmonary effort is normal.      Breath sounds: No stridor.   Abdominal:      General: There is no distension.      Palpations: Abdomen is soft.      Tenderness: There is no abdominal tenderness.   Musculoskeletal:      Cervical back: No edema.      Right lower le+ Pitting Edema present.      Left lower le+ Pitting Edema present.   Skin:     General: Skin is warm.   Neurological:      Mental Status: He is alert.      Cranial Nerves: No cranial nerve deficit.      Sensory: No sensory deficit.   Psychiatric:         Speech: Speech normal.         Behavior: Behavior normal.         Results Review:      Raghav Olivier  Regadenoson Stress Test With Myocardial Perfusion SPECT (Multi Study)  Order# [id number]  Reading physician: Titi Kramer MD Ordering physician: Titi Kramer MD Study date: 21       Patient Information    Patient Name   Raghav Olivier MRN   [id number] Legal Sex   Male  (Age)   1955 (66 y.o.)     Interpretation Summary       · Diaphragmatic attenuation artifact is present.  · Myocardial perfusion imaging indicates a normal myocardial perfusion study with no evidence of ischemia.  · Impressions are consistent with a low risk study.  · Left ventricular ejection fraction is normal. (Calculated EF = 55%).  · Physiological apical thinning noted       Raghav Olivier  Holter Monitor Greater than 7 days up to 15 days  Order# [id number]  Reading physician: Titi Kramer MD Ordering physician: Titi Kramer MD Study date: 8/3/21   Patient Information    Patient Name   Raghav Olivier MRN   [id number] Legal Sex   Male  (Age)   1955 (66 y.o.)   Interpretation Summary       · Average HR: 83. Min HR: 38. Max HR: 216.     · Entire report was reviewed.  Monitoring in  days: ~13  · In atrial fibrillation during entire monitoring duration     · Rare premature ventricular contractions with a PVC burden of: < 1%     · No correlated arrhythmia  · No significant pauses                  Conclusion:      In atrial fibrillation during entire monitoring duration  Intermittent rapid ventricular response with rates between  bpm  Multiple pauses total of 2062 longest 7.9 seconds at 1:39 AM           Results for orders placed during the hospital encounter of 09/20/21    Adult Transthoracic Echo Complete w/ Color, Spectral and Contrast if necessary per protocol    Interpretation Summary  · Left ventricular ejection fraction appears to be 56 - 60%. Left ventricular systolic function is normal.  · Left ventricular wall thickness is consistent with moderate concentric hypertrophy.  · The following left ventricular wall segments are hypokinetic: apical inferior.  · Estimated right ventricular systolic pressure from tricuspid regurgitation is normal (<35 mmHg).     ____________________________________________________________________________________________________________________________________________  Health maintenance and recommendations    Low salt/ HTN/ Heart healthy carbohydrate restricted cardiac diet   The patient is advised to reduce or avoid caffeine or other cardiac stimulants.   Minimize or avoid  NSAID-type medications      Monitor for any signs of bleeding including red or dark stools. Fall precautions.   Advised staying uptodate with immunizations per established standard guidelines.    Offered to give patient  a copy of my notes     Questions were encouraged, asked and answered to the patient's  understanding and satisfaction. Questions if any regarding current medications and side effects, need for refills and importance of compliance to medications stressed.    Reviewed available prior notes, consults, prior visits, laboratory findings, radiology and cardiology relevant  reports. Updated chart as applicable. I have reviewed the patient's medical history in detail and updated the computerized patient record as relevant.      Updated patient regarding any new or relevant abnormalities on review of records or any new findings on physical exam. Mentioned to patient about purpose of visit and desirable health short and long term goals and objectives.    Primary to monitor CBC CMP Lipid panel and TSH as applicable    ___________________________________________________________________________________________________________________________________________       Procedures    Assessment/Plan   Diagnoses and all orders for this visit:    1. Paroxysmal atrial fibrillation (HCC) (Primary)    2. TAMARA on CPAP    3. Nonrheumatic mitral (valve) insufficiency    4. LVH (left ventricular hypertrophy) moderate     5. Dilated aortic root (HCC)    6. Presence of cardiac pacemaker    7. Sinoatrial node dysfunction (HCC)    8. Essential hypertension    9. Coronary artery disease involving native coronary artery of native heart without angina pectoris        Plan      Patient expressed understanding  Encouraged and answered all questions   Discussed with the patient and all questioned fully answered. He will call me if any problems arise.   Discussed results of prior testing with patient : myocardial perfusion scan     as well electrocardiogram from today       Continue Bumex 0.5 mg daily   Weigh yourself frequently, at least weekly, preferably daily, call me if more than 2 pounds a day or 5 pounds a week weight gain.  Flexible diuretic dosing     Monitor cardiac rhythm device function regularly per established schedule or PRN    Dr Hernández monitors device   Keep f/u Dr Hernández     Check BP and heart rates twice daily at least 3x / week, week a month  at home and bring a recording for me to review next visit  If BP >130/85 or < 100/60 persistently over 3 reading 30 mins apart call sooner      Follow up with  Adrianna AWAN , Sebatsian AWAN  or myself          Return in about 6 months (around 7/18/2022).

## 2022-06-20 ENCOUNTER — TRANSCRIBE ORDERS (OUTPATIENT)
Dept: ADMINISTRATIVE | Facility: HOSPITAL | Age: 67
End: 2022-06-20

## 2022-06-20 DIAGNOSIS — Z01.818 PREOP TESTING: ICD-10-CM

## 2022-06-20 DIAGNOSIS — I48.91 ATRIAL FIBRILLATION, UNSPECIFIED TYPE: Primary | ICD-10-CM

## 2022-06-30 ENCOUNTER — ANESTHESIA (OUTPATIENT)
Dept: CARDIOLOGY | Facility: HOSPITAL | Age: 67
End: 2022-06-30

## 2022-06-30 ENCOUNTER — HOSPITAL ENCOUNTER (OUTPATIENT)
Dept: CARDIOLOGY | Facility: HOSPITAL | Age: 67
Setting detail: HOSPITAL OUTPATIENT SURGERY
Discharge: HOME | End: 2022-06-30

## 2022-06-30 ENCOUNTER — ANESTHESIA EVENT (OUTPATIENT)
Dept: CARDIOLOGY | Facility: HOSPITAL | Age: 67
End: 2022-06-30

## 2022-06-30 VITALS
HEIGHT: 74 IN | WEIGHT: 309.6 LBS | SYSTOLIC BLOOD PRESSURE: 150 MMHG | OXYGEN SATURATION: 96 % | TEMPERATURE: 97.5 F | BODY MASS INDEX: 39.73 KG/M2 | DIASTOLIC BLOOD PRESSURE: 91 MMHG | HEART RATE: 50 BPM | RESPIRATION RATE: 17 BRPM

## 2022-06-30 DIAGNOSIS — I48.91 ATRIAL FIBRILLATION, UNSPECIFIED TYPE: ICD-10-CM

## 2022-06-30 LAB
MAXIMAL PREDICTED HEART RATE: 151 BPM
STRESS TARGET HR: 128 BPM

## 2022-06-30 PROCEDURE — 25010000002 PROPOFOL 10 MG/ML EMULSION: Performed by: NURSE ANESTHETIST, CERTIFIED REGISTERED

## 2022-06-30 PROCEDURE — 92960 CARDIOVERSION ELECTRIC EXT: CPT

## 2022-06-30 RX ORDER — AMIODARONE HYDROCHLORIDE 200 MG/1
200 TABLET ORAL 2 TIMES DAILY
Qty: 60 TABLET | Refills: 5 | Status: SHIPPED | OUTPATIENT
Start: 2022-06-30 | End: 2022-07-30

## 2022-06-30 RX ORDER — SODIUM CHLORIDE 9 MG/ML
INJECTION, SOLUTION INTRAVENOUS CONTINUOUS PRN
Status: DISCONTINUED | OUTPATIENT
Start: 2022-06-30 | End: 2022-06-30 | Stop reason: SURG

## 2022-06-30 RX ORDER — MELOXICAM 15 MG/1
15 TABLET ORAL
COMMUNITY
Start: 2022-05-20 | End: 2022-12-02

## 2022-06-30 RX ORDER — AMIODARONE HYDROCHLORIDE 200 MG/1
400 TABLET ORAL 2 TIMES DAILY
COMMUNITY
End: 2022-06-30 | Stop reason: SDUPTHER

## 2022-06-30 RX ORDER — LIDOCAINE HYDROCHLORIDE 20 MG/ML
INJECTION, SOLUTION EPIDURAL; INFILTRATION; INTRACAUDAL; PERINEURAL AS NEEDED
Status: DISCONTINUED | OUTPATIENT
Start: 2022-06-30 | End: 2022-06-30 | Stop reason: SURG

## 2022-06-30 RX ORDER — PROPOFOL 10 MG/ML
VIAL (ML) INTRAVENOUS AS NEEDED
Status: DISCONTINUED | OUTPATIENT
Start: 2022-06-30 | End: 2022-06-30 | Stop reason: SURG

## 2022-06-30 RX ADMIN — LIDOCAINE HYDROCHLORIDE 100 MG: 20 INJECTION, SOLUTION EPIDURAL; INFILTRATION; INTRACAUDAL; PERINEURAL at 08:42

## 2022-06-30 RX ADMIN — SODIUM CHLORIDE: 9 INJECTION, SOLUTION INTRAVENOUS at 08:41

## 2022-06-30 RX ADMIN — PROPOFOL 50 MG: 10 INJECTION, EMULSION INTRAVENOUS at 08:42

## 2022-06-30 NOTE — ANESTHESIA PREPROCEDURE EVALUATION
Anesthesia Evaluation     Patient summary reviewed and Nursing notes reviewed   no history of anesthetic complications:  NPO Solid Status: > 8 hours  NPO Liquid Status: > 8 hours           Airway   Mallampati: II  TM distance: >3 FB  Neck ROM: full  No difficulty expected  Dental      Pulmonary    (+) sleep apnea on CPAP,   Cardiovascular   Exercise tolerance: good (4-7 METS)    (+) hypertension, valvular problems/murmurs (dilated aortic root), dysrhythmias Atrial Fib, hyperlipidemia,       Neuro/Psych- negative ROS  GI/Hepatic/Renal/Endo    (+) morbid obesity,      Musculoskeletal (-) negative ROS    Abdominal    Substance History - negative use     OB/GYN negative ob/gyn ROS         Other                        Anesthesia Plan    ASA 3     MAC     intravenous induction     Anesthetic plan, risks, benefits, and alternatives have been provided, discussed and informed consent has been obtained with: patient.        CODE STATUS:

## 2022-06-30 NOTE — ANESTHESIA POSTPROCEDURE EVALUATION
"Patient: Raghav Olivier    Procedure Summary     Date: 06/30/22 Room / Location: [facility] CATH LAB    Anesthesia Start: 0841 Anesthesia Stop: 0850    Procedure: CARDIOVERSION EXTERNAL IN CARDIOLOGY DEPARTMENT Diagnosis:       Atrial fibrillation, unspecified type (HCC)      (I48.91)    Scheduled Providers: Titi Kramer MD; Mitch Hernández MD Provider: Berna Ortiz CRNA    Anesthesia Type: MAC ASA Status: 3          Anesthesia Type: MAC    Vitals  No vitals data found for the desired time range.          Post Anesthesia Care and Evaluation    Patient location during evaluation: PACU  Patient participation: complete - patient participated  Level of consciousness: awake and alert  Pain management: adequate    Airway patency: patent  Anesthetic complications: No anesthetic complications    Cardiovascular status: acceptable  Respiratory status: acceptable  Hydration status: acceptable    Comments: Blood pressure 139/91, pulse 65, temperature 97.5 °F (36.4 °C), temperature source Tympanic, resp. rate 19, height 188 cm (74\"), weight (!) 140 kg (309 lb 9.6 oz), SpO2 98 %.    Pt discharged from PACU based on dinesh score >8      "

## 2022-07-19 ENCOUNTER — LAB (OUTPATIENT)
Dept: LAB | Facility: HOSPITAL | Age: 67
End: 2022-07-19

## 2022-07-19 ENCOUNTER — OFFICE VISIT (OUTPATIENT)
Dept: CARDIOLOGY | Facility: CLINIC | Age: 67
End: 2022-07-19

## 2022-07-19 VITALS
HEIGHT: 74 IN | SYSTOLIC BLOOD PRESSURE: 138 MMHG | DIASTOLIC BLOOD PRESSURE: 82 MMHG | HEART RATE: 58 BPM | WEIGHT: 307 LBS | BODY MASS INDEX: 39.4 KG/M2

## 2022-07-19 DIAGNOSIS — R06.02 SOB (SHORTNESS OF BREATH): Primary | ICD-10-CM

## 2022-07-19 DIAGNOSIS — R53.83 EASY FATIGABILITY: ICD-10-CM

## 2022-07-19 DIAGNOSIS — E66.01 SEVERE OBESITY (BMI 35.0-39.9) WITH COMORBIDITY: ICD-10-CM

## 2022-07-19 DIAGNOSIS — I34.0 NONRHEUMATIC MITRAL (VALVE) INSUFFICIENCY: ICD-10-CM

## 2022-07-19 DIAGNOSIS — I48.0 PAROXYSMAL ATRIAL FIBRILLATION: ICD-10-CM

## 2022-07-19 DIAGNOSIS — I77.810 DILATED AORTIC ROOT: ICD-10-CM

## 2022-07-19 DIAGNOSIS — R06.02 SOB (SHORTNESS OF BREATH): ICD-10-CM

## 2022-07-19 DIAGNOSIS — E78.2 MIXED HYPERLIPIDEMIA: ICD-10-CM

## 2022-07-19 DIAGNOSIS — I51.7 LVH (LEFT VENTRICULAR HYPERTROPHY): ICD-10-CM

## 2022-07-19 DIAGNOSIS — I10 ESSENTIAL HYPERTENSION: ICD-10-CM

## 2022-07-19 DIAGNOSIS — I25.10 CORONARY ARTERY DISEASE INVOLVING NATIVE CORONARY ARTERY OF NATIVE HEART WITHOUT ANGINA PECTORIS: ICD-10-CM

## 2022-07-19 LAB
ALBUMIN SERPL-MCNC: 4.5 G/DL (ref 3.5–5.2)
ALBUMIN/GLOB SERPL: 1.6 G/DL
ALP SERPL-CCNC: 43 U/L (ref 39–117)
ALT SERPL W P-5'-P-CCNC: 19 U/L (ref 1–41)
ANION GAP SERPL CALCULATED.3IONS-SCNC: 9 MMOL/L (ref 5–15)
AST SERPL-CCNC: 18 U/L (ref 1–40)
BASOPHILS # BLD AUTO: 0.04 10*3/MM3 (ref 0–0.2)
BASOPHILS NFR BLD AUTO: 0.5 % (ref 0–1.5)
BILIRUB SERPL-MCNC: 0.3 MG/DL (ref 0–1.2)
BUN SERPL-MCNC: 15 MG/DL (ref 8–23)
BUN/CREAT SERPL: 11.6 (ref 7–25)
CALCIUM SPEC-SCNC: 9.2 MG/DL (ref 8.6–10.5)
CHLORIDE SERPL-SCNC: 103 MMOL/L (ref 98–107)
CO2 SERPL-SCNC: 31 MMOL/L (ref 22–29)
CREAT SERPL-MCNC: 1.29 MG/DL (ref 0.76–1.27)
DEPRECATED RDW RBC AUTO: 53.1 FL (ref 37–54)
EGFRCR SERPLBLD CKD-EPI 2021: 60 ML/MIN/1.73
EOSINOPHIL # BLD AUTO: 0.1 10*3/MM3 (ref 0–0.4)
EOSINOPHIL NFR BLD AUTO: 1.2 % (ref 0.3–6.2)
ERYTHROCYTE [DISTWIDTH] IN BLOOD BY AUTOMATED COUNT: 15.8 % (ref 12.3–15.4)
GLOBULIN UR ELPH-MCNC: 2.9 GM/DL
GLUCOSE SERPL-MCNC: 104 MG/DL (ref 65–99)
HCT VFR BLD AUTO: 43.9 % (ref 37.5–51)
HGB BLD-MCNC: 13.6 G/DL (ref 13–17.7)
IMM GRANULOCYTES # BLD AUTO: 0.14 10*3/MM3 (ref 0–0.05)
IMM GRANULOCYTES NFR BLD AUTO: 1.6 % (ref 0–0.5)
LYMPHOCYTES # BLD AUTO: 1.93 10*3/MM3 (ref 0.7–3.1)
LYMPHOCYTES NFR BLD AUTO: 22.2 % (ref 19.6–45.3)
MCH RBC QN AUTO: 28.3 PG (ref 26.6–33)
MCHC RBC AUTO-ENTMCNC: 31 G/DL (ref 31.5–35.7)
MCV RBC AUTO: 91.3 FL (ref 79–97)
MONOCYTES # BLD AUTO: 0.75 10*3/MM3 (ref 0.1–0.9)
MONOCYTES NFR BLD AUTO: 8.6 % (ref 5–12)
NEUTROPHILS NFR BLD AUTO: 5.72 10*3/MM3 (ref 1.7–7)
NEUTROPHILS NFR BLD AUTO: 65.9 % (ref 42.7–76)
NRBC BLD AUTO-RTO: 0 /100 WBC (ref 0–0.2)
NT-PROBNP SERPL-MCNC: 81.4 PG/ML (ref 0–900)
PLATELET # BLD AUTO: 215 10*3/MM3 (ref 140–450)
PMV BLD AUTO: 10.6 FL (ref 6–12)
POTASSIUM SERPL-SCNC: 4.2 MMOL/L (ref 3.5–5.2)
PROT SERPL-MCNC: 7.4 G/DL (ref 6–8.5)
RBC # BLD AUTO: 4.81 10*6/MM3 (ref 4.14–5.8)
SODIUM SERPL-SCNC: 143 MMOL/L (ref 136–145)
TSH SERPL DL<=0.05 MIU/L-ACNC: 1.8 UIU/ML (ref 0.27–4.2)
WBC NRBC COR # BLD: 8.68 10*3/MM3 (ref 3.4–10.8)

## 2022-07-19 PROCEDURE — 36415 COLL VENOUS BLD VENIPUNCTURE: CPT

## 2022-07-19 PROCEDURE — 80053 COMPREHEN METABOLIC PANEL: CPT

## 2022-07-19 PROCEDURE — 85025 COMPLETE CBC W/AUTO DIFF WBC: CPT

## 2022-07-19 PROCEDURE — 83880 ASSAY OF NATRIURETIC PEPTIDE: CPT

## 2022-07-19 PROCEDURE — 93000 ELECTROCARDIOGRAM COMPLETE: CPT | Performed by: INTERNAL MEDICINE

## 2022-07-19 PROCEDURE — 84443 ASSAY THYROID STIM HORMONE: CPT

## 2022-07-19 PROCEDURE — 99214 OFFICE O/P EST MOD 30 MIN: CPT | Performed by: INTERNAL MEDICINE

## 2022-07-19 NOTE — PROGRESS NOTES
Raghav Olivier  [id number]  1955  67 y.o.  male    Referring Provider: Devendra Martinez MD    Reason for  Visit:     Paroxysmal atrial fibrillation now in sinus rhythm  obstructive sleep apnea   essential hypertension  Hyperlipidemia    short term office follow up after recent encounter     Came to ER with atrial fibrillation and underwent DC cardioversion in past   Since then has seen Dr Hernández and off Amiodarone     Due to gum bleed stopped Xarelto on his own and now on Eliquis and tolerating well    Patient called to be seen due to new symptoms of increasing pedal edema   Preoperative cardiovascular clearance under general anesthesia for spine surgery Dr Garry Manuel  underwent cardiac cath with following findings and recommendations    Cardiac workup test results as below: stress test and echo        Subjective    Mild chronic exertional shortness of breath on exertion relieved with rest  No significant cough or wheezing    No palpitations  No associated chest pain    No fever or chills  No significant expectoration    No hemoptysis  No presyncope or syncope    Tolerating current medications well with no untoward side effects   Compliant with prescribed medication regimen. Tries to adhere to cardiac diet.     Easy fatiguability and increasing tired  Feels energy levels running low      Dr Hernández has him on Amiodarone 200 mg BID and due to decrease soon in a few weeks when he is seen in 09/22   Less  pedal edema in past now much better on Bumex in past now stopped   On HCTZ     No bleeding, excessive bruising, gait instability or fall risks    sick sinus syndrome s/p pacemaker   Dr Hernández did pacemaker DDD       History of present illness:  Raghav Olivier is a 67 y.o. yo male with paroxysmal atrial fibrillation  who presents today for   Chief Complaint   Patient presents with   • Follow-up   .    History  Past Medical History:   Diagnosis Date   • Atrial fibrillation (HCC)    •  Diverticulitis    • Hyperlipidemia    • Hypertension    • Kidney stones    • Nonrheumatic mitral (valve) insufficiency    • Palpitations    • Sleep apnea    • SOB (shortness of breath)    ,   Past Surgical History:   Procedure Laterality Date   • APPENDECTOMY     • CARDIAC CATHETERIZATION     • CARDIAC ELECTROPHYSIOLOGY PROCEDURE N/A 10/1/2021    Procedure: NEW PACEMAKER IMPLANTATION;  Surgeon: Mitch Hernández MD;  Location: [facility] CATH INVASIVE LOCATION;  Service: Cardiology;  Laterality: N/A;   • CARDIOVERSION     • CHOLECYSTECTOMY     • COLONOSCOPY N/A 2017    Procedure: COLONOSCOPY;  Surgeon: Billy Robbins DO;  Location: [facility] ENDOSCOPY;  Service:    • COLONOSCOPY N/A 2021    Procedure: COLONOSCOPY;  Surgeon: Billy Robbins DO;  Location: [facility] ENDOSCOPY;  Service: Gastroenterology;  Laterality: N/A;   • EP STUDY     • KNEE SURGERY Right    • SHOULDER SURGERY Left    ,   Family History   Problem Relation Age of Onset   • Cancer Mother         colon   • Cancer Father         prostate   • Dementia Father    ,   Social History     Tobacco Use   • Smoking status: Former Smoker     Packs/day: 0.25     Years: 4.00     Pack years: 1.00     Types: Cigarettes     Start date:      Quit date:      Years since quittin.5   • Smokeless tobacco: Never Used   Vaping Use   • Vaping Use: Never used   Substance Use Topics   • Alcohol use: No   • Drug use: No   ,     Medications  Current Outpatient Medications   Medication Sig Dispense Refill   • acetaminophen (TYLENOL) 650 MG 8 hr tablet Take 650 mg by mouth Every 8 (Eight) Hours As Needed for Mild Pain . 1000mg twice a day     • amiodarone (PACERONE) 200 MG tablet Take 1 tablet by mouth 2 (Two) Times a Day for 30 days. 60 tablet 5   • apixaban (ELIQUIS) 5 MG tablet tablet Take 5 mg by mouth 2 (Two) Times a Day.     • bumetanide (BUMEX) 0.5 MG tablet Take 1 tablet by mouth 2 (Two) Times a Day As Needed (for edema). 60 tablet 11   •  desloratadine (CLARINEX) 5 MG tablet Take 1 tablet by mouth Daily. 30 tablet 5   • fluticasone (FLONASE) 50 MCG/ACT nasal spray 1 spray into the nostril(s) as directed by provider.     • gabapentin (NEURONTIN) 100 MG capsule Take 100 mg by mouth 4 (Four) Times a Day.     • hydroCHLOROthiazide (HYDRODIURIL) 12.5 MG tablet TAKE 1 TABLET BY MOUTH EVERY DAY (Patient taking differently: Take 12.5 mg by mouth Daily.) 90 tablet 1   • meloxicam (MOBIC) 15 MG tablet Take 15 mg by mouth Daily With Breakfast.     • metoprolol succinate XL (TOPROL-XL) 25 MG 24 hr tablet metoprolol succinate ER 25 mg tablet,extended release 24 hr   Take 1 tablet every day by oral route for 90 days.     • nitroglycerin (NITROSTAT) 0.4 MG SL tablet Place 1 tablet under the tongue Every 5 (Five) Minutes As Needed for Chest Pain. Take no more than 3 doses in 15 minutes. 30 tablet 1   • rosuvastatin (CRESTOR) 20 MG tablet TAKE 1 TABLET BY MOUTH EVERY DAY (Patient taking differently: Take 20 mg by mouth Every Night.) 90 tablet 1   • valACYclovir (VALTREX) 1000 MG tablet TAKE 1 TABLET BY MOUTH EVERY DAY 30 tablet 1   • valsartan (DIOVAN) 160 MG tablet Take 1 tablet by mouth Daily. 90 tablet 3     No current facility-administered medications for this visit.       Allergies:  Penicillins and Vancomycin    Review of Systems  Review of Systems   Constitutional: Negative.   HENT: Negative.    Eyes: Negative.    Cardiovascular: Positive for dyspnea on exertion and leg swelling. Negative for chest pain, claudication, cyanosis, irregular heartbeat, near-syncope, orthopnea, palpitations, paroxysmal nocturnal dyspnea and syncope.   Respiratory: Negative.    Endocrine: Negative.    Hematologic/Lymphatic: Negative.    Skin: Negative.    Musculoskeletal: Positive for arthritis and joint pain.   Gastrointestinal: Negative for anorexia.   Genitourinary: Negative.    Neurological: Negative.    Psychiatric/Behavioral: Negative.        Objective     Physical  "Exam:      Vitals:    22 1022 22 1026   BP: 176/80 138/82   Pulse: 58    Weight: (!) 139 kg (307 lb)    Height: 188 cm (74\")       Repeat BP as above checked by myself using appropriate sized BP cuff using high precision manual [place] sphygmomanometer with ausculation with amplified stethoscope    /82   Pulse 58   Ht 188 cm (74\")   Wt (!) 139 kg (307 lb)   BMI 39.42 kg/m²     Physical Exam  Constitutional:       Appearance: He is well-developed.   HENT:      Head: Normocephalic.   Neck:      Vascular: Normal carotid pulses. No carotid bruit or JVD.      Trachea: No tracheal tenderness or tracheal deviation.   Cardiovascular:      Rate and Rhythm: Rhythm irregularly irregular.      Pulses: Normal pulses.      Heart sounds: Normal heart sounds.   Pulmonary:      Effort: Pulmonary effort is normal.      Breath sounds: No stridor.   Abdominal:      General: There is no distension.      Palpations: Abdomen is soft.      Tenderness: There is no abdominal tenderness.   Musculoskeletal:      Cervical back: No edema.      Right lower le+ Pitting Edema present.      Left lower le+ Pitting Edema present.   Skin:     General: Skin is warm.   Neurological:      Mental Status: He is alert.      Cranial Nerves: No cranial nerve deficit.      Sensory: No sensory deficit.   Psychiatric:         Speech: Speech normal.         Behavior: Behavior normal.         Results Review:      Raghav Olivier  Regadenoson Stress Test With Myocardial Perfusion SPECT (Multi Study)  Order# [id number]  Reading physician: Titi Kramer MD Ordering physician: Titi Kramer MD Study date: 21       Patient Information    Patient Name   Raghav Olivier MRN   [id number] Legal Sex   Male  (Age)   1955 (66 y.o.)     Interpretation Summary       · Diaphragmatic attenuation artifact is present.  · Myocardial perfusion imaging indicates a normal myocardial perfusion study with no evidence of " ischemia.  · Impressions are consistent with a low risk study.  · Left ventricular ejection fraction is normal. (Calculated EF = 55%).  · Physiological apical thinning noted       Raghav Olivier  Holter Monitor Greater than 7 days up to 15 days  Order# [id number]  Reading physician: Titi Kramer MD Ordering physician: Titi Kramer MD Study date: 8/3/21   Patient Information    Patient Name   Raghav Olivier MRN   [id number] Legal Sex   Male  (Age)   1955 (66 y.o.)   Interpretation Summary       · Average HR: 83. Min HR: 38. Max HR: 216.     · Entire report was reviewed.  Monitoring in days: ~13  · In atrial fibrillation during entire monitoring duration     · Rare premature ventricular contractions with a PVC burden of: < 1%     · No correlated arrhythmia  · No significant pauses                  Conclusion:      In atrial fibrillation during entire monitoring duration  Intermittent rapid ventricular response with rates between  bpm  Multiple pauses total of 2062 longest 7.9 seconds at 1:39 AM           Results for orders placed during the hospital encounter of 21    Adult Transthoracic Echo Complete w/ Color, Spectral and Contrast if necessary per protocol    Interpretation Summary  · Left ventricular ejection fraction appears to be 56 - 60%. Left ventricular systolic function is normal.  · Left ventricular wall thickness is consistent with moderate concentric hypertrophy.  · The following left ventricular wall segments are hypokinetic: apical inferior.  · Estimated right ventricular systolic pressure from tricuspid regurgitation is normal (<35 mmHg).     ____________________________________________________________________________________________________________________________________________  Health maintenance and recommendations    Low salt/ HTN/ Heart healthy carbohydrate restricted cardiac diet   The patient is advised to reduce or avoid caffeine or other cardiac stimulants.    Minimize or avoid  NSAID-type medications      Monitor for any signs of bleeding including red or dark stools. Fall precautions.   Advised staying uptodate with immunizations per established standard guidelines.    Offered to give patient  a copy of my notes     Questions were encouraged, asked and answered to the patient's  understanding and satisfaction. Questions if any regarding current medications and side effects, need for refills and importance of compliance to medications stressed.    Reviewed available prior notes, consults, prior visits, laboratory findings, radiology and cardiology relevant reports. Updated chart as applicable. I have reviewed the patient's medical history in detail and updated the computerized patient record as relevant.      Updated patient regarding any new or relevant abnormalities on review of records or any new findings on physical exam. Mentioned to patient about purpose of visit and desirable health short and long term goals and objectives.    Primary to monitor CBC CMP Lipid panel and TSH as applicable    ___________________________________________________________________________________________________________________________________________         ECG 12 Lead    Date/Time: 7/19/2022 10:21 AM  Performed by: Titi Kramer MD  Authorized by: Titi Kramer MD   Comparison: compared with previous ECG from 12/8/2021  Comparison to previous ECG:  sinus rhythm replaced atrial fibrillation    Ventricular rate changed from 94   to 58  beats per minute    Rhythm: sinus bradycardia  Rate: bradycardic  Conduction: conduction normal  ST Segments: ST segments normal  T Waves: T waves normal  Other: no other findings    Clinical impression: abnormal EKG            Assessment & Plan   Diagnoses and all orders for this visit:    1. SOB (shortness of breath) (Primary)  -     CBC & Differential; Future  -     TSH; Future  -     Comprehensive Metabolic Panel; Future  -     BNP; Future    2.  Paroxysmal atrial fibrillation (HCC)    3. Mixed hyperlipidemia    4. Dilated aortic root (HCC)    5. Essential hypertension    6. Coronary artery disease involving native coronary artery of native heart without angina pectoris    7. Severe obesity (BMI 35.0-39.9) with comorbidity (HCC)    8. LVH (left ventricular hypertrophy) moderate     9. Nonrheumatic mitral (valve) insufficiency    10. Easy fatigability   -     TSH; Future    Other orders  -     ECG 12 Lead        Plan    Overall doing well no new cardiovascular symptoms and therefore no additional cardiac testing is required prior to next visit  If any interim issues arise will call me for further evaluation.     Check BP and heart rates twice daily initially till blood pressures and heart rates under good control and then at least 3x / week,   If blood pressures continue to be well-controlled then can check week a month  at home and bring a recording for review next visit  If BP >130/85 or < 100/60 persistently over 3 reading 30 mins apart or if heart rates persistently above 100 bpm or less than 55 bpm call sooner for evaluation and advise     Has had nausea in AM   Will check LFT and TSH     Orders Placed This Encounter   Procedures   • TSH     Standing Status:   Future     Standing Expiration Date:   7/19/2023     Order Specific Question:   Release to patient     Answer:   Immediate   • Comprehensive Metabolic Panel     Standing Status:   Future     Standing Expiration Date:   7/19/2023     Order Specific Question:   Release to patient     Answer:   Immediate   • BNP     Standing Status:   Future     Standing Expiration Date:   7/19/2023     Order Specific Question:   Release to patient     Answer:   Immediate   • ECG 12 Lead     This order was created via procedure documentation     Order Specific Question:   Release to patient     Answer:   Immediate   • CBC & Differential     Standing Status:   Future     Standing Expiration Date:   7/19/2023     Order  Specific Question:   Manual Differential     Answer:   No     Order Specific Question:   Release to patient     Answer:   Immediate      Call for results of above testing.   Will be talking to primary soon regarding nausea     Patient expressed understanding  Encouraged and answered all questions   Discussed with the patient and all questioned fully answered. He will call me if any problems arise.   Discussed results of prior testing with patient : myocardial perfusion scan  and echo    as well electrocardiogram from today       Continue Bumex 0.5 mg daily   Weigh yourself frequently, at least weekly, preferably daily, call me if more than 2 pounds a day or 5 pounds a week weight gain.  Flexible diuretic dosing     Monitor cardiac rhythm device function regularly per established schedule or PRN    Dr Hernández monitors device   Keep f/u Dr Hernández     Check BP and heart rates twice daily at least 3x / week, week a month  at home and bring a recording for me to review next visit  If BP >130/85 or < 100/60 persistently over 3 reading 30 mins apart call sooner      Follow up with Adrianna AWAN , Sebastian AWAN or Pat Greenwood PA-C           Return in about 4 months (around 11/19/2022).

## 2022-08-09 ENCOUNTER — TELEPHONE (OUTPATIENT)
Dept: CARDIOLOGY | Facility: CLINIC | Age: 67
End: 2022-08-09

## 2022-11-21 ENCOUNTER — TRANSCRIBE ORDERS (OUTPATIENT)
Dept: ADMINISTRATIVE | Facility: HOSPITAL | Age: 67
End: 2022-11-21

## 2022-11-21 DIAGNOSIS — M48.061 DEGENERATIVE LUMBAR SPINAL STENOSIS: Primary | ICD-10-CM

## 2022-11-21 DIAGNOSIS — M48.061 SPINAL STENOSIS, LUMBAR REGION, WITHOUT NEUROGENIC CLAUDICATION: ICD-10-CM

## 2022-12-02 ENCOUNTER — OFFICE VISIT (OUTPATIENT)
Dept: CARDIOLOGY | Facility: CLINIC | Age: 67
End: 2022-12-02

## 2022-12-02 VITALS
HEART RATE: 58 BPM | SYSTOLIC BLOOD PRESSURE: 153 MMHG | OXYGEN SATURATION: 97 % | DIASTOLIC BLOOD PRESSURE: 70 MMHG | WEIGHT: 287 LBS | BODY MASS INDEX: 36.85 KG/M2

## 2022-12-02 DIAGNOSIS — I48.0 PAROXYSMAL ATRIAL FIBRILLATION: ICD-10-CM

## 2022-12-02 DIAGNOSIS — E66.01 SEVERE OBESITY (BMI 35.0-39.9) WITH COMORBIDITY: ICD-10-CM

## 2022-12-02 DIAGNOSIS — I49.5 SINOATRIAL NODE DYSFUNCTION: ICD-10-CM

## 2022-12-02 DIAGNOSIS — Z95.0 PRESENCE OF CARDIAC PACEMAKER: ICD-10-CM

## 2022-12-02 DIAGNOSIS — I34.0 NONRHEUMATIC MITRAL (VALVE) INSUFFICIENCY: Primary | ICD-10-CM

## 2022-12-02 DIAGNOSIS — I25.10 CORONARY ARTERY DISEASE INVOLVING NATIVE CORONARY ARTERY OF NATIVE HEART WITHOUT ANGINA PECTORIS: ICD-10-CM

## 2022-12-02 DIAGNOSIS — I10 ESSENTIAL HYPERTENSION: ICD-10-CM

## 2022-12-02 DIAGNOSIS — G47.33 OSA ON CPAP: ICD-10-CM

## 2022-12-02 DIAGNOSIS — Z79.899 LONG-TERM USE OF HIGH-RISK MEDICATION: ICD-10-CM

## 2022-12-02 DIAGNOSIS — I77.810 DILATED AORTIC ROOT: ICD-10-CM

## 2022-12-02 DIAGNOSIS — R00.2 PALPITATIONS: ICD-10-CM

## 2022-12-02 DIAGNOSIS — Z99.89 OSA ON CPAP: ICD-10-CM

## 2022-12-02 PROCEDURE — 93000 ELECTROCARDIOGRAM COMPLETE: CPT | Performed by: INTERNAL MEDICINE

## 2022-12-02 PROCEDURE — 99214 OFFICE O/P EST MOD 30 MIN: CPT | Performed by: INTERNAL MEDICINE

## 2022-12-02 RX ORDER — VALSARTAN 320 MG/1
320 TABLET ORAL DAILY
Qty: 90 TABLET | Refills: 3 | Status: SHIPPED | OUTPATIENT
Start: 2022-12-02

## 2022-12-02 RX ORDER — HYDROCHLOROTHIAZIDE 25 MG/1
25 TABLET ORAL DAILY
Qty: 90 TABLET | Refills: 3 | Status: SHIPPED | OUTPATIENT
Start: 2022-12-02 | End: 2023-03-07 | Stop reason: SDUPTHER

## 2022-12-02 NOTE — PROGRESS NOTES
Raghav Olivier  [id number]  1955  67 y.o.  male    Referring Provider: Devendra Martinez MD    Reason for  Visit:     Paroxysmal atrial fibrillation now in sinus rhythm  obstructive sleep apnea   essential hypertension  Hyperlipidemia    short term office follow up after recent encounter     Came to ER with atrial fibrillation and underwent DC cardioversion in past   Since then has seen Dr Hernández and off Amiodarone     Due to gum bleed stopped Xarelto on his own and now on Eliquis and tolerating well    Patient called to be seen due to new symptoms of increasing pedal edema   Preoperative cardiovascular clearance under general anesthesia for spine surgery Dr Garry Manuel  underwent cardiac cath with following findings and recommendations    Cardiac workup test results as below: stress test and echo        Subjective      Chronic low back pain    Awaiting evaluation by spine specialist in [facility]   Mild chronic exertional shortness of breath on exertion relieved with rest  No significant cough or wheezing    No palpitations  No associated chest pain    No fever or chills  No significant expectoration    No hemoptysis  No presyncope or syncope    Tolerating current medications well with no untoward side effects   Compliant with prescribed medication regimen. Tries to adhere to cardiac diet.     Easy fatiguability and increasing tired  Feels energy levels running low      Dr Hernández seeing him  Dr Hernández took him off Metoprolol   No bleeding, excessive bruising, gait instability or fall risks    sick sinus syndrome s/p pacemaker   Dr Hernández did pacemaker DDD and monitoring       History of present illness:  Raghav Olivier is a 67 y.o. yo male with paroxysmal atrial fibrillation  who presents today for   Chief Complaint   Patient presents with   • Follow-up     4 MONTH FOLLOW UP - HTN CAD    .    History  Past Medical History:   Diagnosis Date   • Abnormal ECG    • Arrhythmia    • Atrial  fibrillation (HCC)    • Diverticulitis    • Hyperlipidemia    • Hypertension    • Kidney stones    • Nonrheumatic mitral (valve) insufficiency    • Palpitations    • Pulmonary embolism (HCC) 21    [facility] ER   • Sleep apnea    • SOB (shortness of breath)    ,   Past Surgical History:   Procedure Laterality Date   • APPENDECTOMY     • CARDIAC CATHETERIZATION     • CARDIAC ELECTROPHYSIOLOGY PROCEDURE N/A 10/01/2021    Procedure: NEW PACEMAKER IMPLANTATION;  Surgeon: Mitch Hernández MD;  Location: [facility] CATH INVASIVE LOCATION;  Service: Cardiology;  Laterality: N/A;   • CARDIOVERSION     • CHOLECYSTECTOMY     • COLONOSCOPY N/A 2017    Procedure: COLONOSCOPY;  Surgeon: Billy Robbins DO;  Location: [facility] ENDOSCOPY;  Service:    • COLONOSCOPY N/A 2021    Procedure: COLONOSCOPY;  Surgeon: Billy Robbins DO;  Location: [facility] ENDOSCOPY;  Service: Gastroenterology;  Laterality: N/A;   • EP STUDY     • INSERT / REPLACE / REMOVE PACEMAKER  10-1-21    Dr Hernández   • KNEE SURGERY Right    • SHOULDER SURGERY Left    ,   Family History   Problem Relation Age of Onset   • Cancer Mother         colon   • Hypertension Mother    • Cancer Father         prostate   • Dementia Father    • Hypertension Father    ,   Social History     Tobacco Use   • Smoking status: Former     Packs/day: 0.25     Years: 4.00     Pack years: 1.00     Types: Cigarettes     Start date: 1969     Quit date: 1973     Years since quittin.9   • Smokeless tobacco: Never   Vaping Use   • Vaping Use: Never used   Substance Use Topics   • Alcohol use: No   • Drug use: No   ,     Medications  Current Outpatient Medications   Medication Sig Dispense Refill   • acetaminophen (TYLENOL) 650 MG 8 hr tablet Take 650 mg by mouth Every 8 (Eight) Hours As Needed for Mild Pain . 1000mg twice a day     • apixaban (ELIQUIS) 5 MG tablet tablet Take 5 mg by mouth 2 (Two) Times a Day.     • desloratadine (CLARINEX) 5 MG tablet Take  1 tablet by mouth Daily. 30 tablet 5   • fluticasone (FLONASE) 50 MCG/ACT nasal spray 1 spray into the nostril(s) as directed by provider.     • hydroCHLOROthiazide (HYDRODIURIL) 25 MG tablet Take 1 tablet by mouth Daily. 90 tablet 3   • metoprolol succinate XL (TOPROL-XL) 25 MG 24 hr tablet metoprolol succinate ER 25 mg tablet,extended release 24 hr   Take 1 tablet every day by oral route for 90 days.     • nitroglycerin (NITROSTAT) 0.4 MG SL tablet Place 1 tablet under the tongue Every 5 (Five) Minutes As Needed for Chest Pain. Take no more than 3 doses in 15 minutes. 30 tablet 1   • rosuvastatin (CRESTOR) 20 MG tablet TAKE 1 TABLET BY MOUTH EVERY DAY (Patient taking differently: Take 20 mg by mouth Every Night.) 90 tablet 1   • valACYclovir (VALTREX) 1000 MG tablet TAKE 1 TABLET BY MOUTH EVERY DAY 30 tablet 1   • valsartan (DIOVAN) 320 MG tablet Take 1 tablet by mouth Daily. 90 tablet 3     No current facility-administered medications for this visit.       Allergies:  Penicillins and Vancomycin    Review of Systems  Review of Systems   Constitutional: Negative.   HENT: Negative.    Eyes: Negative.    Cardiovascular: Positive for dyspnea on exertion and leg swelling. Negative for chest pain, claudication, cyanosis, irregular heartbeat, near-syncope, orthopnea, palpitations, paroxysmal nocturnal dyspnea and syncope.   Respiratory: Negative.    Endocrine: Negative.    Hematologic/Lymphatic: Negative.    Skin: Negative.    Musculoskeletal: Positive for arthritis and joint pain.   Gastrointestinal: Negative for anorexia.   Genitourinary: Negative.    Neurological: Negative.    Psychiatric/Behavioral: Negative.        Objective     Physical Exam:      Vitals:    12/02/22 1207   BP: 153/70   Pulse: 58   SpO2: 97%   Weight: 130 kg (287 lb)      Repeat BP as above checked by myself using appropriate sized BP cuff using high precision manual [employer] sphygmomanometer with ausculation with amplified stethoscope    BP  153/70   Pulse 58   Wt 130 kg (287 lb)   SpO2 97%   BMI 36.85 kg/m²     Physical Exam  Constitutional:       Appearance: He is well-developed.   HENT:      Head: Normocephalic.   Neck:      Vascular: Normal carotid pulses. No carotid bruit or JVD.      Trachea: No tracheal tenderness or tracheal deviation.   Cardiovascular:      Rate and Rhythm: Rhythm irregularly irregular.      Pulses: Normal pulses.      Heart sounds: Normal heart sounds.   Pulmonary:      Effort: Pulmonary effort is normal.      Breath sounds: No stridor.   Abdominal:      General: There is no distension.      Palpations: Abdomen is soft.      Tenderness: There is no abdominal tenderness.   Musculoskeletal:      Cervical back: No edema.      Right lower le+ Pitting Edema present.      Left lower le+ Pitting Edema present.   Skin:     General: Skin is warm.   Neurological:      Mental Status: He is alert.      Cranial Nerves: No cranial nerve deficit.      Sensory: No sensory deficit.   Psychiatric:         Speech: Speech normal.         Behavior: Behavior normal.         Results Review:      Raghav Olivier  Regadenoson Stress Test With Myocardial Perfusion SPECT (Multi Study)  Order# [id number]  Reading physician: Titi Kramer MD Ordering physician: Titi Kramer MD Study date: 21       Patient Information    Patient Name   Raghav Olivier MRN   [id number] Legal Sex   Male  (Age)   1955 (66 y.o.)     Interpretation Summary       · Diaphragmatic attenuation artifact is present.  · Myocardial perfusion imaging indicates a normal myocardial perfusion study with no evidence of ischemia.  · Impressions are consistent with a low risk study.  · Left ventricular ejection fraction is normal. (Calculated EF = 55%).  · Physiological apical thinning noted       Raghav Olivier  Holter Monitor Greater than 7 days up to 15 days  Order# [id number]  Reading physician: Titi Kramer MD Ordering physician: Titi Kramer MD Study  date: 8/3/21   Patient Information    Patient Name   Raghav Olivier MRN   [id number] Legal Sex   Male  (Age)   1955 (66 y.o.)   Interpretation Summary       · Average HR: 83. Min HR: 38. Max HR: 216.     · Entire report was reviewed.  Monitoring in days: ~13  · In atrial fibrillation during entire monitoring duration     · Rare premature ventricular contractions with a PVC burden of: < 1%     · No correlated arrhythmia  · No significant pauses                  Conclusion:      In atrial fibrillation during entire monitoring duration  Intermittent rapid ventricular response with rates between  bpm  Multiple pauses total of 2062 longest 7.9 seconds at 1:39 AM           Results for orders placed during the hospital encounter of 21    Adult Transthoracic Echo Complete w/ Color, Spectral and Contrast if necessary per protocol    Interpretation Summary  · Left ventricular ejection fraction appears to be 56 - 60%. Left ventricular systolic function is normal.  · Left ventricular wall thickness is consistent with moderate concentric hypertrophy.  · The following left ventricular wall segments are hypokinetic: apical inferior.  · Estimated right ventricular systolic pressure from tricuspid regurgitation is normal (<35 mmHg).     ____________________________________________________________________________________________________________________________________________  Health maintenance and recommendations    Low salt/ HTN/ Heart healthy carbohydrate restricted cardiac diet   The patient is advised to reduce or avoid caffeine or other cardiac stimulants.   Minimize or avoid  NSAID-type medications      Monitor for any signs of bleeding including red or dark stools. Fall precautions.   Advised staying uptodate with immunizations per established standard guidelines.    Offered to give patient  a copy of my notes     Questions were encouraged, asked and answered to the patient's  understanding and  satisfaction. Questions if any regarding current medications and side effects, need for refills and importance of compliance to medications stressed.    Reviewed available prior notes, consults, prior visits, laboratory findings, radiology and cardiology relevant reports. Updated chart as applicable. I have reviewed the patient's medical history in detail and updated the computerized patient record as relevant.      Updated patient regarding any new or relevant abnormalities on review of records or any new findings on physical exam. Mentioned to patient about purpose of visit and desirable health short and long term goals and objectives.    Primary to monitor CBC CMP Lipid panel and TSH as applicable    ___________________________________________________________________________________________________________________________________________         ECG 12 Lead    Date/Time: 12/2/2022 12:29 PM  Performed by: Titi Kramer MD  Authorized by: Titi Kramer MD   Comparison: compared with previous ECG from 7/19/2022  Comparison to previous ECG: Ventricular rate unchanged  58  beats per minute    Rhythm: sinus bradycardia  Rate: bradycardic  Conduction: non-specific intraventricular conduction delay    Clinical impression: abnormal EKG            Assessment & Plan   Diagnoses and all orders for this visit:    1. Nonrheumatic mitral (valve) insufficiency (Primary)    2. TAMARA on CPAP    3. Palpitations    4. Paroxysmal atrial fibrillation (HCC)    5. Presence of cardiac pacemaker    6. Severe obesity (BMI 35.0-39.9) with comorbidity (HCC)    7. Sinoatrial node dysfunction (HCC)    8. Long-term use of high-risk medication    9. Essential hypertension  -     hydroCHLOROthiazide (HYDRODIURIL) 25 MG tablet; Take 1 tablet by mouth Daily.  Dispense: 90 tablet; Refill: 3    10. Dilated aortic root (HCC)    11. Coronary artery disease involving native coronary artery of native heart without angina pectoris    Other orders  -     ECG  12 Lead  -     valsartan (DIOVAN) 320 MG tablet; Take 1 tablet by mouth Daily.  Dispense: 90 tablet; Refill: 3        Plan      Needs better BP control    Requested Prescriptions     Signed Prescriptions Disp Refills   • valsartan (DIOVAN) 320 MG tablet 90 tablet 3     Sig: Take 1 tablet by mouth Daily.   • hydroCHLOROthiazide (HYDRODIURIL) 25 MG tablet 90 tablet 3     Sig: Take 1 tablet by mouth Daily.    Primary will get a repeat BMP after increasing BP meds dose     Check BP and heart rates twice daily initially till blood pressures and heart rates under good control and then at least 3x / week,   If blood pressures continue to be well-controlled then can check week a month  at home and bring a recording for review next visit  If BP >130/85 or < 100/60 persistently over 3 reading 30 mins apart or if heart rates persistently above 100 bpm or less than 55 bpm call sooner for evaluation and advise        Orders Placed This Encounter   Procedures   • ECG 12 Lead     This order was created via procedure documentation     Order Specific Question:   Release to patient     Answer:   Routine Release      Call for results of above testing.   Will be talking to primary soon regarding nausea     Patient expressed understanding  Encouraged and answered all questions   Discussed with the patient and all questioned fully answered. He will call me if any problems arise.   Discussed results of prior testing with patient : myocardial perfusion scan  and echo    as well electrocardiogram from today       Weigh yourself frequently, at least weekly, preferably daily, call me if more than 2 pounds a day or 5 pounds a week weight gain.  Flexible diuretic dosing     Monitor cardiac rhythm device function regularly per established schedule or PRN    Dr Hernández monitors device   Keep f/u Dr Hernández   Monitor CBC, CMP, TSH and Lipid Panel by primary  Eye exam, pulmonary function tests   Discussed Amiodarone induced toxicity and required  monitoring and close follow up for this     Patient was advised to continue CPAP daily.        Follow up with Adrianna AWAN        Return in about 6 months (around 6/2/2023).

## 2022-12-19 ENCOUNTER — HOSPITAL ENCOUNTER (OUTPATIENT)
Dept: MRI IMAGING | Facility: HOSPITAL | Age: 67
Discharge: HOME | End: 2022-12-19
Admitting: NEUROLOGICAL SURGERY

## 2022-12-19 DIAGNOSIS — M48.061 DEGENERATIVE LUMBAR SPINAL STENOSIS: ICD-10-CM

## 2022-12-19 LAB — CREAT BLDA-MCNC: 1.4 MG/DL (ref 0.6–1.3)

## 2022-12-19 PROCEDURE — 72158 MRI LUMBAR SPINE W/O & W/DYE: CPT

## 2022-12-19 PROCEDURE — 82565 ASSAY OF CREATININE: CPT

## 2022-12-19 PROCEDURE — 0 GADOBENATE DIMEGLUMINE 529 MG/ML SOLUTION: Performed by: NEUROLOGICAL SURGERY

## 2022-12-19 PROCEDURE — A9577 INJ MULTIHANCE: HCPCS | Performed by: NEUROLOGICAL SURGERY

## 2022-12-19 RX ADMIN — GADOBENATE DIMEGLUMINE 20 ML: 529 INJECTION, SOLUTION INTRAVENOUS at 17:20

## 2022-12-29 ENCOUNTER — TELEPHONE (OUTPATIENT)
Dept: CARDIOLOGY | Facility: CLINIC | Age: 67
End: 2022-12-29

## 2022-12-29 NOTE — TELEPHONE ENCOUNTER
We sent patients Valsartan 320 mg once daily and Hydrochlorothiazide 25 mg once daily on 12/02/22 electronically however pharmacy denied receiving medication refills. ([place], [place]) Due to patient being out of medication i have verbally called in medication refills to pharmacist Jill today, 12/29/22 at 02:30pm for Valsartan 320 mg once daily, quantity 90 with 3 refills and  Hydrochlorothiazide 25 mg once daily, quantity 90 with 3 refills.    Thanks  WF

## 2023-03-07 ENCOUNTER — TELEPHONE (OUTPATIENT)
Dept: CARDIOLOGY | Facility: CLINIC | Age: 68
End: 2023-03-07
Payer: MEDICARE

## 2023-03-07 DIAGNOSIS — I10 ESSENTIAL HYPERTENSION: ICD-10-CM

## 2023-03-07 RX ORDER — HYDROCHLOROTHIAZIDE 50 MG/1
50 TABLET ORAL DAILY
Qty: 30 TABLET | Refills: 11 | Status: SHIPPED | OUTPATIENT
Start: 2023-03-07

## 2023-03-07 NOTE — TELEPHONE ENCOUNTER
PT CALLED STATING THAT HIS BP HAS BEEN ELEVATED OVER THE LAST WEEK (170S/90'S). HE HAS BEEN EXPERIENCING HEADACHES ALONG WITH DIZZINESS AS WELL. HE WAS GIVEN VALSARTAN AT LOV & SAYS HE HAS HAD NO OTHER CHANGES WITH MEDICATION OR DIET SINCE    PLEASE ADVISE

## 2023-03-21 ENCOUNTER — TELEPHONE (OUTPATIENT)
Dept: CARDIOLOGY | Facility: CLINIC | Age: 68
End: 2023-03-21
Payer: MEDICARE

## 2023-03-21 NOTE — TELEPHONE ENCOUNTER
PT IS SCHEDULED FOR A RIGHT TOTAL KNEE ARTHROPLASTY ON 4/6/2023. DR HIDALGO WOULD LIKE TO HOLD ELIQUIS PRIOR TO PROCEDURE.    LOV 12/2022 - AFIB - ON ELIQUIS - PLEASE ADVISE

## 2023-03-23 ENCOUNTER — PRE-ADMISSION TESTING (OUTPATIENT)
Dept: PREADMISSION TESTING | Facility: HOSPITAL | Age: 68
End: 2023-03-23
Payer: MEDICARE

## 2023-03-23 VITALS
DIASTOLIC BLOOD PRESSURE: 74 MMHG | SYSTOLIC BLOOD PRESSURE: 163 MMHG | BODY MASS INDEX: 39.09 KG/M2 | RESPIRATION RATE: 16 BRPM | HEART RATE: 58 BPM | OXYGEN SATURATION: 96 % | WEIGHT: 294.98 LBS | HEIGHT: 73 IN

## 2023-03-23 LAB
ANION GAP SERPL CALCULATED.3IONS-SCNC: 10 MMOL/L (ref 5–15)
BUN SERPL-MCNC: 21 MG/DL (ref 8–23)
BUN/CREAT SERPL: 18.1 (ref 7–25)
CALCIUM SPEC-SCNC: 9.5 MG/DL (ref 8.6–10.5)
CHLORIDE SERPL-SCNC: 103 MMOL/L (ref 98–107)
CO2 SERPL-SCNC: 29 MMOL/L (ref 22–29)
CREAT SERPL-MCNC: 1.16 MG/DL (ref 0.76–1.27)
DEPRECATED RDW RBC AUTO: 47 FL (ref 37–54)
EGFRCR SERPLBLD CKD-EPI 2021: 68.2 ML/MIN/1.73
ERYTHROCYTE [DISTWIDTH] IN BLOOD BY AUTOMATED COUNT: 13.9 % (ref 12.3–15.4)
GLUCOSE SERPL-MCNC: 115 MG/DL (ref 65–99)
HCT VFR BLD AUTO: 43 % (ref 37.5–51)
HGB BLD-MCNC: 13.2 G/DL (ref 13–17.7)
MCH RBC QN AUTO: 28.8 PG (ref 26.6–33)
MCHC RBC AUTO-ENTMCNC: 30.7 G/DL (ref 31.5–35.7)
MCV RBC AUTO: 93.7 FL (ref 79–97)
PLATELET # BLD AUTO: 230 10*3/MM3 (ref 140–450)
PMV BLD AUTO: 10 FL (ref 6–12)
POTASSIUM SERPL-SCNC: 3.8 MMOL/L (ref 3.5–5.2)
RBC # BLD AUTO: 4.59 10*6/MM3 (ref 4.14–5.8)
SODIUM SERPL-SCNC: 142 MMOL/L (ref 136–145)
WBC NRBC COR # BLD: 8.04 10*3/MM3 (ref 3.4–10.8)

## 2023-03-23 PROCEDURE — 85027 COMPLETE CBC AUTOMATED: CPT

## 2023-03-23 PROCEDURE — 36415 COLL VENOUS BLD VENIPUNCTURE: CPT

## 2023-03-23 PROCEDURE — 80048 BASIC METABOLIC PNL TOTAL CA: CPT

## 2023-03-23 NOTE — DISCHARGE INSTRUCTIONS
Before you come to the hospital        Arrival time: AS DIRECTED BY OFFICE     YOU MAY TAKE THE FOLLOWING MEDICATION(S) THE MORNING OF SURGERY WITH A SIP OF WATER: NONE           DO NOT TAKE YOUR VALSARTAN 24 HOURS  PRIOR TO SURGERY          ALL OTHER HOME MEDICATION CHECK WITH YOUR PHYSICIAN (especially if   you are taking diabetes medicines or blood thinners)    Do not take any Erectile Dysfunction medications (EX: CIALIS, VIAGRA) 24 hours prior to surgery.      If you were given and instructed to use a germ- killing soap, use as directed the night before surgery and again the morning of surgery or as directed by your surgeon. (Use one-half of the bottle with each shower.)   See attached information for How to Use Chlorhexidine for Bathing if applicable.            Eating and drinking restrictions prior to scheduled arrival time    2 Hours before arrival time STOP   Drinking Clear liquids (water, apple juice-no pulp)     6 Hours before arrival time STOP   Milk or drinks that contain milk, full liquids    6 Hours before arrival time STOP   Light meals or foods, such as toast or cereal    8 Hours before arrival time STOP   Heavy foods, such as meat, fried foods, or fatty foods    (It is extremely important that you follow these guidelines to prevent delay or cancelation of your procedure)     Clear Liquids  Water and flavored water                                                                      Clear Fruit juices, such as cranberry juice and apple juice.  Black coffee (NO cream of any kind, including powdered).  Plain tea  Clear bouillon or broth.  Flavored gelatin.  Soda.  Gatorade or Powerade.  Full liquid examples  Juices that have pulp.  Frozen ice pops that contain fruit pieces.  Coffee with creamer  Milk.  Yogurt.                MANAGING PAIN AFTER SURGERY    We know you are probably wondering what your pain will be like after surgery.  Following surgery it is unrealistic to expect you will not have pain.    Pain is how our bodies let us know that something is wrong or cautions us to be careful.  That said, our goal is to make your pain tolerable.    Methods we may use to treat your pain include (oral or IV medications, PCAs, epidurals, nerve blocks, etc.)   While some procedures require IV pain medications for a short time after surgery, transitioning to pain medications by mouth allows for better management of pain.   Your nurse will encourage you to take oral pain medications whenever possible.  IV medications work almost immediately, but only last a short while.  Taking medications by mouth allows for a more constant level of medication in your blood stream for a longer period of time.      Once your pain is out of control it is harder to get back under control.  It is important you are aware when your next dose of pain medication is due.  If you are admitted, your nurse may write the time of your next dose on the white board in your room to help you remember.      We are interested in your pain and encourage you to inform us about aggravating factors during your visit.   Many times a simple repositioning every few hours can make a big difference.    If your physician says it is okay, do not let your pain prevent you from getting out of bed. Be sure to call your nurse for assistance prior to getting up so you do not fall.      Before surgery, please decide your tolerable pain goal.  These faces help describe the pain ratings we use on a 0-10 scale.   Be prepared to tell us your goal and whether or not you take pain or anxiety medications at home.          Preparing for Surgery  Preparing for surgery is an important part of your care. It can make things go more smoothly and help you avoid complications. The steps leading up to surgery may vary among hospitals. Follow all instructions given to you by your health care providers. Ask questions if you do not understand something. Talk about any concerns that you  have.  Here are some questions to consider asking before your surgery:  If my surgery is not an emergency (is elective), when would be the best time to have the surgery?  What arrangements do I need to make for work, home, or school?  What will my recovery be like? How long will it be before I can return to normal activities?  Will I need to prepare my home? Will I need to arrange care for me or my children?  Should I expect to have pain after surgery? What are my pain management options? Are there nonmedical options that I can try for pain?  Tell a health care provider about:  Any allergies you have.  All medicines you are taking, including vitamins, herbs, eye drops, creams, and over-the-counter medicines.  Any problems you or family members have had with anesthetic medicines.  Any blood disorders you have.  Any surgeries you have had.  Any medical conditions you have.  Whether you are pregnant or may be pregnant.  What are the risks?  The risks and complications of surgery depend on the specific procedure that you have. Discuss all the risks with your health care providers before your surgery. Ask about common surgical complications, which may include:  Infection.  Bleeding or a need for blood replacement (transfusion).  Allergic reactions to medicines.  Damage to surrounding nerves, tissues, or structures.  A blood clot.  Scarring.  Failure of the surgery to correct the problem.  Follow these instructions before the procedure:  Several days or weeks before your procedure  You may have a physical exam by your primary health care provider to make sure it is safe for you to have surgery.  You may have testing. This may include a chest X-ray, blood and urine tests, electrocardiogram (ECG), or other testing.  Ask your health care provider about:  Changing or stopping your regular medicines. This is especially important if you are taking diabetes medicines or blood thinners.  Taking medicines such as aspirin and  ibuprofen. These medicines can thin your blood. Do not take these medicines unless your health care provider tells you to take them.  Taking over-the-counter medicines, vitamins, herbs, and supplements.  Do not use any products that contain nicotine or tobacco, such as cigarettes and e-cigarettes. If you need help quitting, ask your health care provider.  Avoid alcohol.  Ask your health care provider if there are exercises you can do to prepare for surgery.  Eat a healthy diet.   Plan to have someone 18 years of age or older to take you home from the hospital. We will need to verify your ride on the morning of surgery if you are being discharged home on the same day. Tell your ride to be expecting a call from the hospital prior to your procedure.   Plan to have a responsible adult care for you for at least 24 hours after you leave the hospital or clinic. This is important.  The day before your procedure  You may be given antibiotic medicine to take by mouth to help prevent infection. Take it as told by your health care provider.  You may be asked to shower with a germ-killing soap.  Follow instructions from your health care provider about eating and drinking restrictions. This includes gum, mints and hard candy.  Pack comfortable clothes according to your procedure.   The day of your procedure  You may need to take another shower with a germ-killing soap before you leave home in the morning.  With a small sip of water, take only the medicines that you are told to take.  Remove all jewelry including rings.   Leave anything you consider valuable at home except hearing aids if needed.  You do not need to bring your home medications into the hospital.   Do not wear any makeup, nail polish, powder, deodorant, lotion, hair accessories, or anything on your skin or body except your clothes.  If you will be staying in the hospital, bring a case to hold your glasses, contacts, or dentures. You may also want to bring your robe  and non-skid footwear.       (Do not use denture adhesives since you will be asked to remove them during  surgery).   If you wear oxygen at home, bring it with you the day of surgery.  If instructed by your health care provider, bring your sleep apnea device with you on the day of your surgery (if this applies to you).  You may want to leave your suitcase and sleep apnea device in the car until after surgery.   Arrive at the hospital as scheduled.  Bring a friend or family member with you who can help to answer questions and be present while you meet with your health care provider.  At the hospital  When you arrive at the hospital:  Go to registration located at the main entrance of the hospital. You will be registered and given a beeper and a sticker sheet. Take the stickers to the Outpatient nurses desk and place in the black tray. This is to notify staff that you have arrived. Then return to the lobby to wait.   When your beeper lights up and vibrates proceed through the double doors, under the stairs, and a member of the Outpatient Surgery staff will escort you to your preoperative room.  You may have to wear compression sleeves. These help to prevent blood clots and reduce swelling in your legs.  An IV may be inserted into one of your veins.              In the operating room, you may be given one or more of the following:        A medicine to help you relax (sedative).        A medicine to numb the area (local anesthetic).        A medicine to make you fall asleep (general anesthetic).        A medicine that is injected into an area of your body to numb everything below the                      injection site (regional anesthetic).  You may be given an antibiotic through your IV to help prevent infection.  Your surgical site will be marked or identified.    Contact a health care provider if you:  Develop a fever of more than 100.4°F (38°C) or other feelings of illness during the 48 hours before your  surgery.  Have symptoms that get worse.  Have questions or concerns about your surgery.  Summary  Preparing for surgery can make the procedure go more smoothly and lower your risk of complications.  Before surgery, make a list of questions and concerns to discuss with your surgeon. Ask about the risks and possible complications.  In the days or weeks before your surgery, follow all instructions from your health care provider. You may need to stop smoking, avoid alcohol, follow eating restrictions, and change or stop your regular medicines.  Contact your surgeon if you develop a fever or other signs of illness during the few days before your surgery.  This information is not intended to replace advice given to you by your health care provider. Make sure you discuss any questions you have with your health care provider.  Document Revised: 12/21/2018 Document Reviewed: 10/23/2018  Elsevier Patient Education © 2021 [employer]

## 2023-04-06 ENCOUNTER — HOSPITAL ENCOUNTER (OUTPATIENT)
Facility: HOSPITAL | Age: 68
Setting detail: HOSPITAL OUTPATIENT SURGERY
Discharge: HOME | End: 2023-04-06
Attending: ORTHOPAEDIC SURGERY | Admitting: ORTHOPAEDIC SURGERY
Payer: MEDICARE

## 2023-04-06 ENCOUNTER — ANESTHESIA EVENT (OUTPATIENT)
Dept: PERIOP | Facility: HOSPITAL | Age: 68
End: 2023-04-06
Payer: MEDICARE

## 2023-04-06 ENCOUNTER — ANESTHESIA (OUTPATIENT)
Dept: PERIOP | Facility: HOSPITAL | Age: 68
End: 2023-04-06
Payer: MEDICARE

## 2023-04-06 VITALS
OXYGEN SATURATION: 97 % | RESPIRATION RATE: 16 BRPM | HEART RATE: 72 BPM | TEMPERATURE: 98 F | DIASTOLIC BLOOD PRESSURE: 79 MMHG | SYSTOLIC BLOOD PRESSURE: 144 MMHG

## 2023-04-06 DIAGNOSIS — Z96.651 TOTAL KNEE REPLACEMENT STATUS, RIGHT: Primary | ICD-10-CM

## 2023-04-06 PROCEDURE — C9088 BUPIVACAINE-MELOXICAM ER 400-12 MG/14ML SOLUTION: HCPCS | Performed by: ORTHOPAEDIC SURGERY

## 2023-04-06 PROCEDURE — 25010000002 DROPERIDOL PER 5 MG: Performed by: ANESTHESIOLOGY

## 2023-04-06 PROCEDURE — C1776 JOINT DEVICE (IMPLANTABLE): HCPCS | Performed by: ORTHOPAEDIC SURGERY

## 2023-04-06 PROCEDURE — 25010000002 ROPIVACAINE PER 1 MG: Performed by: ANESTHESIOLOGY

## 2023-04-06 PROCEDURE — 25010000002 DEXAMETHASONE PER 1 MG: Performed by: ANESTHESIOLOGY

## 2023-04-06 PROCEDURE — 25010000002 FENTANYL CITRATE (PF) 250 MCG/5ML SOLUTION: Performed by: NURSE ANESTHETIST, CERTIFIED REGISTERED

## 2023-04-06 PROCEDURE — 25010000002 BUPIVACAINE-MELOXICAM ER 400-12 MG/14ML SOLUTION: Performed by: ORTHOPAEDIC SURGERY

## 2023-04-06 PROCEDURE — 25010000002 FENTANYL CITRATE (PF) 50 MCG/ML SOLUTION: Performed by: ANESTHESIOLOGY

## 2023-04-06 PROCEDURE — 25010000002 ONDANSETRON PER 1 MG: Performed by: NURSE ANESTHETIST, CERTIFIED REGISTERED

## 2023-04-06 PROCEDURE — C1713 ANCHOR/SCREW BN/BN,TIS/BN: HCPCS | Performed by: ORTHOPAEDIC SURGERY

## 2023-04-06 PROCEDURE — 25010000002 MIDAZOLAM PER 1 MG: Performed by: ANESTHESIOLOGY

## 2023-04-06 PROCEDURE — 25010000002 PROPOFOL 10 MG/ML EMULSION: Performed by: NURSE ANESTHETIST, CERTIFIED REGISTERED

## 2023-04-06 PROCEDURE — 25010000002 DEXAMETHASONE PER 1 MG: Performed by: NURSE ANESTHETIST, CERTIFIED REGISTERED

## 2023-04-06 DEVICE — CAP TOTL KN POROUS/HYBRID PRIMARY: Type: IMPLANTABLE DEVICE | Site: KNEE | Status: FUNCTIONAL

## 2023-04-06 DEVICE — IMPLANTABLE DEVICE: Type: IMPLANTABLE DEVICE | Site: KNEE | Status: FUNCTIONAL

## 2023-04-06 DEVICE — PAT KN PERSONA ALLPOLY CMT 10X41MM: Type: IMPLANTABLE DEVICE | Site: KNEE | Status: FUNCTIONAL

## 2023-04-06 DEVICE — ART/SRF KN PERSONA/VE MC GH 10MM RT: Type: IMPLANTABLE DEVICE | Site: KNEE | Status: FUNCTIONAL

## 2023-04-06 RX ORDER — ACETAMINOPHEN 500 MG
1000 TABLET ORAL ONCE
Status: COMPLETED | OUTPATIENT
Start: 2023-04-06 | End: 2023-04-06

## 2023-04-06 RX ORDER — LABETALOL HYDROCHLORIDE 5 MG/ML
5 INJECTION, SOLUTION INTRAVENOUS
Status: DISCONTINUED | OUTPATIENT
Start: 2023-04-06 | End: 2023-04-06 | Stop reason: HOSPADM

## 2023-04-06 RX ORDER — SULFAMETHOXAZOLE AND TRIMETHOPRIM 400; 80 MG/1; MG/1
1 TABLET ORAL 2 TIMES DAILY
Qty: 10 TABLET | Refills: 0 | Status: SHIPPED | OUTPATIENT
Start: 2023-04-06

## 2023-04-06 RX ORDER — SODIUM CHLORIDE, SODIUM LACTATE, POTASSIUM CHLORIDE, CALCIUM CHLORIDE 600; 310; 30; 20 MG/100ML; MG/100ML; MG/100ML; MG/100ML
100 INJECTION, SOLUTION INTRAVENOUS CONTINUOUS PRN
Status: DISCONTINUED | OUTPATIENT
Start: 2023-04-06 | End: 2023-04-06 | Stop reason: HOSPADM

## 2023-04-06 RX ORDER — SODIUM CHLORIDE 0.9 % (FLUSH) 0.9 %
3 SYRINGE (ML) INJECTION AS NEEDED
Status: DISCONTINUED | OUTPATIENT
Start: 2023-04-06 | End: 2023-04-06 | Stop reason: HOSPADM

## 2023-04-06 RX ORDER — PROPOFOL 10 MG/ML
VIAL (ML) INTRAVENOUS AS NEEDED
Status: DISCONTINUED | OUTPATIENT
Start: 2023-04-06 | End: 2023-04-06 | Stop reason: SURG

## 2023-04-06 RX ORDER — NEOSTIGMINE METHYLSULFATE 5 MG/5 ML
SYRINGE (ML) INTRAVENOUS AS NEEDED
Status: DISCONTINUED | OUTPATIENT
Start: 2023-04-06 | End: 2023-04-06 | Stop reason: SURG

## 2023-04-06 RX ORDER — DROPERIDOL 2.5 MG/ML
0.62 INJECTION, SOLUTION INTRAMUSCULAR; INTRAVENOUS ONCE AS NEEDED
Status: COMPLETED | OUTPATIENT
Start: 2023-04-06 | End: 2023-04-06

## 2023-04-06 RX ORDER — LIDOCAINE HYDROCHLORIDE 20 MG/ML
INJECTION, SOLUTION EPIDURAL; INFILTRATION; INTRACAUDAL; PERINEURAL AS NEEDED
Status: DISCONTINUED | OUTPATIENT
Start: 2023-04-06 | End: 2023-04-06 | Stop reason: SURG

## 2023-04-06 RX ORDER — SODIUM CHLORIDE 9 MG/ML
40 INJECTION, SOLUTION INTRAVENOUS AS NEEDED
Status: DISCONTINUED | OUTPATIENT
Start: 2023-04-06 | End: 2023-04-06 | Stop reason: HOSPADM

## 2023-04-06 RX ORDER — GLYCOPYRROLATE 0.2 MG/ML
INJECTION INTRAMUSCULAR; INTRAVENOUS AS NEEDED
Status: DISCONTINUED | OUTPATIENT
Start: 2023-04-06 | End: 2023-04-06 | Stop reason: SURG

## 2023-04-06 RX ORDER — MELOXICAM 7.5 MG/1
7.5 TABLET ORAL ONCE AS NEEDED
Status: DISCONTINUED | OUTPATIENT
Start: 2023-04-06 | End: 2023-04-06 | Stop reason: HOSPADM

## 2023-04-06 RX ORDER — IBUPROFEN 600 MG/1
600 TABLET ORAL ONCE AS NEEDED
Status: DISCONTINUED | OUTPATIENT
Start: 2023-04-06 | End: 2023-04-06 | Stop reason: HOSPADM

## 2023-04-06 RX ORDER — LIDOCAINE HYDROCHLORIDE 10 MG/ML
0.5 INJECTION, SOLUTION EPIDURAL; INFILTRATION; INTRACAUDAL; PERINEURAL ONCE AS NEEDED
Status: DISCONTINUED | OUTPATIENT
Start: 2023-04-06 | End: 2023-04-06 | Stop reason: HOSPADM

## 2023-04-06 RX ORDER — ROCURONIUM BROMIDE 10 MG/ML
INJECTION, SOLUTION INTRAVENOUS AS NEEDED
Status: DISCONTINUED | OUTPATIENT
Start: 2023-04-06 | End: 2023-04-06 | Stop reason: SURG

## 2023-04-06 RX ORDER — DEXAMETHASONE SODIUM PHOSPHATE 4 MG/ML
INJECTION, SOLUTION INTRA-ARTICULAR; INTRALESIONAL; INTRAMUSCULAR; INTRAVENOUS; SOFT TISSUE AS NEEDED
Status: DISCONTINUED | OUTPATIENT
Start: 2023-04-06 | End: 2023-04-06 | Stop reason: SURG

## 2023-04-06 RX ORDER — CEFAZOLIN SODIUM IN 0.9 % NACL 3 G/100 ML
3 INTRAVENOUS SOLUTION, PIGGYBACK (ML) INTRAVENOUS ONCE
Status: COMPLETED | OUTPATIENT
Start: 2023-04-06 | End: 2023-04-06

## 2023-04-06 RX ORDER — SODIUM CHLORIDE 0.9 % (FLUSH) 0.9 %
3-10 SYRINGE (ML) INJECTION AS NEEDED
Status: DISCONTINUED | OUTPATIENT
Start: 2023-04-06 | End: 2023-04-06 | Stop reason: HOSPADM

## 2023-04-06 RX ORDER — MAGNESIUM HYDROXIDE 1200 MG/15ML
LIQUID ORAL AS NEEDED
Status: DISCONTINUED | OUTPATIENT
Start: 2023-04-06 | End: 2023-04-06 | Stop reason: HOSPADM

## 2023-04-06 RX ORDER — SODIUM CHLORIDE 0.9 % (FLUSH) 0.9 %
10 SYRINGE (ML) INJECTION EVERY 12 HOURS SCHEDULED
Status: DISCONTINUED | OUTPATIENT
Start: 2023-04-06 | End: 2023-04-06 | Stop reason: HOSPADM

## 2023-04-06 RX ORDER — ONDANSETRON 4 MG/1
4 TABLET, FILM COATED ORAL ONCE AS NEEDED
Status: DISCONTINUED | OUTPATIENT
Start: 2023-04-06 | End: 2023-04-06 | Stop reason: HOSPADM

## 2023-04-06 RX ORDER — ONDANSETRON 4 MG/1
4 TABLET, FILM COATED ORAL EVERY 8 HOURS PRN
Qty: 20 TABLET | Refills: 0 | Status: SHIPPED | OUTPATIENT
Start: 2023-04-06

## 2023-04-06 RX ORDER — NALOXONE HCL 0.4 MG/ML
0.4 VIAL (ML) INJECTION AS NEEDED
Status: DISCONTINUED | OUTPATIENT
Start: 2023-04-06 | End: 2023-04-06 | Stop reason: HOSPADM

## 2023-04-06 RX ORDER — FENTANYL CITRATE 50 UG/ML
50 INJECTION, SOLUTION INTRAMUSCULAR; INTRAVENOUS ONCE
Status: COMPLETED | OUTPATIENT
Start: 2023-04-06 | End: 2023-04-06

## 2023-04-06 RX ORDER — SODIUM CHLORIDE, SODIUM LACTATE, POTASSIUM CHLORIDE, CALCIUM CHLORIDE 600; 310; 30; 20 MG/100ML; MG/100ML; MG/100ML; MG/100ML
100 INJECTION, SOLUTION INTRAVENOUS CONTINUOUS
Status: DISCONTINUED | OUTPATIENT
Start: 2023-04-06 | End: 2023-04-06 | Stop reason: HOSPADM

## 2023-04-06 RX ORDER — OXYCODONE AND ACETAMINOPHEN 10; 325 MG/1; MG/1
1 TABLET ORAL EVERY 4 HOURS PRN
Qty: 42 TABLET | Refills: 0 | Status: SHIPPED | OUTPATIENT
Start: 2023-04-06

## 2023-04-06 RX ORDER — ROPIVACAINE HYDROCHLORIDE 5 MG/ML
INJECTION, SOLUTION EPIDURAL; INFILTRATION; PERINEURAL
Status: COMPLETED | OUTPATIENT
Start: 2023-04-06 | End: 2023-04-06

## 2023-04-06 RX ORDER — ONDANSETRON 2 MG/ML
4 INJECTION INTRAMUSCULAR; INTRAVENOUS ONCE AS NEEDED
Status: DISCONTINUED | OUTPATIENT
Start: 2023-04-06 | End: 2023-04-06 | Stop reason: HOSPADM

## 2023-04-06 RX ORDER — MIDAZOLAM HYDROCHLORIDE 1 MG/ML
1 INJECTION INTRAMUSCULAR; INTRAVENOUS ONCE
Status: COMPLETED | OUTPATIENT
Start: 2023-04-06 | End: 2023-04-06

## 2023-04-06 RX ORDER — SODIUM CHLORIDE, SODIUM LACTATE, POTASSIUM CHLORIDE, CALCIUM CHLORIDE 600; 310; 30; 20 MG/100ML; MG/100ML; MG/100ML; MG/100ML
1000 INJECTION, SOLUTION INTRAVENOUS CONTINUOUS
Status: DISCONTINUED | OUTPATIENT
Start: 2023-04-06 | End: 2023-04-06 | Stop reason: HOSPADM

## 2023-04-06 RX ORDER — SODIUM CHLORIDE 0.9 % (FLUSH) 0.9 %
3 SYRINGE (ML) INJECTION EVERY 12 HOURS SCHEDULED
Status: DISCONTINUED | OUTPATIENT
Start: 2023-04-06 | End: 2023-04-06 | Stop reason: HOSPADM

## 2023-04-06 RX ORDER — OXYCODONE AND ACETAMINOPHEN 7.5; 325 MG/1; MG/1
2 TABLET ORAL EVERY 4 HOURS PRN
Status: DISCONTINUED | OUTPATIENT
Start: 2023-04-06 | End: 2023-04-06 | Stop reason: HOSPADM

## 2023-04-06 RX ORDER — OXYCODONE AND ACETAMINOPHEN 10; 325 MG/1; MG/1
1 TABLET ORAL ONCE AS NEEDED
Status: DISCONTINUED | OUTPATIENT
Start: 2023-04-06 | End: 2023-04-06 | Stop reason: HOSPADM

## 2023-04-06 RX ORDER — FENTANYL CITRATE 50 UG/ML
25 INJECTION, SOLUTION INTRAMUSCULAR; INTRAVENOUS
Status: DISCONTINUED | OUTPATIENT
Start: 2023-04-06 | End: 2023-04-06 | Stop reason: HOSPADM

## 2023-04-06 RX ORDER — FENTANYL CITRATE 50 UG/ML
INJECTION, SOLUTION INTRAMUSCULAR; INTRAVENOUS AS NEEDED
Status: DISCONTINUED | OUTPATIENT
Start: 2023-04-06 | End: 2023-04-06 | Stop reason: SURG

## 2023-04-06 RX ORDER — BUPIVACAINE HYDROCHLORIDE 2.5 MG/ML
INJECTION, SOLUTION INFILTRATION; PERINEURAL AS NEEDED
Status: DISCONTINUED | OUTPATIENT
Start: 2023-04-06 | End: 2023-04-06 | Stop reason: HOSPADM

## 2023-04-06 RX ORDER — SODIUM CHLORIDE 0.9 % (FLUSH) 0.9 %
10 SYRINGE (ML) INJECTION AS NEEDED
Status: DISCONTINUED | OUTPATIENT
Start: 2023-04-06 | End: 2023-04-06 | Stop reason: HOSPADM

## 2023-04-06 RX ORDER — FLUMAZENIL 0.1 MG/ML
0.2 INJECTION INTRAVENOUS AS NEEDED
Status: DISCONTINUED | OUTPATIENT
Start: 2023-04-06 | End: 2023-04-06 | Stop reason: HOSPADM

## 2023-04-06 RX ORDER — DEXAMETHASONE SODIUM PHOSPHATE 4 MG/ML
4 INJECTION, SOLUTION INTRA-ARTICULAR; INTRALESIONAL; INTRAMUSCULAR; INTRAVENOUS; SOFT TISSUE ONCE AS NEEDED
Status: COMPLETED | OUTPATIENT
Start: 2023-04-06 | End: 2023-04-06

## 2023-04-06 RX ORDER — MIDAZOLAM HYDROCHLORIDE 1 MG/ML
1 INJECTION INTRAMUSCULAR; INTRAVENOUS
Status: DISCONTINUED | OUTPATIENT
Start: 2023-04-06 | End: 2023-04-06 | Stop reason: HOSPADM

## 2023-04-06 RX ORDER — OXYCODONE AND ACETAMINOPHEN 7.5; 325 MG/1; MG/1
1 TABLET ORAL ONCE AS NEEDED
Status: DISCONTINUED | OUTPATIENT
Start: 2023-04-06 | End: 2023-04-06 | Stop reason: HOSPADM

## 2023-04-06 RX ORDER — ONDANSETRON 2 MG/ML
INJECTION INTRAMUSCULAR; INTRAVENOUS AS NEEDED
Status: DISCONTINUED | OUTPATIENT
Start: 2023-04-06 | End: 2023-04-06 | Stop reason: SURG

## 2023-04-06 RX ORDER — MELOXICAM 7.5 MG/1
7.5 TABLET ORAL DAILY
COMMUNITY

## 2023-04-06 RX ADMIN — FENTANYL CITRATE 25 MCG: 50 INJECTION, SOLUTION INTRAMUSCULAR; INTRAVENOUS at 11:54

## 2023-04-06 RX ADMIN — FENTANYL CITRATE 100 MCG: 50 INJECTION, SOLUTION INTRAMUSCULAR; INTRAVENOUS at 09:16

## 2023-04-06 RX ADMIN — ACETAMINOPHEN 1000 MG: 500 TABLET, FILM COATED ORAL at 09:12

## 2023-04-06 RX ADMIN — FENTANYL CITRATE 25 MCG: 50 INJECTION, SOLUTION INTRAMUSCULAR; INTRAVENOUS at 12:00

## 2023-04-06 RX ADMIN — FENTANYL CITRATE 100 MCG: 50 INJECTION, SOLUTION INTRAMUSCULAR; INTRAVENOUS at 09:30

## 2023-04-06 RX ADMIN — ROPIVACAINE HYDROCHLORIDE 30 ML: 5 INJECTION, SOLUTION EPIDURAL; INFILTRATION; PERINEURAL at 09:09

## 2023-04-06 RX ADMIN — Medication 3 MG: at 10:43

## 2023-04-06 RX ADMIN — DEXAMETHASONE SODIUM PHOSPHATE 4 MG: 4 INJECTION INTRA-ARTICULAR; INTRALESIONAL; INTRAMUSCULAR; INTRAVENOUS; SOFT TISSUE at 09:06

## 2023-04-06 RX ADMIN — PROPOFOL INJECTABLE EMULSION 150 MG: 10 INJECTION, EMULSION INTRAVENOUS at 09:18

## 2023-04-06 RX ADMIN — FENTANYL CITRATE 25 MCG: 50 INJECTION, SOLUTION INTRAMUSCULAR; INTRAVENOUS at 12:05

## 2023-04-06 RX ADMIN — LIDOCAINE HYDROCHLORIDE 100 MG: 20 INJECTION, SOLUTION EPIDURAL; INFILTRATION; INTRACAUDAL; PERINEURAL at 09:18

## 2023-04-06 RX ADMIN — DROPERIDOL 0.62 MG: 2.5 INJECTION, SOLUTION INTRAMUSCULAR; INTRAVENOUS at 11:59

## 2023-04-06 RX ADMIN — GLYCOPYRROLATE 0.4 MG: 0.2 INJECTION INTRAMUSCULAR; INTRAVENOUS at 10:43

## 2023-04-06 RX ADMIN — Medication 3 G: at 09:28

## 2023-04-06 RX ADMIN — OXYCODONE HYDROCHLORIDE AND ACETAMINOPHEN 2 TABLET: 7.5; 325 TABLET ORAL at 11:47

## 2023-04-06 RX ADMIN — DEXAMETHASONE SODIUM PHOSPHATE 4 MG: 4 INJECTION, SOLUTION INTRA-ARTICULAR; INTRALESIONAL; INTRAMUSCULAR; INTRAVENOUS; SOFT TISSUE at 10:43

## 2023-04-06 RX ADMIN — FENTANYL CITRATE 25 MCG: 50 INJECTION, SOLUTION INTRAMUSCULAR; INTRAVENOUS at 11:49

## 2023-04-06 RX ADMIN — MIDAZOLAM 1 MG: 1 INJECTION INTRAMUSCULAR; INTRAVENOUS at 09:06

## 2023-04-06 RX ADMIN — FENTANYL CITRATE 50 MCG: 50 INJECTION, SOLUTION INTRAMUSCULAR; INTRAVENOUS at 09:06

## 2023-04-06 RX ADMIN — ROCURONIUM BROMIDE 50 MG: 10 INJECTION, SOLUTION INTRAVENOUS at 09:18

## 2023-04-06 RX ADMIN — ONDANSETRON 4 MG: 2 INJECTION INTRAMUSCULAR; INTRAVENOUS at 10:43

## 2023-04-06 RX ADMIN — FENTANYL CITRATE 50 MCG: 50 INJECTION, SOLUTION INTRAMUSCULAR; INTRAVENOUS at 09:47

## 2023-04-06 RX ADMIN — SODIUM CHLORIDE, POTASSIUM CHLORIDE, SODIUM LACTATE AND CALCIUM CHLORIDE 1000 ML: 600; 310; 30; 20 INJECTION, SOLUTION INTRAVENOUS at 08:25

## 2023-04-06 NOTE — ANESTHESIA PREPROCEDURE EVALUATION
Anesthesia Evaluation     Patient summary reviewed and Nursing notes reviewed   no history of anesthetic complications:  NPO Solid Status: > 8 hours  NPO Liquid Status: > 8 hours           Airway   Mallampati: II  TM distance: >3 FB  Neck ROM: full  No difficulty expected  Dental      Pulmonary    (+) pulmonary embolism (1 year ago following COVID vaccine), sleep apnea on CPAP,   (-) asthma  Cardiovascular   Exercise tolerance: good (4-7 METS)    (+) pacemaker (st nora) pacemaker, hypertension, valvular problems/murmurs (dilated aortic root), CAD, dysrhythmias Atrial Fib, hyperlipidemia,     ROS comment: Echo:  Left ventricular ejection fraction appears to be 56 - 60%. Left ventricular systolic function is normal.  Left ventricular wall thickness is consistent with moderate concentric hypertrophy.  The following left ventricular wall segments are hypokinetic: apical inferior.  Estimated right ventricular systolic pressure from tricuspid regurgitation is normal (<35 mmHg).      Neuro/Psych- negative ROS  (-) seizures, TIA, CVA  GI/Hepatic/Renal/Endo    (+) morbid obesity,  renal disease stones,   (-) liver disease, diabetes    Musculoskeletal (-) negative ROS    Abdominal    Substance History - negative use     OB/GYN negative ob/gyn ROS         Other                          Anesthesia Plan    ASA 3     general with block     intravenous induction     Anesthetic plan, risks, benefits, and alternatives have been provided, discussed and informed consent has been obtained with: patient.        CODE STATUS:

## 2023-04-06 NOTE — OP NOTE
PREOPERATIVE DIAGNOSIS: Right knee primary osteoarthritis    POSTOPERATIVE DIAGNOSIS:  Right knee primary osteoarthritis     PROCEDURE:  Right total Knee Arthroplasty     SURGEON:  Pepe Vaughn M.D.     ASSISTANT:  Sj Severino PA-C    The assistant aided in limb position as well as retractor placement.  The assistant was also critical in implantation of the prosthesis.  In addition to this, the assistant was responsible for wound closure.  It was necessary to have the assistant present in order to complete the procedure.    ANESTHESIA:  General with Block    ESTIMATED BLOOD LOSS:  250 mL    COMPLICATIONS:  None.    CONDITION:  Stable.    IMPLANTS: Lv persona 12 cruciate retaining press-fit femur, H trabecular metal backed tibia with cement placed on the back of the component but not on the post, 10 medial congruent vitamin E polyethylene spacer and a 41 mm patella.    INDICATIONS:  The patient is a 67 y.o. male who  presents today for a right total knee arthroplasty for primary osteoarthritis.  The patient has failed conservative management including: time, activity modifications, NSAIDs and corticosteroids.    PROCEDURE:  The patient was interviewed in the pre-anesthesia area.  The operative limb was then marked with a marking pen.  The patient was administered a regional block per the anesthesia team.  The patient was then taken to the operative suite where general endotracheal anesthesia was performed by the anesthesia team.  A time out was then called to confirm the administration of 2 grams of Ancef as well as the administration of tranexamic acid prior to incision.  The patient was then prepped and draped in standard sterile fashion using ChloraPrep.      Once fully prepped and draped, the limb was reprepped with ChloraPrep.  Sterile marking pen was then used to segundo out the tibia distally as well as the first web space.   A tourniquet was not used.  The operative foot was placed in a Suarez leg hogan and a  standard midline incision was carried out followed by a medial parapatellar arthrotomy.  The knee did demonstrate a large knee joint effusion with tricompartmental osteoarthrosis.     Attention was then turned to the distal femoral resection.  The assistant held a Hohmann retractor medially and laterally protecting the collateral ligaments as well as extensor mechanism.  A drill was used to enter the intramedullary canal just anterior to the PCL insertion.  The extramedullary guide was then placed.  The guide was set at 5 degrees of valgus.  We then made a plus 2 mm distal femoral cut.  We then sized the femur to a size 12 using an anterior referencing guide and penned the femoral block into position.  Care was taken to make sure that the block was perpendicular to the epicondylar axis and parallel to the Whitesides line, thus in external rotation.  The assistant then held the knee flexed with the collateral ligaments protected while I made the anterior, posterior as well as chamfer cuts.      Attention was then turned to the tibia.  Then the assistant placed a PCL retractor posteriorly and subluxed the tibia forward as the ACL and PCL were resected.  The infrapatellar fat pad was excised and Hohmann retractor was placed medially and laterally.  An extramedullary tibial guide was then set parallel to the tibia on the AP and lateral views in line with the first web space and second metatarsal, just slightly medial to the medial center of the ankle.  The guide was set at 7 degrees posterior slope.  A 2/10 guide was then used to measure for approximately a 2 mm resection medially and 10 mm resection laterally.  An assistant held Hohmann retractors both medial and laterally at this point with a PCL retractor posteriorly protecting the soft tissue around the tibia circumferentially.  The proximal tibia was then removed en bloc.  The knee was then brought into extension and an assistant held a distractor in the medial and  lateral joint space respectively as the medial and lateral menisci were then excised with a radiofrequency device.  We also performed a medial soft tissue release around the posteromedial corner of the knee.  Curved osteotome was then used to remove any posterior osteophytes off the medial and lateral femoral condyles as well as strip the posterior capsule while an assistant held the femur elevated with the knee in a flexed position.  A size H tibial tray was then placed with the central portion of the tibial tray in line with the medial third of the tibial tubercle, thus in external rotation, and pinned into position.     We then placed the femoral component into position, taking care to make sure that the femoral component was flush with the lateral cortex.   A trial tibial component with medial congruent spacer was placed.  The patient achieved full extension of the knee and was stable in flexion.  The lug holes for the femur were then finished and all trial components removed.      Attention was then turned to the patella.  The assistant held the patella everted as I resected down to 16 mm.  We then prepped for a size 41 patella implant  to sit superomedially for tracking purposes.     At this point in time, all trial components were removed.  The knee was copiously irrigated with pulsatile lavage.  I then cemented in our size H tibia component leaving the post exposed for ingrowth with a press-fit size 12 CR femoral component as well as our 41 mm patellar component.  A 10 mm medial congruent trial spacer was placed with the knee in full extension as we waited on the cement to cure.  While I held the knee in extension with pressure across the joint line tip stabilize a cement and assistant was responsible for meticulous cement clean up.  Once the cement was allowed to cure, we then trialed with our trial spacers.   I elected to go with a size 10 mm medial congruent polyethylene spacer as with this in position  the knee achieved full extension and was stable in flexion, as well as having 2 mm of opening with varus/valgus stress testing.  Thus, the trial component was removed.  The knee was once again copiously irrigated with pulsatile lavage.  We then injected the posterior capsule with a local cocktail solution, both posteromedially and posterolaterally.  Care was taken to make sure there was no remaining cement.  A size 10 mm medial congruent vitamin E polyethylene spacer was then placed.  The knee was then brought into 30 degrees of flexion.  The tourniquet was deflated and meticulous hemostasis was maintained with electrocautery.      The assistant then closed the extensor mechanism with a #1 Vicryl suture.  The skin was then approximated with a 3-0 Vicryl suture and closed with a superglue skin closure.  The patient was placed in a sterile dressing.  The patient was awakened from anesthesia without dif superglue skin closure ficulty and was transferred to the PACU in stable condition.  All sponge, needle and instrument counts were correct at the end of the procedure.     Pepe Vaughn M.D.

## 2023-04-06 NOTE — ANESTHESIA PROCEDURE NOTES
Airway  Urgency: elective    Date/Time: 4/6/2023 9:20 AM  Airway not difficult    General Information and Staff    Patient location during procedure: OR  CRNA/CAA: Ben Thompson CRNA    Indications and Patient Condition  Indications for airway management: airway protection    Preoxygenated: yes  Mask difficulty assessment: 1 - vent by mask    Final Airway Details  Final airway type: endotracheal airway      Successful airway: ETT  Cuffed: yes   Successful intubation technique: direct laryngoscopy  Facilitating devices/methods: intubating stylet  Endotracheal tube insertion site: oral  Blade: Dickerson  Blade size: 2  ETT size (mm): 8.0  Cormack-Lehane Classification: grade IIa - partial view of glottis  Placement verified by: capnometry   Measured from: lips  ETT/EBT  to lips (cm): 23  Number of attempts at approach: 1  Assessment: lips, teeth, and gum same as pre-op and atraumatic intubation

## 2023-04-06 NOTE — ANESTHESIA POSTPROCEDURE EVALUATION
Patient: Raghav Olivier    Procedure Summary     Date: 04/06/23 Room / Location: [facility] OR  /  [facility] OR    Anesthesia Start: 0915 Anesthesia Stop: 1138    Procedure: RIGHT TOTAL KNEE ARTHROPLASTY (Right: Knee) Diagnosis: (RIGHT KNEE PAIN)    Surgeons: Pepe Vaughn MD Provider: Ben Thompson CRNA    Anesthesia Type: general with block ASA Status: 3          Anesthesia Type: general with block    Vitals  Vitals Value Taken Time   /77 04/06/23 1235   Temp 98 °F (36.7 °C) 04/06/23 1235   Pulse 66 04/06/23 1235   Resp 15 04/06/23 1235   SpO2 99 % 04/06/23 1235           Post Anesthesia Care and Evaluation    Patient location during evaluation: PACU  Patient participation: complete - patient participated  Level of consciousness: awake and alert  Pain management: adequate    Airway patency: patent  Anesthetic complications: No anesthetic complications    Cardiovascular status: acceptable  Respiratory status: acceptable  Hydration status: acceptable    Comments: Blood pressure 144/79, pulse 72, temperature 98 °F (36.7 °C), temperature source Temporal, resp. rate 16, SpO2 97 %.    Pt discharged from PACU based on dinesh score >8

## 2023-04-06 NOTE — ANESTHESIA PROCEDURE NOTES
Peripheral Block    Pre-sedation assessment completed: 4/6/2023 9:06 AM    Patient reassessed immediately prior to procedure    Patient location during procedure: holding area  Start time: 4/6/2023 9:09 AM  Stop time: 4/6/2023 9:10 AM  Reason for block: procedure for pain, at surgeon's request, post-op pain management and Requested by Dr. Vaughn  Performed by  Anesthesiologist: Kenna Forte MD  Preanesthetic Checklist  Completed: patient identified, IV checked, site marked, risks and benefits discussed, surgical consent, monitors and equipment checked, pre-op evaluation and timeout performed  Prep:  Pt Position: supine  Sterile barriers:gloves, mask and washed/disinfected hands  Prep: ChloraPrep  Patient monitoring: blood pressure monitoring, continuous pulse oximetry and EKG  Procedure    Sedation: yes    Guidance:ultrasound guided and femoral artery identified in adductor canal and local anesthetic seen surrounding artery    ULTRASOUND INTERPRETATION.  Using ultrasound guidance a 20 G gauge needle was placed in close proximity to the nerve, at which point, under ultrasound guidance anesthetic was injected in the area of the nerve and spread of the anesthesia was seen on ultrasound in close proximity thereto.  There were no abnormalities seen on ultrasound; a digital image was taken; and the patient tolerated the procedure with no complications. Images:still images obtained (picture printed and placed in patients chart)    Laterality:right  Block Type:adductor canal block  Injection Technique:single-shot  Needle Type:echogenic  Resistance on Injection: none    Medications Used: ropivacaine (NAROPIN) injection 0.5 % - Injection   30 mL - 4/6/2023 9:09:00 AM      Post Assessment  Injection Assessment: negative aspiration for heme, no paresthesia on injection and incremental injection  Patient Tolerance:comfortable throughout block  Complications:no

## 2023-06-13 ENCOUNTER — LAB (OUTPATIENT)
Dept: LAB | Facility: HOSPITAL | Age: 68
End: 2023-06-13
Payer: MEDICARE

## 2023-06-13 ENCOUNTER — OFFICE VISIT (OUTPATIENT)
Dept: CARDIOLOGY | Facility: CLINIC | Age: 68
End: 2023-06-13
Payer: MEDICARE

## 2023-06-13 VITALS
SYSTOLIC BLOOD PRESSURE: 146 MMHG | HEART RATE: 59 BPM | HEIGHT: 73 IN | DIASTOLIC BLOOD PRESSURE: 81 MMHG | WEIGHT: 292 LBS | BODY MASS INDEX: 38.7 KG/M2

## 2023-06-13 DIAGNOSIS — I34.0 NONRHEUMATIC MITRAL (VALVE) INSUFFICIENCY: ICD-10-CM

## 2023-06-13 DIAGNOSIS — I10 ESSENTIAL HYPERTENSION: ICD-10-CM

## 2023-06-13 DIAGNOSIS — E78.2 MIXED HYPERLIPIDEMIA: ICD-10-CM

## 2023-06-13 DIAGNOSIS — I25.10 CORONARY ARTERY DISEASE INVOLVING NATIVE CORONARY ARTERY OF NATIVE HEART WITHOUT ANGINA PECTORIS: ICD-10-CM

## 2023-06-13 DIAGNOSIS — I77.89 ENLARGED AORTA: ICD-10-CM

## 2023-06-13 DIAGNOSIS — R06.02 SOB (SHORTNESS OF BREATH): ICD-10-CM

## 2023-06-13 DIAGNOSIS — I51.7 LVH (LEFT VENTRICULAR HYPERTROPHY): ICD-10-CM

## 2023-06-13 DIAGNOSIS — I48.0 PAROXYSMAL ATRIAL FIBRILLATION: ICD-10-CM

## 2023-06-13 DIAGNOSIS — Z95.0 PRESENCE OF CARDIAC PACEMAKER: ICD-10-CM

## 2023-06-13 DIAGNOSIS — R53.83 EASY FATIGABILITY: ICD-10-CM

## 2023-06-13 DIAGNOSIS — R00.2 PALPITATIONS: ICD-10-CM

## 2023-06-13 DIAGNOSIS — I77.810 DILATED AORTIC ROOT: ICD-10-CM

## 2023-06-13 DIAGNOSIS — I49.5 SINOATRIAL NODE DYSFUNCTION: ICD-10-CM

## 2023-06-13 DIAGNOSIS — G47.33 OSA ON CPAP: ICD-10-CM

## 2023-06-13 DIAGNOSIS — E66.01 SEVERE OBESITY (BMI 35.0-39.9) WITH COMORBIDITY: Primary | ICD-10-CM

## 2023-06-13 DIAGNOSIS — Z99.89 OSA ON CPAP: ICD-10-CM

## 2023-06-13 LAB
ALBUMIN SERPL-MCNC: 4.3 G/DL (ref 3.5–5.2)
ALBUMIN/GLOB SERPL: 1.5 G/DL
ALP SERPL-CCNC: 35 U/L (ref 39–117)
ALT SERPL W P-5'-P-CCNC: 21 U/L (ref 1–41)
ANION GAP SERPL CALCULATED.3IONS-SCNC: 12 MMOL/L (ref 5–15)
AST SERPL-CCNC: 22 U/L (ref 1–40)
BASOPHILS # BLD AUTO: 0.04 10*3/MM3 (ref 0–0.2)
BASOPHILS NFR BLD AUTO: 0.5 % (ref 0–1.5)
BILIRUB SERPL-MCNC: 0.2 MG/DL (ref 0–1.2)
BUN SERPL-MCNC: 20 MG/DL (ref 8–23)
BUN/CREAT SERPL: 17.2 (ref 7–25)
CALCIUM SPEC-SCNC: 9.6 MG/DL (ref 8.6–10.5)
CHLORIDE SERPL-SCNC: 103 MMOL/L (ref 98–107)
CO2 SERPL-SCNC: 28 MMOL/L (ref 22–29)
CREAT SERPL-MCNC: 1.16 MG/DL (ref 0.76–1.27)
DEPRECATED RDW RBC AUTO: 48.1 FL (ref 37–54)
EGFRCR SERPLBLD CKD-EPI 2021: 67.8 ML/MIN/1.73
EOSINOPHIL # BLD AUTO: 0.06 10*3/MM3 (ref 0–0.4)
EOSINOPHIL NFR BLD AUTO: 0.8 % (ref 0.3–6.2)
ERYTHROCYTE [DISTWIDTH] IN BLOOD BY AUTOMATED COUNT: 14.3 % (ref 12.3–15.4)
GLOBULIN UR ELPH-MCNC: 2.9 GM/DL
GLUCOSE SERPL-MCNC: 102 MG/DL (ref 65–99)
HCT VFR BLD AUTO: 39.3 % (ref 37.5–51)
HGB BLD-MCNC: 12 G/DL (ref 13–17.7)
IMM GRANULOCYTES # BLD AUTO: 0.05 10*3/MM3 (ref 0–0.05)
IMM GRANULOCYTES NFR BLD AUTO: 0.6 % (ref 0–0.5)
LYMPHOCYTES # BLD AUTO: 1.79 10*3/MM3 (ref 0.7–3.1)
LYMPHOCYTES NFR BLD AUTO: 23 % (ref 19.6–45.3)
MCH RBC QN AUTO: 28 PG (ref 26.6–33)
MCHC RBC AUTO-ENTMCNC: 30.5 G/DL (ref 31.5–35.7)
MCV RBC AUTO: 91.8 FL (ref 79–97)
MONOCYTES # BLD AUTO: 0.61 10*3/MM3 (ref 0.1–0.9)
MONOCYTES NFR BLD AUTO: 7.8 % (ref 5–12)
NEUTROPHILS NFR BLD AUTO: 5.24 10*3/MM3 (ref 1.7–7)
NEUTROPHILS NFR BLD AUTO: 67.3 % (ref 42.7–76)
NRBC BLD AUTO-RTO: 0 /100 WBC (ref 0–0.2)
PLATELET # BLD AUTO: 242 10*3/MM3 (ref 140–450)
PMV BLD AUTO: 9.3 FL (ref 6–12)
POTASSIUM SERPL-SCNC: 3.7 MMOL/L (ref 3.5–5.2)
PROT SERPL-MCNC: 7.2 G/DL (ref 6–8.5)
RBC # BLD AUTO: 4.28 10*6/MM3 (ref 4.14–5.8)
SODIUM SERPL-SCNC: 143 MMOL/L (ref 136–145)
WBC NRBC COR # BLD: 7.79 10*3/MM3 (ref 3.4–10.8)

## 2023-06-13 PROCEDURE — 85025 COMPLETE CBC W/AUTO DIFF WBC: CPT

## 2023-06-13 PROCEDURE — 84443 ASSAY THYROID STIM HORMONE: CPT

## 2023-06-13 PROCEDURE — 80053 COMPREHEN METABOLIC PANEL: CPT

## 2023-06-13 PROCEDURE — 84479 ASSAY OF THYROID (T3 OR T4): CPT

## 2023-06-13 PROCEDURE — 84436 ASSAY OF TOTAL THYROXINE: CPT

## 2023-06-13 PROCEDURE — 36415 COLL VENOUS BLD VENIPUNCTURE: CPT

## 2023-06-13 RX ORDER — ACETAMINOPHEN 160 MG
TABLET,DISINTEGRATING ORAL DAILY
COMMUNITY
Start: 2023-04-03

## 2023-06-13 NOTE — PROGRESS NOTES
Raghav Olivier  [id number]  1955  68 y.o.  male    Referring Provider: Devendra Martinez MD    Reason for  Visit:     Paroxysmal atrial fibrillation now in sinus rhythm  obstructive sleep apnea   essential hypertension  Hyperlipidemia    short term office follow up after recent encounter     Came to ER with atrial fibrillation and underwent DC cardioversion in past   Since then has seen Dr Hernández and off Amiodarone     Due to gum bleed stopped Xarelto on his own and now on Eliquis and tolerating well    Patient called to be seen due to new symptoms of increasing pedal edema   Preoperative cardiovascular clearance under general anesthesia for spine surgery Dr Garry Manuel  underwent cardiac cath with following findings and recommendations  Cardiac workup test results as below: stress test and echo    HSaw Dr Hernández 3 months ago and due top see him in November in [facility]       Subjective    Overall feels the same   No new events or complaints since last visit   Overall the patient feels no major change from baseline symptoms   Similar symptoms as during last visit      Chronic low back pain    Awaiting evaluation by spine specialist in [facility]   Mild chronic exertional shortness of breath on exertion relieved with rest  No significant cough or wheezing  Mild b/l pedal edema     No palpitations  No associated chest pain    No fever or chills  No significant expectoration    No hemoptysis  No presyncope or syncope    Tolerating current medications well with no untoward side effects   Compliant with prescribed medication regimen. Tries to adhere to cardiac diet.     Easy fatiguability and increasing tired  Feels energy levels running low      Dr Hernández seeing him  Dr Hernández took him off Metoprolol   No bleeding, excessive bruising, gait instability or fall risks    sick sinus syndrome s/p pacemaker   Dr Hernández did pacemaker DDD and monitoring       History of present illness:  Raghav Boss  Charline is a 68 y.o. yo male with paroxysmal atrial fibrillation  who presents today for   Chief Complaint   Patient presents with    Atrial Fibrillation     6 mo f/u   .    History  Past Medical History:   Diagnosis Date    Abnormal ECG     Arrhythmia     Arthritis     Atrial fibrillation     Cancer     SKIN    Cataract     Diverticulitis     Hyperlipidemia     Hypertension     Kidney stones     Migraines     Nonrheumatic mitral (valve) insufficiency     Palpitations     Pulmonary embolism 8-30-21    [facility] ER    Sleep apnea     C-PAP    SOB (shortness of breath)     Spinal headache    ,   Past Surgical History:   Procedure Laterality Date    APPENDECTOMY      BACK SURGERY  01/2022    CARDIAC CATHETERIZATION      CARDIAC ELECTROPHYSIOLOGY PROCEDURE N/A 10/01/2021    Procedure: NEW PACEMAKER IMPLANTATION;  Surgeon: Mitch Hernández MD;  Location: [facility] CATH INVASIVE LOCATION;  Service: Cardiology;  Laterality: N/A;    CARDIOVERSION      CHOLECYSTECTOMY      COLONOSCOPY N/A 07/31/2017    Procedure: COLONOSCOPY;  Surgeon: Billy Robbins DO;  Location: [facility] ENDOSCOPY;  Service:     COLONOSCOPY N/A 07/28/2021    Procedure: COLONOSCOPY;  Surgeon: Billy Robbins DO;  Location: [facility] ENDOSCOPY;  Service: Gastroenterology;  Laterality: N/A;    EP STUDY      INSERT / REPLACE / REMOVE PACEMAKER  10-1-21    Dr Hernández    KNEE SURGERY Right     X2    SHOULDER SURGERY Left 2009    TOTAL KNEE ARTHROPLASTY Right 4/6/2023    Procedure: RIGHT TOTAL KNEE ARTHROPLASTY;  Surgeon: Pepe Vaughn MD;  Location: [facility] OR;  Service: Orthopedics;  Laterality: Right;   ,   Family History   Problem Relation Age of Onset    Cancer Mother         colon    Hypertension Mother     Cancer Father         prostate    Dementia Father     Hypertension Father    ,   Social History     Tobacco Use    Smoking status: Former     Packs/day: 0.25     Years: 4.00     Pack years: 1.00     Types: Cigarettes     Start date: 1/1/1969     Quit  date: 1973     Years since quittin.4    Smokeless tobacco: Never   Vaping Use    Vaping Use: Never used   Substance Use Topics    Alcohol use: No    Drug use: No   ,     Medications  Current Outpatient Medications   Medication Sig Dispense Refill    acetaminophen (TYLENOL) 650 MG 8 hr tablet Take 1 tablet by mouth Every 8 (Eight) Hours As Needed for Mild Pain. 1000mg twice a day      AMIODARONE HCL PO Take 100 mg by mouth Daily.      apixaban (ELIQUIS) 5 MG tablet tablet Take 1 tablet by mouth 2 (Two) Times a Day. Indications: Atrial Fibrillation, resume eliquis saturday morning      Cholecalciferol (Vitamin D3) 50 MCG ( UT) capsule Daily.      desloratadine (CLARINEX) 5 MG tablet Take 1 tablet by mouth Daily. (Patient taking differently: Take 1 tablet by mouth Daily As Needed.) 30 tablet 5    DICLOFENAC PO Take  by mouth Daily.      fluticasone (FLONASE) 50 MCG/ACT nasal spray 1 spray into the nostril(s) as directed by provider As Needed.      hydroCHLOROthiazide (HYDRODIURIL) 50 MG tablet Take 1 tablet by mouth Daily. 30 tablet 11    meloxicam (MOBIC) 7.5 MG tablet Take 1 tablet by mouth Daily.      nitroglycerin (NITROSTAT) 0.4 MG SL tablet Place 1 tablet under the tongue Every 5 (Five) Minutes As Needed for Chest Pain. Take no more than 3 doses in 15 minutes. 30 tablet 1    rosuvastatin (CRESTOR) 20 MG tablet TAKE 1 TABLET BY MOUTH EVERY DAY (Patient taking differently: Take 1 tablet by mouth Every Night.) 90 tablet 1    valACYclovir (VALTREX) 1000 MG tablet TAKE 1 TABLET BY MOUTH EVERY DAY (Patient taking differently: As Needed.) 30 tablet 1    valsartan (DIOVAN) 320 MG tablet Take 1 tablet by mouth Daily. 90 tablet 3    metoprolol succinate XL (TOPROL-XL) 25 MG 24 hr tablet metoprolol succinate ER 25 mg tablet,extended release 24 hr   Take 1 tablet every day by oral route for 90 days. (Patient not taking: Reported on 2023)      ondansetron (ZOFRAN) 4 MG tablet Take 1 tablet by mouth Every 8  "(Eight) Hours As Needed for Nausea or Vomiting. (Patient not taking: Reported on 6/13/2023) 20 tablet 0    oxyCODONE-acetaminophen (PERCOCET)  MG per tablet Take 1 tablet by mouth Every 4 (Four) Hours As Needed for Moderate Pain. (Patient not taking: Reported on 6/13/2023) 42 tablet 0    rivaroxaban (Xarelto) 10 MG tablet Take 1 tablet by mouth Daily. (Patient not taking: Reported on 6/13/2023) 30 tablet 0    sulfamethoxazole-trimethoprim (Bactrim) 400-80 MG tablet Take 1 tablet by mouth 2 (Two) Times a Day. (Patient not taking: Reported on 6/13/2023) 10 tablet 0     No current facility-administered medications for this visit.       Allergies:  Penicillins and Vancomycin    Review of Systems  Review of Systems   Constitutional: Negative.   HENT: Negative.     Eyes: Negative.    Cardiovascular:  Positive for dyspnea on exertion and leg swelling. Negative for chest pain, claudication, cyanosis, irregular heartbeat, near-syncope, orthopnea, palpitations, paroxysmal nocturnal dyspnea and syncope.   Respiratory: Negative.     Endocrine: Negative.    Hematologic/Lymphatic: Negative.    Skin: Negative.    Musculoskeletal:  Positive for arthritis and joint pain.   Gastrointestinal:  Negative for anorexia.   Genitourinary: Negative.    Neurological: Negative.    Psychiatric/Behavioral: Negative.       Objective     Physical Exam:      Vitals:    06/13/23 1051   BP: 146/81   Pulse: 59   Weight: 132 kg (292 lb)   Height: 185.4 cm (73\")         /81   Pulse 59   Ht 185.4 cm (73\")   Wt 132 kg (292 lb)   BMI 38.52 kg/m²     Physical Exam  Constitutional:       Appearance: He is well-developed.   HENT:      Head: Normocephalic.   Neck:      Vascular: Normal carotid pulses. No carotid bruit or JVD.      Trachea: No tracheal tenderness or tracheal deviation.   Cardiovascular:      Rate and Rhythm: Regular rhythm.      Pulses: Normal pulses.      Heart sounds: Normal heart sounds.   Pulmonary:      Effort: Pulmonary " effort is normal.      Breath sounds: No stridor.   Abdominal:      General: There is no distension.      Palpations: Abdomen is soft.      Tenderness: There is no abdominal tenderness.   Musculoskeletal:      Cervical back: No edema.      Right lower le+ Pitting Edema present.      Left lower le+ Pitting Edema present.   Skin:     General: Skin is warm.   Neurological:      Mental Status: He is alert.      Cranial Nerves: No cranial nerve deficit.      Sensory: No sensory deficit.   Psychiatric:         Speech: Speech normal.         Behavior: Behavior normal.       Results Review:      Raghav Olivier  Regadenoson Stress Test With Myocardial Perfusion SPECT (Multi Study)  Order# [id number]  Reading physician: Titi Kramer MD Ordering physician: Titi Kramer MD Study date: 21       Patient Information    Patient Name   Raghav Olivier MRN   [id number] Legal Sex   Male  (Age)   1955 (66 y.o.)     Interpretation Summary       Diaphragmatic attenuation artifact is present.  Myocardial perfusion imaging indicates a normal myocardial perfusion study with no evidence of ischemia.  Impressions are consistent with a low risk study.  Left ventricular ejection fraction is normal. (Calculated EF = 55%).  Physiological apical thinning noted       Raghav Olivier  Holter Monitor Greater than 7 days up to 15 days  Order# [id number]  Reading physician: Titi Kramer MD Ordering physician: Titi Kramer MD Study date: 8/3/21   Patient Information    Patient Name   Raghav Olivier MRN   [id number] Legal Sex   Male  (Age)   1955 (66 y.o.)   Interpretation Summary       Average HR: 83. Min HR: 38. Max HR: 216.     Entire report was reviewed.  Monitoring in days: ~13  In atrial fibrillation during entire monitoring duration     Rare premature ventricular contractions with a PVC burden of: < 1%     No correlated arrhythmia  No significant pauses                  Conclusion:      In atrial  fibrillation during entire monitoring duration  Intermittent rapid ventricular response with rates between  bpm  Multiple pauses total of 2062 longest 7.9 seconds at 1:39 AM           Results for orders placed during the hospital encounter of 09/20/21    Adult Transthoracic Echo Complete w/ Color, Spectral and Contrast if necessary per protocol    Interpretation Summary  · Left ventricular ejection fraction appears to be 56 - 60%. Left ventricular systolic function is normal.  · Left ventricular wall thickness is consistent with moderate concentric hypertrophy.  · The following left ventricular wall segments are hypokinetic: apical inferior.  · Estimated right ventricular systolic pressure from tricuspid regurgitation is normal (<35 mmHg).     ____________________________________________________________________________________________________________________________________________  Health maintenance and recommendations    Low salt/ HTN/ Heart healthy carbohydrate restricted cardiac diet   The patient is advised to reduce or avoid caffeine or other cardiac stimulants.   Minimize or avoid  NSAID-type medications      Monitor for any signs of bleeding including red or dark stools. Fall precautions.   Advised staying uptodate with immunizations per established standard guidelines.    Offered to give patient  a copy of my notes     Questions were encouraged, asked and answered to the patient's  understanding and satisfaction. Questions if any regarding current medications and side effects, need for refills and importance of compliance to medications stressed.    Reviewed available prior notes, consults, prior visits, laboratory findings, radiology and cardiology relevant reports. Updated chart as applicable. I have reviewed the patient's medical history in detail and updated the computerized patient record as relevant.      Updated patient regarding any new or relevant abnormalities on review of records or any  new findings on physical exam. Mentioned to patient about purpose of visit and desirable health short and long term goals and objectives.    Primary to monitor CBC CMP Lipid panel and TSH as applicable    ___________________________________________________________________________________________________________________________________________         ECG 12 Lead    Date/Time: 6/13/2023 11:18 AM  Performed by: Titi Kramer MD  Authorized by: Titi Kramer MD   Comparison: compared with previous ECG from 12/2/2022  Comparison to previous ECG: Ventricular rate unchanged at 58  beats per minute      Rhythm: sinus bradycardia  Rate: bradycardic  Conduction: conduction normal  ST Segments: ST segments normal  Other findings: non-specific ST-T wave changes    Clinical impression: abnormal EKG        Assessment & Plan   Diagnoses and all orders for this visit:    1. Severe obesity (BMI 35.0-39.9) with comorbidity (Primary)    2. TAMARA on CPAP    3. Presence of cardiac pacemaker    4. Palpitations    5. Paroxysmal atrial fibrillation    6. Nonrheumatic mitral (valve) insufficiency    7. LVH (left ventricular hypertrophy) moderate     8. Mixed hyperlipidemia    9. Essential hypertension    10. Dilated aortic root    11. Coronary artery disease involving native coronary artery of native heart without angina pectoris    12. Sinoatrial node dysfunction    13. SOB (shortness of breath)  -     CBC & Differential; Future  -     Comprehensive Metabolic Panel; Future  -     Thyroid Panel With TSH; Future    14. Enlarged aorta  -     CT Chest With Contrast; Future    15. Easy fatigability  -     Thyroid Panel With TSH; Future    Other orders  -     ECG 12 Lead        Plan    Monitor cardiac rhythm device function regularly per established schedule or PRN    Dr Hernández monitors device   Keep f/u Dr Hernández   Monitor CBC, CMP, TSH and Lipid Panel by primary  Eye exam, pulmonary function tests   Discussed Amiodarone induced toxicity and  required monitoring and close follow up for this     Check BP and heart rates twice daily initially till blood pressures and heart rates under good control and then at least 3x / week,   If blood pressures continue to be well-controlled then can check week a month  at home and bring a recording for review next visit  If BP >130/85 or < 100/60 persistently over 3 reading 30 mins apart or if heart rates persistently above 100 bpm or less than 55 bpm call sooner for evaluation and advise     No recent labs performed per patient will order labs today       Orders Placed This Encounter   Procedures    CT Chest With Contrast     Standing Status:   Future     Standing Expiration Date:   6/13/2024     Order Specific Question:   Release to patient     Answer:   Routine Release    Comprehensive Metabolic Panel     Standing Status:   Future     Standing Expiration Date:   6/13/2024     Order Specific Question:   Release to patient     Answer:   Routine Release    Thyroid Panel With TSH     Standing Status:   Future     Standing Expiration Date:   6/13/2024     Order Specific Question:   Release to patient     Answer:   Routine Release    ECG 12 Lead     This order was created via procedure documentation     Order Specific Question:   Release to patient     Answer:   Routine Release    CBC & Differential     Standing Status:   Future     Standing Expiration Date:   6/13/2024     Order Specific Question:   Manual Differential     Answer:   No     Order Specific Question:   Release to patient     Answer:   Routine Release           Patient expressed understanding  Encouraged and answered all questions   Discussed with the patient and all questioned fully answered. He will call me if any problems arise.   Discussed results of prior testing with patient : myocardial perfusion scan  and echo    as well electrocardiogram from today       Weigh yourself frequently, at least weekly, preferably daily, call me if more than 2 pounds a day  or 5 pounds a week weight gain.  Flexible diuretic dosing   Patient was advised to continue CPAP daily.            Return in about 6 months (around 12/13/2023).

## 2023-06-14 LAB
T-UPTAKE NFR SERPL: 1.22 TBI (ref 0.8–1.3)
T4 SERPL-MCNC: 10.8 MCG/DL (ref 4.5–11.7)
TSH SERPL DL<=0.05 MIU/L-ACNC: 1.54 UIU/ML (ref 0.27–4.2)

## 2023-07-10 NOTE — ANESTHESIA PREPROCEDURE EVALUATION
Anesthesia Evaluation     Patient summary reviewed and Nursing notes reviewed   NPO Solid Status: > 8 hours  NPO Liquid Status: > 8 hours           Airway   Mallampati: II  No difficulty expected  Dental - normal exam     Pulmonary - normal exam   (+) a smoker Former, shortness of breath, sleep apnea on CPAP,   Cardiovascular     Rhythm: irregular  Rate: normal    (+) hypertension well controlled less than 2 medications, valvular problems/murmurs MR, CAD, dysrhythmias Atrial Fib, hyperlipidemia,       Neuro/Psych  GI/Hepatic/Renal/Endo    (+) obesity, morbid obesity,      Musculoskeletal (-) negative ROS    Abdominal    Substance History      OB/GYN          Other - negative ROS                       Anesthesia Plan    ASA 3     MAC     intravenous induction     Anesthetic plan, all risks, benefits, and alternatives have been provided, discussed and informed consent has been obtained with: patient.    Plan discussed with CRNA.      
6

## 2023-08-20 NOTE — PROGRESS NOTES
Raghav Olivier  [id number]  1955  65 y.o.  male    Referring Provider: Devendra Martinez MD    Reason for  Visit:       paroxysmal atrial fibrillation now in sinus rhythm   obstructive sleep apnea   essential hypertension  Hyperlipidemia    short term office follow up after recent encounter   Came to ER with atrial fibrillation and underwent DC cardioversion       Subjective    Mild chronic exertional shortness of breath on exertion relieved with rest  No significant cough or wheezing    No palpitations  No associated chest pain  No significant pedal edema    No fever or chills  No significant expectoration    No hemoptysis  No presyncope or syncope    Tolerating current medications well with no untoward side effects   Compliant with prescribed medication regimen. Tries to adhere to cardiac diet.     Joint pain in small, medium and large joints   Per patient intentional weight loss by home scales of 20 lbs in past now gained back 5 lbs  obstructive sleep apnea on CPAP     Effort tolerance limited more by orthopedic rather than cardiac related issues, therefore difficult to assess functional capacity.     Dr Vaughn told him he needs knee and shoulder replacement   Planning to do this later this year     No bleeding, excessive bruising, gait instability or fall risks      History of present illness:  Raghav Olivier is a 65 y.o. yo male with history of paroxysmal atrial fibrillation  who presents today for   Chief Complaint   Patient presents with   • PAF     3mo- no issues. patient does monitor his weight and BP at home.    .    History  Past Medical History:   Diagnosis Date   • Atrial fibrillation (CMS/HCC)    • Diverticulitis    • Hyperlipidemia    • Hypertension    • Kidney stones    • Nonrheumatic mitral (valve) insufficiency    • Palpitations    • Sleep apnea    • SOB (shortness of breath)    ,   Past Surgical History:   Procedure Laterality Date   • APPENDECTOMY     • CARDIAC  CATHETERIZATION     • CARDIOVERSION     • CHOLECYSTECTOMY     • COLONOSCOPY N/A 2017    Procedure: COLONOSCOPY;  Surgeon: Billy Robbins DO;  Location: [facility] ENDOSCOPY;  Service:    • EP STUDY     • KNEE SURGERY Right    • SHOULDER SURGERY Left    ,   Family History   Problem Relation Age of Onset   • Cancer Mother         colon   • Cancer Father         prostate   • Dementia Father    ,   Social History     Tobacco Use   • Smoking status: Former Smoker     Packs/day: 0.25     Years: 4.00     Pack years: 1.00     Types: Cigarettes     Start date:      Quit date:      Years since quittin.1   • Smokeless tobacco: Never Used   Substance Use Topics   • Alcohol use: No     Frequency: Never     Binge frequency: Never   • Drug use: No   ,     Medications  Current Outpatient Medications   Medication Sig Dispense Refill   • acetaminophen (TYLENOL) 650 MG 8 hr tablet Take 650 mg by mouth Every 8 (Eight) Hours As Needed for Mild Pain . Takes one tablet in the morning and one at night     • amiodarone (PACERONE) 200 MG tablet TAKE 1 TABLET BY MOUTH TWICE A DAY (Patient taking differently: Taking 1/2 tablet) 180 tablet 4   • hydroCHLOROthiazide (HYDRODIURIL) 12.5 MG tablet TAKE 1 TABLET BY MOUTH EVERY DAY 90 tablet 1   • nitroglycerin (NITROSTAT) 0.4 MG SL tablet Place 1 tablet under the tongue Every 5 (Five) Minutes As Needed for Chest Pain. Take no more than 3 doses in 15 minutes. 30 tablet 1   • rivaroxaban (XARELTO) 20 MG tablet Take 20 mg by mouth Daily.     • rosuvastatin (CRESTOR) 20 MG tablet TAKE 1 TABLET BY MOUTH EVERY DAY (Patient taking differently: 30 mg.) 90 tablet 1   • silver sulfadiazine (SILVADENE) 1 % cream Apply  topically to the appropriate area as directed 2 (Two) Times a Day. 50 g 5   • valACYclovir (VALTREX) 1000 MG tablet TAKE 1 TABLET BY MOUTH EVERY DAY 30 tablet 1   • valsartan (DIOVAN) 80 MG tablet Take 80 mg by mouth Daily.     • aspirin 81 MG EC tablet Take 1 tablet by  "mouth Daily. 30 tablet 2   • cefuroxime (CEFTIN) 500 MG tablet Take 1 tablet by mouth 2 (Two) Times a Day. 20 tablet 0   • cyclobenzaprine (FLEXERIL) 10 MG tablet Take 1 tablet by mouth 3 (Three) Times a Day As Needed for Muscle Spasms. 30 tablet 0   • desloratadine (CLARINEX) 5 MG tablet Take 1 tablet by mouth Daily. 30 tablet 5   • predniSONE (DELTASONE) 20 MG tablet One po tid x 2 days, then one po bid x 3 days, then one po qday x 3 days. 21 tablet 0   • sulfamethoxazole-trimethoprim (BACTRIM DS,SEPTRA DS) 800-160 MG per tablet Take 1 tablet by mouth 2 (Two) Times a Day. 20 tablet 0     No current facility-administered medications for this visit.        Allergies:  Penicillins and Vancomycin    Review of Systems  Review of Systems   Constitution: Negative.   HENT: Negative.    Eyes: Negative.    Cardiovascular: Positive for dyspnea on exertion and palpitations. Negative for chest pain, claudication, cyanosis, irregular heartbeat, leg swelling, near-syncope, orthopnea, paroxysmal nocturnal dyspnea and syncope.   Respiratory: Negative.    Endocrine: Negative.    Hematologic/Lymphatic: Negative.    Skin: Negative.    Musculoskeletal: Positive for arthritis and joint pain.   Gastrointestinal: Negative for anorexia.   Genitourinary: Negative.    Neurological: Negative.    Psychiatric/Behavioral: Negative.        Objective     Physical Exam:  /72   Pulse 60   Ht 188 cm (74\")   Wt 135 kg (297 lb)   SpO2 98%   BMI 38.13 kg/m²     Physical Exam  Constitutional:       Appearance: He is well-developed.   HENT:      Head: Normocephalic.   Neck:      Musculoskeletal: No edema.      Vascular: Normal carotid pulses. No carotid bruit or JVD.      Trachea: No tracheal tenderness or tracheal deviation.   Cardiovascular:      Rate and Rhythm: Regular rhythm.      Pulses: Normal pulses.      Heart sounds: Normal heart sounds.   Pulmonary:      Effort: Pulmonary effort is normal.      Breath sounds: No stridor. "   Abdominal:      General: There is no distension.      Palpations: Abdomen is soft.      Tenderness: There is no abdominal tenderness.   Skin:     General: Skin is warm.   Neurological:      Mental Status: He is alert.      Cranial Nerves: No cranial nerve deficit.      Sensory: No sensory deficit.   Psychiatric:         Speech: Speech normal.         Behavior: Behavior normal.         Results Review:    Results for orders placed during the hospital encounter of 05/04/18   Adult Transthoracic Echo Complete W/ Cont if Necessary Per Protocol    Narrative · Left ventricular systolic function is normal. Estimated EF = 65%.  · Left ventricular diastolic dysfunction (grade I) consistent with   impaired relaxation.  · Left atrial cavity size is borderline dilated.  · Borderline dilation of the aortic root is present (3.9 cms)         ____________________________________________________________________________________________________________________________________________  Health maintenance and recommendations    Low salt/ HTN/ Heart healthy carbohydrate restricted cardiac diet   The patient is advised to reduce or avoid caffeine or other cardiac stimulants.   Minimize or avoid  NSAID-type medications      Monitor for any signs of bleeding including red or dark stools. Fall precautions.   Advised staying uptodate with immunizations per established standard guidelines.    Offered to give patient  a copy of my notes     Questions were encouraged, asked and answered to the patient's  understanding and satisfaction. Questions if any regarding current medications and side effects, need for refills and importance of compliance to medications stressed.    Reviewed available prior notes, consults, prior visits, laboratory findings, radiology and cardiology relevant reports. Updated chart as applicable. I have reviewed the patient's medical history in detail and updated the computerized patient record as relevant.      Updated  patient regarding any new or relevant abnormalities on review of records or any new findings on physical exam. Mentioned to patient about purpose of visit and desirable health short and long term goals and objectives.    Primary to monitor CBC CMP Lipid panel and TSH as applicable    ___________________________________________________________________________________________________________________________________________       Procedures    Assessment/Plan   Diagnoses and all orders for this visit:    1. Palpitations (Primary)    2. Paroxysmal atrial fibrillation (CMS/HCC)    3. SOB (shortness of breath)    4. Nonrheumatic mitral (valve) insufficiency    5. Essential hypertension    6. Mixed hyperlipidemia    7. Dilated aortic root (CMS/HCC)    8. TAMARA on CPAP    9. Class 3 severe obesity due to excess calories with serious comorbidity and body mass index (BMI) of 40.0 to 44.9 in adult (CMS/[facility])        Plan    Had CTA coronaries in [facility] with moderate obstruction   Keep f/u Dr Hernández   He is due to see Dr Hernández in May 2021    Ws advised atrial fibrillation ablation but patient wants to defer for now   continue on anticoagulation  Defer any elective surgeries     Continue  Amiodarone to 100 mg daily   Monitor CBC, CMP, TSH and Lipid Panel by primary  Eye exam, pulmonary function tests   Discussed Amiodarone induced toxicity and required monitoring and close follow up for this     Monitor for any signs of bleeding including red or dark stools as well as easy bruisabilty. Fall precautions.       Check BP and heart rates twice daily at least 3x / week, week a month  at home and bring a recording for me to review next visit  If BP >130/85 or < 100/60 persistently over 3 reading 30 mins apart call sooner      Overall doing well no new cardiovascular symptoms and therefore no additional cardiac testing is required   If any interim issues arise will call me for further evaluation.     Patient was advised to  continue CPAP daily.   I support the patient's decision to take the Covid -19 vaccine            Return in about 6 months (around 8/2/2021).                diffuse

## 2023-12-12 ENCOUNTER — OFFICE VISIT (OUTPATIENT)
Dept: CARDIOLOGY | Facility: CLINIC | Age: 68
End: 2023-12-12
Payer: MEDICARE

## 2023-12-12 VITALS
WEIGHT: 303 LBS | HEART RATE: 58 BPM | SYSTOLIC BLOOD PRESSURE: 156 MMHG | BODY MASS INDEX: 40.16 KG/M2 | HEIGHT: 73 IN | DIASTOLIC BLOOD PRESSURE: 84 MMHG

## 2023-12-12 DIAGNOSIS — Z79.01 CHRONIC ANTICOAGULATION: ICD-10-CM

## 2023-12-12 DIAGNOSIS — I49.5 SINOATRIAL NODE DYSFUNCTION: ICD-10-CM

## 2023-12-12 DIAGNOSIS — G47.33 OSA ON CPAP: ICD-10-CM

## 2023-12-12 DIAGNOSIS — E66.01 SEVERE OBESITY (BMI 35.0-39.9) WITH COMORBIDITY: ICD-10-CM

## 2023-12-12 DIAGNOSIS — Z95.0 PRESENCE OF CARDIAC PACEMAKER: ICD-10-CM

## 2023-12-12 DIAGNOSIS — E78.2 MIXED HYPERLIPIDEMIA: ICD-10-CM

## 2023-12-12 DIAGNOSIS — I48.0 PAROXYSMAL ATRIAL FIBRILLATION: Primary | ICD-10-CM

## 2023-12-12 DIAGNOSIS — I10 ESSENTIAL HYPERTENSION: ICD-10-CM

## 2023-12-12 NOTE — PROGRESS NOTES
Subjective:     Encounter Date:12/12/2023      Patient ID: Raghav Olivier is a 68 y.o. male with paroxysmal atrial fibrillation, obstructive sleep apnea, hypertension, hyperlipidemia.    Chief Complaint: routine follow up   History of Present Illness  Patient presents today for management of atrial fibrillation. He followed with Dr Hernández last week, Amiodarone was stopped and visit went well. Today he reports that he has been doing good. He denies any chest pain. He denies any dyspnea on exertion. He reports that his BP has been remaining controlled 125-130/80. He reports that he has occasional heart racing/palpitations in the am that he feels like it lasts <5 minutes. He report some dizziness when he lays down and rolls from side to side. He reports some nausea with it as well. He denies any leg swelling, orthopnea or PND. Patient reports that he was spitting up blood and he decreased the Eliquis to daily. He denies any further bleeding issues. He was on Xarelto previously due to gum bleeding and had issues. Patient follows with Michelle as PCP.     The following portions of the patient's history were reviewed and updated as appropriate: allergies, current medications, past family history, past medical history, past social history, past surgical history and problem list.    Allergies   Allergen Reactions    Penicillins Unknown - Low Severity     Pt states is was a childhood allergy    Vancomycin Itching       Current Outpatient Medications:     acetaminophen (TYLENOL) 650 MG 8 hr tablet, Take 1 tablet by mouth Every 8 (Eight) Hours As Needed for Mild Pain. 1000mg twice a day, Disp: , Rfl:     apixaban (ELIQUIS) 5 MG tablet tablet, Take 1 tablet by mouth Daily. Indications: Atrial Fibrillation, resume eliquis saturday morning, Disp: , Rfl:     Cholecalciferol (Vitamin D3) 50 MCG (2000 UT) capsule, Daily., Disp: , Rfl:     fluticasone (FLONASE) 50 MCG/ACT nasal spray, 1 spray into the nostril(s) as directed by  provider As Needed., Disp: , Rfl:     hydroCHLOROthiazide (HYDRODIURIL) 50 MG tablet, Take 1 tablet by mouth Daily., Disp: 30 tablet, Rfl: 11    meloxicam (MOBIC) 7.5 MG tablet, Take 1 tablet by mouth Every Other Day., Disp: , Rfl:     nitroglycerin (NITROSTAT) 0.4 MG SL tablet, Place 1 tablet under the tongue Every 5 (Five) Minutes As Needed for Chest Pain. Take no more than 3 doses in 15 minutes., Disp: 30 tablet, Rfl: 1    rosuvastatin (CRESTOR) 20 MG tablet, TAKE 1 TABLET BY MOUTH EVERY DAY (Patient taking differently: Take 1 tablet by mouth Every Night.), Disp: 90 tablet, Rfl: 1    valsartan (DIOVAN) 320 MG tablet, Take 1 tablet by mouth Daily. (Patient taking differently: Take 0.5 tablets by mouth Daily.), Disp: 90 tablet, Rfl: 3  Past Medical History:   Diagnosis Date    Abnormal ECG     Arrhythmia     Arthritis     Atrial fibrillation     Cancer     SKIN    Cataract     Diverticulitis     Hyperlipidemia     Hypertension     Kidney stones     Migraines     Nonrheumatic mitral (valve) insufficiency     Palpitations     Pulmonary embolism 21    [facility]    Sleep apnea     C-PAP    SOB (shortness of breath)     Spinal headache      Social History     Socioeconomic History    Marital status:     Highest education level: GED or equivalent   Tobacco Use    Smoking status: Former     Packs/day: 0.25     Years: 4.00     Additional pack years: 0.00     Total pack years: 1.00     Types: Cigarettes     Start date: 1969     Quit date: 1973     Years since quittin.9    Smokeless tobacco: Never   Vaping Use    Vaping Use: Never used   Substance and Sexual Activity    Alcohol use: No    Drug use: No    Sexual activity: Yes     Partners: Female     Birth control/protection: Surgical       Review of Systems   Constitutional: Negative for malaise/fatigue.   HENT:  Negative for nosebleeds.    Cardiovascular:  Positive for palpitations. Negative for chest pain, dyspnea on exertion, irregular  "heartbeat, leg swelling, near-syncope, orthopnea, paroxysmal nocturnal dyspnea and syncope.   Respiratory:  Negative for shortness of breath.    Hematologic/Lymphatic: Bruises/bleeds easily.   Genitourinary:  Negative for hematuria.   Neurological:  Negative for dizziness and weakness.   All other systems reviewed and are negative.         Objective:     Vitals reviewed.   Constitutional:       General: Not in acute distress.     Appearance: Normal appearance. Well-developed.   Eyes:      Pupils: Pupils are equal, round, and reactive to light.   HENT:      Head: Normocephalic and atraumatic.      Nose: Nose normal.   Neck:      Vascular: No carotid bruit.   Pulmonary:      Effort: Pulmonary effort is normal. No respiratory distress.      Breath sounds: Normal breath sounds. No wheezing. No rales.   Cardiovascular:      Normal rate. Regular rhythm.      Murmurs: There is no murmur.   Edema:     Peripheral edema absent.   Abdominal:      General: There is no distension.      Palpations: Abdomen is soft.   Musculoskeletal: Normal range of motion.      Cervical back: Normal range of motion and neck supple. Skin:     General: Skin is warm.      Findings: No erythema or rash.   Neurological:      General: No focal deficit present.      Mental Status: Alert and oriented to person, place, and time.   Psychiatric:         Attention and Perception: Attention normal.         Mood and Affect: Mood normal.         Speech: Speech normal.         Behavior: Behavior normal.         Thought Content: Thought content normal.         Judgment: Judgment normal.         /84   Pulse 58   Ht 185.4 cm (73\")   Wt (!) 137 kg (303 lb)   BMI 39.98 kg/m²       ECG 12 Lead    Date/Time: 12/12/2023 2:50 PM  Performed by: Sebastian Kulkarni APRN    Authorized by: Sebastian Kulkarni APRN  Comparison: compared with previous ECG from 6/13/2023  Similar to previous ECG  Rhythm: sinus bradycardia  Rate: bradycardic  BPM: 58          Lab Review:    " "   Results for orders placed during the hospital encounter of 09/20/21    Adult Transthoracic Echo Complete w/ Color, Spectral and Contrast if necessary per protocol    Interpretation Summary  · Left ventricular ejection fraction appears to be 56 - 60%. Left ventricular systolic function is normal.  · Left ventricular wall thickness is consistent with moderate concentric hypertrophy.  · The following left ventricular wall segments are hypokinetic: apical inferior.  · Estimated right ventricular systolic pressure from tricuspid regurgitation is normal (<35 mmHg).    Interrogation 4/6/2023:      No results found for: \"CHOL\", \"CHLPL\", \"TRIG\", \"HDL\", \"LDL\", \"LDLDIRECT\"    No results found for: \"HGBA1C\"    I have personally reviewed echo, interrogation and past office notes prior to patients visit   Assessment:          Diagnosis Plan   1. Paroxysmal atrial fibrillation  ECG 12 Lead      2. Chronic anticoagulation        3. Essential hypertension        4. Mixed hyperlipidemia        5. Sinoatrial node dysfunction        6. Presence of cardiac pacemaker        7. TAMARA on CPAP        8. Severe obesity (BMI 35.0-39.9) with comorbidity               Plan:       Paroxysmal atrial fibrillation: s/p ablation Dr Hernández. Atrial Fibrillation 0% burden on pacemaker interrogation 4/6/2023. Continues to follow with Dr Hernández    2. Chronic anticoagulation: patient and his PCP decreased his Eliquis to 5 mg daily a couple of months ago due to pink tinged sputum and possible gum bleeding. Discussed with patient that eliquis 5 mg was not studied to reduce stroke risk. Recommend him to return to 5 mg BID dosing. Patient verbalizes understanding. Discussed potential left atrial appendage occlusion with Watchman device if bleeding issues arise.     3. Hypertension: Elevated in office. Home BP readings show better controlled BP.  Continue current medications. Recommend to continue to monitor routinely and follow up with PCP.     4. " Hyperlipidemia: managed and followed by PCP. Continue rosuvastatin    5/6. SSS: s/p Pacemaker: Interrogation 4/6/2023 noted that battery is 10 years. A pacing 31%, V pacing <1%. Atrial fibrillation burden of 0% since 2/2023.     7. TAMARA on BiPAP: patient reports compliance    8. Obesity: Class 2 Severe Obesity (BMI >=35 and <=39.9). Obesity-related health conditions include the following: obstructive sleep apnea. Obesity is unchanged. BMI is is above average; no BMI management plan is appropriate. We discussed portion control and increasing exercise.    I spent 36 minutes caring for Raghav on this date of service. This time includes time spent by me in the following activities:preparing for the visit, reviewing tests, obtaining and/or reviewing a separately obtained history, performing a medically appropriate examination and/or evaluation , counseling and educating the patient/family/caregiver, and documenting information in the medical record     I spent 2 minutes on the separately reported service of EKG. This time is not included in the time used to support the E/M service also reported today.    Patient is to follow up in 6 months or sooner if needed

## 2023-12-26 NOTE — TELEPHONE ENCOUNTER
Rx Refill Note  Requested Prescriptions     Pending Prescriptions Disp Refills    valsartan (DIOVAN) 320 MG tablet [Pharmacy Med Name: VALSARTAN 320 MG TABLET] 90 tablet 3     Sig: TAKE 1 TABLET EVERY DAY      Last office visit with prescribing clinician: 6/13/2023      Next office visit with prescribing clinician: 6/11/2024            Tyesha Aquino MA  12/26/23, 11:17 CST

## 2023-12-28 RX ORDER — VALSARTAN 160 MG/1
160 TABLET ORAL DAILY
Qty: 90 TABLET | Refills: 3 | Status: SHIPPED | OUTPATIENT
Start: 2023-12-28

## 2024-03-10 DIAGNOSIS — I10 ESSENTIAL HYPERTENSION: ICD-10-CM

## 2024-03-11 RX ORDER — HYDROCHLOROTHIAZIDE 50 MG/1
50 TABLET ORAL DAILY
Qty: 90 TABLET | Refills: 3 | Status: SHIPPED | OUTPATIENT
Start: 2024-03-11

## 2024-04-30 ENCOUNTER — TELEPHONE (OUTPATIENT)
Dept: FAMILY MEDICINE CLINIC | Facility: CLINIC | Age: 69
End: 2024-04-30
Payer: MEDICARE

## 2024-04-30 NOTE — TELEPHONE ENCOUNTER
[facility] TO RELAY    LM for patient to call regarding his appointment with us today 04/30/2024 @ 1:30. If he is not planning on switching all his to Dr Corona from Dr. Martinez, then Dr. Corona would prefer that he go to Dr. Martinez or urgent care. We only seeing patients that we are the PCP for.

## 2024-05-09 ENCOUNTER — OFFICE VISIT (OUTPATIENT)
Dept: INTERNAL MEDICINE | Facility: CLINIC | Age: 69
End: 2024-05-09
Payer: MEDICARE

## 2024-05-09 VITALS
HEIGHT: 73 IN | HEART RATE: 80 BPM | BODY MASS INDEX: 39.76 KG/M2 | SYSTOLIC BLOOD PRESSURE: 124 MMHG | DIASTOLIC BLOOD PRESSURE: 83 MMHG | OXYGEN SATURATION: 99 % | WEIGHT: 300 LBS | TEMPERATURE: 98.4 F

## 2024-05-09 DIAGNOSIS — H69.93 DYSFUNCTION OF BOTH EUSTACHIAN TUBES: ICD-10-CM

## 2024-05-09 DIAGNOSIS — R42 VERTIGO: Primary | ICD-10-CM

## 2024-05-09 DIAGNOSIS — Z12.5 SCREENING PSA (PROSTATE SPECIFIC ANTIGEN): ICD-10-CM

## 2024-05-09 DIAGNOSIS — R09.89 DECREASED PULSE: ICD-10-CM

## 2024-05-09 DIAGNOSIS — I10 ESSENTIAL HYPERTENSION: ICD-10-CM

## 2024-05-09 DIAGNOSIS — J01.01 ACUTE RECURRENT MAXILLARY SINUSITIS: ICD-10-CM

## 2024-05-09 DIAGNOSIS — R73.9 ELEVATED BLOOD SUGAR: ICD-10-CM

## 2024-05-09 DIAGNOSIS — M48.061 SPINAL STENOSIS OF LUMBAR REGION, UNSPECIFIED WHETHER NEUROGENIC CLAUDICATION PRESENT: ICD-10-CM

## 2024-05-09 DIAGNOSIS — Z23 NEED FOR PNEUMOCOCCAL 20-VALENT CONJUGATE VACCINATION: ICD-10-CM

## 2024-05-09 PROBLEM — J44.9 CHRONIC OBSTRUCTIVE PULMONARY DISEASE: Status: ACTIVE | Noted: 2024-05-09

## 2024-05-09 PROBLEM — M17.0 PRIMARY OSTEOARTHRITIS OF BOTH KNEES: Status: ACTIVE | Noted: 2021-06-09

## 2024-05-09 RX ORDER — CLINDAMYCIN HYDROCHLORIDE 300 MG/1
300 CAPSULE ORAL 3 TIMES DAILY
Qty: 21 CAPSULE | Refills: 0 | Status: SHIPPED | OUTPATIENT
Start: 2024-05-09 | End: 2024-05-16

## 2024-05-09 RX ORDER — METHYLPREDNISOLONE 4 MG/1
TABLET ORAL
Qty: 21 TABLET | Refills: 0 | Status: SHIPPED | OUTPATIENT
Start: 2024-05-09

## 2024-05-09 RX ORDER — VALACYCLOVIR HYDROCHLORIDE 1 G/1
500 TABLET, FILM COATED ORAL DAILY
COMMUNITY

## 2024-05-09 RX ORDER — GUAIFENESIN 600 MG/1
600 TABLET, EXTENDED RELEASE ORAL 2 TIMES DAILY
Qty: 20 TABLET | Refills: 0 | Status: SHIPPED | OUTPATIENT
Start: 2024-05-09 | End: 2024-05-19

## 2024-05-10 DIAGNOSIS — R06.02 SOB (SHORTNESS OF BREATH): Primary | ICD-10-CM

## 2024-05-10 LAB
ALBUMIN SERPL-MCNC: 4.3 G/DL (ref 3.9–4.9)
ALBUMIN/GLOB SERPL: 1.7 {RATIO} (ref 1.2–2.2)
ALP SERPL-CCNC: 34 IU/L (ref 44–121)
ALT SERPL-CCNC: 30 IU/L (ref 0–44)
AST SERPL-CCNC: 28 IU/L (ref 0–40)
BASOPHILS # BLD AUTO: 0.1 X10E3/UL (ref 0–0.2)
BASOPHILS NFR BLD AUTO: 1 %
BILIRUB SERPL-MCNC: 0.3 MG/DL (ref 0–1.2)
BUN SERPL-MCNC: 19 MG/DL (ref 8–27)
BUN/CREAT SERPL: 14 (ref 10–24)
CALCIUM SERPL-MCNC: 9.8 MG/DL (ref 8.6–10.2)
CHLORIDE SERPL-SCNC: 102 MMOL/L (ref 96–106)
CHOLEST SERPL-MCNC: 161 MG/DL (ref 100–199)
CO2 SERPL-SCNC: 24 MMOL/L (ref 20–29)
CREAT SERPL-MCNC: 1.4 MG/DL (ref 0.76–1.27)
D DIMER PPP FEU-MCNC: 0.65 MG/L FEU (ref 0–0.49)
EGFRCR SERPLBLD CKD-EPI 2021: 54 ML/MIN/1.73
EOSINOPHIL # BLD AUTO: 0.1 X10E3/UL (ref 0–0.4)
EOSINOPHIL NFR BLD AUTO: 1 %
ERYTHROCYTE [DISTWIDTH] IN BLOOD BY AUTOMATED COUNT: 14 % (ref 11.6–15.4)
GLOBULIN SER CALC-MCNC: 2.5 G/DL (ref 1.5–4.5)
GLUCOSE SERPL-MCNC: 106 MG/DL (ref 70–99)
HBA1C MFR BLD: 6.3 % (ref 4.8–5.6)
HCT VFR BLD AUTO: 44.4 % (ref 37.5–51)
HDLC SERPL-MCNC: 33 MG/DL
HGB BLD-MCNC: 14.4 G/DL (ref 13–17.7)
IMM GRANULOCYTES # BLD AUTO: 0 X10E3/UL (ref 0–0.1)
IMM GRANULOCYTES NFR BLD AUTO: 0 %
LDLC SERPL CALC-MCNC: 99 MG/DL (ref 0–99)
LYMPHOCYTES # BLD AUTO: 1.6 X10E3/UL (ref 0.7–3.1)
LYMPHOCYTES NFR BLD AUTO: 24 %
MCH RBC QN AUTO: 29.2 PG (ref 26.6–33)
MCHC RBC AUTO-ENTMCNC: 32.4 G/DL (ref 31.5–35.7)
MCV RBC AUTO: 90 FL (ref 79–97)
MONOCYTES # BLD AUTO: 0.6 X10E3/UL (ref 0.1–0.9)
MONOCYTES NFR BLD AUTO: 8 %
NEUTROPHILS # BLD AUTO: 4.4 X10E3/UL (ref 1.4–7)
NEUTROPHILS NFR BLD AUTO: 66 %
PLATELET # BLD AUTO: 257 X10E3/UL (ref 150–450)
POTASSIUM SERPL-SCNC: 3.6 MMOL/L (ref 3.5–5.2)
PROT SERPL-MCNC: 6.8 G/DL (ref 6–8.5)
PSA SERPL-MCNC: 1.4 NG/ML (ref 0–4)
RBC # BLD AUTO: 4.93 X10E6/UL (ref 4.14–5.8)
SODIUM SERPL-SCNC: 143 MMOL/L (ref 134–144)
TRIGL SERPL-MCNC: 162 MG/DL (ref 0–149)
VLDLC SERPL CALC-MCNC: 29 MG/DL (ref 5–40)
WBC # BLD AUTO: 6.7 X10E3/UL (ref 3.4–10.8)

## 2024-05-24 ENCOUNTER — HOSPITAL ENCOUNTER (OUTPATIENT)
Dept: CT IMAGING | Facility: HOSPITAL | Age: 69
Discharge: HOME | End: 2024-05-24
Payer: MEDICARE

## 2024-05-24 DIAGNOSIS — R06.02 SOB (SHORTNESS OF BREATH): ICD-10-CM

## 2024-05-24 LAB — CREAT BLDA-MCNC: 1.5 MG/DL (ref 0.6–1.3)

## 2024-05-24 PROCEDURE — 71260 CT THORAX DX C+: CPT

## 2024-05-24 PROCEDURE — 82565 ASSAY OF CREATININE: CPT

## 2024-05-24 PROCEDURE — 25510000001 IOPAMIDOL 61 % SOLUTION: Performed by: FAMILY MEDICINE

## 2024-05-24 RX ADMIN — IOPAMIDOL 100 ML: 612 INJECTION, SOLUTION INTRAVENOUS at 13:20

## 2024-05-28 DIAGNOSIS — I10 ESSENTIAL HYPERTENSION: ICD-10-CM

## 2024-05-28 PROCEDURE — 99213 OFFICE O/P EST LOW 20 MIN: CPT | Performed by: FAMILY MEDICINE

## 2024-05-28 RX ORDER — HYDROCHLOROTHIAZIDE 25 MG/1
25 TABLET ORAL DAILY
Qty: 30 TABLET | Refills: 11 | Status: SHIPPED | OUTPATIENT
Start: 2024-05-28

## 2024-09-05 ENCOUNTER — OFFICE VISIT (OUTPATIENT)
Dept: CARDIOLOGY | Facility: CLINIC | Age: 69
End: 2024-09-05
Payer: MEDICARE

## 2024-09-05 VITALS
BODY MASS INDEX: 37.6 KG/M2 | WEIGHT: 293 LBS | SYSTOLIC BLOOD PRESSURE: 144 MMHG | DIASTOLIC BLOOD PRESSURE: 80 MMHG | HEART RATE: 58 BPM | HEIGHT: 74 IN | OXYGEN SATURATION: 96 %

## 2024-09-05 DIAGNOSIS — I10 ESSENTIAL HYPERTENSION: ICD-10-CM

## 2024-09-05 DIAGNOSIS — Z95.0 PRESENCE OF CARDIAC PACEMAKER: ICD-10-CM

## 2024-09-05 DIAGNOSIS — I48.0 PAROXYSMAL ATRIAL FIBRILLATION: Primary | ICD-10-CM

## 2024-09-05 DIAGNOSIS — E66.01 SEVERE OBESITY (BMI 35.0-39.9) WITH COMORBIDITY: ICD-10-CM

## 2024-09-05 RX ORDER — POTASSIUM CHLORIDE 750 MG/1
TABLET, EXTENDED RELEASE ORAL
COMMUNITY

## 2024-09-05 RX ORDER — AMIODARONE HYDROCHLORIDE 200 MG/1
1 TABLET ORAL DAILY
COMMUNITY

## 2024-09-05 RX ORDER — FUROSEMIDE 40 MG
TABLET ORAL
COMMUNITY

## 2024-09-05 RX ORDER — PANTOPRAZOLE SODIUM 40 MG/1
1 TABLET, DELAYED RELEASE ORAL DAILY
COMMUNITY
Start: 2024-07-26 | End: 2024-09-05

## 2024-09-05 NOTE — PROGRESS NOTES
"    Subjective:     Encounter Date:09/05/2024      Patient ID: Raghav Olivier is a 69 y.o. male.    Chief Complaint:\"no complaints\"  Atrial Fibrillation  Presents for follow-up visit. Symptoms are negative for chest pain, dizziness, palpitations, shortness of breath, syncope and weakness. The symptoms have been stable. Past medical history includes atrial fibrillation.   Hypertension  This is a chronic problem. The current episode started more than 1 year ago. The problem has been stable since onset. The problem is uncontrolled. Pertinent negatives include no chest pain, malaise/fatigue, orthopnea, palpitations, PND or shortness of breath.     Patient presents today as a routine follow up. He is followed by Dr. Kramer for management of PAF. He was previously seen by Dr. Page and was placed on Amiodarone that was eventually stopped \"due to black lung\". He was then referred to Dr. Hernández in 2020 due to persistent afib for consideration of an ablation and underwent a successful afib ablation per Dr. Hernández in 12/2020. 7/2024. He was diagnosed with SSS and underwent pacemaker implant per Dr. Hernández in 10/2021.     He reports he feels well. He recently underwent a repeat ablation in 7/2024 and amiodarone was restarted due to evidence of afib episodes noted on either a holter or pacemaker interrogations. He notes Dr. Hernández plans to stop Amiodarone 90 days after his ablation. He was also started on Lasix daily for 90 days post-ablation. He stopped his valsartan and crestor because he was tired of taking \"so many pills\". He has not been monitoring his BP since he stopped his medication. He denies symptoms of afib which include dyspnea and overall not feeling well. He is scheduled to see Dr. Hernández for a follow up in October. He denies chest pain, palpitations, dyspnea and edema.,    The following portions of the patient's history were reviewed and updated as appropriate: allergies, current medications, past family history, " past medical history, past social history, past surgical history and problem list.    Allergies   Allergen Reactions    Penicillins Unknown - Low Severity     Pt states is was a childhood allergy    Vancomycin Itching       Current Outpatient Medications:     acetaminophen (TYLENOL) 650 MG 8 hr tablet, Take 1 tablet by mouth Every 8 (Eight) Hours As Needed for Mild Pain. 1000mg twice a day, Disp: , Rfl:     amiodarone (PACERONE) 200 MG tablet, Take 1 tablet by mouth Daily., Disp: , Rfl:     apixaban (ELIQUIS) 5 MG tablet tablet, Take 1 tablet by mouth Every 12 (Twelve) Hours. Indications: Atrial Fibrillation, resume eliquis saturday morning, Disp: , Rfl:     Cholecalciferol (Vitamin D3) 50 MCG (2000 UT) capsule, Daily., Disp: , Rfl:     fluticasone (FLONASE) 50 MCG/ACT nasal spray, 1 spray into the nostril(s) as directed by provider As Needed., Disp: , Rfl:     furosemide (LASIX) 40 MG tablet, TAKE 1 TABLET BY MOUTH EVERY DAY FOR 30 DAYS, Disp: , Rfl:     potassium chloride 10 MEQ CR tablet, TAKE 1 TABLET BY MOUTH EVERY DAY FOR 30 DAYS, Disp: , Rfl:     rosuvastatin (CRESTOR) 20 MG tablet, TAKE 1 TABLET BY MOUTH EVERY DAY (Patient taking differently: Take 1 tablet by mouth Every Night. Takes every other day), Disp: 90 tablet, Rfl: 1  Past Medical History:   Diagnosis Date    Arrhythmia     Arthritis     Atrial fibrillation     Cancer     SKIN    Cataract     Diverticulitis     Hyperlipidemia     Hypertension     Kidney stones     Migraines     Nonrheumatic mitral (valve) insufficiency     Pulmonary embolism 8-30-21    [facility]    Sleep apnea     C-PAP    Spinal headache        Social History     Socioeconomic History    Marital status:     Highest education level: GED or equivalent   Tobacco Use    Smoking status: Former     Current packs/day: 0.00     Average packs/day: 0.3 packs/day for 4.0 years (1.0 ttl pk-yrs)     Types: Cigarettes     Start date: 1/1/1969     Quit date: 1/1/1973     Years since  quittin.7     Passive exposure: Past    Smokeless tobacco: Never   Vaping Use    Vaping status: Never Used   Substance and Sexual Activity    Alcohol use: No    Drug use: No    Sexual activity: Yes     Partners: Female     Birth control/protection: Surgical       Review of Systems   Constitutional: Negative for malaise/fatigue, weight gain and weight loss.   Cardiovascular:  Negative for chest pain, dyspnea on exertion, irregular heartbeat, leg swelling, near-syncope, orthopnea, palpitations, paroxysmal nocturnal dyspnea and syncope.   Respiratory:  Negative for cough, shortness of breath, sleep disturbances due to breathing, sputum production and wheezing.    Skin:  Negative for dry skin, flushing, itching and rash.   Gastrointestinal:  Negative for hematemesis and hematochezia.   Neurological:  Negative for dizziness, light-headedness, loss of balance and weakness.   All other systems reviewed and are negative.         Objective:     Vitals reviewed.   Constitutional:       General: Not in acute distress.     Appearance: Healthy appearance. Well-developed. Obese. Not diaphoretic.   Eyes:      General: No scleral icterus.     Conjunctiva/sclera: Conjunctivae normal.      Pupils: Pupils are equal, round, and reactive to light.   HENT:      Head: Normocephalic.    Mouth/Throat:      Pharynx: No oropharyngeal exudate.   Neck:      Vascular: No JVR.   Pulmonary:      Effort: Pulmonary effort is normal. No respiratory distress.      Breath sounds: Normal breath sounds. No wheezing. No rhonchi. No rales.   Chest:      Chest wall: Not tender to palpatation.   Cardiovascular:      Normal rate. Regular rhythm.   Pulses:     Intact distal pulses.   Edema:     Peripheral edema absent.   Abdominal:      General: Bowel sounds are normal. There is no distension.      Palpations: Abdomen is soft.      Tenderness: There is no abdominal tenderness.   Musculoskeletal: Normal range of motion.      Cervical back: Normal range of  "motion and neck supple. Skin:     General: Skin is warm and dry.      Coloration: Skin is not pale.      Findings: No erythema or rash.   Neurological:      Mental Status: Alert, oriented to person, place, and time and oriented to person, place and time.      Deep Tendon Reflexes: Reflexes are normal and symmetric.   Psychiatric:         Behavior: Behavior normal.             ECG 12 Lead    Date/Time: 9/5/2024 12:47 PM  Performed by: Nereida Clifford APRN    Authorized by: Nereida Clifford APRN  Comparison: compared with previous ECG from 12/12/2023  Similar to previous ECG  Rhythm: sinus bradycardia  Rate: bradycardic  BPM: 58  Conduction: conduction normal  Conduction: non-specific intraventricular conduction delay  ST Segments: ST segments normal  QRS axis: normal  Other findings: T wave abnormality    Clinical impression: abnormal EKG        /80 (BP Location: Left arm, Patient Position: Sitting, Cuff Size: Large Adult)   Pulse 58   Ht 188 cm (74\")   Wt 133 kg (293 lb)   SpO2 96%   BMI 37.62 kg/m²     Lab Review:   I have reviewed previous office notes, [place] records, recent labs and recent cardiac testing.     Lab Results   Component Value Date    CHLPL 161 05/09/2024    TRIG 162 (H) 05/09/2024    HDL 33 (L) 05/09/2024    LDL 99 05/09/2024           Assessment:          Diagnosis Plan   1. Paroxysmal atrial fibrillation        2. Presence of cardiac pacemaker        3. Essential hypertension        4. Severe obesity (BMI 35.0-39.9) with comorbidity               Plan:       1 PAF- stable. Sinus rené per EKG today. S/p ablation x2, most recently in July per Dr. Hernández. Currently on Amiodarone 200mg daily and reports plans to stop at his upcoming visit with Dr. Hernández. [id number] 3, continue Eliquis 5mg BID.   2. Pacemaker- managed per Dr. Hernández.   3. HTN- elevated today. Educated to monitor his BP at home and will likely need to resume his Valsartan. Education on cardiac complications that can " result from uncontrolled HTN.   4. BMI- Class 2 Severe Obesity (BMI >=35 and <=39.9). Obesity-related health conditions include the following: obstructive sleep apnea, hypertension, and dyslipidemias. Obesity is unchanged. BMI is is above average; BMI management plan is completed. We discussed portion control and increasing exercise.        Follow up in 6 months or sooner if symptoms worsen.     I spent 30 minutes caring for Raghav on this date of service. This time includes time spent by me in the following activities:preparing for the visit, reviewing tests, obtaining and/or reviewing a separately obtained history, performing a medically appropriate examination and/or evaluation , counseling and educating the patient/family/caregiver, and documenting information in the medical record    I spent 2 minutes on the separately reported service of EKG interpretation. This time is not included in the time used to support the E/M service also reported today.

## 2025-01-21 ENCOUNTER — OFFICE VISIT (OUTPATIENT)
Dept: INTERNAL MEDICINE | Facility: CLINIC | Age: 70
End: 2025-01-21
Payer: MEDICARE

## 2025-01-21 VITALS
DIASTOLIC BLOOD PRESSURE: 76 MMHG | SYSTOLIC BLOOD PRESSURE: 138 MMHG | HEIGHT: 74 IN | WEIGHT: 310 LBS | BODY MASS INDEX: 39.78 KG/M2 | TEMPERATURE: 97.5 F | HEART RATE: 70 BPM | OXYGEN SATURATION: 98 %

## 2025-01-21 DIAGNOSIS — N18.31 CHRONIC RENAL FAILURE, STAGE 3A: Primary | ICD-10-CM

## 2025-01-21 DIAGNOSIS — Z12.11 COLON CANCER SCREENING: ICD-10-CM

## 2025-01-21 DIAGNOSIS — Z91.81 AT MODERATE RISK FOR FALL: ICD-10-CM

## 2025-01-21 DIAGNOSIS — I10 ESSENTIAL HYPERTENSION: ICD-10-CM

## 2025-01-21 DIAGNOSIS — R73.03 BORDERLINE DIABETES: ICD-10-CM

## 2025-01-21 DIAGNOSIS — Z00.00 MEDICARE ANNUAL WELLNESS VISIT, SUBSEQUENT: ICD-10-CM

## 2025-01-21 DIAGNOSIS — E78.2 MIXED HYPERLIPIDEMIA: ICD-10-CM

## 2025-01-21 PROBLEM — I50.32 CHRONIC DIASTOLIC HEART FAILURE: Status: ACTIVE | Noted: 2025-01-21

## 2025-01-21 PROCEDURE — 1160F RVW MEDS BY RX/DR IN RCRD: CPT | Performed by: FAMILY MEDICINE

## 2025-01-21 PROCEDURE — G2211 COMPLEX E/M VISIT ADD ON: HCPCS | Performed by: FAMILY MEDICINE

## 2025-01-21 PROCEDURE — 3078F DIAST BP <80 MM HG: CPT | Performed by: FAMILY MEDICINE

## 2025-01-21 PROCEDURE — 1170F FXNL STATUS ASSESSED: CPT | Performed by: FAMILY MEDICINE

## 2025-01-21 PROCEDURE — 99497 ADVNCD CARE PLAN 30 MIN: CPT | Performed by: FAMILY MEDICINE

## 2025-01-21 PROCEDURE — G0439 PPPS, SUBSEQ VISIT: HCPCS | Performed by: FAMILY MEDICINE

## 2025-01-21 PROCEDURE — 3075F SYST BP GE 130 - 139MM HG: CPT | Performed by: FAMILY MEDICINE

## 2025-01-21 PROCEDURE — 1159F MED LIST DOCD IN RCRD: CPT | Performed by: FAMILY MEDICINE

## 2025-01-21 PROCEDURE — 1125F AMNT PAIN NOTED PAIN PRSNT: CPT | Performed by: FAMILY MEDICINE

## 2025-01-21 PROCEDURE — 99214 OFFICE O/P EST MOD 30 MIN: CPT | Performed by: FAMILY MEDICINE

## 2025-01-21 RX ORDER — VALSARTAN 160 MG/1
1 TABLET ORAL DAILY
COMMUNITY
Start: 2024-11-05

## 2025-01-21 NOTE — ASSESSMENT & PLAN NOTE
Orders:    Microalbumin / Creatinine Urine Ratio - Urine, Clean Catch    Lipid Panel    Comprehensive Metabolic Panel    Hemoglobin A1c

## 2025-01-21 NOTE — PROGRESS NOTES
Subjective   The ABCs of the Annual Wellness Visit  Medicare Wellness Visit      Raghav Olivier is a 69 y.o. patient who presents for a Medicare Wellness Visit.    The following portions of the patient's history were reviewed and   updated as appropriate: allergies, current medications, past family history, past medical history, past social history, past surgical history, and problem list.    Compared to one year ago, the patient's physical   health is worse.  Compared to one year ago, the patient's mental   health is worse.    Recent Hospitalizations:  He was admitted within the past 365 days at [facility].     Current Medical Providers:  Patient Care Team:  Paras Bliss MD as PCP - General (Family Medicine)  Raghav Pablo MD as Consulting Physician (Urology)  Titi Kramer MD as Cardiologist (Cardiology)  Sebastian Kulkarni APRN as Nurse Practitioner (Cardiology)  Adrianna Connell APRN as Nurse Practitioner (Family Medicine)    Outpatient Medications Prior to Visit   Medication Sig Dispense Refill    acetaminophen (TYLENOL) 650 MG 8 hr tablet Take 1 tablet by mouth Every 8 (Eight) Hours As Needed for Mild Pain. 1000mg twice a day      apixaban (ELIQUIS) 5 MG tablet tablet Take 1 tablet by mouth Every 12 (Twelve) Hours. Indications: Atrial Fibrillation, resume eliquis saturday morning      Cholecalciferol (Vitamin D3) 50 MCG (2000 UT) capsule Daily.      fluticasone (FLONASE) 50 MCG/ACT nasal spray Administer 1 spray into the nostril(s) as directed by provider As Needed.      furosemide (LASIX) 40 MG tablet TAKE 1 TABLET BY MOUTH EVERY DAY FOR 30 DAYS      potassium chloride 10 MEQ CR tablet TAKE 1 TABLET BY MOUTH EVERY DAY FOR 30 DAYS      rosuvastatin (CRESTOR) 20 MG tablet TAKE 1 TABLET BY MOUTH EVERY DAY (Patient taking differently: Take 1 tablet by mouth Every Night. Takes every other day) 90 tablet 1    valsartan (DIOVAN) 160 MG tablet Take 1 tablet by mouth Daily.       amiodarone (PACERONE) 200 MG tablet Take 1 tablet by mouth Daily. (Patient not taking: Reported on 1/21/2025)       No facility-administered medications prior to visit.     No opioid medication identified on active medication list. I have reviewed chart for other potential  high risk medication/s and harmful drug interactions in the elderly.      Aspirin is not on active medication list.  Aspirin use is contraindicated for this patient due to: current use of Eliquis.  .    Patient Active Problem List   Diagnosis    Paroxysmal atrial fibrillation    Nonrheumatic mitral (valve) insufficiency    Essential hypertension    Hyperlipidemia    Dilated aortic root    TAMARA on CPAP    Severe obesity (BMI 35.0-39.9) with comorbidity    Lipoma    Long-term use of high-risk medication    Coronary artery disease involving native coronary artery of native heart without angina pectoris    LVH (left ventricular hypertrophy) moderate     Sinoatrial node dysfunction    Presence of cardiac pacemaker    Chronic obstructive pulmonary disease    Primary osteoarthritis of both knees    Spinal stenosis of lumbar region    Chronic diastolic heart failure    Chronic renal failure, stage 3a    Borderline diabetes     Advance Care Planning Advance Directive is not on file.  ACP discussion was held with the patient during this visit. Most form filled out and discussed              Advanced Directives  I discussed with this patient the importance of advanced directives and planning ahead in the event that they are unable to make decisions on their own due to illness or incapacity.  We discussed the MOST form and discussed the different options including DNR, DNI, feeding tubes, Long term iv fluids and the use of antibiotics.  I discussed other options such as a health care surrogate, and or placing time limits on life saving measures should the need arise.  I discussed the importance of having tucker discussions with their family/healthcare  "surrogate about their wishes so they are well known.    Lastly we gave the patient he packet and offered to do a MOST at any time in the future.  I spent 16 minutes discussing/counseling these issues with the patient     I discussed at length, cancer screening and other health screenings recommendations with him and its role in reducing types of cancer and other health problems.  Patient understands the risks and benefits of screening, as well as the risk to their own health if these are not performed.  he decided that at this time he declines the following screening: Shingles .   I advised him that I will bring this up in the future to see if he has changed his mind on preventative screening.   Refusal at this time does not preclude future screening.      Objective   Vitals:    25 0816 25 08   BP: 166/78 138/76   BP Location: Left arm Right arm   Patient Position: Sitting Sitting   Cuff Size: Large Adult Large Adult   Pulse: 70    Temp: 97.5 °F (36.4 °C)    TempSrc: Infrared    SpO2: 98%    Weight: (!) 141 kg (310 lb)    Height: 188 cm (74\")    PainSc:   1    PainLoc: Back        Estimated body mass index is 39.8 kg/m² as calculated from the following:    Height as of this encounter: 188 cm (74\").    Weight as of this encounter: 141 kg (310 lb).                Does the patient have evidence of cognitive impairment? No                                                                                                Health  Risk Assessment    Smoking Status:  Social History     Tobacco Use   Smoking Status Former    Current packs/day: 0.00    Average packs/day: 0.3 packs/day for 4.0 years (1.0 ttl pk-yrs)    Types: Cigarettes    Start date: 1969    Quit date: 1973    Years since quittin.0    Passive exposure: Past   Smokeless Tobacco Never     Alcohol Consumption:  Social History     Substance and Sexual Activity   Alcohol Use No       Fall Risk Screen  EZADI Fall Risk Assessment was " completed, and patient is at MODERATE risk for falls. Assessment completed on:2025    Depression Screening   Little interest or pleasure in doing things? Not at all   Feeling down, depressed, or hopeless? Not at all   PHQ-2 Total Score 0      Health Habits and Functional and Cognitive Screenin/21/2025     8:22 AM   Functional & Cognitive Status   Do you have difficulty preparing food and eating? No   Do you have difficulty bathing yourself, getting dressed or grooming yourself? No   Do you have difficulty using the toilet? No   Do you have difficulty moving around from place to place? No   Do you have trouble with steps or getting out of a bed or a chair? No   Current Diet Low Carb Diet   Dental Exam Not up to date   Eye Exam Not up to date   Exercise (times per week) 2 times per week   Current Exercises Include Walking   Do you need help using the phone?  No   Are you deaf or do you have serious difficulty hearing?  No   Do you need help to go to places out of walking distance? No   Do you need help shopping? No   Do you need help preparing meals?  No   Do you need help with housework?  No   Do you need help with laundry? No   Do you need help taking your medications? No   Do you need help managing money? No   Do you ever drive or ride in a car without wearing a seat belt? No   Have you felt unusual stress, anger or loneliness in the last month? No   Who do you live with? Spouse   If you need help, do you have trouble finding someone available to you? No   Do you have difficulty concentrating, remembering or making decisions? Yes           Age-appropriate Screening Schedule:  Refer to the list below for future screening recommendations based on patient's age, sex and/or medical conditions. Orders for these recommended tests are listed in the plan section. The patient has been provided with a written plan.    Health Maintenance List  Health Maintenance   Topic Date Due    COLORECTAL CANCER SCREENING   07/28/2024    ZOSTER VACCINE (1 of 2) 01/21/2025 (Originally 5/17/2003)    COVID-19 Vaccine (2 - 2024-25 season) 03/31/2025 (Originally 9/1/2024)    INFLUENZA VACCINE  03/31/2025 (Originally 7/1/2024)    LIPID PANEL  05/09/2025    BMI FOLLOWUP  09/05/2025    ANNUAL WELLNESS VISIT  01/21/2026    Pneumococcal Vaccine 65+  Completed    AAA SCREEN ONCE  Completed    HEPATITIS C SCREENING  Addressed    TDAP/TD VACCINES  Discontinued                                                                                                                                                CMS Preventative Services Quick Reference  Risk Factors Identified During Encounter  Fall Risk-High or Moderate: Discussed Fall Prevention in the home    The above risks/problems have been discussed with the patient.  Pertinent information has been shared with the patient in the After Visit Summary.  An After Visit Summary and PPPS were made available to the patient.    Follow Up:   Next Medicare Wellness visit to be scheduled in 1 year.         Additional E&M Note during same encounter follows:  Patient has additional, significant, and separately identifiable condition(s)/problem(s) that require work above and beyond the Medicare Wellness Visit     Chief Complaint  Medicare Wellness-subsequent    Subjective   HPI  Raghav is also being seen today for an annual adult preventative physical exam. Patient presents for follow up of borderline Diabetes. Current symptoms include: none. Patient denies hyperglycemia and hypoglycemia .  Home sugars: patient does not check sugars. Current treatments: no recent interventions. Patient is due for his diabetic labs at this time, so we will go ahead and order Cmp, HBa1c, Lipid Profile, and Microalbumin urine for evaluation.  Discussed metabolic syndrome at this time.  Discussed possibly adding metformin however the need to watch this very closely with his current renal function.    Anti-inflammatories with this  "patient and that he needs to hold off from using them due to his renal function as well as being on Eliquis and bleeding risk.    Since patient's chronic renal failure and its current stage do not recommend any intervention at this time we will continue to follow every 6 months.    She would like to get set up for colonoscopy at this time so we will set him up for that today.              Review of systems   negative unless otherwise specified above in HPI    Objective   Vital Signs:  /76 (BP Location: Right arm, Patient Position: Sitting, Cuff Size: Large Adult)   Pulse 70   Temp 97.5 °F (36.4 °C) (Infrared)   Ht 188 cm (74\")   Wt (!) 141 kg (310 lb)   SpO2 98%   BMI 39.80 kg/m²   Physical Exam  Vitals and nursing note reviewed.   Constitutional:       General: He is not in acute distress.     Appearance: Normal appearance.   HENT:      Head: Normocephalic.   Eyes:      Extraocular Movements: Extraocular movements intact.      Pupils: Pupils are equal, round, and reactive to light.   Cardiovascular:      Rate and Rhythm: Normal rate and regular rhythm.      Heart sounds: Normal heart sounds. No murmur heard.  Pulmonary:      Effort: Pulmonary effort is normal. No respiratory distress.      Breath sounds: Normal breath sounds. No rhonchi or rales.   Abdominal:      General: Abdomen is flat. Bowel sounds are normal.      Palpations: Abdomen is soft.   Neurological:      General: No focal deficit present.      Mental Status: He is alert.                       Assessment and Plan            Mixed hyperlipidemia   Lipid abnormalities are stable    Plan:  Continue same medication/s without change.      Discussed medication dosage, use, side effects, and goals of treatment in detail.    Counseled patient on lifestyle modifications to help control hyperlipidemia.     Patient Treatment Goals:   LDL goal is less than 70    Followup in 6 months.    Orders:    Lipid Panel    Chronic renal failure, stage 3a  Renal " condition is stable.  Continue current treatment regimen.  Renal condition will be reassessed in 6 months.    Orders:    Microalbumin / Creatinine Urine Ratio - Urine, Clean Catch    Comprehensive Metabolic Panel    CBC & Differential    Essential hypertension  Hypertension is stable and controlled  Continue current treatment regimen.  Blood pressure will be reassessed in 6 months.         Borderline diabetes    Orders:    Microalbumin / Creatinine Urine Ratio - Urine, Clean Catch    Lipid Panel    Comprehensive Metabolic Panel    Hemoglobin A1c    Colon cancer screening    Orders:    Ambulatory Referral to Gastroenterology    Medicare annual wellness visit, subsequent         At moderate risk for fall                 Follow Up   Return in about 6 months (around 7/21/2025) for Recheck.  Patient was given instructions and counseling regarding his condition or for health maintenance advice. Please see specific information pulled into the AVS if appropriate.

## 2025-01-21 NOTE — PATIENT INSTRUCTIONS
Advance Care Planning and Advance Directives     You make decisions on a daily basis - decisions about where you want to live, your career, your home, your life. Perhaps one of the most important decisions you face is your choice for future medical care. Take time to talk with your family and your healthcare team and start planning today.  Advance Care Planning is a process that can help you:  Understand possible future healthcare decisions in light of your own experiences  Reflect on those decision in light of your goals and values  Discuss your decisions with those closest to you and the healthcare professionals that care for you  Make a plan by creating a document that reflects your wishes    Surrogate Decision Maker  In the event of a medical emergency, which has left you unable to communicate or to make your own decisions, you would need someone to make decisions for you.  It is important to discuss your preferences for medical treatment with this person while you are in good health.     Qualities of a surrogate decision maker:  Willing to take on this role and responsibility  Knows what you want for future medical care  Willing to follow your wishes even if they don't agree with them  Able to make difficult medical decisions under stressful circumstances    Advance Directives  These are legal documents you can create that will guide your healthcare team and decision maker(s) when needed. These documents can be stored in the electronic medical record.    Living Will - a legal document to guide your care if you have a terminal condition or a serious illness and are unable to communicate. States vary by statute in document names/types, but most forms may include one or more of the following:        -  Directions regarding life-prolonging treatments        -  Directions regarding artificially provided nutrition/hydration        -  Choosing a healthcare decision maker        -  Direction regarding organ/tissue  donation    Durable Power of  for Healthcare - this document names an -in-fact to make medical decisions for you, but it may also allow this person to make personal and financial decisions for you. Please seek the advice of an  if you need this type of document.    **Advance Directives are not required and no one may discriminate against you if you do not sign one.    Medical Orders  Many states allow specific forms/orders signed by your physician to record your wishes for medical treatment in your current state of health. This form, signed in personal communication with your physician, addresses resuscitation and other medical interventions that you may or may not want.      For more information or to schedule a time with a [facility] Advance Care Planning Facilitator contact: [facility] or call [phone] and someone will contact you directly.  Fall Prevention in the Home, Adult  Falls can cause injuries and affect people of all ages. There are many simple things that you can do to make your home safe and to help prevent falls.  If you need it, ask for help making these changes.  What actions can I take to prevent falls?  General information  Use good lighting in all rooms. Make sure to:  Replace any light bulbs that burn out.  Turn on lights if it is dark and use night-lights.  Keep items that you use often in easy-to-reach places. Lower the shelves around your home if needed.  Move furniture so that there are clear paths around it.  Do not keep throw rugs or other things on the floor that can make you trip.  If any of your floors are uneven, fix them.  Add color or contrast paint or tape to clearly segundo and help you see:  Grab bars or handrails.  First and last steps of staircases.  Where the edge of each step is.  If you use a ladder or stepladder:  Make sure that it is fully opened. Do not climb a closed ladder.  Make sure the sides of the ladder are locked in  place.  Have someone hold the ladder while you use it.  Know where your pets are as you move through your home.  What can I do in the bathroom?         Keep the floor dry. Clean up any water that is on the floor right away.  Remove soap buildup in the bathtub or shower. Buildup makes bathtubs and showers slippery.  Use non-skid mats or decals on the floor of the bathtub or shower.  Attach bath mats securely with double-sided, non-slip rug tape.  If you need to sit down while you are in the shower, use a non-slip stool.  Install grab bars by the toilet and in the bathtub and shower. Do not use towel bars as grab bars.  What can I do in the bedroom?  Make sure that you have a light by your bed that is easy to reach.  Do not use any sheets or blankets on your bed that hang to the floor.  Have a firm bench or chair with side arms that you can use for support when you get dressed.  What can I do in the kitchen?  Clean up any spills right away.  If you need to reach something above you, use a sturdy step stool that has a grab bar.  Keep electrical cables out of the way.  Do not use floor polish or wax that makes floors slippery.  What can I do with my stairs?  Do not leave anything on the stairs.  Make sure that you have a light switch at the top and the bottom of the stairs. Have them installed if you do not have them.  Make sure that there are handrails on both sides of the stairs. Fix handrails that are broken or loose. Make sure that handrails are as long as the staircases.  Install non-slip stair treads on all stairs in your home if they do not have carpet.  Avoid having throw rugs at the top or bottom of stairs, or secure the rugs with carpet tape to prevent them from moving.  Choose a carpet design that does not hide the edge of steps on the stairs. Make sure that carpet is firmly attached to the stairs. Fix any carpet that is loose or worn.  What can I do on the outside of my home?  Use bright outdoor  lighting.  Repair the edges of walkways and driveways and fix any cracks. Clear paths of anything that can make you trip, such as tools or rocks.  Add color or contrast paint or tape to clearly segundo and help you see high doorway thresholds.  Trim any bushes or trees on the main path into your home.  Check that handrails are securely fastened and in good repair. Both sides of all steps should have handrails.  Install guardrails along the edges of any raised decks or porches.  Have leaves, snow, and ice cleared regularly. Use sand, salt, or ice melt on walkways during winter months if you live where there is ice and snow.  In the garage, clean up any spills right away, including grease or oil spills.  What other actions can I take?  Review your medicines with your health care provider. Some medicines can make you confused or feel dizzy. This can increase your chance of falling.  Wear closed-toe shoes that fit well and support your feet. Wear shoes that have rubber soles and low heels.  Use a cane, walker, scooter, or crutches that help you move around if needed.  Talk with your provider about other ways that you can decrease your risk of falls. This may include seeing a physical therapist to learn to do exercises to improve movement and strength.  Where to find more information  Centers for Disease Control and Prevention[place]: cdc.gov  [place] on Aging: candice.nih.gov  National [place] on Aging: candice.nih.gov  Contact a health care provider if:  You are afraid of falling at home.  You feel weak, drowsy, or dizzy at home.  You fall at home.  Get help right away if you:  Lose consciousness or have trouble moving after a fall.  Have a fall that causes a head injury.  These symptoms may be an emergency. Get help right away. Call 911.  Do not wait to see if the symptoms will go away.  Do not drive yourself to the hospital.  This information is not intended to replace advice given to you by your health care  provider. Make sure you discuss any questions you have with your health care provider.  Document Revised: 08/21/2023 Document Reviewed: 08/21/2023  Elsevier Patient Education © 2024 [employer]  Sit-to-Stand Exercise    The sit-to-stand exercise (also known as the chair stand or chair rise exercise) strengthens your lower body and helps you maintain or improve your mobility and independence. The end goal is to do the sit-to-stand exercise without using your hands. This will be easier as you become stronger. You should always talk with your health care provider before starting any exercise program, especially if you have had recent surgery.  Do the exercise exactly as told by your health care provider and adjust it as directed. It is normal to feel mild stretching, pulling, tightness, or discomfort as you do this exercise, but you should stop right away if you feel sudden pain or your pain gets worse. Do not begin doing this exercise until told by your health care provider.  What the sit-to-stand exercise does  The sit-to-stand exercise helps to strengthen the muscles in your thighs and the muscles in the center of your body that give you stability (core muscles). This exercise is especially helpful if:  You have had knee or hip surgery.  You have trouble getting up from a chair, out of a car, or off the toilet due to muscle weakness.  How to do the sit-to-stand exercise  Sit toward the front edge of a sturdy chair without armrests. Your knees should be bent and your feet should be flat on the floor and shoulder-width apart and underneath your hips.  Place your hands lightly on each side of the seat. Keep your back and neck as straight as possible, with your chest slightly forward.  Breathe in slowly. Lean forward and slightly shift your weight to the front of your feet.  Breathe out as you slowly stand up. Try not to support any weight with your hands.  Stand and pause for a full breath in and out.  Breathe in as  you sit down slowly. Tighten your core and abdominal muscles to control your lowering as much as possible. You should lower yourself back to the chair slowly, not just drop back into the seat.  Breathe out slowly.  Do this exercise 10-15 times. If needed, do it fewer times until you build up strength.  Rest for 1 minute, then do another set of 10-15 repetitions.  To change the difficulty of the sit-to-stand exercise  If the exercise is too difficult, use a chair with sturdy armrests, and push off the armrests to help you come to the standing position. You can also use the armrests to help slowly lower yourself back to sitting. As this gets easier, try to use your arms less. You can also place a firm cushion or pillow on the chair to make the surface higher.  If this exercise is too easy, do not use your arms to help raise or lower yourself. You can also wear a weighted vest, use hand weights, increase your repetitions, or try a lower chair.  General tips  You may feel tired when starting an exercise routine. This is normal.  You may have muscle soreness that lasts a few days. This is normal. As you get stronger, you may not feel muscle soreness.  Use smooth, steady movements.  Do not  hold your breath during strength exercises. This can cause unsafe changes in your blood pressure.  Breathe in slowly through your nose, and breathe out slowly through your mouth.  Summary  Strengthening your lower body is an important step to help you move safely and independently.  The sit-to-stand exercise helps strengthen the muscles in your thighs and core.  You should always talk with your health care provider before starting any exercise program, especially if you have had recent surgery.  This information is not intended to replace advice given to you by your health care provider. Make sure you discuss any questions you have with your health care provider.  Document Revised: 04/10/2022 Document Reviewed: 04/10/2022  Katerina  Patient Education 2024 Elsevier Inc.    Medicare Wellness  Personal Prevention Plan of Service     Date of Office Visit:    Encounter Provider:  Paras Bliss MD  Place of Service:  [facility]  Patient Name: Raghav Olivier  :  1955    As part of the Medicare Wellness portion of your visit today, we are providing you with this personalized preventive plan of services (PPPS). This plan is based upon recommendations of the [place] Preventive Services Task Force (USPSTF) and the Advisory Committee on Immunization Practices (ACIP).    This lists the preventive care services that should be considered, and provides dates of when you are due. Items listed as completed are up-to-date and do not require any further intervention.    Health Maintenance   Topic Date Due   • ZOSTER VACCINE (1 of 2) Never done   • COLORECTAL CANCER SCREENING  2024   • COVID-19 Vaccine ( season) 2025 (Originally 2024)   • INFLUENZA VACCINE  2025 (Originally 2024)   • LIPID PANEL  2025   • BMI FOLLOWUP  2025   • ANNUAL WELLNESS VISIT  2026   • Pneumococcal Vaccine 65+  Completed   • AAA SCREEN ONCE  Completed   • HEPATITIS C SCREENING  Addressed   • TDAP/TD VACCINES  Discontinued       No orders of the defined types were placed in this encounter.      No follow-ups on file.

## 2025-01-21 NOTE — ASSESSMENT & PLAN NOTE
Renal condition is stable.  Continue current treatment regimen.  Renal condition will be reassessed in 6 months.    Orders:    Microalbumin / Creatinine Urine Ratio - Urine, Clean Catch    Comprehensive Metabolic Panel    CBC & Differential

## 2025-01-22 LAB
ALBUMIN SERPL-MCNC: 4.4 G/DL (ref 3.8–4.8)
ALP SERPL-CCNC: 48 IU/L (ref 44–121)
ALT SERPL-CCNC: 33 IU/L (ref 0–44)
AST SERPL-CCNC: 25 IU/L (ref 0–40)
BASOPHILS # BLD AUTO: 0.1 X10E3/UL (ref 0–0.2)
BASOPHILS NFR BLD AUTO: 1 %
BILIRUB SERPL-MCNC: <0.2 MG/DL (ref 0–1.2)
BUN SERPL-MCNC: 23 MG/DL (ref 8–27)
BUN/CREAT SERPL: 19 (ref 10–24)
CALCIUM SERPL-MCNC: 9.5 MG/DL (ref 8.6–10.2)
CHLORIDE SERPL-SCNC: 105 MMOL/L (ref 96–106)
CHOLEST SERPL-MCNC: 174 MG/DL (ref 100–199)
CO2 SERPL-SCNC: 22 MMOL/L (ref 20–29)
CREAT SERPL-MCNC: 1.24 MG/DL (ref 0.76–1.27)
EGFRCR SERPLBLD CKD-EPI 2021: 62 ML/MIN/1.73
EOSINOPHIL # BLD AUTO: 0.1 X10E3/UL (ref 0–0.4)
EOSINOPHIL NFR BLD AUTO: 1 %
ERYTHROCYTE [DISTWIDTH] IN BLOOD BY AUTOMATED COUNT: 13.4 % (ref 11.6–15.4)
GLOBULIN SER CALC-MCNC: 2.4 G/DL (ref 1.5–4.5)
GLUCOSE SERPL-MCNC: 122 MG/DL (ref 70–99)
HBA1C MFR BLD: 6.6 % (ref 4.8–5.6)
HCT VFR BLD AUTO: 43.4 % (ref 37.5–51)
HDLC SERPL-MCNC: 32 MG/DL
HGB BLD-MCNC: 13.6 G/DL (ref 13–17.7)
IMM GRANULOCYTES # BLD AUTO: 0 X10E3/UL (ref 0–0.1)
IMM GRANULOCYTES NFR BLD AUTO: 1 %
LDLC SERPL CALC-MCNC: 102 MG/DL (ref 0–99)
LYMPHOCYTES # BLD AUTO: 1.5 X10E3/UL (ref 0.7–3.1)
LYMPHOCYTES NFR BLD AUTO: 19 %
MCH RBC QN AUTO: 29.2 PG (ref 26.6–33)
MCHC RBC AUTO-ENTMCNC: 31.3 G/DL (ref 31.5–35.7)
MCV RBC AUTO: 93 FL (ref 79–97)
MONOCYTES # BLD AUTO: 0.8 X10E3/UL (ref 0.1–0.9)
MONOCYTES NFR BLD AUTO: 10 %
NEUTROPHILS # BLD AUTO: 5.3 X10E3/UL (ref 1.4–7)
NEUTROPHILS NFR BLD AUTO: 68 %
PLATELET # BLD AUTO: 243 X10E3/UL (ref 150–450)
POTASSIUM SERPL-SCNC: 4.3 MMOL/L (ref 3.5–5.2)
PROT SERPL-MCNC: 6.8 G/DL (ref 6–8.5)
RBC # BLD AUTO: 4.66 X10E6/UL (ref 4.14–5.8)
SODIUM SERPL-SCNC: 142 MMOL/L (ref 134–144)
TRIGL SERPL-MCNC: 235 MG/DL (ref 0–149)
VLDLC SERPL CALC-MCNC: 40 MG/DL (ref 5–40)
WBC # BLD AUTO: 7.8 X10E3/UL (ref 3.4–10.8)

## 2025-01-30 ENCOUNTER — OFFICE VISIT (OUTPATIENT)
Dept: INTERNAL MEDICINE | Facility: CLINIC | Age: 70
End: 2025-01-30
Payer: MEDICARE

## 2025-01-30 VITALS
HEART RATE: 68 BPM | HEIGHT: 74 IN | BODY MASS INDEX: 39.91 KG/M2 | SYSTOLIC BLOOD PRESSURE: 150 MMHG | WEIGHT: 311 LBS | TEMPERATURE: 97.3 F | DIASTOLIC BLOOD PRESSURE: 70 MMHG | OXYGEN SATURATION: 97 %

## 2025-01-30 DIAGNOSIS — J32.3 SINUSITIS CHRONIC, SPHENOIDAL: Primary | ICD-10-CM

## 2025-01-30 DIAGNOSIS — R73.03 BORDERLINE DIABETES: ICD-10-CM

## 2025-01-30 DIAGNOSIS — N18.31 CHRONIC RENAL FAILURE, STAGE 3A: ICD-10-CM

## 2025-01-30 PROCEDURE — 96372 THER/PROPH/DIAG INJ SC/IM: CPT | Performed by: FAMILY MEDICINE

## 2025-01-30 PROCEDURE — 3078F DIAST BP <80 MM HG: CPT | Performed by: FAMILY MEDICINE

## 2025-01-30 PROCEDURE — 99213 OFFICE O/P EST LOW 20 MIN: CPT | Performed by: FAMILY MEDICINE

## 2025-01-30 PROCEDURE — 1125F AMNT PAIN NOTED PAIN PRSNT: CPT | Performed by: FAMILY MEDICINE

## 2025-01-30 PROCEDURE — 3077F SYST BP >= 140 MM HG: CPT | Performed by: FAMILY MEDICINE

## 2025-01-30 RX ORDER — GUAIFENESIN 600 MG/1
600 TABLET, EXTENDED RELEASE ORAL 2 TIMES DAILY
Qty: 20 TABLET | Refills: 0 | Status: SHIPPED | OUTPATIENT
Start: 2025-01-30 | End: 2025-02-09

## 2025-01-30 RX ORDER — METHYLPREDNISOLONE SODIUM SUCCINATE 40 MG/ML
80 INJECTION INTRAMUSCULAR; INTRAVENOUS ONCE
Status: COMPLETED | OUTPATIENT
Start: 2025-01-30 | End: 2025-01-30

## 2025-01-30 RX ORDER — LEVOFLOXACIN 500 MG/1
500 TABLET, FILM COATED ORAL DAILY
Qty: 7 TABLET | Refills: 0 | Status: SHIPPED | OUTPATIENT
Start: 2025-01-30 | End: 2025-02-06

## 2025-01-30 RX ADMIN — METHYLPREDNISOLONE SODIUM SUCCINATE 80 MG: 40 INJECTION INTRAMUSCULAR; INTRAVENOUS at 14:05

## 2025-01-30 NOTE — PROGRESS NOTES
Subjective     Chief Complaint   Patient presents with    Headache    Sinus Problem     Hurts on bridge of nose, and goes down his nose and he has blurred vision           History of Present Illness    Patient's PMR from outside medical facility reviewed and noted.    Raghav Olivier is a 69 y.o. male who presents for a routine office visit.  The patient states he has significant head congestion with mild associated cough and congestion.   Drainage has been yellowish green in color, as well as any coughed up sputum..  There has been some facial pain and pressure.  Patient reports no fever, myalgias, nausea, vomiting or diarrhea associated with this at this time.  Patient reports that this has been going on for 7 days at this point, and would like something to help with their sinusitis at this time.    He also has borderline diabetes with insulin resistance patient is interested in weight loss we will go ahead and try some metformin to see if it is helpful for him at this time.        Past Medical History:   Past Medical History:   Diagnosis Date    Arrhythmia     Arthritis     Atrial fibrillation     Cancer     SKIN    Cataract     Diverticulitis     Hyperlipidemia     Hypertension     Kidney stones     Migraines     Nonrheumatic mitral (valve) insufficiency     Pulmonary embolism 8-30-21    [facility]    Sleep apnea     C-PAP    Spinal headache      Past Surgical History:  Past Surgical History:   Procedure Laterality Date    APPENDECTOMY      BACK SURGERY  01/2022    CARDIAC ABLATION  07/26/2024    CARDIAC CATHETERIZATION      CARDIAC ELECTROPHYSIOLOGY PROCEDURE N/A 10/01/2021    Procedure: NEW PACEMAKER IMPLANTATION;  Surgeon: Mitch Hernández MD;  Location: [facility] CATH INVASIVE LOCATION;  Service: Cardiology;  Laterality: N/A;    CARDIOVERSION      CHOLECYSTECTOMY      COLONOSCOPY N/A 07/31/2017    Procedure: COLONOSCOPY;  Surgeon: Billy Robbins DO;  Location: [facility] ENDOSCOPY;  Service:      COLONOSCOPY N/A 07/28/2021    Procedure: COLONOSCOPY;  Surgeon: Billy Robbins DO;  Location: [facility] ENDOSCOPY;  Service: Gastroenterology;  Laterality: N/A;    EP STUDY      INSERT / REPLACE / REMOVE PACEMAKER  10-1-21    Dr Hernández    KNEE SURGERY Right     X2    SHOULDER SURGERY Left 2009    TOTAL KNEE ARTHROPLASTY Right 04/06/2023    Procedure: RIGHT TOTAL KNEE ARTHROPLASTY;  Surgeon: Pepe Vaughn MD;  Location: [facility] OR;  Service: Orthopedics;  Laterality: Right;     Social History:  reports that he quit smoking about 52 years ago. His smoking use included cigarettes. He started smoking about 56 years ago. He has a 1 pack-year smoking history. He has been exposed to tobacco smoke. He has never used smokeless tobacco. He reports that he does not drink alcohol and does not use drugs.    Family History: family history includes Cancer in his father and mother; Dementia in his father; Diabetes in his brother, father, and sister; Hypertension in his father and mother.      Allergies:  Allergies   Allergen Reactions    Penicillins Unknown - Low Severity     Pt states is was a childhood allergy    Vancomycin Itching     Medications:  Prior to Admission medications    Medication Sig Start Date End Date Taking? Authorizing Provider   acetaminophen (TYLENOL) 650 MG 8 hr tablet Take 1 tablet by mouth Every 8 (Eight) Hours As Needed for Mild Pain. 1000mg twice a day   Yes Tristen Mo MD   amiodarone (PACERONE) 200 MG tablet Take 1 tablet by mouth Daily.   Yes Tristen Mo MD   apixaban (ELIQUIS) 5 MG tablet tablet Take 1 tablet by mouth Every 12 (Twelve) Hours. Indications: Atrial Fibrillation, resume eliquis saturday morning   Yes Tristen Mo MD   Cholecalciferol (Vitamin D3) 50 MCG (2000 UT) capsule Daily. 4/3/23  Yes Tristen Mo MD   fluticasone (FLONASE) 50 MCG/ACT nasal spray Administer 1 spray into the nostril(s) as directed by provider As Needed.   Yes Chely  "MD Tristen   furosemide (LASIX) 40 MG tablet TAKE 1 TABLET BY MOUTH EVERY DAY FOR 30 DAYS   Yes Provider, MD Tristen   potassium chloride 10 MEQ CR tablet TAKE 1 TABLET BY MOUTH EVERY DAY FOR 30 DAYS   Yes Tristen Mo MD   rosuvastatin (CRESTOR) 20 MG tablet TAKE 1 TABLET BY MOUTH EVERY DAY  Patient taking differently: Take 1 tablet by mouth Every Night. Takes every other day 4/1/20  Yes Jay Perez MD   valsartan (DIOVAN) 160 MG tablet Take 1 tablet by mouth Daily. 11/5/24  Yes ProviderTristen MD         Review of systems   negative unless otherwise specified above in HPI    Objective     Vital Signs: /70 (BP Location: Right arm, Patient Position: Sitting, Cuff Size: Large Adult)   Pulse 68   Temp 97.3 °F (36.3 °C) (Infrared)   Ht 188 cm (74\")   Wt (!) 141 kg (311 lb)   SpO2 97%   BMI 39.93 kg/m²     Physical Exam  Vitals and nursing note reviewed.   Constitutional:       General: He is not in acute distress.     Appearance: Normal appearance.   HENT:      Head: Normocephalic.   Eyes:      Extraocular Movements: Extraocular movements intact.      Pupils: Pupils are equal, round, and reactive to light.   Cardiovascular:      Rate and Rhythm: Normal rate and regular rhythm.      Heart sounds: Normal heart sounds. No murmur heard.  Pulmonary:      Effort: Pulmonary effort is normal. No respiratory distress.      Breath sounds: Normal breath sounds. No rhonchi or rales.   Abdominal:      General: Abdomen is flat. Bowel sounds are normal.      Palpations: Abdomen is soft.   Neurological:      General: No focal deficit present.      Mental Status: He is alert.                Results Reviewed:  Glucose   Date Value Ref Range Status   01/21/2025 122 (H) 70 - 99 mg/dL Final   06/13/2023 102 (H) 65 - 99 mg/dL Final     BUN   Date Value Ref Range Status   01/21/2025 23 8 - 27 mg/dL Final   06/13/2023 20 8 - 23 mg/dL Final   01/05/2022 21 8 - 26 mg/dL Final     Creatinine   Date " Value Ref Range Status   01/21/2025 1.24 0.76 - 1.27 mg/dL Final   05/24/2024 1.50 (H) 0.60 - 1.30 mg/dL Final     Comment:     Serial Number: [id number]:  [id number]   01/05/2022 0.99 0.72 - 1.25 mg/dL Final     Sodium   Date Value Ref Range Status   01/21/2025 142 134 - 144 mmol/L Final   06/13/2023 143 136 - 145 mmol/L Final   01/05/2022 140 136 - 145 mmol/L Final     Potassium   Date Value Ref Range Status   01/21/2025 4.3 3.5 - 5.2 mmol/L Final   06/13/2023 3.7 3.5 - 5.2 mmol/L Final   01/05/2022 4.0 3.3 - 4.8 mmol/L Final     Comment:     Plasma reference ranges are shown, please note that serum reference ranges are higher than plasma.     Chloride   Date Value Ref Range Status   01/21/2025 105 96 - 106 mmol/L Final   06/13/2023 103 98 - 107 mmol/L Final   01/05/2022 105 98 - 107 mmol/L Final     CO2   Date Value Ref Range Status   06/13/2023 28.0 22.0 - 29.0 mmol/L Final     Total CO2   Date Value Ref Range Status   01/21/2025 22 20 - 29 mmol/L Final   01/05/2022 25 23 - 31 mmol/L Final     Calcium   Date Value Ref Range Status   01/21/2025 9.5 8.6 - 10.2 mg/dL Final   06/13/2023 9.6 8.6 - 10.5 mg/dL Final   01/05/2022 8.4 8.4 - 10.5 mg/dL Final     ALT (SGPT)   Date Value Ref Range Status   01/21/2025 33 0 - 44 IU/L Final   06/13/2023 21 1 - 41 U/L Final     AST (SGOT)   Date Value Ref Range Status   01/21/2025 25 0 - 40 IU/L Final   06/13/2023 22 1 - 40 U/L Final     WBC   Date Value Ref Range Status   01/21/2025 7.8 3.4 - 10.8 x10E3/uL Final     Hematocrit   Date Value Ref Range Status   01/21/2025 43.4 37.5 - 51.0 % Final   06/13/2023 39.3 37.5 - 51.0 % Final     Platelets   Date Value Ref Range Status   01/21/2025 243 150 - 450 x10E3/uL Final   06/13/2023 242 140 - 450 10*3/mm3 Final     Triglycerides   Date Value Ref Range Status   01/21/2025 235 (H) 0 - 149 mg/dL Final     HDL Cholesterol   Date Value Ref Range Status   01/21/2025 32 (L) >39 mg/dL Final     LDL Chol Calc ([facility])   Date Value Ref Range  Status   01/21/2025 102 (H) 0 - 99 mg/dL Final     Hemoglobin A1C   Date Value Ref Range Status   01/21/2025 6.6 (H) 4.8 - 5.6 % Final     Comment:              Prediabetes: 5.7 - 6.4           Diabetes: >6.4           Glycemic control for adults with diabetes: <7.0               Procedure   Procedures       Assessment / Plan     Assessment/Plan:   Diagnosis Plan   1. Sinusitis chronic, sphenoidal  levoFLOXacin (LEVAQUIN) 500 MG tablet    methylPREDNISolone sodium succinate (SOLU-Medrol) injection 80 mg    guaiFENesin (Mucinex) 600 MG 12 hr tablet      2. Borderline diabetes  metFORMIN (GLUCOPHAGE) 500 MG tablet            Return for Next scheduled follow up. unless patient needs to be seen sooner or acute issues arise.      I have discussed the patient results/orders and and plan/recommendation with them at today's visit.      Signed by:    Paras Bliss MD Date: 01/30/25

## 2025-02-01 LAB
ALBUMIN/CREAT UR: 9 MG/G CREAT (ref 0–29)
CREAT UR-MCNC: 81.3 MG/DL
MICROALBUMIN UR-MCNC: 7 UG/ML

## 2025-02-02 ENCOUNTER — APPOINTMENT (OUTPATIENT)
Dept: ULTRASOUND IMAGING | Facility: HOSPITAL | Age: 70
End: 2025-02-02
Payer: MEDICARE

## 2025-02-02 ENCOUNTER — HOSPITAL ENCOUNTER (EMERGENCY)
Facility: HOSPITAL | Age: 70
Discharge: HOME | End: 2025-02-02
Attending: EMERGENCY MEDICINE | Admitting: EMERGENCY MEDICINE
Payer: MEDICARE

## 2025-02-02 ENCOUNTER — APPOINTMENT (OUTPATIENT)
Dept: CT IMAGING | Facility: HOSPITAL | Age: 70
End: 2025-02-02
Payer: MEDICARE

## 2025-02-02 VITALS
BODY MASS INDEX: 39.89 KG/M2 | WEIGHT: 310.85 LBS | TEMPERATURE: 98.1 F | OXYGEN SATURATION: 96 % | DIASTOLIC BLOOD PRESSURE: 84 MMHG | RESPIRATION RATE: 16 BRPM | SYSTOLIC BLOOD PRESSURE: 178 MMHG | HEART RATE: 65 BPM | HEIGHT: 74 IN

## 2025-02-02 DIAGNOSIS — K76.0 HEPATIC STEATOSIS: ICD-10-CM

## 2025-02-02 DIAGNOSIS — N40.0 ENLARGED PROSTATE: ICD-10-CM

## 2025-02-02 DIAGNOSIS — M79.606 PAIN OF LOWER EXTREMITY, UNSPECIFIED LATERALITY: Primary | ICD-10-CM

## 2025-02-02 DIAGNOSIS — R06.00 DYSPNEA, UNSPECIFIED TYPE: ICD-10-CM

## 2025-02-02 DIAGNOSIS — I77.810 ASCENDING AORTA DILATATION: ICD-10-CM

## 2025-02-02 DIAGNOSIS — R91.1 PULMONARY NODULE: ICD-10-CM

## 2025-02-02 DIAGNOSIS — R10.9 ABDOMINAL PAIN, UNSPECIFIED ABDOMINAL LOCATION: ICD-10-CM

## 2025-02-02 LAB
ALBUMIN SERPL-MCNC: 4.1 G/DL (ref 3.5–5.2)
ALBUMIN/GLOB SERPL: 1.6 G/DL
ALP SERPL-CCNC: 35 U/L (ref 39–117)
ALT SERPL W P-5'-P-CCNC: 26 U/L (ref 1–41)
ANION GAP SERPL CALCULATED.3IONS-SCNC: 11 MMOL/L (ref 5–15)
AST SERPL-CCNC: 21 U/L (ref 1–40)
BASOPHILS # BLD AUTO: 0.04 10*3/MM3 (ref 0–0.2)
BASOPHILS NFR BLD AUTO: 0.6 % (ref 0–1.5)
BILIRUB SERPL-MCNC: 0.3 MG/DL (ref 0–1.2)
BUN SERPL-MCNC: 19 MG/DL (ref 8–23)
BUN/CREAT SERPL: 19.4 (ref 7–25)
CALCIUM SPEC-SCNC: 8.9 MG/DL (ref 8.6–10.5)
CHLORIDE SERPL-SCNC: 105 MMOL/L (ref 98–107)
CO2 SERPL-SCNC: 25 MMOL/L (ref 22–29)
CREAT SERPL-MCNC: 0.98 MG/DL (ref 0.76–1.27)
D DIMER PPP FEU-MCNC: 0.34 MCGFEU/ML (ref 0–0.71)
DEPRECATED RDW RBC AUTO: 46.3 FL (ref 37–54)
EGFRCR SERPLBLD CKD-EPI 2021: 82.4 ML/MIN/1.73
EOSINOPHIL # BLD AUTO: 0.08 10*3/MM3 (ref 0–0.4)
EOSINOPHIL NFR BLD AUTO: 1.2 % (ref 0.3–6.2)
ERYTHROCYTE [DISTWIDTH] IN BLOOD BY AUTOMATED COUNT: 13.9 % (ref 12.3–15.4)
GEN 5 1HR TROPONIN T REFLEX: 15 NG/L
GLOBULIN UR ELPH-MCNC: 2.6 GM/DL
GLUCOSE SERPL-MCNC: 111 MG/DL (ref 65–99)
HCT VFR BLD AUTO: 39.9 % (ref 37.5–51)
HGB BLD-MCNC: 12.9 G/DL (ref 13–17.7)
IMM GRANULOCYTES # BLD AUTO: 0.02 10*3/MM3 (ref 0–0.05)
IMM GRANULOCYTES NFR BLD AUTO: 0.3 % (ref 0–0.5)
LIPASE SERPL-CCNC: 31 U/L (ref 13–60)
LYMPHOCYTES # BLD AUTO: 1.74 10*3/MM3 (ref 0.7–3.1)
LYMPHOCYTES NFR BLD AUTO: 25.3 % (ref 19.6–45.3)
MCH RBC QN AUTO: 29.3 PG (ref 26.6–33)
MCHC RBC AUTO-ENTMCNC: 32.3 G/DL (ref 31.5–35.7)
MCV RBC AUTO: 90.7 FL (ref 79–97)
MONOCYTES # BLD AUTO: 0.68 10*3/MM3 (ref 0.1–0.9)
MONOCYTES NFR BLD AUTO: 9.9 % (ref 5–12)
NEUTROPHILS NFR BLD AUTO: 4.32 10*3/MM3 (ref 1.7–7)
NEUTROPHILS NFR BLD AUTO: 62.7 % (ref 42.7–76)
NRBC BLD AUTO-RTO: 0 /100 WBC (ref 0–0.2)
NT-PROBNP SERPL-MCNC: <36 PG/ML (ref 0–900)
PLATELET # BLD AUTO: 219 10*3/MM3 (ref 140–450)
PMV BLD AUTO: 9.6 FL (ref 6–12)
POTASSIUM SERPL-SCNC: 4 MMOL/L (ref 3.5–5.2)
PROT SERPL-MCNC: 6.7 G/DL (ref 6–8.5)
RBC # BLD AUTO: 4.4 10*6/MM3 (ref 4.14–5.8)
SODIUM SERPL-SCNC: 141 MMOL/L (ref 136–145)
TROPONIN T NUMERIC DELTA: -1 NG/L
TROPONIN T SERPL HS-MCNC: 16 NG/L
WBC NRBC COR # BLD AUTO: 6.88 10*3/MM3 (ref 3.4–10.8)

## 2025-02-02 PROCEDURE — 85025 COMPLETE CBC W/AUTO DIFF WBC: CPT | Performed by: EMERGENCY MEDICINE

## 2025-02-02 PROCEDURE — 93970 EXTREMITY STUDY: CPT

## 2025-02-02 PROCEDURE — 25510000001 IOPAMIDOL PER 1 ML: Performed by: EMERGENCY MEDICINE

## 2025-02-02 PROCEDURE — 93005 ELECTROCARDIOGRAM TRACING: CPT | Performed by: EMERGENCY MEDICINE

## 2025-02-02 PROCEDURE — 83690 ASSAY OF LIPASE: CPT | Performed by: EMERGENCY MEDICINE

## 2025-02-02 PROCEDURE — 80053 COMPREHEN METABOLIC PANEL: CPT | Performed by: EMERGENCY MEDICINE

## 2025-02-02 PROCEDURE — 84484 ASSAY OF TROPONIN QUANT: CPT | Performed by: EMERGENCY MEDICINE

## 2025-02-02 PROCEDURE — 36415 COLL VENOUS BLD VENIPUNCTURE: CPT

## 2025-02-02 PROCEDURE — 93970 EXTREMITY STUDY: CPT | Performed by: SURGERY

## 2025-02-02 PROCEDURE — 71275 CT ANGIOGRAPHY CHEST: CPT

## 2025-02-02 PROCEDURE — 99285 EMERGENCY DEPT VISIT HI MDM: CPT

## 2025-02-02 PROCEDURE — 85379 FIBRIN DEGRADATION QUANT: CPT | Performed by: EMERGENCY MEDICINE

## 2025-02-02 PROCEDURE — 74177 CT ABD & PELVIS W/CONTRAST: CPT

## 2025-02-02 PROCEDURE — 83880 ASSAY OF NATRIURETIC PEPTIDE: CPT | Performed by: EMERGENCY MEDICINE

## 2025-02-02 RX ORDER — IOPAMIDOL 755 MG/ML
100 INJECTION, SOLUTION INTRAVASCULAR
Status: COMPLETED | OUTPATIENT
Start: 2025-02-02 | End: 2025-02-02

## 2025-02-02 RX ORDER — PANTOPRAZOLE SODIUM 40 MG/1
40 TABLET, DELAYED RELEASE ORAL DAILY
Qty: 20 TABLET | Refills: 0 | Status: SHIPPED | OUTPATIENT
Start: 2025-02-02 | End: 2025-02-22

## 2025-02-02 RX ADMIN — IOPAMIDOL 100 ML: 755 INJECTION, SOLUTION INTRAVENOUS at 11:25

## 2025-02-02 NOTE — ED PROVIDER NOTES
Subjective   History of Present Illness  Patient is 69 years old who Kemeya coming with left lower extremities tenderness and pain going on for the past couple of days his wife said he is also short of breath he also by the way complains of some abdominal discomfort.  On examination looks that he may have a DVT we will get a CT of the chest also get a CT of the abdomen pelvis to look at the iliac vessels.    Leg Pain  Location:  Leg  Time since incident:  2 days  Injury: no    Leg location:  L leg  Pain details:     Quality:  Aching    Radiates to:  Does not radiate    Severity:  Moderate    Onset quality:  Gradual    Timing:  Constant    Progression:  Worsening  Chronicity:  New  Dislocation: no    Prior injury to area:  No  Relieved by:  Nothing  Worsened by:  Bearing weight  Ineffective treatments:  None tried  Associated symptoms: swelling    Associated symptoms: no back pain, no decreased ROM, no fatigue, no fever, no itching, no neck pain, no numbness and no tingling    Risk factors: no concern for non-accidental trauma        Review of Systems   Constitutional: Negative.  Negative for chills, fatigue and fever.   HENT: Negative.  Negative for congestion.    Respiratory: Negative.  Negative for cough, chest tightness and stridor.    Cardiovascular: Negative.  Negative for chest pain.   Gastrointestinal: Negative.  Negative for abdominal distention, abdominal pain, nausea and vomiting.   Endocrine: Negative.    Genitourinary: Negative.  Negative for difficulty urinating and flank pain.   Musculoskeletal: Negative.  Negative for back pain and neck pain.   Skin: Negative.  Negative for color change and itching.   Neurological: Negative.  Negative for dizziness and headaches.   All other systems reviewed and are negative.      Past Medical History:   Diagnosis Date    Arrhythmia     Arthritis     Atrial fibrillation     Cancer     SKIN    Cataract     Diverticulitis     Hyperlipidemia     Hypertension     Kidney  stones     Migraines     Nonrheumatic mitral (valve) insufficiency     Pulmonary embolism 21    [facility]    Sleep apnea     C-PAP    Spinal headache        Allergies   Allergen Reactions    Penicillins Unknown - Low Severity     Pt states is was a childhood allergy    Vancomycin Itching       Past Surgical History:   Procedure Laterality Date    APPENDECTOMY      BACK SURGERY  2022    CARDIAC ABLATION  2024    CARDIAC CATHETERIZATION      CARDIAC ELECTROPHYSIOLOGY PROCEDURE N/A 10/01/2021    Procedure: NEW PACEMAKER IMPLANTATION;  Surgeon: Mitch Hernández MD;  Location: [facility] CATH INVASIVE LOCATION;  Service: Cardiology;  Laterality: N/A;    CARDIOVERSION      CHOLECYSTECTOMY      COLONOSCOPY N/A 2017    Procedure: COLONOSCOPY;  Surgeon: Billy Robbins DO;  Location: [facility] ENDOSCOPY;  Service:     COLONOSCOPY N/A 2021    Procedure: COLONOSCOPY;  Surgeon: Billy Robbins DO;  Location: [facility] ENDOSCOPY;  Service: Gastroenterology;  Laterality: N/A;    EP STUDY      INSERT / REPLACE / REMOVE PACEMAKER  10-1-21    Dr Hernández    KNEE SURGERY Right     X2    SHOULDER SURGERY Left 2009    TOTAL KNEE ARTHROPLASTY Right 2023    Procedure: RIGHT TOTAL KNEE ARTHROPLASTY;  Surgeon: Pepe Vaughn MD;  Location: [facility] OR;  Service: Orthopedics;  Laterality: Right;       Family History   Problem Relation Age of Onset    Cancer Mother         colon    Hypertension Mother     Diabetes Father     Cancer Father         prostate    Dementia Father     Hypertension Father     Diabetes Sister     Diabetes Brother        Social History     Socioeconomic History    Marital status:     Highest education level: GED or equivalent   Tobacco Use    Smoking status: Former     Current packs/day: 0.00     Average packs/day: 0.3 packs/day for 4.0 years (1.0 ttl pk-yrs)     Types: Cigarettes     Start date: 1969     Quit date: 1973     Years since quittin.1     Passive exposure:  Past    Smokeless tobacco: Never   Vaping Use    Vaping status: Never Used   Substance and Sexual Activity    Alcohol use: No    Drug use: No    Sexual activity: Yes     Partners: Female     Birth control/protection: Surgical           Objective   Physical Exam  Vitals and nursing note reviewed. Exam conducted with a chaperone present.   Constitutional:       General: He is awake.      Appearance: Normal appearance. He is well-developed. He is morbidly obese. He is not ill-appearing.   HENT:      Head: Normocephalic and atraumatic.   Eyes:      General: Lids are normal.      Conjunctiva/sclera: Conjunctivae normal.      Pupils: Pupils are equal, round, and reactive to light.   Cardiovascular:      Rate and Rhythm: Normal rate and regular rhythm.      Chest Wall: PMI is not displaced.      Pulses: Normal pulses.      Heart sounds: Normal heart sounds.   Pulmonary:      Effort: Pulmonary effort is normal.      Breath sounds: Normal breath sounds. No decreased breath sounds.   Abdominal:      General: Abdomen is flat. Bowel sounds are normal.      Palpations: Abdomen is soft.      Tenderness: There is no abdominal tenderness in the periumbilical area.      Comments: Somewhat tender in the paramedical area no guarding or rebound tenderness noted.   Musculoskeletal:         General: Normal range of motion.      Cervical back: Full passive range of motion without pain, normal range of motion and neck supple.      Comments: Left lower extremity tenderness on palpation of the calf and the thigh appears to have a DVT dorsalis pedis and posterior pulse are palpable.   Skin:     General: Skin is warm and dry.      Capillary Refill: Capillary refill takes less than 2 seconds.   Neurological:      General: No focal deficit present.      Mental Status: He is alert and oriented to person, place, and time.      Motor: Motor function is intact.      Deep Tendon Reflexes: Reflexes are normal and symmetric.   Psychiatric:          Behavior: Behavior is cooperative.         Procedures           ED Course  ED Course as of 02/02/25 1315   Sun Feb 02, 2025   1108 bradycardia [TS]   1226   No evidence of pulmonary embolus.     2.  No evidence of aortic dissection. Ascending aorta is mildly dilated  measuring 4.2 cm diameter, similar to the prior study. Moderate coronary  artery atherosclerotic calcification.     3.  Lungs are clear other than for mild atelectasis.     4.  No change in small left lower lobe pulmonary nodules measure up to 5  These findings along with a CT abdomen pelvis finding were discussed the patient at length need for follow-up as an outpatient. [TS]   1309 Patient is a 69-year-old gentleman who came to the ER commingled left lower extremity swelling.  And pain was slightly erythematous.  The possibility of DVT could not be ruled out and therefore sonograms were performed which were negative for DVT.  He also was complaining abdominal pain and discomfort therefore a CT of the abdomen pelvis were performed giving consideration that there is a concern for DVT and the patient's wife was stating that the patient develops shortness of breath pretty easily CT angio of the chest were performed which is negative for PE.  His workup essentially is negative I have had an extensive discussion with him and we will discharge him on home he has been advised to follow-up with cardiology for further evaluation of dyspnea on exertion.  There is no chest pain associate with this.  He has been taking Levaquin and that may be the culprit causing this leg pain that he is having there is no clinical evidence of infection.  Patient can be discharged home with a follow-up with the primary MD and to return if any worsening symptoms. [TS]      ED Course User Index  [TS] Justice Pierre MD                                                       Medical Decision Making  Lower extremity swelling and tenderness possible DVT will get a sonogram having some  abdominal discomfort we will get a CT to rule out any iliac veins or any compressive problem will be giving rise to DVT.  Also the family states that the patient is short of breath more so on walking.  Therefore get a CT angio of the chest also.  Furthermore the patient is on Eliquis and makes things more complicated the Eliquis is given for his history of A-fib in the past which was ablated.    Problems Addressed:  Abdominal pain, unspecified abdominal location: acute illness or injury  Ascending aorta dilatation: undiagnosed new problem with uncertain prognosis  Enlarged prostate: complicated acute illness or injury  Hepatic steatosis: undiagnosed new problem with uncertain prognosis  Pain of lower extremity, unspecified laterality: acute illness or injury  Pulmonary nodule: chronic illness or injury    Amount and/or Complexity of Data Reviewed  Labs: ordered.     Details: Labs reviewed  Radiology: ordered.  ECG/medicine tests: ordered.    Risk  Prescription drug management.  Risk Details: Patient arrived to the ED with extremity pain/discomfort.  Patient is hemodynamically stable.  There is low clinical suspicion of DVT with a negative Wells score and no history of DVT or risk factors for DVT in the past along with a low preclinical suspicion.  There is low suspicion for acute bacterial skin and skin structure infection or acute osteomyelitis with the patient being afebrile no evidence of swelling erythema or warmth no history of trauma insect bite or injury to the involved extremity with no radiological evidence of osteo .  There is low clinical suspicion of compartment syndrome with no clinical history of trauma crush injury fractures or bleeding and/or any history of extravasation injury and no physical finding of significant pain paresthesias numbness or vascular compromise.  There is little clinical evidence to support traumatic etiology with out any history of recent trauma to the extremity with negative  x-ray for acute trauma.  There is low clinical evidence of fasciitis without any history of intravenous drug use and or trauma or animal or insect bites or injuries, patient with no clinical evidence of infectious process fever or immunosuppression along with no clinical findings of fasciitis i.e. excessive pain cellulitis erythema swelling or crepitus in the involved extremity.  There is low suspicion for vascular compromise with intact distal and proximal pulses good cap refill no sensory deficits.    This patient presents with abdominal pain arrived hemodynamically stable.  Presentation not consistent with other acute, emergent causes of abdominal pain at this time.  Low suspicion for dissection there is no widened mediastinum, hypotension, pulses deficits, and no pain tearing through to the back.  Low suspicion for nephrolithiasis without flank pain radiating to the groin, hematuria, or any history of kidney stones.  Low suspicion for viscus perforation without evidence of guarding, rebound, or rigidity that would be concerning for an acute abdomen.  Low concern for appendicitis as pain did not radiate from the umbilicus to the right lower quadrant, negative Rovsing's sign, no severe constipation on exam.  Low concern for cholecystitis with negative Brooke sign, no history of gallstones, and no recent pale stools or pain after eating.  Low concern for ulcerative disease as pain is not consistent with eating  foods and is not relieved with patient refraining from eatingand there is no hematemesis or melena. Low suspicion for GI bleeding as there is no melena hematemesis or hematochezia and patient's hemodynamics are stable and hemoglobin is at its baseline.  Low suspicion for gastroenteritis without evidence of diarrhea, fevers, or recent uncooked food exposure.  There is low concern for ischemic bowel with no significant abdominal pain normal vitals and no acidosis no history of peripheral vascular disease or  cardiac dysrhythmias.                Final diagnoses:   Pain of lower extremity, unspecified laterality   Abdominal pain, unspecified abdominal location   Dyspnea, unspecified type   Ascending aorta dilatation   Enlarged prostate   Hepatic steatosis   Pulmonary nodule       ED Disposition  ED Disposition       ED Disposition   Discharge    Condition   Stable    Comment   --               Paras Bliss MD  [address] [place] [address] [phone]               Medication List        New Prescriptions      pantoprazole 40 MG EC tablet  Commonly known as: PROTONIX  Take 1 tablet by mouth Daily for 20 days.            Changed      rosuvastatin 20 MG tablet  Commonly known as: CRESTOR  TAKE 1 TABLET BY MOUTH EVERY DAY  What changed:   when to take this  additional instructions               Where to Get Your Medications        These medications were sent to [facility]/pharmacy #[id number] - [place], [place] - [facility] - [phone]  - [phone] [address] [address]      Phone: [phone]   pantoprazole 40 MG EC tablet            Justice Pierre MD  02/02/25 [id number]       Justice Pierre MD  02/02/25 6210

## 2025-02-03 RX ORDER — VALSARTAN 160 MG/1
160 TABLET ORAL DAILY
Qty: 90 TABLET | Refills: 3 | OUTPATIENT
Start: 2025-02-03

## 2025-02-03 NOTE — TELEPHONE ENCOUNTER
"     Patient not taking: Reported on 9/5/202    He stopped his valsartan and crestor because he was tired of taking \"so many pills\".   "

## 2025-02-05 LAB
QT INTERVAL: 438 MS
QTC INTERVAL: 419 MS

## 2025-02-13 RX ORDER — VALSARTAN 160 MG/1
160 TABLET ORAL DAILY
Qty: 90 TABLET | Refills: 3 | Status: SHIPPED | OUTPATIENT
Start: 2025-02-13

## 2025-02-13 NOTE — TELEPHONE ENCOUNTER
Caller: Raghav Olivier    Relationship: Self    Best call back number: [phone]      Requested Prescriptions:   Requested Prescriptions     Pending Prescriptions Disp Refills    valsartan (DIOVAN) 160 MG tablet       Sig: Take 1 tablet by mouth Daily.        Pharmacy where request should be sent: [facility]/PHARMACY #[id number] - [place], [place] - [address] [facility] [phone] [facility] [phone]      Last office visit with prescribing clinician: 6/13/2023   Last telemedicine visit with prescribing clinician: Visit date not found   Next office visit with prescribing clinician: 3/12/2025     Additional details provided by patient: PT HAS BEEN OUT OF MEDS FOR 3 DAYS    Does the patient have less than a 3 day supply:  [x] Yes  [] No    Would you like a call back once the refill request has been completed: [x] Yes [] No    If the office needs to give you a call back, can they leave a voicemail: [x] Yes [] No    Beny Spaulding Rep   02/13/25 09:49 CST

## 2025-02-13 NOTE — TELEPHONE ENCOUNTER
PER LOV 09/05/2024    HTN- elevated today. Educated to monitor his BP at home and will likely need to resume his Valsartan. Education on cardiac complications that can result from uncontrolled HTN.

## 2025-03-17 ENCOUNTER — OFFICE VISIT (OUTPATIENT)
Dept: CARDIOLOGY | Facility: CLINIC | Age: 70
End: 2025-03-17
Payer: MEDICARE

## 2025-03-17 VITALS
SYSTOLIC BLOOD PRESSURE: 158 MMHG | HEART RATE: 61 BPM | WEIGHT: 304 LBS | HEIGHT: 74 IN | DIASTOLIC BLOOD PRESSURE: 82 MMHG | BODY MASS INDEX: 39.01 KG/M2

## 2025-03-17 DIAGNOSIS — I34.0 NONRHEUMATIC MITRAL (VALVE) INSUFFICIENCY: ICD-10-CM

## 2025-03-17 DIAGNOSIS — I48.0 PAROXYSMAL ATRIAL FIBRILLATION: ICD-10-CM

## 2025-03-17 DIAGNOSIS — I25.10 CORONARY ARTERY DISEASE INVOLVING NATIVE CORONARY ARTERY OF NATIVE HEART WITHOUT ANGINA PECTORIS: ICD-10-CM

## 2025-03-17 DIAGNOSIS — R06.09 DOE (DYSPNEA ON EXERTION): ICD-10-CM

## 2025-03-17 DIAGNOSIS — I77.819 DILATATION OF AORTA: ICD-10-CM

## 2025-03-17 DIAGNOSIS — I49.5 SINOATRIAL NODE DYSFUNCTION: ICD-10-CM

## 2025-03-17 DIAGNOSIS — Z95.0 PRESENCE OF CARDIAC PACEMAKER: ICD-10-CM

## 2025-03-17 DIAGNOSIS — I51.7 LVH (LEFT VENTRICULAR HYPERTROPHY): Primary | ICD-10-CM

## 2025-03-17 DIAGNOSIS — E78.2 MIXED HYPERLIPIDEMIA: ICD-10-CM

## 2025-03-17 PROCEDURE — 99214 OFFICE O/P EST MOD 30 MIN: CPT | Performed by: INTERNAL MEDICINE

## 2025-03-17 PROCEDURE — 3077F SYST BP >= 140 MM HG: CPT | Performed by: INTERNAL MEDICINE

## 2025-03-17 PROCEDURE — 3079F DIAST BP 80-89 MM HG: CPT | Performed by: INTERNAL MEDICINE

## 2025-03-17 PROCEDURE — 1159F MED LIST DOCD IN RCRD: CPT | Performed by: INTERNAL MEDICINE

## 2025-03-17 PROCEDURE — 1160F RVW MEDS BY RX/DR IN RCRD: CPT | Performed by: INTERNAL MEDICINE

## 2025-03-17 RX ORDER — SODIUM CHLORIDE 9 MG/ML
40 INJECTION, SOLUTION INTRAVENOUS AS NEEDED
OUTPATIENT
Start: 2025-03-17

## 2025-03-17 RX ORDER — SODIUM CHLORIDE 0.9 % (FLUSH) 0.9 %
10 SYRINGE (ML) INJECTION EVERY 12 HOURS SCHEDULED
OUTPATIENT
Start: 2025-03-17

## 2025-03-17 RX ORDER — SODIUM CHLORIDE 0.9 % (FLUSH) 0.9 %
10 SYRINGE (ML) INJECTION AS NEEDED
OUTPATIENT
Start: 2025-03-17

## 2025-03-17 NOTE — PROGRESS NOTES
Raghav Olivier  [id number]  1955  69 y.o.  male    Referring Provider: Paras Bliss MD      Subjective    Mild chronic exertional shortness of breath on exertion relieved with rest  No significant cough or wheezing    No palpitations  No associated chest pain  No significant pedal edema    No fever or chills  No significant expectoration    No hemoptysis  No presyncope or syncope    Tolerating current medications well with no untoward side effects   Compliant with prescribed medication regimen. Tries to adhere to cardiac diet.     CT chest as below   Dilated aorta 4.2 cm   Not taken BP meds       History of present illness:  Raghav Olivier is a 69 y.o. yo male with paroxysmal atrial fibrillation  who presents today for   Chief Complaint   Patient presents with    Atrial Fibrillation     6 month follow up - patient believe that Levofloxacin 500mg was causing him some problems that sent him to the ER    .    History  Past Medical History:   Diagnosis Date    Abnormal ECG     Allergic     Arrhythmia     Arthritis     Atrial fibrillation     Cancer     SKIN    Cataract     Diverticulitis     Hyperlipidemia     Hypertension     Kidney stones     Migraines     Nonrheumatic mitral (valve) insufficiency     Obesity     Pulmonary embolism 8-30-21    [facility]    Sleep apnea     C-PAP    Spinal headache    ,   Past Surgical History:   Procedure Laterality Date    ABLATION OF DYSRHYTHMIC FOCUS      APPENDECTOMY      BACK SURGERY  01/2022    CARDIAC ABLATION  07/26/2024    CARDIAC ABLATION  10/2024    with     CARDIAC CATHETERIZATION      CARDIAC ELECTROPHYSIOLOGY PROCEDURE N/A 10/01/2021    Procedure: NEW PACEMAKER IMPLANTATION;  Surgeon: Mitch Hernández MD;  Location: [facility] CATH INVASIVE LOCATION;  Service: Cardiology;  Laterality: N/A;    CARDIOVERSION      CHOLECYSTECTOMY      COLONOSCOPY N/A 07/31/2017    Procedure: COLONOSCOPY;  Surgeon: Billy Robbins DO;  Location: [facility]  ENDOSCOPY;  Service:     COLONOSCOPY N/A 2021    Procedure: COLONOSCOPY;  Surgeon: Billy Robbins DO;  Location: [facility] ENDOSCOPY;  Service: Gastroenterology;  Laterality: N/A;    EP STUDY      INSERT / REPLACE / REMOVE PACEMAKER  10-1-21    Dr Hernández    JOINT REPLACEMENT  23    KNEE SURGERY Right     X2    SHOULDER SURGERY Left     SPINE SURGERY      TOTAL KNEE ARTHROPLASTY Right 2023    Procedure: RIGHT TOTAL KNEE ARTHROPLASTY;  Surgeon: Pepe Vaughn MD;  Location: [facility] OR;  Service: Orthopedics;  Laterality: Right;   ,   Family History   Problem Relation Age of Onset    Cancer Mother         Colon    Hypertension Mother     Diabetes Father     Cancer Father         Prostate    Dementia Father     Hypertension Father     Diabetes Sister     Diabetes Brother     Cancer Brother         Prostate    Cancer Brother         Prostate    Diabetes Brother     Colon cancer Neg Hx     Colon polyps Neg Hx     Esophageal cancer Neg Hx    ,   Social History     Tobacco Use    Smoking status: Former     Current packs/day: 0.00     Average packs/day: 0.3 packs/day for 4.0 years (1.0 ttl pk-yrs)     Types: Cigarettes     Start date: 1969     Quit date: 1973     Years since quittin.2     Passive exposure: Past    Smokeless tobacco: Never   Vaping Use    Vaping status: Never Used   Substance Use Topics    Alcohol use: No    Drug use: No   ,     Medications  Current Outpatient Medications   Medication Sig Dispense Refill    acetaminophen (TYLENOL) 650 MG 8 hr tablet Take 1 tablet by mouth Every 8 (Eight) Hours As Needed for Mild Pain. 1000mg twice a day      amiodarone (PACERONE) 200 MG tablet Take 1 tablet by mouth Daily.      apixaban (ELIQUIS) 5 MG tablet tablet Take 1 tablet by mouth Every 12 (Twelve) Hours. Indications: Atrial Fibrillation, resume eliquis saturday morning      Cholecalciferol (Vitamin D3) 50 MCG (2000) capsule Daily.      fluticasone (FLONASE) 50 MCG/ACT nasal  "spray Administer 1 spray into the nostril(s) as directed by provider As Needed.      furosemide (LASIX) 40 MG tablet TAKE 1 TABLET BY MOUTH EVERY DAY FOR 30 DAYS      metFORMIN (GLUCOPHAGE) 500 MG tablet Take 1 tablet by mouth Daily With Breakfast. 30 tablet 11    potassium chloride 10 MEQ CR tablet TAKE 1 TABLET BY MOUTH EVERY DAY FOR 30 DAYS      rosuvastatin (CRESTOR) 20 MG tablet TAKE 1 TABLET BY MOUTH EVERY DAY (Patient taking differently: Take 1 tablet by mouth Every Night. Takes every other day) 90 tablet 1    valsartan (DIOVAN) 160 MG tablet Take 1 tablet by mouth Daily. 90 tablet 3     No current facility-administered medications for this visit.       Allergies:  Penicillins and Vancomycin    Review of Systems  Review of Systems   Constitutional: Negative.   HENT: Negative.     Eyes: Negative.    Cardiovascular:  Positive for dyspnea on exertion and leg swelling. Negative for chest pain, claudication, cyanosis, irregular heartbeat, near-syncope, orthopnea, palpitations, paroxysmal nocturnal dyspnea and syncope.   Respiratory: Negative.     Endocrine: Negative.    Hematologic/Lymphatic: Negative.    Skin: Negative.    Musculoskeletal:  Positive for arthritis and joint pain.   Gastrointestinal:  Negative for anorexia.   Genitourinary: Negative.    Neurological: Negative.    Psychiatric/Behavioral: Negative.         Objective     Physical Exam:      Vitals:    03/17/25 0959   BP: 158/82   Pulse: 61   Weight: (!) 138 kg (304 lb)   Height: 188 cm (74\")         /82   Pulse 61   Ht 188 cm (74\")   Wt (!) 138 kg (304 lb)   BMI 39.03 kg/m²     Physical Exam  Constitutional:       Appearance: He is well-developed.   HENT:      Head: Normocephalic.   Neck:      Vascular: Normal carotid pulses. No carotid bruit or JVD.      Trachea: No tracheal tenderness or tracheal deviation.   Cardiovascular:      Rate and Rhythm: Regular rhythm.      Pulses: Normal pulses.      Heart sounds: Normal heart sounds. "   Pulmonary:      Effort: Pulmonary effort is normal.      Breath sounds: No stridor.   Abdominal:      General: There is no distension.      Palpations: Abdomen is soft.      Tenderness: There is no abdominal tenderness.   Musculoskeletal:      Cervical back: No edema.      Right lower le+ Pitting Edema present.      Left lower le+ Pitting Edema present.   Skin:     General: Skin is warm.   Neurological:      Mental Status: He is alert.      Cranial Nerves: No cranial nerve deficit.      Sensory: No sensory deficit.   Psychiatric:         Speech: Speech normal.         Behavior: Behavior normal.         Results Review:    IMPRESSION:     1.  No evidence of pulmonary embolus.     2.  No evidence of aortic dissection. Ascending aorta is mildly dilated  measuring 4.2 cm diameter, similar to the prior study. Moderate coronary  artery atherosclerotic calcification.     3.  Lungs are clear other than for mild atelectasis.     4.  No change in small left lower lobe pulmonary nodules measure up to 5  mm.        This report was signed and finalized on 2025 11:37 AM by Dr. Rory Kaplan MD.      Raghav Olivier  Regadenoson Stress Test With Myocardial Perfusion SPECT (Multi Study)  Order# [id number]  Reading physician: Titi Kramer MD Ordering physician: Titi Kramer MD Study date: 21       Patient Information    Patient Name   Raghav Olivier MRN   [id number] Legal Sex   Male  (Age)   1955 (66 y.o.)     Interpretation Summary       Diaphragmatic attenuation artifact is present.  Myocardial perfusion imaging indicates a normal myocardial perfusion study with no evidence of ischemia.  Impressions are consistent with a low risk study.  Left ventricular ejection fraction is normal. (Calculated EF = 55%).  Physiological apical thinning noted       Raghav Olivier  Holter Monitor Greater than 7 days up to 15 days  Order# [id number]  Reading physician: Titi Kramer MD Ordering physician:  Titi Kramer MD Study date: 8/3/21   Patient Information    Patient Name   Raghav Olivier MRN   [id number] Legal Sex   Male  (Age)   1955 (66 y.o.)   Interpretation Summary       Average HR: 83. Min HR: 38. Max HR: 216.     Entire report was reviewed.  Monitoring in days: ~13  In atrial fibrillation during entire monitoring duration     Rare premature ventricular contractions with a PVC burden of: < 1%     No correlated arrhythmia  No significant pauses                  Conclusion:      In atrial fibrillation during entire monitoring duration  Intermittent rapid ventricular response with rates between  bpm  Multiple pauses total of 2062 longest 7.9 seconds at 1:39 AM           Results for orders placed during the hospital encounter of 21    Adult Transthoracic Echo Complete w/ Color, Spectral and Contrast if necessary per protocol    Interpretation Summary  · Left ventricular ejection fraction appears to be 56 - 60%. Left ventricular systolic function is normal.  · Left ventricular wall thickness is consistent with moderate concentric hypertrophy.  · The following left ventricular wall segments are hypokinetic: apical inferior.  · Estimated right ventricular systolic pressure from tricuspid regurgitation is normal (<35 mmHg).     ____________________________________________________________________________________________________________________________________________  Health maintenance and recommendations    Low salt/ HTN/ Heart healthy carbohydrate restricted cardiac diet   The patient is advised to reduce or avoid caffeine or other cardiac stimulants.   Minimize or avoid  NSAID-type medications      Monitor for any signs of bleeding including red or dark stools. Fall precautions.   Advised staying uptodate with immunizations per established standard guidelines.    Offered to give patient  a copy of my notes     Questions were encouraged, asked and answered to the patient's  understanding  and satisfaction. Questions if any regarding current medications and side effects, need for refills and importance of compliance to medications stressed.    Reviewed available prior notes, consults, prior visits, laboratory findings, radiology and cardiology relevant reports. Updated chart as applicable. I have reviewed the patient's medical history in detail and updated the computerized patient record as relevant.      Updated patient regarding any new or relevant abnormalities on review of records or any new findings on physical exam. Mentioned to patient about purpose of visit and desirable health short and long term goals and objectives.    Primary to monitor CBC CMP Lipid panel and TSH as applicable    ___________________________________________________________________________________________________________________________________________       Procedures    Assessment & Plan   Diagnoses and all orders for this visit:    1. LVH (left ventricular hypertrophy) moderate  (Primary)  -     Adult Transthoracic Echo Complete W/ Cont if Necessary Per Protocol; Future    2. Nonrheumatic mitral (valve) insufficiency  -     Adult Transthoracic Echo Complete W/ Cont if Necessary Per Protocol; Future    3. Paroxysmal atrial fibrillation    4. Presence of cardiac pacemaker    5. Sinoatrial node dysfunction    6. Mixed hyperlipidemia    7. Dilatation of aorta  -     Ambulatory Referral to Cardiothoracic Surgery  -     Adult Transthoracic Echo Complete W/ Cont if Necessary Per Protocol; Future    8. ZHOU (dyspnea on exertion)  -     Adult Transthoracic Echo Complete W/ Cont if Necessary Per Protocol; Future  -     Stress Test With PET Myocardial Perfusion; Future  -     NM PET Cardiac Stress Radiology Interpretation; Future  -     No Caffeine, Decaffeinated Coffee, or Nicotine 12 hrs Prior to Cardiac PET CT Appointment  -     Nothing to Eat or Drink 6 Hours Prior to Cardiac PET CT Appointment  -     No Theophylline or Products  Containing Theophylline for 12 Hours prior Cardiac PET CT Appointment  -     No Oral Diabetic Medications After Midnight Prior to Cardiac PET CT Appointment, Hold Half of Insulin Dose Morning of Cardiac PET CT Appointment  -     Insert Peripheral IV; Standing  -     Saline Lock & Maintain IV Access; Standing  -     sodium chloride 0.9 % flush 10 mL  -     sodium chloride 0.9 % flush 10 mL  -     sodium chloride 0.9 % infusion 40 mL    9. Coronary artery disease involving native coronary artery of native heart without angina pectoris  -     Stress Test With PET Myocardial Perfusion; Future  -     NM PET Cardiac Stress Radiology Interpretation; Future  -     No Caffeine, Decaffeinated Coffee, or Nicotine 12 hrs Prior to Cardiac PET CT Appointment  -     Nothing to Eat or Drink 6 Hours Prior to Cardiac PET CT Appointment  -     No Theophylline or Products Containing Theophylline for 12 Hours prior Cardiac PET CT Appointment  -     No Oral Diabetic Medications After Midnight Prior to Cardiac PET CT Appointment, Hold Half of Insulin Dose Morning of Cardiac PET CT Appointment  -     Insert Peripheral IV; Standing  -     Saline Lock & Maintain IV Access; Standing  -     sodium chloride 0.9 % flush 10 mL  -     sodium chloride 0.9 % flush 10 mL  -     sodium chloride 0.9 % infusion 40 mL        Plan      Resume BP meds   Weigh yourself frequently, at least weekly, preferably daily, call me if more than 2 pounds a day or 5 pounds a week weight gain.  Flexible diuretic dosing   Patient was advised to continue CPAP daily.     Monitor cardiac rhythm device function regularly per established schedule or PRN      Will order cardiac PET scan   Orders Placed This Encounter   Procedures    NM PET Cardiac Stress Radiology Interpretation     Standing Status:   Future     Expected Date:   3/20/2025     Expiration Date:   6/17/2026     Reason for Exam::   cad     Release to patient:   Routine Release [[id number]]    Ambulatory Referral  to Cardiothoracic Surgery     Referral Priority:   Routine     Referral Type:   Consultation     Referral Reason:   Specialty Services Required     Requested Specialty:   Cardiothoracic Surgery     Number of Visits Requested:   1    No Caffeine, Decaffeinated Coffee, or Nicotine 12 hrs Prior to Cardiac PET CT Appointment    Nothing to Eat or Drink 6 Hours Prior to Cardiac PET CT Appointment    No Theophylline or Products Containing Theophylline for 12 Hours prior Cardiac PET CT Appointment    No Oral Diabetic Medications After Midnight Prior to Cardiac PET CT Appointment, Hold Half of Insulin Dose Morning of Cardiac PET CT Appointment    Stress Test With PET Myocardial Perfusion     Standing Status:   Future     Expected Date:   3/22/2025     Expiration Date:   6/17/2026     Reason for exam?:   Known Coronary Artery Disease     Release to patient:   Routine Release [[id number]]    Adult Transthoracic Echo Complete W/ Cont if Necessary Per Protocol     Standing Status:   Future     Expected Date:   3/24/2025     Expiration Date:   3/17/2026     Scheduling Instructions:      Myocardial strain to be performed       Use newer echo machine     Reason for exam?:   Dyspnea     Release to patient:   Routine Release [[id number]]      Check BP and heart rates twice daily initially till blood pressures and heart rates under good control and then at least 3x / week,   If blood pressures continue to be well-controlled then can check week a month  at home and bring a recording for review next visit  If BP >130/85 or < 100/60 persistently over 3 reading 30 mins apart or if heart rates persistently above 100 bpm or less than 55 bpm call sooner for evaluation and advise              Return in about 2 months (around 5/17/2025).

## 2025-03-19 DIAGNOSIS — E78.2 MIXED HYPERLIPIDEMIA: ICD-10-CM

## 2025-03-19 RX ORDER — ROSUVASTATIN CALCIUM 20 MG/1
20 TABLET, COATED ORAL NIGHTLY
Qty: 90 TABLET | Refills: 3 | Status: SHIPPED | OUTPATIENT
Start: 2025-03-19

## 2025-03-19 NOTE — TELEPHONE ENCOUNTER
"  Caller: Raghav Olivier \"Don\"    Relationship: Self    Best call back number: [phone]     Requested Prescriptions:   Requested Prescriptions     Pending Prescriptions Disp Refills    rosuvastatin (CRESTOR) 20 MG tablet 90 tablet 1     Sig: Take 1 tablet by mouth Daily.        Pharmacy where request should be sent:  [facility]     Last office visit with prescribing clinician: 3/17/2025   Last telemedicine visit with prescribing clinician: Visit date not found   Next office visit with prescribing clinician: 5/28/2025     Additional details provided by patient: PATIENT NEEDS A 90 DAY PRESCRIPTION DUE TO INSURANCE BILLING.     Does the patient have less than a 3 day supply:  [x] Yes  [] No    Would you like a call back once the refill request has been completed: [] Yes [x] No    If the office needs to give you a call back, can they leave a voicemail: [x] Yes [] No    Beny Deluca Rep   03/19/25 14:14 CDT           "

## 2025-03-25 ENCOUNTER — HOSPITAL ENCOUNTER (OUTPATIENT)
Facility: HOSPITAL | Age: 70
Discharge: HOME | End: 2025-03-25
Payer: MEDICARE

## 2025-03-25 ENCOUNTER — OFFICE VISIT (OUTPATIENT)
Dept: CARDIAC SURGERY | Facility: CLINIC | Age: 70
End: 2025-03-25
Payer: MEDICARE

## 2025-03-25 VITALS
BODY MASS INDEX: 38.24 KG/M2 | HEART RATE: 67 BPM | OXYGEN SATURATION: 96 % | HEIGHT: 74 IN | WEIGHT: 298 LBS | DIASTOLIC BLOOD PRESSURE: 84 MMHG | SYSTOLIC BLOOD PRESSURE: 138 MMHG

## 2025-03-25 VITALS — BODY MASS INDEX: 39.04 KG/M2 | WEIGHT: 304.24 LBS | HEIGHT: 74 IN

## 2025-03-25 DIAGNOSIS — Q25.43 AORTIC ROOT ANEURYSM: ICD-10-CM

## 2025-03-25 DIAGNOSIS — I25.10 CORONARY ARTERY DISEASE INVOLVING NATIVE CORONARY ARTERY OF NATIVE HEART WITHOUT ANGINA PECTORIS: ICD-10-CM

## 2025-03-25 DIAGNOSIS — I77.810 DILATED AORTIC ROOT: Primary | ICD-10-CM

## 2025-03-25 DIAGNOSIS — R06.09 DOE (DYSPNEA ON EXERTION): ICD-10-CM

## 2025-03-25 DIAGNOSIS — I71.21 ANEURYSM OF ASCENDING AORTA WITHOUT RUPTURE: Primary | ICD-10-CM

## 2025-03-25 PROCEDURE — 93017 CV STRESS TEST TRACING ONLY: CPT

## 2025-03-25 PROCEDURE — 1160F RVW MEDS BY RX/DR IN RCRD: CPT | Performed by: THORACIC SURGERY (CARDIOTHORACIC VASCULAR SURGERY)

## 2025-03-25 PROCEDURE — 78430 MYOCRD IMG PET RST/STRS W/CT: CPT

## 2025-03-25 PROCEDURE — 99204 OFFICE O/P NEW MOD 45 MIN: CPT | Performed by: THORACIC SURGERY (CARDIOTHORACIC VASCULAR SURGERY)

## 2025-03-25 PROCEDURE — 1159F MED LIST DOCD IN RCRD: CPT | Performed by: THORACIC SURGERY (CARDIOTHORACIC VASCULAR SURGERY)

## 2025-03-25 PROCEDURE — 3079F DIAST BP 80-89 MM HG: CPT | Performed by: THORACIC SURGERY (CARDIOTHORACIC VASCULAR SURGERY)

## 2025-03-25 PROCEDURE — A9555 RB82 RUBIDIUM: HCPCS | Performed by: INTERNAL MEDICINE

## 2025-03-25 PROCEDURE — 34310000005 RUBIDIUM CHLORIDE: Performed by: INTERNAL MEDICINE

## 2025-03-25 PROCEDURE — 3075F SYST BP GE 130 - 139MM HG: CPT | Performed by: THORACIC SURGERY (CARDIOTHORACIC VASCULAR SURGERY)

## 2025-03-25 PROCEDURE — 25010000002 REGADENOSON 0.4 MG/5ML SOLUTION: Performed by: INTERNAL MEDICINE

## 2025-03-25 RX ORDER — MELOXICAM 15 MG/1
15 TABLET ORAL AS NEEDED
COMMUNITY

## 2025-03-25 RX ORDER — TRIAMCINOLONE ACETONIDE 1 MG/G
CREAM TOPICAL
COMMUNITY
Start: 2025-03-24

## 2025-03-25 RX ORDER — REGADENOSON 0.08 MG/ML
0.4 INJECTION, SOLUTION INTRAVENOUS ONCE
Status: COMPLETED | OUTPATIENT
Start: 2025-03-25 | End: 2025-03-25

## 2025-03-25 RX ADMIN — REGADENOSON 0.4 MG: 0.08 INJECTION, SOLUTION INTRAVENOUS at 10:13

## 2025-03-26 LAB
BH CV NUCLEAR PRIOR STUDY: 3
BH CV REST NUCLEAR ISOTOPE DOSE: 24.8 MCI
BH CV STRESS BP STAGE 1: NORMAL
BH CV STRESS COMMENTS STAGE 1: NORMAL
BH CV STRESS DOSE REGADENOSON STAGE 1: 0.4
BH CV STRESS DURATION MIN STAGE 1: 0
BH CV STRESS DURATION SEC STAGE 1: 10
BH CV STRESS HR STAGE 1: 82
BH CV STRESS NUCLEAR ISOTOPE DOSE: 24.9 MCI
BH CV STRESS PROTOCOL 1: NORMAL
BH CV STRESS RECOVERY BP: NORMAL MMHG
BH CV STRESS RECOVERY HR: 67 BPM
BH CV STRESS STAGE 1: 1
MAXIMAL PREDICTED HEART RATE: 149 BPM
PERCENT MAX PREDICTED HR: 55.03 %
SPECT HRT GATED+EF W RNC IV: 60 %
STRESS BASELINE BP: NORMAL MMHG
STRESS BASELINE HR: 60 BPM
STRESS PERCENT HR: 65 %
STRESS POST EXERCISE DUR MIN: 0 MIN
STRESS POST EXERCISE DUR SEC: 10 SEC
STRESS POST PEAK BP: NORMAL MMHG
STRESS POST PEAK HR: 82 BPM
STRESS TARGET HR: 127 BPM

## 2025-03-31 PROBLEM — I71.21 ANEURYSM OF ASCENDING AORTA WITHOUT RUPTURE: Status: ACTIVE | Noted: 2025-03-31

## 2025-03-31 PROBLEM — Q25.43 AORTIC ROOT ANEURYSM: Status: ACTIVE | Noted: 2025-03-31

## 2025-03-31 NOTE — PROGRESS NOTES
Chief Complaint   Patient presents with    Dilatation of Aorta     New patient from Dr Kramer         Subjective     History of Present Illness  The patient is a 69-year-old male who presents for evaluation of an aortic aneurysm. He is accompanied by his wife.    He has been under the care of Dr. Kramer for a known aortic aneurysm, which has remained stable in size post-surgery. He was informed that the presence of a blockage is not uncommon for his age. He has undergone 2 cardiac catheterizations, both of which revealed no coronary artery disease or need for stent placement. Approximately 10 years ago, he experienced chest pain, which was determined to be non-cardiac in origin. He has recently resumed his antihypertensive medication regimen after a period of discontinuation and is actively monitoring his blood pressure. His current blood pressure goal is 120/80 mmHg. He believes that further weight loss may contribute to improved blood pressure control and potentially allow for discontinuation of his antihypertensive medications.    He has achieved significant weight loss, reducing from 317 pounds to his current weight of 298 pounds. He reports the presence of a large abdominal mass when lying down, which he attributes to excess abdominal fat.    Supplemental Information  He experiences morning stiffness, which he manages with meloxicam, taking it as needed, approximately twice weekly. He also reports a recent onset of trigger finger.    SOCIAL HISTORY  He is a .    MEDICATIONS  Current: Meloxicam    Review of Systems       Past Medical History:   Diagnosis Date    Abnormal ECG     Allergic     Arrhythmia     Arthritis     Atrial fibrillation     Cancer     SKIN    Cataract     Diverticulitis     Hyperlipidemia     Hypertension     Kidney stones     Migraines     Nonrheumatic mitral (valve) insufficiency     Obesity     Pulmonary embolism 8-30-21    [facility]    Sleep apnea     C-PAP    Spinal headache       Past Surgical History:   Procedure Laterality Date    ABLATION OF DYSRHYTHMIC FOCUS      APPENDECTOMY      BACK SURGERY  2022    CARDIAC ABLATION  2024    CARDIAC ABLATION  10/2024    with     CARDIAC CATHETERIZATION      CARDIAC ELECTROPHYSIOLOGY PROCEDURE N/A 10/01/2021    Procedure: NEW PACEMAKER IMPLANTATION;  Surgeon: Mitch Hernández MD;  Location: [facility] CATH INVASIVE LOCATION;  Service: Cardiology;  Laterality: N/A;    CARDIOVERSION      CHOLECYSTECTOMY      COLONOSCOPY N/A 2017    Procedure: COLONOSCOPY;  Surgeon: Billy Robbins DO;  Location: [facility] ENDOSCOPY;  Service:     COLONOSCOPY N/A 2021    Procedure: COLONOSCOPY;  Surgeon: Billy Robbins DO;  Location: [facility] ENDOSCOPY;  Service: Gastroenterology;  Laterality: N/A;    EP STUDY      INSERT / REPLACE / REMOVE PACEMAKER  10-1-21    Dr Hernández    JOINT REPLACEMENT  23    KNEE SURGERY Right     X2    SHOULDER SURGERY Left     SPINE SURGERY      TOTAL KNEE ARTHROPLASTY Right 2023    Procedure: RIGHT TOTAL KNEE ARTHROPLASTY;  Surgeon: Pepe Vaughn MD;  Location: [facility] OR;  Service: Orthopedics;  Laterality: Right;     Family History   Problem Relation Age of Onset    Cancer Mother         Colon    Hypertension Mother     Diabetes Father     Cancer Father         Prostate    Dementia Father     Hypertension Father     Diabetes Sister     Diabetes Brother     Cancer Brother         Prostate    Cancer Brother         Prostate    Diabetes Brother     Colon cancer Neg Hx     Colon polyps Neg Hx     Esophageal cancer Neg Hx      Social History     Tobacco Use    Smoking status: Former     Current packs/day: 0.00     Average packs/day: 0.3 packs/day for 4.0 years (1.0 ttl pk-yrs)     Types: Cigarettes     Start date: 1969     Quit date: 1973     Years since quittin.2     Passive exposure: Past    Smokeless tobacco: Never   Vaping Use    Vaping status: Never Used   Substance Use  "Topics    Alcohol use: No    Drug use: No     Current Outpatient Medications   Medication Sig Dispense Refill    acetaminophen (TYLENOL) 650 MG 8 hr tablet Take 1 tablet by mouth Every 8 (Eight) Hours As Needed for Mild Pain. 1000mg twice a day      apixaban (ELIQUIS) 5 MG tablet tablet Take 1 tablet by mouth Every 12 (Twelve) Hours. Indications: Atrial Fibrillation, resume eliquis saturday morning      Cholecalciferol (Vitamin D3) 50 MCG (2000 UT) capsule Daily.      fluticasone (FLONASE) 50 MCG/ACT nasal spray Administer 1 spray into the nostril(s) as directed by provider As Needed.      furosemide (LASIX) 40 MG tablet TAKE 1 TABLET BY MOUTH EVERY DAY FOR 30 DAYS      meloxicam (MOBIC) 15 MG tablet Take 1 tablet by mouth As Needed for Mild Pain.      metFORMIN (GLUCOPHAGE) 500 MG tablet Take 1 tablet by mouth Daily With Breakfast. 30 tablet 11    potassium chloride 10 MEQ CR tablet TAKE 1 TABLET BY MOUTH EVERY DAY FOR 30 DAYS      rosuvastatin (CRESTOR) 20 MG tablet Take 1 tablet by mouth Every Night. 90 tablet 3    triamcinolone (KENALOG) 0.1 % cream       valsartan (DIOVAN) 160 MG tablet Take 1 tablet by mouth Daily. 90 tablet 3    amiodarone (PACERONE) 200 MG tablet Take 1 tablet by mouth Daily. (Patient not taking: Reported on 3/25/2025)       No current facility-administered medications for this visit.     Allergies:  Levofloxacin, Penicillins, and Vancomycin    Objective      Vital Signs  Visit Vitals  /84   Pulse 67   Ht 188 cm (74.02\")   Wt 135 kg (298 lb)   SpO2 96%   BMI 38.24 kg/m²         Physical Exam  Physical Exam  Physical Exam:    General: No acute distress, in good spirits  Cardiovascular: RRR, no murmur, rubs, or gallops.    Pulmonary: Clear to auscultation bilaterally, no wheezing, rubs, or rales.  Abdomen: Soft, nondistended, and nontender. obese  Extremities: Warm, moves all extremities.  Neurologic:  No focal deficits, CN II-XII intact grossly.        Vital Signs  Blood pressure " reading is 138/84.    ______________________________________________________________________        NM PET Cardiac Stress Radiology Interpretation  Result Date: 3/25/2025  Narrative: EXAM: NM PET CARDIAC STRESS RADIOLOGY INTERPRETATION-  DATE: 3/25/2025 9:08 AM  HISTORY: cad; R06.09-Other forms of dyspnea; I25.10-Atherosclerotic heart disease of native coronary artery without angina pectoris   COMPARISON: Chest CTs dated 6/23/2023 and 10/19/2020.  DOSE LENGTH PRODUCT: 348 mGy*centimeters. Automatic exposure control was utilized to make radiation dose as low as reasonably achievable.  TECHNIQUE: NM PET cardiac stress per cardiology protocol with multiplanar reformats of the attenuation correction CT submitted for radiology interpretation of non-cardiac structures.  FINDINGS:  AIRWAYS/PULMONARY PARENCHYMA: Scattered calcified lung granulomas. There is a 5 mm left lower lobe noncalcified nodule on axial image 57 which is stable across multiple exams dating back to October 2020, considered benign.  VESSELS: Ascending thoracic aorta is mildly dilated. Central pulmonary arteries are dilated.  CARDIAC: PET interpretation by cardiology dictated separately. No pericardial effusion. Coronary atheromatous calcification.  MEDIASTINUM: No hilar or mediastinal adenopathy.  EXTRATHORACIC: Included portions of the chest wall soft tissues are unremarkable.  UPPER ABDOMEN: Visualized upper abdominal structures are within normal limits.  BONES: Visualized chest walls bony elements are unremarkable.       Impression: 1. NM PET cardiac stress with the attenuation correction CT submitted for radiology interpretation of non-cardiac structures. Interpretation of the PET stress deferred to cardiology, which will be dictated separately. 2. 5 mm left lower lobe pulmonary nodule is stable for multiple years and considered benign. 3. CT evidence for pulmonary hypertension. 4. Dilated ascending thoracic aorta measuring up to 4 cm in diameter.    This report was signed and finalized on 3/25/2025 11:17 AM by Dr Yunior Weathers.      Results  Imaging  CTA chest obtained on 02/02/2025 shows distal ascending aorta measured 4 cm in size, the ascending aorta in greatest dimension 4.3 cm in size. The root measures 4.3 cm in size on reconstructions there are group measures 3.9 cm in size. On sagittal reconstruction measures 3.8.  I reviewed the patient's new clinical results.  Discussed with patient       Assessment & Plan       Diagnoses and all orders for this visit:    1. Aneurysm of ascending aorta without rupture (Primary)    2. Aortic root aneurysm      Assessment & Plan  1. Aortic aneurysm.  The aneurysm has not enlarged post-surgery, which is a positive outcome. The average growth rate of an aneurysm is approximately 1 mm per year. Given his current size and height, he is considered a low-risk candidate for aneurysm surgery. He was advised to maintain his blood pressure within the range of 110s to 120s systolic and 60s to 70s diastolic. A target weight of 195 to 198 pounds was suggested, with the aim to achieve this over a span of 3 years. He was encouraged to engage in regular exercise, specifically walking, and to avoid heavy lifting or strenuous activities. A follow-up CAT scan will be scheduled in 1 year to monitor the aneurysm as his recommendation to remain conservative and continue with active surveillance.  He was instructed to seek immediate medical attention at the ER if he experiences any chest pain.    2. Hypertension.  His blood pressure today was 138/84, which is higher than the recommended range. He was advised to continue monitoring his blood pressure and maintain it within the target range of 110s to 120s systolic and 60s to 70s diastolic. He was reminded of the importance of blood pressure control in preventing the progression of the aneurysm.    3. Obesity.  He has lost weight, reducing from 317 pounds to 298 pounds. He was encouraged to  continue his weight loss journey with a target weight of 195 to 198 pounds over the next 3 years. He was advised that increasing muscle mass through exercise can help with insulin resistance and burn more calories.      He is a non smoker.       Follow-up  The patient will follow up in 1 year w CTA chest with Cammy Shah.        Many thanks for the opportunity to care for your patient.    I will continue to keep you apprised of provided care as it ensues.            Transcribed from ambient dictation for Adam Helms MD by Adam Helms MD.  03/31/25   09:15 CDT    Patient or patient representative verbalized consent for the use of Ambient Listening during the visit with  Adam Helms MD for chart documentation. 3/31/2025  09:38 CDT

## 2025-04-15 ENCOUNTER — OFFICE VISIT (OUTPATIENT)
Dept: INTERNAL MEDICINE | Facility: CLINIC | Age: 70
End: 2025-04-15
Payer: MEDICARE

## 2025-04-15 VITALS
SYSTOLIC BLOOD PRESSURE: 138 MMHG | BODY MASS INDEX: 38.12 KG/M2 | OXYGEN SATURATION: 97 % | DIASTOLIC BLOOD PRESSURE: 72 MMHG | HEART RATE: 67 BPM | TEMPERATURE: 98 F | WEIGHT: 297 LBS | HEIGHT: 74 IN

## 2025-04-15 DIAGNOSIS — R73.03 BORDERLINE DIABETES: ICD-10-CM

## 2025-04-15 DIAGNOSIS — Z91.09 ENVIRONMENTAL ALLERGIES: ICD-10-CM

## 2025-04-15 DIAGNOSIS — I10 ESSENTIAL HYPERTENSION: ICD-10-CM

## 2025-04-15 DIAGNOSIS — G47.33 OSA ON CPAP: ICD-10-CM

## 2025-04-15 DIAGNOSIS — M65.332 TRIGGER MIDDLE FINGER OF LEFT HAND: Primary | ICD-10-CM

## 2025-04-15 RX ORDER — FLUTICASONE PROPIONATE 50 MCG
1 SPRAY, SUSPENSION (ML) NASAL AS NEEDED
Qty: 16 G | Refills: 11 | Status: SHIPPED | OUTPATIENT
Start: 2025-04-15

## 2025-04-15 RX ORDER — CETIRIZINE HYDROCHLORIDE 10 MG/1
10 TABLET ORAL DAILY
COMMUNITY
End: 2025-04-15

## 2025-04-15 RX ORDER — METHYLPREDNISOLONE SODIUM SUCCINATE 125 MG/2ML
125 INJECTION INTRAMUSCULAR; INTRAVENOUS ONCE
Status: COMPLETED | OUTPATIENT
Start: 2025-04-15 | End: 2025-04-15

## 2025-04-15 RX ORDER — FEXOFENADINE HCL 180 MG/1
180 TABLET ORAL DAILY
Qty: 30 TABLET | Refills: 11 | Status: SHIPPED | OUTPATIENT
Start: 2025-04-15

## 2025-04-15 RX ADMIN — METHYLPREDNISOLONE SODIUM SUCCINATE 125 MG: 125 INJECTION INTRAMUSCULAR; INTRAVENOUS at 10:16

## 2025-04-15 NOTE — PROGRESS NOTES
Subjective     Chief Complaint   Patient presents with    Hand Pain     Trigger         Hand Pain         Patient's PMR from outside medical facility reviewed and noted.    Raghav Olivier is a 69 y.o. male who presents for a routine office visit.  Patient has trigger finger in the middle finger of his left hand.  Unable to locate the nodule along the tendon will refer him to hand surgery for injection and/or possible surgery.  Patient states that he is getting stuck on a regular basis has been bothering him for the last 2 to 3 weeks and appears to be getting worse.    Patient has significant environmental allergies.  Would like something to help with this at this time we will go ahead and renew his Flonase as well as changing some of his allergy meds to get him more relief.    Patient requires a cpap device and has done well with current administration.  Needs refills of supplies at this time to maintain functional use of his CPAP machine.  Patient has done well with current therapy states it helped significantly reduce daytime fatigue and sleepiness.  Patient has no acute issues with using his current mask    Discussed his borderline diabetes at this time we will recheck this in a couple of months he states blood sugars have remained stable likewise patient's hypertension remains stable and well-controlled at this time.    Longitudinal care Statement:  Patient to continue with continuous, comprehensive, coordinated primary care that serves as the continuing focal point for all needed health care services related to his complex medical conditions.  I discussed preventative health care, vaccines, depression, and cancer screening with him.  Reviewed his complex diagnosis, comorbid conditions, and history at this time as well as associated labs and medication list for possible interactions.      Past Medical History:   Past Medical History:   Diagnosis Date    Abnormal ECG     Allergic     Arrhythmia      Arthritis     Atrial fibrillation     Cancer     SKIN    Cataract     Diverticulitis     Hyperlipidemia     Hypertension     Kidney stones     Migraines     Nonrheumatic mitral (valve) insufficiency     Obesity     Pulmonary embolism 8-30-21    [facility]    Sleep apnea     C-PAP    Spinal headache      Past Surgical History:  Past Surgical History:   Procedure Laterality Date    ABLATION OF DYSRHYTHMIC FOCUS      APPENDECTOMY      BACK SURGERY  01/2022    CARDIAC ABLATION  07/26/2024    CARDIAC ABLATION  10/2024    with     CARDIAC CATHETERIZATION      CARDIAC ELECTROPHYSIOLOGY PROCEDURE N/A 10/01/2021    Procedure: NEW PACEMAKER IMPLANTATION;  Surgeon: Mitch Hernández MD;  Location: [facility] CATH INVASIVE LOCATION;  Service: Cardiology;  Laterality: N/A;    CARDIOVERSION      CHOLECYSTECTOMY      COLONOSCOPY N/A 07/31/2017    Procedure: COLONOSCOPY;  Surgeon: Billy Robbins DO;  Location: [facility] ENDOSCOPY;  Service:     COLONOSCOPY N/A 07/28/2021    Procedure: COLONOSCOPY;  Surgeon: Billy Robbins DO;  Location: [facility] ENDOSCOPY;  Service: Gastroenterology;  Laterality: N/A;    EP STUDY      INSERT / REPLACE / REMOVE PACEMAKER  10-1-21    Dr Hernández    JOINT REPLACEMENT  04/09/23    KNEE SURGERY Right     X2    SHOULDER SURGERY Left 2009    SPINE SURGERY  00/00/22    TOTAL KNEE ARTHROPLASTY Right 04/06/2023    Procedure: RIGHT TOTAL KNEE ARTHROPLASTY;  Surgeon: Pepe Vaughn MD;  Location: [facility] OR;  Service: Orthopedics;  Laterality: Right;     Social History:  reports that he quit smoking about 52 years ago. His smoking use included cigarettes. He started smoking about 56 years ago. He has a 1 pack-year smoking history. He has been exposed to tobacco smoke. He has never used smokeless tobacco. He reports that he does not drink alcohol and does not use drugs.    Family History: family history includes Cancer in his brother, brother, father, and mother; Dementia in his father; Diabetes in his  "brother, brother, father, and sister; Hypertension in his father and mother.      Allergies:  Allergies   Allergen Reactions    Levofloxacin Other (See Comments)     Pain in legs     Penicillins Unknown - Low Severity     Pt states is was a childhood allergy    Vancomycin Itching     Medications:  Prior to Admission medications    Medication Sig Start Date End Date Taking? Authorizing Provider   acetaminophen (TYLENOL) 650 MG 8 hr tablet Take 1 tablet by mouth Every 8 (Eight) Hours As Needed for Mild Pain. 1000mg twice a day   Yes Tristen Mo MD   amiodarone (PACERONE) 200 MG tablet Take 1 tablet by mouth Daily.   Yes Tristen Mo MD   apixaban (ELIQUIS) 5 MG tablet tablet Take 1 tablet by mouth Every 12 (Twelve) Hours. Indications: Atrial Fibrillation, resume eliquis saturday morning   Yes Tristen Mo MD   Cholecalciferol (Vitamin D3) 50 MCG (2000 UT) capsule Daily. 4/3/23  Yes Tristen Mo MD   fluticasone (FLONASE) 50 MCG/ACT nasal spray Administer 1 spray into the nostril(s) as directed by provider As Needed.   Yes Tristen Mo MD   furosemide (LASIX) 40 MG tablet TAKE 1 TABLET BY MOUTH EVERY DAY FOR 30 DAYS   Yes Tristen Mo MD   potassium chloride 10 MEQ CR tablet TAKE 1 TABLET BY MOUTH EVERY DAY FOR 30 DAYS   Yes Tristen Mo MD   rosuvastatin (CRESTOR) 20 MG tablet TAKE 1 TABLET BY MOUTH EVERY DAY  Patient taking differently: Take 1 tablet by mouth Every Night. Takes every other day 4/1/20  Yes Jay Perez MD   valsartan (DIOVAN) 160 MG tablet Take 1 tablet by mouth Daily. 11/5/24  Yes Tristen Mo MD         Review of systems   negative unless otherwise specified above in HPI    Objective     Vital Signs: /72 (BP Location: Right arm, Patient Position: Sitting, Cuff Size: Adult)   Pulse 67   Temp 98 °F (36.7 °C) (Infrared)   Ht 188 cm (74\")   Wt 135 kg (297 lb)   SpO2 97%   BMI 38.13 kg/m²     Physical " Exam  Vitals and nursing note reviewed.   Constitutional:       General: He is not in acute distress.     Appearance: Normal appearance.   HENT:      Head: Normocephalic.   Eyes:      Extraocular Movements: Extraocular movements intact.      Pupils: Pupils are equal, round, and reactive to light.   Cardiovascular:      Rate and Rhythm: Normal rate and regular rhythm.      Heart sounds: Normal heart sounds. No murmur heard.  Pulmonary:      Effort: Pulmonary effort is normal. No respiratory distress.      Breath sounds: Normal breath sounds. No rhonchi or rales.   Abdominal:      General: Abdomen is flat. Bowel sounds are normal.      Palpations: Abdomen is soft.   Neurological:      General: No focal deficit present.      Mental Status: He is alert.                Results Reviewed:  Glucose   Date Value Ref Range Status   02/02/2025 111 (H) 65 - 99 mg/dL Final     BUN   Date Value Ref Range Status   02/02/2025 19 8 - 23 mg/dL Final   01/05/2022 21 8 - 26 mg/dL Final     Creatinine   Date Value Ref Range Status   02/02/2025 0.98 0.76 - 1.27 mg/dL Final   05/24/2024 1.50 (H) 0.60 - 1.30 mg/dL Final     Comment:     Serial Number: [id number]:  [id number]   01/05/2022 0.99 0.72 - 1.25 mg/dL Final     Sodium   Date Value Ref Range Status   02/02/2025 141 136 - 145 mmol/L Final   01/05/2022 140 136 - 145 mmol/L Final     Potassium   Date Value Ref Range Status   02/02/2025 4.0 3.5 - 5.2 mmol/L Final   01/05/2022 4.0 3.3 - 4.8 mmol/L Final     Comment:     Plasma reference ranges are shown, please note that serum reference ranges are higher than plasma.     Chloride   Date Value Ref Range Status   02/02/2025 105 98 - 107 mmol/L Final   01/05/2022 105 98 - 107 mmol/L Final     CO2   Date Value Ref Range Status   02/02/2025 25.0 22.0 - 29.0 mmol/L Final     Total CO2   Date Value Ref Range Status   01/05/2022 25 23 - 31 mmol/L Final     Calcium   Date Value Ref Range Status   02/02/2025 8.9 8.6 - 10.5 mg/dL Final    01/05/2022 8.4 8.4 - 10.5 mg/dL Final     ALT (SGPT)   Date Value Ref Range Status   02/02/2025 26 1 - 41 U/L Final     AST (SGOT)   Date Value Ref Range Status   02/02/2025 21 1 - 40 U/L Final     WBC   Date Value Ref Range Status   02/02/2025 6.88 3.40 - 10.80 10*3/mm3 Final   01/21/2025 7.8 3.4 - 10.8 x10E3/uL Final     Hematocrit   Date Value Ref Range Status   02/02/2025 39.9 37.5 - 51.0 % Final     Platelets   Date Value Ref Range Status   02/02/2025 219 140 - 450 10*3/mm3 Final     Triglycerides   Date Value Ref Range Status   01/21/2025 235 (H) 0 - 149 mg/dL Final     HDL Cholesterol   Date Value Ref Range Status   01/21/2025 32 (L) >39 mg/dL Final     LDL Chol Calc (NIH)   Date Value Ref Range Status   01/21/2025 102 (H) 0 - 99 mg/dL Final     Hemoglobin A1C   Date Value Ref Range Status   01/21/2025 6.6 (H) 4.8 - 5.6 % Final     Comment:              Prediabetes: 5.7 - 6.4           Diabetes: >6.4           Glycemic control for adults with diabetes: <7.0               Procedure   Procedures       Assessment / Plan     Assessment/Plan:   Diagnosis Plan   1. Trigger middle finger of left hand  Ambulatory Referral to Orthopedic Surgery    methylPREDNISolone sodium succinate (SOLU-Medrol) injection 125 mg      2. Environmental allergies  methylPREDNISolone sodium succinate (SOLU-Medrol) injection 125 mg    fluticasone (FLONASE) 50 MCG/ACT nasal spray    fexofenadine (ALLEGRA) 180 MG tablet      3. TAMARA on CPAP  PAP Therapy      4. Essential hypertension        5. Borderline diabetes                No follow-ups on file. unless patient needs to be seen sooner or acute issues arise.      I have discussed the patient results/orders and and plan/recommendation with them at today's visit.      Signed by:    Paras Bliss MD Date: 04/15/25

## 2025-04-22 NOTE — DISCHARGE INSTRUCTIONS
It was very nice to meet you, Raghav. Thank you for allowing us to take care of you today at [facility].     Today you were seen in the emergency department for your symptoms. Please understand that an ER evaluation is just the start of your evaluation. We do the best we can, but we are often unable to fully find what is causing your symptoms from one evaluation.  Because of this, the goal is to determine whether you need to be evaluated in the hospital or if it is safe for you to go home and see other doctors provided such as primary care physicians or specialist on an outpatient basis.      Like we discussed, I strongly urge that you follow up with your primary care doctor. Please call their office to set up an appointment as soon as possible so that you can be re-evaluated for improvement in your symptoms or for any other questions.  I have provided the information needed, including phone number, to call to set up an appointment below in these discharge papers.      Educational material has also been provided in the following pages regarding what we have discussed today.      Please return to the emergency room within 12-48 hours if you experience symptoms such as the following:   Fever, chills, chest pain or shortness of breath, pain with inspiration/expiration, pain that travels to your arms, neck or back, nausea, vomiting, severe headache, tearing pain in your chest, dizziness, feel as though you are about to pass out, OR if you have any worsening symptoms, or any other concerns.     Griseofulvin Counseling:  I discussed with the patient the risks of griseofulvin including but not limited to photosensitivity, cytopenia, liver damage, nausea/vomiting and severe allergy.  The patient understands that this medication is best absorbed when taken with a fatty meal (e.g., ice cream or french fries). Valtrex Pregnancy And Lactation Text: this medication is Pregnancy Category B and is considered safe during pregnancy. This medication is not directly found in breast milk but it's metabolite acyclovir is present. Olanzapine Counseling- I discussed with the patient the common side effects of olanzapine including but are not limited to: lack of energy, dry mouth, increased appetite, sleepiness, tremor, constipation, dizziness, changes in behavior, or restlessness.  Explained that teenagers are more likely to experience headaches, abdominal pain, pain in the arms or legs, tiredness, and sleepiness.  Serious side effects include but are not limited: increased risk of death in elderly patients who are confused, have memory loss, or dementia-related psychosis; hyperglycemia; increased cholesterol and triglycerides; and weight gain. Mirvaso Pregnancy And Lactation Text: This medication has not been assigned a Pregnancy Risk Category. It is unknown if the medication is excreted in breast milk. 5-Fu Counseling: 5-Fluorouracil Counseling:  I discussed with the patient the risks of 5-fluorouracil including but not limited to erythema, scaling, itching, weeping, crusting, and pain. Cyclosporine Counseling:  I discussed with the patient the risks of cyclosporine including but not limited to hypertension, gingival hyperplasia,myelosuppression, immunosuppression, liver damage, kidney damage, neurotoxicity, lymphoma, and serious infections. The patient understands that monitoring is required including baseline blood pressure, CBC, CMP, lipid panel and uric acid, and then 1-2 times monthly CMP and blood pressure. Zoryve Pregnancy And Lactation Text: It is unknown if this medication can cause problems during pregnancy and breastfeeding. Erythromycin Counseling:  I discussed with the patient the risks of erythromycin including but not limited to GI upset, allergic reaction, drug rash, diarrhea, increase in liver enzymes, and yeast infections. Glycopyrrolate Pregnancy And Lactation Text: This medication is Pregnancy Category B and is considered safe during pregnancy. It is unknown if it is excreted breast milk. Rifampin Counseling: I discussed with the patient the risks of rifampin including but not limited to liver damage, kidney damage, red-orange body fluids, nausea/vomiting and severe allergy. Tazorac Counseling:  Patient advised that medication is irritating and drying.  Patient may need to apply sparingly and wash off after an hour before eventually leaving it on overnight.  The patient verbalized understanding of the proper use and possible adverse effects of tazorac.  All of the patient's questions and concerns were addressed. Include Pregnancy/Lactation Warning?: No Cephalexin Counseling: I counseled the patient regarding use of cephalexin as an antibiotic for prophylactic and/or therapeutic purposes. Cephalexin (commonly prescribed under brand name Keflex) is a cephalosporin antibiotic which is active against numerous classes of bacteria, including most skin bacteria. Side effects may include nausea, diarrhea, gastrointestinal upset, rash, hives, yeast infections, and in rare cases, hepatitis, kidney disease, seizures, fever, confusion, neurologic symptoms, and others. Patients with severe allergies to penicillin medications are cautioned that there is about a 10% incidence of cross-reactivity with cephalosporins. When possible, patients with penicillin allergies should use alternatives to cephalosporins for antibiotic therapy. Xelluz elenaz Pregnancy And Lactation Text: This medication is Pregnancy Category D and is not considered safe during pregnancy.  The risk during breast feeding is also uncertain. Propranolol Pregnancy And Lactation Text: This medication is Pregnancy Category C and it isn't known if it is safe during pregnancy. It is excreted in breast milk. Cimetidine Counseling:  I discussed with the patient the risks of Cimetidine including but not limited to gynecomastia, headache, diarrhea, nausea, drowsiness, arrhythmias, pancreatitis, skin rashes, psychosis, bone marrow suppression and kidney toxicity. Albendazole Pregnancy And Lactation Text: This medication is Pregnancy Category C and it isn't known if it is safe during pregnancy. It is also excreted in breast milk. Humira Counseling:  I discussed with the patient the risks of adalimumab including but not limited to myelosuppression, immunosuppression, autoimmune hepatitis, demyelinating diseases, lymphoma, and serious infections.  The patient understands that monitoring is required including a PPD at baseline and must alert us or the primary physician if symptoms of infection or other concerning signs are noted. Stelara Counseling:  I discussed with the patient the risks of ustekinumab including but not limited to immunosuppression, malignancy, posterior leukoencephalopathy syndrome, and serious infections.  The patient understands that monitoring is required including a PPD at baseline and must alert us or the primary physician if symptoms of infection or other concerning signs are noted. Clofazimine Pregnancy And Lactation Text: This medication is Pregnancy Category C and isn't considered safe during pregnancy. It is excreted in breast milk. Dutasteride Female Counseling: Dutasteride Counseling:  I discussed with the patient the risks of use of dutasteride including but not limited to decreased libido and sexual dysfunction. Explained the teratogenic nature of the medication and stressed the importance of not getting pregnant during treatment. All of the patient's questions and concerns were addressed. Rhofade Counseling: Rhofade is a topical medication which can decrease superficial blood flow where applied. Side effects are uncommon and include stinging, redness and allergic reactions. Spironolactone Pregnancy And Lactation Text: This medication can cause feminization of the male fetus and should be avoided during pregnancy. The active metabolite is also found in breast milk. Litfulo Counseling: I discussed with the patient the risks of Litfulo therapy including but not limited to upper respiratory tract infections, shingles, cold sores, and nausea. Live vaccines should be avoided.  This medication has been linked to serious infections; higher rate of mortality; malignancy and lymphoproliferative disorders; major adverse cardiovascular events; thrombosis; gastrointestinal perforations; neutropenia; lymphopenia; anemia; liver enzyme elevations; and lipid elevations. Azelaic Acid Pregnancy And Lactation Text: This medication is considered safe during pregnancy and breast feeding. Hydroquinone Pregnancy And Lactation Text: This medication has not been assigned a Pregnancy Risk Category but animal studies failed to show danger with the topical medication. It is unknown if the medication is excreted in breast milk. Cimzia Counseling:  I discussed with the patient the risks of Cimzia including but not limited to immunosuppression, allergic reactions and infections.  The patient understands that monitoring is required including a PPD at baseline and must alert us or the primary physician if symptoms of infection or other concerning signs are noted. Topical Steroids Applications Pregnancy And Lactation Text: Most topical steroids are considered safe to use during pregnancy and lactation.  Any topical steroid applied to the breast or nipple should be washed off before breastfeeding. Griseofulvin Pregnancy And Lactation Text: This medication is Pregnancy Category X and is known to cause serious birth defects. It is unknown if this medication is excreted in breast milk but breast feeding should be avoided. Benzoyl Peroxide Counseling: Patient counseled that medicine may cause skin irritation and bleach clothing.  In the event of skin irritation, the patient was advised to reduce the amount of the drug applied or use it less frequently.   The patient verbalized understanding of the proper use and possible adverse effects of benzoyl peroxide.  All of the patient's questions and concerns were addressed. Colchicine Counseling:  Patient counseled regarding adverse effects including but not limited to stomach upset (nausea, vomiting, stomach pain, or diarrhea).  Patient instructed to limit alcohol consumption while taking this medication.  Colchicine may reduce blood counts especially with prolonged use.  The patient understands that monitoring of kidney function and blood counts may be required, especially at baseline. The patient verbalized understanding of the proper use and possible adverse effects of colchicine.  All of the patient's questions and concerns were addressed. Olanzapine Pregnancy And Lactation Text: This medication is pregnancy category C.   There are no adequate and well controlled trials with olanzapine in pregnant females.  Olanzapine should be used during pregnancy only if the potential benefit justifies the potential risk to the fetus.   In a study in lactating healthy women, olanzapine was excreted in breast milk.  It is recommended that women taking olanzapine should not breast feed. Erivedge Counseling- I discussed with the patient the risks of Erivedge including but not limited to nausea, vomiting, diarrhea, constipation, weight loss, changes in the sense of taste, decreased appetite, muscle spasms, and hair loss.  The patient verbalized understanding of the proper use and possible adverse effects of Erivedge.  All of the patient's questions and concerns were addressed. Tazorac Pregnancy And Lactation Text: This medication is not safe during pregnancy. It is unknown if this medication is excreted in breast milk. Cyclosporine Pregnancy And Lactation Text: This medication is Pregnancy Category C and it isn't know if it is safe during pregnancy. This medication is excreted in breast milk. 5-Fu Pregnancy And Lactation Text: This medication is Pregnancy Category X and contraindicated in pregnancy and in women who may become pregnant. It is unknown if this medication is excreted in breast milk. Zyclara Counseling:  I discussed with the patient the risks of imiquimod including but not limited to erythema, scaling, itching, weeping, crusting, and pain.  Patient understands that the inflammatory response to imiquimod is variable from person to person and was educated regarded proper titration schedule.  If flu-like symptoms develop, patient knows to discontinue the medication and contact us. Rifampin Pregnancy And Lactation Text: This medication is Pregnancy Category C and it isn't know if it is safe during pregnancy. It is also excreted in breast milk and should not be used if you are breast feeding. Opzelura Counseling:  I discussed with the patient the risks of Opzelura including but not limited to nasopharngitis, bronchitis, ear infection, eosinophila, hives, diarrhea, folliculitis, tonsillitis, and rhinorrhea.  Taken orally, this medication has been linked to serious infections; higher rate of mortality; malignancy and lymphoproliferative disorders; major adverse cardiovascular events; thrombosis; thrombocytopenia, anemia, and neutropenia; and lipid elevations. Hydroxychloroquine Counseling:  I discussed with the patient that a baseline ophthalmologic exam is needed at the start of therapy and every year thereafter while on therapy. A CBC may also be warranted for monitoring.  The side effects of this medication were discussed with the patient, including but not limited to agranulocytosis, aplastic anemia, seizures, rashes, retinopathy, and liver toxicity. Patient instructed to call the office should any adverse effect occur.  The patient verbalized understanding of the proper use and possible adverse effects of Plaquenil.  All the patient's questions and concerns were addressed. Erythromycin Pregnancy And Lactation Text: This medication is Pregnancy Category B and is considered safe during pregnancy. It is also excreted in breast milk. Rituxan Counseling:  I discussed with the patient the risks of Rituxan infusions. Side effects can include infusion reactions, severe drug rashes including mucocutaneous reactions, reactivation of latent hepatitis and other infections and rarely progressive multifocal leukoencephalopathy.  All of the patient's questions and concerns were addressed. Olumiant Counseling: I discussed with the patient the risks of Olumiant therapy including but not limited to upper respiratory tract infections, shingles, cold sores, and nausea. Live vaccines should be avoided.  This medication has been linked to serious infections; higher rate of mortality; malignancy and lymphoproliferative disorders; major adverse cardiovascular events; thrombosis; gastrointestinal perforations; neutropenia; lymphopenia; anemia; liver enzyme elevations; and lipid elevations. Siliq Pregnancy And Lactation Text: The risk during pregnancy and breastfeeding is uncertain with this medication. Stelara Pregnancy And Lactation Text: This medication is Pregnancy Category B and is considered safe during pregnancy. It is unknown if this medication is excreted in breast milk. Olumiant Pregnancy And Lactation Text: Based on animal studies, Olumiant may cause embryo-fetal harm when administered to pregnant women.  The medication should not be used in pregnancy.  Breastfeeding is not recommended during treatment. Topical Clindamycin Counseling: Patient counseled that this medication may cause skin irritation or allergic reactions.  In the event of skin irritation, the patient was advised to reduce the amount of the drug applied or use it less frequently.   The patient verbalized understanding of the proper use and possible adverse effects of clindamycin.  All of the patient's questions and concerns were addressed. Hydroxychloroquine Pregnancy And Lactation Text: This medication has been shown to cause fetal harm but it isn't assigned a Pregnancy Risk Category. There are small amounts excreted in breast milk. Cimetidine Pregnancy And Lactation Text: This medication is Pregnancy Category B and is considered safe during pregnancy. It is also excreted in breast milk and breast feeding isn't recommended. Ivermectin Counseling:  Patient instructed to take medication on an empty stomach with a full glass of water.  Patient informed of potential adverse effects including but not limited to nausea, diarrhea, dizziness, itching, and swelling of the extremities or lymph nodes.  The patient verbalized understanding of the proper use and possible adverse effects of ivermectin.  All of the patient's questions and concerns were addressed. Sarecycline Counseling: Patient advised regarding possible photosensitivity and discoloration of the teeth, skin, lips, tongue and gums.  Patient instructed to avoid sunlight, if possible.  When exposed to sunlight, patients should wear protective clothing, sunglasses, and sunscreen.  The patient was instructed to call the office immediately if the following severe adverse effects occur:  hearing changes, easy bruising/bleeding, severe headache, or vision changes.  The patient verbalized understanding of the proper use and possible adverse effects of sarecycline.  All of the patient's questions and concerns were addressed. Acitretin Counseling:  I discussed with the patient the risks of acitretin including but not limited to hair loss, dry lips/skin/eyes, liver damage, hyperlipidemia, depression/suicidal ideation, photosensitivity.  Serious rare side effects can include but are not limited to pancreatitis, pseudotumor cerebri, bony changes, clot formation/stroke/heart attack.  Patient understands that alcohol is contraindicated since it can result in liver toxicity and significantly prolong the elimination of the drug by many years. Dutasteride Pregnancy And Lactation Text: This medication is absolutely contraindicated in women, especially during pregnancy and breast feeding. Feminization of male fetuses is possible if taking while pregnant. High Dose Vitamin A Counseling: Side effects reviewed, pt to contact office should one occur. Cimzia Pregnancy And Lactation Text: This medication crosses the placenta but can be considered safe in certain situations. Cimzia may be excreted in breast milk. SSKI Counseling:  I discussed with the patient the risks of SSKI including but not limited to thyroid abnormalities, metallic taste, GI upset, fever, headache, acne, arthralgias, paraesthesias, lymphadenopathy, easy bleeding, arrhythmias, and allergic reaction. Topical Sulfur Applications Counseling: Topical Sulfur Counseling: Patient counseled that this medication may cause skin irritation or allergic reactions.  In the event of skin irritation, the patient was advised to reduce the amount of the drug applied or use it less frequently.   The patient verbalized understanding of the proper use and possible adverse effects of topical sulfur application.  All of the patient's questions and concerns were addressed. Imiquimod Counseling:  I discussed with the patient the risks of imiquimod including but not limited to erythema, scaling, itching, weeping, crusting, and pain.  Patient understands that the inflammatory response to imiquimod is variable from person to person and was educated regarded proper titration schedule.  If flu-like symptoms develop, patient knows to discontinue the medication and contact us. Erivedge Pregnancy And Lactation Text: This medication is Pregnancy Category X and is absolutely contraindicated during pregnancy. It is unknown if it is excreted in breast milk. Itraconazole Counseling:  I discussed with the patient the risks of itraconazole including but not limited to liver damage, nausea/vomiting, neuropathy, and severe allergy.  The patient understands that this medication is best absorbed when taken with acidic beverages such as non-diet cola or ginger ale.  The patient understands that monitoring is required including baseline LFTs and repeat LFTs at intervals.  The patient understands that they are to contact us or the primary physician if concerning signs are noted. Oral Minoxidil Counseling- I discussed with the patient the risks of oral minoxidil including but not limited to shortness of breath, swelling of the feet or ankles, dizziness, lightheadedness, unwanted hair growth and allergic reaction.  The patient verbalized understanding of the proper use and possible adverse effects of oral minoxidil.  All of the patient's questions and concerns were addressed. Opzelura Pregnancy And Lactation Text: There is insufficient data to evaluate drug-associated risk for major birth defects, miscarriage, or other adverse maternal or fetal outcomes.  There is a pregnancy registry that monitors pregnancy outcomes in pregnant persons exposed to the medication during pregnancy.  It is unknown if this medication is excreted in breast milk.  Do not breastfeed during treatment and for about 4 weeks after the last dose. Zyclara Pregnancy And Lactation Text: This medication is Pregnancy Category C. It is unknown if this medication is excreted in breast milk. Metronidazole Counseling:  I discussed with the patient the risks of metronidazole including but not limited to seizures, nausea/vomiting, a metallic taste in the mouth, nausea/vomiting and severe allergy. Drysol Counseling:  I discussed with the patient the risks of drysol/aluminum chloride including but not limited to skin rash, itching, irritation, burning. Rituxan Pregnancy And Lactation Text: This medication is Pregnancy Category C and it isn't know if it is safe during pregnancy. It is unknown if this medication is excreted in breast milk but similar antibodies are known to be excreted. Azathioprine Counseling:  I discussed with the patient the risks of azathioprine including but not limited to myelosuppression, immunosuppression, hepatotoxicity, lymphoma, and infections.  The patient understands that monitoring is required including baseline LFTs, Creatinine, possible TPMP genotyping and weekly CBCs for the first month and then every 2 weeks thereafter.  The patient verbalized understanding of the proper use and possible adverse effects of azathioprine.  All of the patient's questions and concerns were addressed. Methotrexate Counseling:  Patient counseled regarding adverse effects of methotrexate including but not limited to nausea, vomiting, abnormalities in liver function tests. Patients may develop mouth sores, rash, diarrhea, and abnormalities in blood counts. The patient understands that monitoring is required including LFT's and blood counts.  There is a rare possibility of scarring of the liver and lung problems that can occur when taking methotrexate. Persistent nausea, loss of appetite, pale stools, dark urine, cough, and shortness of breath should be reported immediately. Patient advised to discontinue methotrexate treatment at least three months before attempting to become pregnant.  I discussed the need for folate supplements while taking methotrexate.  These supplements can decrease side effects during methotrexate treatment. The patient verbalized understanding of the proper use and possible adverse effects of methotrexate.  All of the patient's questions and concerns were addressed. Litfulo Pregnancy And Lactation Text: Based on animal studies, Lifulo may cause embryo-fetal harm when administered to pregnant women.  The medication should not be used in pregnancy.  Breastfeeding is not recommended during treatment. Topical Clindamycin Pregnancy And Lactation Text: This medication is Pregnancy Category B and is considered safe during pregnancy. It is unknown if it is excreted in breast milk. Low Dose Naltrexone Counseling- I discussed with the patient the potential risks and side effects of low dose naltrexone including but not limited to: more vivid dreams, headaches, nausea, vomiting, abdominal pain, fatigue, dizziness, and anxiety. Sarecycline Pregnancy And Lactation Text: This medication is Pregnancy Category D and not consider safe during pregnancy. It is also excreted in breast milk. Simponi Counseling:  I discussed with the patient the risks of golimumab including but not limited to myelosuppression, immunosuppression, autoimmune hepatitis, demyelinating diseases, lymphoma, and serious infections.  The patient understands that monitoring is required including a PPD at baseline and must alert us or the primary physician if symptoms of infection or other concerning signs are noted. Taltz Counseling: I discussed with the patient the risks of ixekizumab including but not limited to immunosuppression, serious infections, worsening of inflammatory bowel disease and drug reactions.  The patient understands that monitoring is required including a PPD at baseline and must alert us or the primary physician if symptoms of infection or other concerning signs are noted. Picato Counseling:  I discussed with the patient the risks of Picato including but not limited to erythema, scaling, itching, weeping, crusting, and pain. Doxepin Counseling:  Patient advised that the medication is sedating and not to drive a car after taking this medication. Patient informed of potential adverse effects including but not limited to dry mouth, urinary retention, and blurry vision.  The patient verbalized understanding of the proper use and possible adverse effects of doxepin.  All of the patient's questions and concerns were addressed. Hyrimoz Counseling:  I discussed with the patient the risks of adalimumab including but not limited to myelosuppression, immunosuppression, autoimmune hepatitis, demyelinating diseases, lymphoma, and serious infections.  The patient understands that monitoring is required including a PPD at baseline and must alert us or the primary physician if symptoms of infection or other concerning signs are noted. Rinvoq Counseling: I discussed with the patient the risks of Rinvoq therapy including but not limited to upper respiratory tract infections, shingles, cold sores, bronchitis, nausea, cough, fever, acne, and headache. Live vaccines should be avoided.  This medication has been linked to serious infections; higher rate of mortality; malignancy and lymphoproliferative disorders; major adverse cardiovascular events; thrombosis; thrombocytopenia, anemia, and neutropenia; lipid elevations; liver enzyme elevations; and gastrointestinal perforations. Siliq Counseling:  I discussed with the patient the risks of Siliq including but not limited to new or worsening depression, suicidal thoughts and behavior, immunosuppression, malignancy, posterior leukoencephalopathy syndrome, and serious infections.  The patient understands that monitoring is required including a PPD at baseline and must alert us or the primary physician if symptoms of infection or other concerning signs are noted. There is also a special program designed to monitor depression which is required with Siliq. Acitretin Pregnancy And Lactation Text: This medication is Pregnancy Category X and should not be given to women who are pregnant or may become pregnant in the future. This medication is excreted in breast milk. High Dose Vitamin A Pregnancy And Lactation Text: High dose vitamin A therapy is contraindicated during pregnancy and breast feeding. Cosentyx Counseling:  I discussed with the patient the risks of Cosentyx including but not limited to worsening of Crohn's disease, immunosuppression, allergic reactions and infections.  The patient understands that monitoring is required including a PPD at baseline and must alert us or the primary physician if symptoms of infection or other concerning signs are noted. Sski Pregnancy And Lactation Text: This medication is Pregnancy Category D and isn't considered safe during pregnancy. It is excreted in breast milk. Finasteride Male Counseling: Finasteride Counseling:  I discussed with the patient the risks of use of finasteride including but not limited to decreased libido, decreased ejaculate volume, gynecomastia, and depression. Women should not handle medication.  All of the patient's questions and concerns were addressed. Topical Sulfur Applications Pregnancy And Lactation Text: This medication is Pregnancy Category C and has an unknown safety profile during pregnancy. It is unknown if this topical medication is excreted in breast milk. Solaraze Counseling:  I discussed with the patient the risks of Solaraze including but not limited to erythema, scaling, itching, weeping, crusting, and pain. Benzoyl Peroxide Pregnancy And Lactation Text: This medication is Pregnancy Category C. It is unknown if benzoyl peroxide is excreted in breast milk. Wartpeel Counseling:  I discussed with the patient the risks of Wartpeel including but not limited to erythema, scaling, itching, weeping, crusting, and pain. Opioid Counseling: I discussed with the patient the potential side effects of opioids including but not limited to addiction, altered mental status, and depression. I stressed avoiding alcohol, benzodiazepines, muscle relaxants and sleep aids unless specifically okayed by a physician. The patient verbalized understanding of the proper use and possible adverse effects of opioids. All of the patient's questions and concerns were addressed. They were instructed to flush the remaining pills down the toilet if they did not need them for pain. Carac Counseling:  I discussed with the patient the risks of Carac including but not limited to erythema, scaling, itching, weeping, crusting, and pain. Dapsone Counseling: I discussed with the patient the risks of dapsone including but not limited to hemolytic anemia, agranulocytosis, rashes, methemoglobinemia, kidney failure, peripheral neuropathy, headaches, GI upset, and liver toxicity.  Patients who start dapsone require monitoring including baseline LFTs and weekly CBCs for the first month, then every month thereafter.  The patient verbalized understanding of the proper use and possible adverse effects of dapsone.  All of the patient's questions and concerns were addressed. Itraconazole Pregnancy And Lactation Text: This medication is Pregnancy Category C and it isn't know if it is safe during pregnancy. It is also excreted in breast milk. Libtayo Counseling- I discussed with the patient the risks of Libtayo including but not limited to nausea, vomiting, diarrhea, and bone or muscle pain.  The patient verbalized understanding of the proper use and possible adverse effects of Libtayo.  All of the patient's questions and concerns were addressed. Oral Minoxidil Pregnancy And Lactation Text: This medication should only be used when clearly needed if you are pregnant, attempting to become pregnant or breast feeding. Cephalexin Pregnancy And Lactation Text: This medication is Pregnancy Category B and considered safe during pregnancy.  It is also excreted in breast milk but can be used safely for shorter doses. Metronidazole Pregnancy And Lactation Text: This medication is Pregnancy Category B and considered safe during pregnancy.  It is also excreted in breast milk. Methotrexate Pregnancy And Lactation Text: This medication is Pregnancy Category X and is known to cause fetal harm. This medication is excreted in breast milk. Azathioprine Pregnancy And Lactation Text: This medication is Pregnancy Category D and isn't considered safe during pregnancy. It is unknown if this medication is excreted in breast milk. Elidel Counseling: Patient may experience a mild burning sensation during topical application. Elidel is not approved in children less than 2 years of age. There have been case reports of hematologic and skin malignancies in patients using topical calcineurin inhibitors although causality is questionable. Tetracycline Counseling: Patient counseled regarding possible photosensitivity and increased risk for sunburn.  Patient instructed to avoid sunlight, if possible.  When exposed to sunlight, patients should wear protective clothing, sunglasses, and sunscreen.  The patient was instructed to call the office immediately if the following severe adverse effects occur:  hearing changes, easy bruising/bleeding, severe headache, or vision changes.  The patient verbalized understanding of the proper use and possible adverse effects of tetracycline.  All of the patient's questions and concerns were addressed. Patient understands to avoid pregnancy while on therapy due to potential birth defects. Doxepin Pregnancy And Lactation Text: This medication is Pregnancy Category C and it isn't known if it is safe during pregnancy. It is also excreted in breast milk and breast feeding isn't recommended. Rinvoq Pregnancy And Lactation Text: Based on animal studies, Rinvoq may cause embryo-fetal harm when administered to pregnant women.  The medication should not be used in pregnancy.  Breastfeeding is not recommended during treatment and for 6 days after the last dose. Thalidomide Counseling: I discussed with the patient the risks of thalidomide including but not limited to birth defects, anxiety, weakness, chest pain, dizziness, cough and severe allergy. Bexarotene Counseling:  I discussed with the patient the risks of bexarotene including but not limited to hair loss, dry lips/skin/eyes, liver abnormalities, hyperlipidemia, pancreatitis, depression/suicidal ideation, photosensitivity, drug rash/allergic reactions, hypothyroidism, anemia, leukopenia, infection, cataracts, and teratogenicity.  Patient understands that they will need regular blood tests to check lipid profile, liver function tests, white blood cell count, thyroid function tests and pregnancy test if applicable. Finasteride Female Counseling: Finasteride Counseling:  I discussed with the patient the risks of use of finasteride including but not limited to decreased libido and sexual dysfunction. Explained the teratogenic nature of the medication and stressed the importance of not getting pregnant during treatment. All of the patient's questions and concerns were addressed. Low Dose Naltrexone Pregnancy And Lactation Text: Naltrexone is pregnancy category C.  There have been no adequate and well-controlled studies in pregnant women.  It should be used in pregnancy only if the potential benefit justifies the potential risk to the fetus.   Limited data indicates that naltrexone is minimally excreted into breastmilk. Solaraze Pregnancy And Lactation Text: This medication is Pregnancy Category B and is considered safe. There is some data to suggest avoiding during the third trimester. It is unknown if this medication is excreted in breast milk. Klisyri Counseling:  I discussed with the patient the risks of Klisyri including but not limited to erythema, scaling, itching, weeping, crusting, and pain. Soolantra Counseling: I discussed with the patients the risks of topial Soolantra. This is a medicine which decreases the number of mites and inflammation in the skin. You experience burning, stinging, eye irritation or allergic reactions.  Please call our office if you develop any problems from using this medication. Klisyri Pregnancy And Lactation Text: It is unknown if this medication can harm a developing fetus or if it is excreted in breast milk. Ketoconazole Counseling:   Patient counseled regarding improving absorption with orange juice.  Adverse effects include but are not limited to breast enlargement, headache, diarrhea, nausea, upset stomach, liver function test abnormalities, taste disturbance, and stomach pain.  There is a rare possibility of liver failure that can occur when taking ketoconazole. The patient understands that monitoring of LFTs may be required, especially at baseline. The patient verbalized understanding of the proper use and possible adverse effects of ketoconazole.  All of the patient's questions and concerns were addressed. Winlevi Pregnancy And Lactation Text: This medication is considered safe during pregnancy and breastfeeding. Opioid Pregnancy And Lactation Text: These medications can lead to premature delivery and should be avoided during pregnancy. These medications are also present in breast milk in small amounts. Dapsone Pregnancy And Lactation Text: This medication is Pregnancy Category C and is not considered safe during pregnancy or breast feeding. Otezla Counseling: The side effects of Otezla were discussed with the patient, including but not limited to worsening or new depression, weight loss, diarrhea, nausea, upper respiratory tract infection, and headache. Patient instructed to call the office should any adverse effect occur.  The patient verbalized understanding of the proper use and possible adverse effects of Otezla.  All the patient's questions and concerns were addressed. Libtayo Pregnancy And Lactation Text: This medication is contraindicated in pregnancy and when breast feeding. Cellcept Counseling:  I discussed with the patient the risks of mycophenolate mofetil including but not limited to infection/immunosuppression, GI upset, hypokalemia, hypercholesterolemia, bone marrow suppression, lymphoproliferative disorders, malignancy, GI ulceration/bleed/perforation, colitis, interstitial lung disease, kidney failure, progressive multifocal leukoencephalopathy, and birth defects.  The patient understands that monitoring is required including a baseline creatinine and regular CBC testing. In addition, patient must alert us immediately if symptoms of infection or other concerning signs are noted. Azithromycin Counseling:  I discussed with the patient the risks of azithromycin including but not limited to GI upset, allergic reaction, drug rash, diarrhea, and yeast infections. Minocycline Counseling: Patient advised regarding possible photosensitivity and discoloration of the teeth, skin, lips, tongue and gums.  Patient instructed to avoid sunlight, if possible.  When exposed to sunlight, patients should wear protective clothing, sunglasses, and sunscreen.  The patient was instructed to call the office immediately if the following severe adverse effects occur:  hearing changes, easy bruising/bleeding, severe headache, or vision changes.  The patient verbalized understanding of the proper use and possible adverse effects of minocycline.  All of the patient's questions and concerns were addressed. Clindamycin Counseling: I counseled the patient regarding use of clindamycin as an antibiotic for prophylactic and/or therapeutic purposes. Clindamycin is active against numerous classes of bacteria, including skin bacteria. Side effects may include nausea, diarrhea, gastrointestinal upset, rash, hives, yeast infections, and in rare cases, colitis. Prednisone Counseling:  I discussed with the patient the risks of prolonged use of prednisone including but not limited to weight gain, insomnia, osteoporosis, mood changes, diabetes, susceptibility to infection, glaucoma and high blood pressure.  In cases where prednisone use is prolonged, patients should be monitored with blood pressure checks, serum glucose levels and an eye exam.  Additionally, the patient may need to be placed on GI prophylaxis, PCP prophylaxis, and calcium and vitamin D supplementation and/or a bisphosphonate.  The patient verbalized understanding of the proper use and the possible adverse effects of prednisone.  All of the patient's questions and concerns were addressed. Topical Ketoconazole Counseling: Patient counseled that this medication may cause skin irritation or allergic reactions.  In the event of skin irritation, the patient was advised to reduce the amount of the drug applied or use it less frequently.   The patient verbalized understanding of the proper use and possible adverse effects of ketoconazole.  All of the patient's questions and concerns were addressed. Hydroxyzine Counseling: Patient advised that the medication is sedating and not to drive a car after taking this medication.  Patient informed of potential adverse effects including but not limited to dry mouth, urinary retention, and blurry vision.  The patient verbalized understanding of the proper use and possible adverse effects of hydroxyzine.  All of the patient's questions and concerns were addressed. Ilumya Counseling: I discussed with the patient the risks of tildrakizumab including but not limited to immunosuppression, malignancy, posterior leukoencephalopathy syndrome, and serious infections.  The patient understands that monitoring is required including a PPD at baseline and must alert us or the primary physician if symptoms of infection or other concerning signs are noted. Skyrizi Counseling: I discussed with the patient the risks of risankizumab-rzaa including but not limited to immunosuppression, and serious infections.  The patient understands that monitoring is required including a PPD at baseline and must alert us or the primary physician if symptoms of infection or other concerning signs are noted. Sotyktu Counseling:  I discussed the most common side effects of Sotyktu including: common cold, sore throat, sinus infections, cold sores, canker sores, folliculitis, and acne.? I also discussed more serious side effects of Sotyktu including but not limited to: serious allergic reactions; increased risk for infections such as TB; cancers such as lymphomas; rhabdomyolysis and elevated CPK; and elevated triglycerides and liver enzymes.? Tremfya Counseling: I discussed with the patient the risks of guselkumab including but not limited to immunosuppression, serious infections, worsening of inflammatory bowel disease and drug reactions.  The patient understands that monitoring is required including a PPD at baseline and must alert us or the primary physician if symptoms of infection or other concerning signs are noted. Protopic Counseling: Patient may experience a mild burning sensation during topical application. Protopic is not approved in children less than 2 years of age. There have been case reports of hematologic and skin malignancies in patients using topical calcineurin inhibitors although causality is questionable. Niacinamide Counseling: I recommended taking niacin or niacinamide, also know as vitamin B3, twice daily. Recent evidence suggests that taking vitamin B3 (500 mg twice daily) can reduce the risk of actinic keratoses and non-melanoma skin cancers. Side effects of vitamin B3 include flushing and headache. Bexarotene Pregnancy And Lactation Text: This medication is Pregnancy Category X and should not be given to women who are pregnant or may become pregnant. This medication should not be used if you are breast feeding. Dupixent Counseling: I discussed with the patient the risks of dupilumab including but not limited to eye infection and irritation, cold sores, injection site reactions, worsening of asthma, allergic reactions and increased risk of parasitic infection.  Live vaccines should be avoided while taking dupilumab. Dupilumab will also interact with certain medications such as warfarin and cyclosporine. The patient understands that monitoring is required and they must alert us or the primary physician if symptoms of infection or other concerning signs are noted. Finasteride Pregnancy And Lactation Text: This medication is absolutely contraindicated during pregnancy. It is unknown if it is excreted in breast milk. Adbry Counseling: I discussed with the patient the risks of tralokinumab including but not limited to eye infection and irritation, cold sores, injection site reactions, worsening of asthma, allergic reactions and increased risk of parasitic infection.  Live vaccines should be avoided while taking tralokinumab. The patient understands that monitoring is required and they must alert us or the primary physician if symptoms of infection or other concerning signs are noted. Soolantra Pregnancy And Lactation Text: This medication is Pregnancy Category C. This medication is considered safe during breast feeding. Ketoconazole Pregnancy And Lactation Text: This medication is Pregnancy Category C and it isn't know if it is safe during pregnancy. It is also excreted in breast milk and breast feeding isn't recommended. Minoxidil Counseling: Minoxidil is a topical medication which can increase blood flow where it is applied. It is uncertain how this medication increases hair growth. Side effects are uncommon and include stinging and allergic reactions. VTAMA Counseling: I discussed with the patient that VTAMA is not for use in the eyes, mouth or mouth. They should call the office if they develop any signs of allergic reactions to VTAMA. The patient verbalized understanding of the proper use and possible adverse effects of VTAMA.  All of the patient's questions and concerns were addressed. Winlevi Counseling:  I discussed with the patient the risks of topical clascoterone including but not limited to erythema, scaling, itching, and stinging. Patient voiced their understanding. Clindamycin Pregnancy And Lactation Text: This medication can be used in pregnancy if certain situations. Clindamycin is also present in breast milk. Gabapentin Counseling: I discussed with the patient the risks of gabapentin including but not limited to dizziness, somnolence, fatigue and ataxia. Odomzo Counseling- I discussed with the patient the risks of Odomzo including but not limited to nausea, vomiting, diarrhea, constipation, weight loss, changes in the sense of taste, decreased appetite, muscle spasms, and hair loss.  The patient verbalized understanding of the proper use and possible adverse effects of Odomzo.  All of the patient's questions and concerns were addressed. Calcipotriene Counseling:  I discussed with the patient the risks of calcipotriene including but not limited to erythema, scaling, itching, and irritation. Otezla Pregnancy And Lactation Text: This medication is Pregnancy Category C and it isn't known if it is safe during pregnancy. It is unknown if it is excreted in breast milk. Azithromycin Pregnancy And Lactation Text: This medication is considered safe during pregnancy and is also secreted in breast milk. Hydroxyzine Pregnancy And Lactation Text: This medication is not safe during pregnancy and should not be taken. It is also excreted in breast milk and breast feeding isn't recommended. Topical Metronidazole Counseling: Metronidazole is a topical antibiotic medication. You may experience burning, stinging, redness, or allergic reactions.  Please call our office if you develop any problems from using this medication. Arava Counseling:  Patient counseled regarding adverse effects of Arava including but not limited to nausea, vomiting, abnormalities in liver function tests. Patients may develop mouth sores, rash, diarrhea, and abnormalities in blood counts. The patient understands that monitoring is required including LFTs and blood counts.  There is a rare possibility of scarring of the liver and lung problems that can occur when taking methotrexate. Persistent nausea, loss of appetite, pale stools, dark urine, cough, and shortness of breath should be reported immediately. Patient advised to discontinue Arava treatment and consult with a physician prior to attempting conception. The patient will have to undergo a treatment to eliminate Arava from the body prior to conception. Birth Control Pills Counseling: Birth Control Pill Counseling: I discussed with the patient the potential side effects of OCPs including but not limited to increased risk of stroke, heart attack, thrombophlebitis, deep venous thrombosis, hepatic adenomas, breast changes, GI upset, headaches, and depression.  The patient verbalized understanding of the proper use and possible adverse effects of OCPs. All of the patient's questions and concerns were addressed. Protopic Pregnancy And Lactation Text: This medication is Pregnancy Category C. It is unknown if this medication is excreted in breast milk when applied topically. Aklief counseling:  Patient advised to apply a pea-sized amount only at bedtime and wait 30 minutes after washing their face before applying.  If too drying, patient may add a non-comedogenic moisturizer.  The most commonly reported side effects including irritation, redness, scaling, dryness, stinging, burning, itching, and increased risk of sunburn.  The patient verbalized understanding of the proper use and possible adverse effects of retinoids.  All of the patient's questions and concerns were addressed. Dupixent Pregnancy And Lactation Text: This medication likely crosses the placenta but the risk for the fetus is uncertain. This medication is excreted in breast milk. Isotretinoin Counseling: Patient should get monthly blood tests, not donate blood, not drive at night if vision affected, not share medication, and not undergo elective surgery for 6 months after tx completed. Side effects reviewed, pt to contact office should one occur. Eucrisa Counseling: Patient may experience a mild burning sensation during topical application. Eucrisa is not approved in children less than 2 years of age. Adbry Pregnancy And Lactation Text: It is unknown if this medication will adversely affect pregnancy or breast feeding. Tranexamic Acid Counseling:  Patient advised of the small risk of bleeding problems with tranexamic acid. They were also instructed to call if they developed any nausea, vomiting or diarrhea. All of the patient's questions and concerns were addressed. Niacinamide Pregnancy And Lactation Text: These medications are considered safe during pregnancy. Fluconazole Counseling:  Patient counseled regarding adverse effects of fluconazole including but not limited to headache, diarrhea, nausea, upset stomach, liver function test abnormalities, taste disturbance, and stomach pain.  There is a rare possibility of liver failure that can occur when taking fluconazole.  The patient understands that monitoring of LFTs and kidney function test may be required, especially at baseline. The patient verbalized understanding of the proper use and possible adverse effects of fluconazole.  All of the patient's questions and concerns were addressed. Terbinafine Counseling: Patient counseling regarding adverse effects of terbinafine including but not limited to headache, diarrhea, rash, upset stomach, liver function test abnormalities, itching, taste/smell disturbance, nausea, abdominal pain, and flatulence.  There is a rare possibility of liver failure that can occur when taking terbinafine.  The patient understands that a baseline LFT and kidney function test may be required. The patient verbalized understanding of the proper use and possible adverse effects of terbinafine.  All of the patient's questions and concerns were addressed. Tranexamic Acid Pregnancy And Lactation Text: It is unknown if this medication is safe during pregnancy or breast feeding. Topical Retinoid counseling:  Patient advised to apply a pea-sized amount only at bedtime and wait 30 minutes after washing their face before applying.  If too drying, patient may add a non-comedogenic moisturizer. The patient verbalized understanding of the proper use and possible adverse effects of retinoids.  All of the patient's questions and concerns were addressed. Cyclophosphamide Counseling:  I discussed with the patient the risks of cyclophosphamide including but not limited to hair loss, hormonal abnormalities, decreased fertility, abdominal pain, diarrhea, nausea and vomiting, bone marrow suppression and infection. The patient understands that monitoring is required while taking this medication. Calcipotriene Pregnancy And Lactation Text: The use of this medication during pregnancy or lactation is not recommended as there is insufficient data. Oxybutynin Counseling:  I discussed with the patient the risks of oxybutynin including but not limited to skin rash, drowsiness, dry mouth, difficulty urinating, and blurred vision. Quinolones Counseling:  I discussed with the patient the risks of fluoroquinolones including but not limited to GI upset, allergic reaction, drug rash, diarrhea, dizziness, photosensitivity, yeast infections, liver function test abnormalities, tendonitis/tendon rupture. Bactrim Counseling:  I discussed with the patient the risks of sulfa antibiotics including but not limited to GI upset, allergic reaction, drug rash, diarrhea, dizziness, photosensitivity, and yeast infections.  Rarely, more serious reactions can occur including but not limited to aplastic anemia, agranulocytosis, methemoglobinemia, blood dyscrasias, liver or kidney failure, lung infiltrates or desquamative/blistering drug rashes. Sotyktu Pregnancy And Lactation Text: There is insufficient data to evaluate whether or not Sotyktu is safe to use during pregnancy.? ?It is not known if Sotyktu passes into breast milk and whether or not it is safe to use when breastfeeding.?? Doxycycline Counseling:  Patient counseled regarding possible photosensitivity and increased risk for sunburn.  Patient instructed to avoid sunlight, if possible.  When exposed to sunlight, patients should wear protective clothing, sunglasses, and sunscreen.  The patient was instructed to call the office immediately if the following severe adverse effects occur:  hearing changes, easy bruising/bleeding, severe headache, or vision changes.  The patient verbalized understanding of the proper use and possible adverse effects of doxycycline.  All of the patient's questions and concerns were addressed. Infliximab Counseling:  I discussed with the patient the risks of infliximab including but not limited to myelosuppression, immunosuppression, autoimmune hepatitis, demyelinating diseases, lymphoma, and serious infections.  The patient understands that monitoring is required including a PPD at baseline and must alert us or the primary physician if symptoms of infection or other concerning signs are noted. Detail Level: Detailed Birth Control Pills Pregnancy And Lactation Text: This medication should be avoided if pregnant and for the first 30 days post-partum. Qbrexza Counseling:  I discussed with the patient the risks of Qbrexza including but not limited to headache, mydriasis, blurred vision, dry eyes, nasal dryness, dry mouth, dry throat, dry skin, urinary hesitation, and constipation.  Local skin reactions including erythema, burning, stinging, and itching can also occur. Xolair Counseling:  Patient informed of potential adverse effects including but not limited to fever, muscle aches, rash and allergic reactions.  The patient verbalized understanding of the proper use and possible adverse effects of Xolair.  All of the patient's questions and concerns were addressed. Enbrel Counseling:  I discussed with the patient the risks of etanercept including but not limited to myelosuppression, immunosuppression, autoimmune hepatitis, demyelinating diseases, lymphoma, and infections.  The patient understands that monitoring is required including a PPD at baseline and must alert us or the primary physician if symptoms of infection or other concerning signs are noted. Isotretinoin Pregnancy And Lactation Text: This medication is Pregnancy Category X and is considered extremely dangerous during pregnancy. It is unknown if it is excreted in breast milk. Aklief Pregnancy And Lactation Text: It is unknown if this medication is safe to use during pregnancy.  It is unknown if this medication is excreted in breast milk.  Breastfeeding women should use the topical cream on the smallest area of the skin for the shortest time needed while breastfeeding.  Do not apply to nipple and areola. Topical Metronidazole Pregnancy And Lactation Text: This medication is Pregnancy Category B and considered safe during pregnancy.  It is also considered safe to use while breastfeeding. Bimzelx Counseling:  I discussed with the patient the risks of Bimzelx including but not limited to depression, immunosuppression, allergic reactions and infections.  The patient understands that monitoring is required including a PPD at baseline and must alert us or the primary physician if symptoms of infection or other concerning signs are noted. Nsaids Counseling: NSAID Counseling: I discussed with the patient that NSAIDs should be taken with food. Prolonged use of NSAIDs can result in the development of stomach ulcers.  Patient advised to stop taking NSAIDs if abdominal pain occurs.  The patient verbalized understanding of the proper use and possible adverse effects of NSAIDs.  All of the patient's questions and concerns were addressed. Cibinqo Counseling: I discussed with the patient the risks of Cibinqo therapy including but not limited to common cold, nausea, headache, cold sores, increased blood CPK levels, dizziness, UTIs, fatigue, acne, and vomitting. Live vaccines should be avoided.  This medication has been linked to serious infections; higher rate of mortality; malignancy and lymphoproliferative disorders; major adverse cardiovascular events; thrombosis; thrombocytopenia and lymphopenia; lipid elevations; and retinal detachment. Albendazole Counseling:  I discussed with the patient the risks of albendazole including but not limited to cytopenia, kidney damage, nausea/vomiting and severe allergy.  The patient understands that this medication is being used in an off-label manner. Valtrex Counseling: I discussed with the patient the risks of valacyclovir including but not limited to kidney damage, nausea, vomiting and severe allergy.  The patient understands that if the infection seems to be worsening or is not improving, they are to call. Nsaids Pregnancy And Lactation Text: These medications are considered safe up to 30 weeks gestation. It is excreted in breast milk. Mirvaso Counseling: Mirvaso is a topical medication which can decrease superficial blood flow where applied. Side effects are uncommon and include stinging, redness and allergic reactions. Zoryve Counseling:  I discussed with the patient that Zoryve is not for use in the eyes, mouth or vagina. The most commonly reported side effects include diarrhea, headache, insomnia, application site pain, upper respiratory tract infections, and urinary tract infections.  All of the patient's questions and concerns were addressed. Cantharidin Counseling:  I discussed with the patient the risks of Cantharidin including but not limited to pain, redness, burning, itching, and blistering. Cyclophosphamide Pregnancy And Lactation Text: This medication is Pregnancy Category D and it isn't considered safe during pregnancy. This medication is excreted in breast milk. Spevigo Counseling: I discussed with the patient the risks of Spevigo including but not limited to fatigue, nasuea, vomiting, headache, pruritus, urinary tract infection, an infusion related reactions.  The patient understands that monitoring is required including screening for tuberculosis at baseline and yearly screening thereafter while continuing Spevigo therapy. They should contact us if symptoms of infection or other concerning signs are noted. Glycopyrrolate Counseling:  I discussed with the patient the risks of glycopyrrolate including but not limited to skin rash, drowsiness, dry mouth, difficulty urinating, and blurred vision. Bactrim Pregnancy And Lactation Text: This medication is Pregnancy Category D and is known to cause fetal risk.  It is also excreted in breast milk. Xeljanz Counseling: I discussed with the patient the risks of Xeljanz therapy including increased risk of infection, liver issues, headache, diarrhea, or cold symptoms. Live vaccines should be avoided. They were instructed to call if they have any problems. Doxycycline Pregnancy And Lactation Text: This medication is Pregnancy Category D and not consider safe during pregnancy. It is also excreted in breast milk but is considered safe for shorter treatment courses. Spevigo Pregnancy And Lactation Text: The risk during pregnancy and breastfeeding is uncertain with this medication. This medication does cross the placenta. It is unknown if this medication is found in breast milk. Propranolol Counseling:  I discussed with the patient the risks of propranolol including but not limited to low heart rate, low blood pressure, low blood sugar, restlessness and increased cold sensitivity. They should call the office if they experience any of these side effects. Spironolactone Counseling: Patient advised regarding risks of diarrhea, abdominal pain, hyperkalemia, birth defects (for female patients), liver toxicity and renal toxicity. The patient may need blood work to monitor liver and kidney function and potassium levels while on therapy. The patient verbalized understanding of the proper use and possible adverse effects of spironolactone.  All of the patient's questions and concerns were addressed. Xolair Pregnancy And Lactation Text: This medication is Pregnancy Category B and is considered safe during pregnancy. This medication is excreted in breast milk. Topical Steroids Counseling: I discussed with the patient that prolonged use of topical steroids can result in the increased appearance of superficial blood vessels (telangiectasias), lightening (hypopigmentation) and thinning of the skin (atrophy).  Patient understands to avoid using high potency steroids in skin folds, the groin or the face.  The patient verbalized understanding of the proper use and possible adverse effects of topical steroids.  All of the patient's questions and concerns were addressed. Qbrexza Pregnancy And Lactation Text: There is no available data on Qbrexza use in pregnant women.  There is no available data on Qbrexza use in lactation. Dutasteride Male Counseling: Dustasteride Counseling:  I discussed with the patient the risks of use of dutasteride including but not limited to decreased libido, decreased ejaculate volume, and gynecomastia. Women who can become pregnant should not handle medication.  All of the patient's questions and concerns were addressed. Cantharidin Pregnancy And Lactation Text: This medication has not been proven safe during pregnancy. It is unknown if this medication is excreted in breast milk. Azelaic Acid Counseling: Patient counseled that medicine may cause skin irritation and to avoid applying near the eyes.  In the event of skin irritation, the patient was advised to reduce the amount of the drug applied or use it less frequently.   The patient verbalized understanding of the proper use and possible adverse effects of azelaic acid.  All of the patient's questions and concerns were addressed. Hydroquinone Counseling:  Patient advised that medication may result in skin irritation, lightening (hypopigmentation), dryness, and burning.  In the event of skin irritation, the patient was advised to reduce the amount of the drug applied or use it less frequently.  Rarely, spots that are treated with hydroquinone can become darker (pseudoochronosis).  Should this occur, patient instructed to stop medication and call the office. The patient verbalized understanding of the proper use and possible adverse effects of hydroquinone.  All of the patient's questions and concerns were addressed. Bimzelx Pregnancy And Lactation Text: This medication crosses the placenta and the safety is uncertain during pregnancy. It is unknown if this medication is present in breast milk. Clofazimine Counseling:  I discussed with the patient the risks of clofazimine including but not limited to skin and eye pigmentation, liver damage, nausea/vomiting, gastrointestinal bleeding and allergy. Cibinqo Pregnancy And Lactation Text: It is unknown if this medication will adversely affect pregnancy or breast feeding.  You should not take this medication if you are currently pregnant or planning a pregnancy or while breastfeeding. Ebglyss Pregnancy And Lactation Text: This medication likely crosses the placenta but the risk for the fetus is uncertain. It is unknown if this medication is excreted in breast milk. Simlandi Counseling:  I discussed with the patient the risks of adalimumab including but not limited to myelosuppression, immunosuppression, autoimmune hepatitis, demyelinating diseases, lymphoma, and serious infections.  The patient understands that monitoring is required including a PPD at baseline and must alert us or the primary physician if symptoms of infection or other concerning signs are noted. Ozempic Counseling: I reviewed the possible side effects including: thyroid tumors, kidney disease, gallbladder disease, abdominal pain, constipation, diarrhea, nausea, vomiting and pancreatitis. Do not take this medication if you have a history or family history of multiple endocrine neoplasia syndrome type 2. Side effects reviewed, pt to contact office should one occur. Nemluvio Pregnancy And Lactation Text: It is not known if Nemluvio causes fetal harm or is present in breast milk. Please proceed with caution if patients who are pregnant or breastfeeding. Zepbound Pregnancy And Lactation Text: The fetal risk of this medication is unknown and taking while pregnant is not recommended. It is unknown if this medication is present in breast milk. Ebglyss Counseling: I discussed with the patient the risks of lebrikizumab including but not limited to eye inflammation and irritation, cold sores, injection site reactions, allergic reactions and increased risk of parasitic infection. The patient understands that monitoring is required and they must alert us or the primary physician if symptoms of infection or other concerning signs are noted. Wegovy Counseling: I reviewed the possible side effects including: thyroid tumors, kidney disease, gallbladder disease, abdominal pain, constipation, diarrhea, nausea, vomiting and pancreatitis. Do not take this medication if you have a history or family history of multiple endocrine neoplasia syndrome type 2. Side effects reviewed, pt to contact office should one occur. Nemluvio Counseling: I discussed with the patient the risks of nemolizumab including but not limited to headache, gastrointestinal complaints, nasopharyngitis, musculoskeletal complaints, injection site reactions, and allergic reactions. The patient understands that monitoring is required and they must alert us or the primary physician if any side effects are noted. Zepbound Counseling: I reviewed the possible side effects including: thyroid tumors, kidney disease, gallbladder disease, abdominal pain, constipation, diarrhea, nausea, vomiting and pancreatitis. Do not take this medication if you have a history or family history of multiple endocrine neoplasia syndrome type 2. Side effects reviewed, pt to contact office should one occur. Saxenda Counseling: I reviewed the possible side effects including: thyroid tumors, kidney disease, gallbladder disease, abdominal pain, constipation, diarrhea, nausea, vomiting and pancreatitis. Do not take this medication if you have a history or family history of multiple endocrine neoplasia syndrome type 2. Side effects reviewed, pt to contact office should one occur. Semaglutide Counseling: I reviewed the possible side effects including: thyroid tumors, kidney disease, gallbladder disease, abdominal pain, constipation, diarrhea, nausea, vomiting and pancreatitis. Do not take this medication if you have a history or family history of multiple endocrine neoplasia syndrome type 2. Side effects reviewed, pt to contact office should one occur.

## 2025-04-24 ENCOUNTER — TELEPHONE (OUTPATIENT)
Dept: INTERNAL MEDICINE | Facility: CLINIC | Age: 70
End: 2025-04-24
Payer: MEDICARE

## 2025-04-24 NOTE — TELEPHONE ENCOUNTER
Called [facility], that ref has been sent to Dr Ratliff,  just have to wait to her from that office. And also I have sent the cpap order twice.  So he should be hearing something from them soon

## 2025-04-24 NOTE — TELEPHONE ENCOUNTER
CALLED ABOUT HIS HAND REFERRAL - HE SAID IT'S BEEN A WEEK AND WE MUST HAD FORGOTTEN I TOLD HIM, LOOKS TO BE PENDING WAITING ON APPROVAL FROM HIS INSURANCE THAT HE WAS NOT FORGOTTEN. .    HE ALSO WANTS TO KNOW IF HIS CPAP SUPPLIES HAVE BEEN TURNED IN YET?    PLEASE ADVISE

## 2025-04-25 NOTE — PROGRESS NOTES
"Chief Complaint   Patient presents with    Colonoscopy     Had colon over 7 years in [facility] had some polyps is having some hurting in back       PCP: Paras Bliss MD  REFER: Paras Bliss*    Subjective     HPI    Raghav Olivier is a 69 y.o. male who presents to office for preventative maintenance.  There is  a personal history of colon polyps.   He does not have complaints of nausea/vomiting, change in bowels, weight loss, no BRBPR, no melena.  There is not a family history of colon cancer in first degree relative.  There is not a family history of colon polyps in first degree relative.  Raghav Olivier last colonoscopy-2021 .  Bowels do move on regular basis.    COLONOSCOPY (07/28/2021 14:36)   Tissue Pathology Exam (07/28/2021 14:48) -[place]          COLONOSCOPY (07/31/2017 14:00) -polypectomy      Lab Results - Last 18 Months   Lab Units 02/02/25  0952 01/21/25  0857 05/24/24  1322 05/09/24  0927   GLUCOSE mg/dL 111* 122*  --  106*   SODIUM mmol/L 141 142  --  143   POTASSIUM mmol/L 4.0 4.3  --  3.6   CREATININE mg/dL 0.98 1.24 1.50* 1.40*   BUN mg/dL 19 23  --  19   BUN / CREAT RATIO  19.4 19  --  14   ALK PHOS U/L 35* 48  --  34*   ALT (SGPT) U/L 26 33  --  30   AST (SGOT) U/L 21 25  --  28   BILIRUBIN mg/dL 0.3 <0.2  --  0.3   ALBUMIN g/dL 4.1 4.4  --  4.3       No results for input(s): \"EGFRIFNONA\", \"EGFRIFAFRI\", \"EGFRRESULT\" in the last 42630 hours.     Past Medical History:   Diagnosis Date    Abnormal ECG     Allergic     Arrhythmia     Arthritis     Atrial fibrillation     Cancer     SKIN    Cataract     Diverticulitis     Hyperlipidemia     Hypertension     Kidney stones     Migraines     Nonrheumatic mitral (valve) insufficiency     Obesity     Pulmonary embolism 8-30-21    [facility]    Sleep apnea     C-PAP    Spinal headache      Past Surgical History:   Procedure Laterality Date    ABLATION OF DYSRHYTHMIC FOCUS      APPENDECTOMY      BACK SURGERY  " 01/2022    CARDIAC ABLATION  07/26/2024    CARDIAC ABLATION  10/2024    with     CARDIAC CATHETERIZATION      CARDIAC ELECTROPHYSIOLOGY PROCEDURE N/A 10/01/2021    Procedure: NEW PACEMAKER IMPLANTATION;  Surgeon: Mitch Hernández MD;  Location:  [facility] CATH INVASIVE LOCATION;  Service: Cardiology;  Laterality: N/A;    CARDIOVERSION      CHOLECYSTECTOMY      COLONOSCOPY N/A 07/31/2017    Procedure: COLONOSCOPY;  Surgeon: Billy Robibns DO;  Location: [facility] ENDOSCOPY;  Service:     COLONOSCOPY N/A 07/28/2021    Procedure: COLONOSCOPY;  Surgeon: Billy Robbins DO;  Location: [facility] ENDOSCOPY;  Service: Gastroenterology;  Laterality: N/A;    EP STUDY      INSERT / REPLACE / REMOVE PACEMAKER  10-1-21    Dr Hernández    JOINT REPLACEMENT  04/09/23    KNEE SURGERY Right     X2    SHOULDER SURGERY Left 2009    SPINE SURGERY  00/00/22    TOTAL KNEE ARTHROPLASTY Right 04/06/2023    Procedure: RIGHT TOTAL KNEE ARTHROPLASTY;  Surgeon: Pepe Vaughn MD;  Location: [facility] OR;  Service: Orthopedics;  Laterality: Right;     Outpatient Medications Marked as Taking for the 4/28/25 encounter (Office Visit) with Julio Hayes APRN   Medication Sig Dispense Refill    acetaminophen (TYLENOL) 650 MG 8 hr tablet Take 1 tablet by mouth Every 8 (Eight) Hours As Needed for Mild Pain. 1000mg twice a day      amiodarone (PACERONE) 200 MG tablet Take 1 tablet by mouth Daily.      apixaban (ELIQUIS) 5 MG tablet tablet Take 1 tablet by mouth Every 12 (Twelve) Hours. Indications: Atrial Fibrillation, resume eliquis saturday morning      Cholecalciferol (Vitamin D3) 50 MCG (2000 UT) capsule Daily.      fexofenadine (ALLEGRA) 180 MG tablet Take 1 tablet by mouth Daily. 30 tablet 11    fluticasone (FLONASE) 50 MCG/ACT nasal spray Administer 1 spray into the nostril(s) as directed by provider As Needed for Allergies. 16 g 11    furosemide (LASIX) 40 MG tablet TAKE 1 TABLET BY MOUTH EVERY DAY FOR 30 DAYS      meloxicam (MOBIC) 15  MG tablet Take 1 tablet by mouth As Needed for Mild Pain.      metFORMIN (GLUCOPHAGE) 500 MG tablet Take 1 tablet by mouth Daily With Breakfast. 30 tablet 11    potassium chloride 10 MEQ CR tablet TAKE 1 TABLET BY MOUTH EVERY DAY FOR 30 DAYS      rosuvastatin (CRESTOR) 20 MG tablet Take 1 tablet by mouth Every Night. 90 tablet 3    triamcinolone (KENALOG) 0.1 % cream       valsartan (DIOVAN) 160 MG tablet Take 1 tablet by mouth Daily. 90 tablet 3     Allergies   Allergen Reactions    Levofloxacin Other (See Comments)     Pain in legs     Penicillins Unknown - Low Severity     Pt states is was a childhood allergy    Vancomycin Itching     Social History     Socioeconomic History    Marital status:     Highest education level: GED or equivalent   Tobacco Use    Smoking status: Former     Current packs/day: 0.00     Average packs/day: 0.3 packs/day for 4.0 years (1.0 ttl pk-yrs)     Types: Cigarettes     Start date: 1969     Quit date: 1973     Years since quittin.3     Passive exposure: Past    Smokeless tobacco: Never   Vaping Use    Vaping status: Never Used   Substance and Sexual Activity    Alcohol use: No    Drug use: No    Sexual activity: Yes     Partners: Female     Birth control/protection: Surgical       Review of Systems   Constitutional:  Negative for fever and unexpected weight change.   HENT:  Negative for trouble swallowing.    Respiratory:  Negative for shortness of breath.    Cardiovascular:  Negative for chest pain.   Gastrointestinal:  Negative for abdominal pain and anal bleeding.     Objective   Vitals:    25 0921   BP: 136/76   Pulse: 68   Temp: 97.6 °F (36.4 °C)   SpO2: 99%     Physical Exam  Constitutional:       Appearance: Normal appearance. He is well-developed.   Eyes:      General: No scleral icterus.  Cardiovascular:      Heart sounds: Normal heart sounds. No murmur heard.  Pulmonary:      Effort: Pulmonary effort is normal.   Abdominal:      General: Bowel  sounds are normal. There is no distension.      Palpations: Abdomen is soft.      Tenderness: There is no abdominal tenderness. There is no guarding.   Skin:     General: Skin is warm and dry.      Coloration: Skin is not jaundiced.   Neurological:      Mental Status: He is alert.   Psychiatric:         Behavior: Behavior is cooperative.       Imaging Results (Most Recent)       None          Body mass index is 37.88 kg/m².    Assessment & Plan   Diagnoses and all orders for this visit:    1. History of adenomatous polyp of colon (Primary)  -     Case Request; Standing  -     Implement Anesthesia Orders Day of Procedure; Standing  -     Follow Anesthesia Guidelines / Protocol; Future  -     Case Request    2. Anticoagulated      COLONOSCOPY WITH ANESTHESIA- (examination of colon) (N/A)    Miralax prep     I will follow up on scheduled cardiac echo    Patient is to continue all blood pressure and cardiac medications prior to procedure and has been advised to take medications morning of procedure   Pt verbalized understanding    Pt to hold diabetes medication/insulin day of procedure to prevent any risk of complications of hypoglycemia intraprocedure.  If taking insulin 1/2 the PM dose as well       All risks, benefits, alternatives, and indications of colonoscopy procedure have been discussed with the patient. Risks to include perforation of the colon requiring possible surgery or colostomy, risk of bleeding from biopsies or removal of colon tissue, possibility of missing a colon polyp or cancer, or adverse drug reaction.  Benefits to include the diagnosis and management of disease of the colon and rectum. Alternatives to include barium enema, radiographic evaluation, lab testing or no intervention. He verbalizes understanding and agrees.     Patient is to hold their anticoagulation medication per the direction of their prescribing provider,  Dr Kramer. This is to prevent any risk or complication from bleeding intra  and post procedure. If they develop bleeding post procedure they are to go the emergency department for further evaluation and treatment immediately.  We will obtain clearance from prescribing provider requesting Eliquis will need to be held x 2 days prior to procedure.       Julio Hayes, APRN  04/28/25        There are no Patient Instructions on file for this visit.

## 2025-04-28 ENCOUNTER — OFFICE VISIT (OUTPATIENT)
Dept: GASTROENTEROLOGY | Facility: CLINIC | Age: 70
End: 2025-04-28
Payer: MEDICARE

## 2025-04-28 VITALS
TEMPERATURE: 97.6 F | BODY MASS INDEX: 37.86 KG/M2 | DIASTOLIC BLOOD PRESSURE: 76 MMHG | SYSTOLIC BLOOD PRESSURE: 136 MMHG | OXYGEN SATURATION: 99 % | HEART RATE: 68 BPM | WEIGHT: 295 LBS | HEIGHT: 74 IN

## 2025-04-28 DIAGNOSIS — Z79.01 ANTICOAGULATED: ICD-10-CM

## 2025-04-28 DIAGNOSIS — Z86.0101 HISTORY OF ADENOMATOUS POLYP OF COLON: Primary | ICD-10-CM

## 2025-04-28 PROCEDURE — 3075F SYST BP GE 130 - 139MM HG: CPT | Performed by: NURSE PRACTITIONER

## 2025-04-28 PROCEDURE — 3078F DIAST BP <80 MM HG: CPT | Performed by: NURSE PRACTITIONER

## 2025-04-28 PROCEDURE — 1159F MED LIST DOCD IN RCRD: CPT | Performed by: NURSE PRACTITIONER

## 2025-04-28 PROCEDURE — 1160F RVW MEDS BY RX/DR IN RCRD: CPT | Performed by: NURSE PRACTITIONER

## 2025-04-28 PROCEDURE — S0260 H&P FOR SURGERY: HCPCS | Performed by: NURSE PRACTITIONER

## 2025-04-29 ENCOUNTER — HOSPITAL ENCOUNTER (OUTPATIENT)
Dept: CARDIOLOGY | Facility: HOSPITAL | Age: 70
Discharge: HOME | End: 2025-04-29
Admitting: INTERNAL MEDICINE
Payer: MEDICARE

## 2025-04-29 VITALS
WEIGHT: 304 LBS | DIASTOLIC BLOOD PRESSURE: 76 MMHG | BODY MASS INDEX: 39.01 KG/M2 | HEIGHT: 74 IN | SYSTOLIC BLOOD PRESSURE: 136 MMHG

## 2025-04-29 DIAGNOSIS — I51.7 LVH (LEFT VENTRICULAR HYPERTROPHY): ICD-10-CM

## 2025-04-29 DIAGNOSIS — I34.0 NONRHEUMATIC MITRAL (VALVE) INSUFFICIENCY: ICD-10-CM

## 2025-04-29 DIAGNOSIS — R06.09 DOE (DYSPNEA ON EXERTION): ICD-10-CM

## 2025-04-29 DIAGNOSIS — I77.819 DILATATION OF AORTA: ICD-10-CM

## 2025-04-29 LAB
AORTIC DIMENSIONLESS INDEX: 0.67 (DI)
AV MEAN PRESS GRAD SYS DOP V1V2: 6.7 MMHG
AV VMAX SYS DOP: 177.4 CM/SEC
BH CV ECHO LEFT VENTRICLE GLOBAL LONGITUDINAL STRAIN: -13.6 %
BH CV ECHO MEAS - 2D AUTO EF SIEMENS: 45.8 %
BH CV ECHO MEAS - AI P1/2T: 1766 MSEC
BH CV ECHO MEAS - AO MAX PG: 12.6 MMHG
BH CV ECHO MEAS - AO ROOT DIAM: 3 CM
BH CV ECHO MEAS - AO V2 VTI: 38.4 CM
BH CV ECHO MEAS - AVA(I,D): 3.2 CM2
BH CV ECHO MEAS - EDV(CUBED): 141.2 ML
BH CV ECHO MEAS - EDV(MOD-SP2): 83.2 ML
BH CV ECHO MEAS - EDV(MOD-SP4): 141.3 ML
BH CV ECHO MEAS - EF(MOD-SP2): 53.8 %
BH CV ECHO MEAS - EF(MOD-SP4): 52.9 %
BH CV ECHO MEAS - ESV(CUBED): 71 ML
BH CV ECHO MEAS - ESV(MOD-SP2): 38.4 ML
BH CV ECHO MEAS - ESV(MOD-SP4): 66.6 ML
BH CV ECHO MEAS - FS: 20.5 %
BH CV ECHO MEAS - IVS/LVPW: 1.33 CM
BH CV ECHO MEAS - IVSD: 1.41 CM
BH CV ECHO MEAS - LA DIMENSION: 4 CM
BH CV ECHO MEAS - LAT PEAK E' VEL: 14.9 CM/SEC
BH CV ECHO MEAS - LV DIASTOLIC VOL/BSA (35-75): 54.4 CM2
BH CV ECHO MEAS - LV MASS(C)D: 259.3 GRAMS
BH CV ECHO MEAS - LV MAX PG: 4.7 MMHG
BH CV ECHO MEAS - LV MEAN PG: 2.6 MMHG
BH CV ECHO MEAS - LV SYSTOLIC VOL/BSA (12-30): 25.7 CM2
BH CV ECHO MEAS - LV V1 MAX: 108.1 CM/SEC
BH CV ECHO MEAS - LV V1 VTI: 25.8 CM
BH CV ECHO MEAS - LVIDD: 5.2 CM
BH CV ECHO MEAS - LVIDS: 4.1 CM
BH CV ECHO MEAS - LVOT AREA: 4.7 CM2
BH CV ECHO MEAS - LVOT DIAM: 2.45 CM
BH CV ECHO MEAS - LVPWD: 1.06 CM
BH CV ECHO MEAS - MED PEAK E' VEL: 8.7 CM/SEC
BH CV ECHO MEAS - MV A MAX VEL: 71.8 CM/SEC
BH CV ECHO MEAS - MV DEC SLOPE: 342.7 CM/SEC2
BH CV ECHO MEAS - MV DEC TIME: 0.21 SEC
BH CV ECHO MEAS - MV E MAX VEL: 70.4 CM/SEC
BH CV ECHO MEAS - MV E/A: 0.98
BH CV ECHO MEAS - MV MAX PG: 2.6 MMHG
BH CV ECHO MEAS - MV MEAN PG: 1.21 MMHG
BH CV ECHO MEAS - MV V2 VTI: 29.1 CM
BH CV ECHO MEAS - MVA(VTI): 4.2 CM2
BH CV ECHO MEAS - PA V2 MAX: 87.1 CM/SEC
BH CV ECHO MEAS - RAP SYSTOLE: 3 MMHG
BH CV ECHO MEAS - RVSP: 9.7 MMHG
BH CV ECHO MEAS - SV(LVOT): 121.4 ML
BH CV ECHO MEAS - SV(MOD-SP2): 44.7 ML
BH CV ECHO MEAS - SV(MOD-SP4): 74.7 ML
BH CV ECHO MEAS - SVI(LVOT): 46.8 ML/M2
BH CV ECHO MEAS - SVI(MOD-SP2): 17.2 ML/M2
BH CV ECHO MEAS - SVI(MOD-SP4): 28.8 ML/M2
BH CV ECHO MEAS - TAPSE (>1.6): 1.6 CM
BH CV ECHO MEAS - TR MAX PG: 6.7 MMHG
BH CV ECHO MEAS - TR MAX VEL: 129.8 CM/SEC
BH CV ECHO MEASUREMENTS AVERAGE E/E' RATIO: 5.97
LEFT ATRIUM VOLUME INDEX: 26 ML/M2
LEFT ATRIUM VOLUME: 68 ML

## 2025-04-29 PROCEDURE — 25510000001 PERFLUTREN 6.52 MG/ML SUSPENSION: Performed by: INTERNAL MEDICINE

## 2025-04-29 PROCEDURE — 93356 MYOCRD STRAIN IMG SPCKL TRCK: CPT

## 2025-04-29 PROCEDURE — 93306 TTE W/DOPPLER COMPLETE: CPT

## 2025-04-29 RX ADMIN — PERFLUTREN 6.52 MG: 6.52 INJECTION, SUSPENSION INTRAVENOUS at 10:52

## 2025-05-01 DIAGNOSIS — M65.332 TRIGGER MIDDLE FINGER OF LEFT HAND: Primary | ICD-10-CM

## 2025-05-08 ENCOUNTER — OFFICE VISIT (OUTPATIENT)
Age: 70
End: 2025-05-08
Payer: MEDICARE

## 2025-05-08 VITALS — WEIGHT: 304 LBS | BODY MASS INDEX: 39.01 KG/M2 | HEIGHT: 74 IN | RESPIRATION RATE: 18 BRPM

## 2025-05-08 DIAGNOSIS — M65.332 TRIGGER MIDDLE FINGER OF LEFT HAND: Primary | ICD-10-CM

## 2025-05-08 RX ORDER — TRIAMCINOLONE ACETONIDE 40 MG/ML
20 INJECTION, SUSPENSION INTRA-ARTICULAR; INTRAMUSCULAR ONCE
Status: COMPLETED | OUTPATIENT
Start: 2025-05-08 | End: 2025-05-08

## 2025-05-08 RX ORDER — LIDOCAINE HYDROCHLORIDE 10 MG/ML
0.5 INJECTION, SOLUTION INFILTRATION; PERINEURAL ONCE
Status: COMPLETED | OUTPATIENT
Start: 2025-05-08 | End: 2025-05-08

## 2025-05-08 RX ADMIN — LIDOCAINE HYDROCHLORIDE 0.5 ML: 10 INJECTION, SOLUTION INFILTRATION; PERINEURAL at 09:47

## 2025-05-08 RX ADMIN — TRIAMCINOLONE ACETONIDE 20 MG: 40 INJECTION, SUSPENSION INTRA-ARTICULAR; INTRAMUSCULAR at 09:47

## 2025-05-08 NOTE — PROGRESS NOTES
[facility] & [facility]  Johnathon Severino MD, PhD  Sj Severino PA-C    CHIEF COMPLAINT  Initial Evaluation of the Left Hand (Patient presents today for left hand middle finger pain and trigger finger for 2 months.)       HISTORY OF PRESENT ILLNESS    History of Present Illness  The patient is a 69-year-old male, new patient, who presents for hand pain and suspected trigger finger.    He has been experiencing discomfort in his hand for the past few months, characterized by soreness and difficulty in flexing his fingers. He reports no recent trauma or repetitive motion that could have precipitated this condition. As an avid , he notes that his ability to play has been significantly compromised due to the pain. He specifically mentions difficulty in playing certain chords, which has affected his performance, particularly when playing at home and at [employer].       HISTORY    Current Outpatient Medications   Medication Instructions    acetaminophen (TYLENOL) 650 mg, Every 8 Hours PRN    amiodarone (PACERONE) 200 MG tablet 1 tablet, Daily    apixaban (ELIQUIS) 5 mg, Every 12 Hours Scheduled    Cholecalciferol (Vitamin D3) 50 MCG (2000 UT) capsule Daily    fexofenadine (ALLEGRA) 180 mg, Oral, Daily    fluticasone (FLONASE) 50 MCG/ACT nasal spray 1 spray, Nasal, As Needed    furosemide (LASIX) 40 MG tablet TAKE 1 TABLET BY MOUTH EVERY DAY FOR 30 DAYS    meloxicam (MOBIC) 15 mg, As Needed    metFORMIN (GLUCOPHAGE) 500 mg, Oral, Daily With Breakfast    potassium chloride 10 MEQ CR tablet TAKE 1 TABLET BY MOUTH EVERY DAY FOR 30 DAYS    rosuvastatin (CRESTOR) 20 mg, Oral, Nightly    triamcinolone (KENALOG) 0.1 % cream     valsartan (DIOVAN) 160 mg, Oral, Daily         reports that he quit smoking about 52 years ago. His smoking use included cigarettes. He started smoking about 56 years ago. He has a 1 pack-year smoking history. He has been exposed to tobacco smoke. He has never used  smokeless tobacco. He reports that he does not drink alcohol and does not use drugs.    Past Medical History:   Diagnosis Date    Abnormal ECG     Allergic     Arrhythmia     Arthritis     Atrial fibrillation     Cancer     SKIN    Cataract     Diverticulitis     Hyperlipidemia     Hypertension     Kidney stones     Migraines     Nonrheumatic mitral (valve) insufficiency     Obesity     Pulmonary embolism 8-30-21    [facility]    Sleep apnea     C-PAP    Spinal headache         Past Surgical History:   Procedure Laterality Date    ABLATION OF DYSRHYTHMIC FOCUS      APPENDECTOMY      BACK SURGERY  01/2022    CARDIAC ABLATION  07/26/2024    CARDIAC ABLATION  10/2024    with     CARDIAC CATHETERIZATION      CARDIAC ELECTROPHYSIOLOGY PROCEDURE N/A 10/01/2021    Procedure: NEW PACEMAKER IMPLANTATION;  Surgeon: Mitch Hernández MD;  Location: [facility] CATH INVASIVE LOCATION;  Service: Cardiology;  Laterality: N/A;    CARDIOVERSION      CHOLECYSTECTOMY      COLONOSCOPY N/A 07/31/2017    Procedure: COLONOSCOPY;  Surgeon: Billy Robbins DO;  Location: [facility] ENDOSCOPY;  Service:     COLONOSCOPY N/A 07/28/2021    Procedure: COLONOSCOPY;  Surgeon: Billy Robbins DO;  Location: [facility] ENDOSCOPY;  Service: Gastroenterology;  Laterality: N/A;    EP STUDY      INSERT / REPLACE / REMOVE PACEMAKER  10-1-21    Dr Hernández    JOINT REPLACEMENT  04/09/23    KNEE SURGERY Right     X2    SHOULDER SURGERY Left 2009    SPINE SURGERY  00/00/22    TOTAL KNEE ARTHROPLASTY Right 04/06/2023    Procedure: RIGHT TOTAL KNEE ARTHROPLASTY;  Surgeon: Pepe Vaughn MD;  Location: [facility] OR;  Service: Orthopedics;  Laterality: Right;        PHYSICAL EXAM  Constitutional: The patient is in no apparent distress and generally well-appearing. The patient hears me clearly and answers questions appropriately.   Musculoskeletal:  Physical Exam  Musculoskeletal:  Right Hand/Wrist: Tenderness and soreness noted in the right hand. Presence of  "trigger finger observed at the middle finger.  No contractures or obvious cords in the palm.  No significant swelling of the hand.      IMAGING    No results found.     Results         ASSESSMENT & PLAN  Diagnoses and all orders for this visit:    1. Trigger middle finger of left hand (Primary)  -     triamcinolone acetonide (KENALOG-40) injection 20 mg  -     lidocaine (XYLOCAINE) 1 % injection 0.5 mL         Assessment & Plan  1. Trigger finger:    A steroid injection will be administered to alleviate symptoms. Conservative measures such as splinting and anti-inflammatories were discussed but deemed less effective given the severity and duration of symptoms. The injection may cause discomfort due to the sensitivity of the hand. If symptoms persist after the injection, repeat injection vs surgical intervention may be considered.    Follow-up:  A follow-up appointment will be scheduled to assess the effectiveness of the injection and determine if further treatment is necessary.    PROCEDURE  Steroid injection was administered to the hand today.    Left middle finger trigger finger injection performed today  Follow up: 1 month    Ultrasound-guided Trigger Finger Injection Procedure Note    Left middle trigger finger injection was discussed with the patient in detail, including indication, risks, benefits, and alternatives. Risks include but are not limited to: incomplete symptom resolution, injection site pain, local irritation, bleeding, infection, allergic reaction, elevated blood pressure and blood sugar. Consent was given for the procedure.     Injection site was identified by physical examination and cleaned with chlorhexidine.   Affected area was examined under ultrasound using the [email or web address] ultrasound machine and linear and/or hockey-stick probe.  Prior to needle insertion, ethyl chloride spray was used for surface anesthesia.  A 25-gauge, 1\" needle was directed into base of the digit and flexor tendon sheath " near the A1 pulley under ultrasound guidance, taking care not to inject the tendon itself. Injectate was passed into the space without difficulty. The needle was removed and a simple bandage was applied. The procedure was tolerated well without difficulty.    Injection mixture:  1% plain lidocaine: 0.5 mL  40 mg/mL triamcinolone acetonide: 0.5 mL (20mg)      Patient or patient representative verbalized consent for the use of Ambient Listening during the visit with  Johnathon Severino MD for chart documentation. 5/8/2025  09:31 CDT      This document has been signed by Johnathon Severino MD on May 8, 2025 09:29 CDT

## 2025-05-21 LAB
AORTIC DIMENSIONLESS INDEX: 0.67 (DI)
AV MEAN PRESS GRAD SYS DOP V1V2: 6.7 MMHG
AV VMAX SYS DOP: 177.4 CM/SEC
BH CV ECHO LEFT VENTRICLE GLOBAL LONGITUDINAL STRAIN: -13.6 %
BH CV ECHO MEAS - 2D AUTO EF SIEMENS: 53 %
BH CV ECHO MEAS - AI P1/2T: 1766 MSEC
BH CV ECHO MEAS - AO MAX PG: 12.6 MMHG
BH CV ECHO MEAS - AO ROOT DIAM: 3 CM
BH CV ECHO MEAS - AO V2 VTI: 38.4 CM
BH CV ECHO MEAS - AVA(I,D): 3.2 CM2
BH CV ECHO MEAS - EDV(CUBED): 141.2 ML
BH CV ECHO MEAS - EDV(MOD-SP2): 83.2 ML
BH CV ECHO MEAS - EDV(MOD-SP4): 141.3 ML
BH CV ECHO MEAS - EF(MOD-SP2): 53.8 %
BH CV ECHO MEAS - EF(MOD-SP4): 52.9 %
BH CV ECHO MEAS - ESV(CUBED): 71 ML
BH CV ECHO MEAS - ESV(MOD-SP2): 38.4 ML
BH CV ECHO MEAS - ESV(MOD-SP4): 66.6 ML
BH CV ECHO MEAS - FS: 20.5 %
BH CV ECHO MEAS - IVS/LVPW: 1.33 CM
BH CV ECHO MEAS - IVSD: 1.41 CM
BH CV ECHO MEAS - LA DIMENSION: 4 CM
BH CV ECHO MEAS - LAT PEAK E' VEL: 14.9 CM/SEC
BH CV ECHO MEAS - LV DIASTOLIC VOL/BSA (35-75): 54.4 CM2
BH CV ECHO MEAS - LV MASS(C)D: 259.3 GRAMS
BH CV ECHO MEAS - LV MAX PG: 4.7 MMHG
BH CV ECHO MEAS - LV MEAN PG: 2.6 MMHG
BH CV ECHO MEAS - LV SYSTOLIC VOL/BSA (12-30): 25.7 CM2
BH CV ECHO MEAS - LV V1 MAX: 108.1 CM/SEC
BH CV ECHO MEAS - LV V1 VTI: 25.8 CM
BH CV ECHO MEAS - LVIDD: 5.2 CM
BH CV ECHO MEAS - LVIDS: 4.1 CM
BH CV ECHO MEAS - LVOT AREA: 4.7 CM2
BH CV ECHO MEAS - LVOT DIAM: 2.45 CM
BH CV ECHO MEAS - LVPWD: 1.06 CM
BH CV ECHO MEAS - MED PEAK E' VEL: 8.7 CM/SEC
BH CV ECHO MEAS - MV A MAX VEL: 71.8 CM/SEC
BH CV ECHO MEAS - MV DEC SLOPE: 342.7 CM/SEC2
BH CV ECHO MEAS - MV DEC TIME: 0.21 SEC
BH CV ECHO MEAS - MV E MAX VEL: 70.4 CM/SEC
BH CV ECHO MEAS - MV E/A: 0.98
BH CV ECHO MEAS - MV MAX PG: 2.6 MMHG
BH CV ECHO MEAS - MV MEAN PG: 1.21 MMHG
BH CV ECHO MEAS - MV V2 VTI: 29.1 CM
BH CV ECHO MEAS - MVA(VTI): 4.2 CM2
BH CV ECHO MEAS - PA V2 MAX: 87.1 CM/SEC
BH CV ECHO MEAS - RAP SYSTOLE: 3 MMHG
BH CV ECHO MEAS - RVSP: 9.7 MMHG
BH CV ECHO MEAS - SV(LVOT): 121.4 ML
BH CV ECHO MEAS - SV(MOD-SP2): 44.7 ML
BH CV ECHO MEAS - SV(MOD-SP4): 74.7 ML
BH CV ECHO MEAS - SVI(LVOT): 46.8 ML/M2
BH CV ECHO MEAS - SVI(MOD-SP2): 17.2 ML/M2
BH CV ECHO MEAS - SVI(MOD-SP4): 28.8 ML/M2
BH CV ECHO MEAS - TAPSE (>1.6): 1.6 CM
BH CV ECHO MEAS - TR MAX PG: 6.7 MMHG
BH CV ECHO MEAS - TR MAX VEL: 129.8 CM/SEC
BH CV ECHO MEASUREMENTS AVERAGE E/E' RATIO: 5.97
LEFT ATRIUM VOLUME INDEX: 26 ML/M2
LEFT ATRIUM VOLUME: 68 ML

## 2025-05-28 ENCOUNTER — OFFICE VISIT (OUTPATIENT)
Dept: CARDIOLOGY | Facility: CLINIC | Age: 70
End: 2025-05-28
Payer: MEDICARE

## 2025-05-28 VITALS
DIASTOLIC BLOOD PRESSURE: 76 MMHG | WEIGHT: 294 LBS | SYSTOLIC BLOOD PRESSURE: 146 MMHG | HEART RATE: 76 BPM | HEIGHT: 74 IN | BODY MASS INDEX: 37.73 KG/M2

## 2025-05-28 DIAGNOSIS — I51.7 LVH (LEFT VENTRICULAR HYPERTROPHY): ICD-10-CM

## 2025-05-28 DIAGNOSIS — E78.2 MIXED HYPERLIPIDEMIA: ICD-10-CM

## 2025-05-28 DIAGNOSIS — I34.0 NONRHEUMATIC MITRAL (VALVE) INSUFFICIENCY: ICD-10-CM

## 2025-05-28 DIAGNOSIS — I77.810 DILATED AORTIC ROOT: ICD-10-CM

## 2025-05-28 DIAGNOSIS — I71.21 ANEURYSM OF ASCENDING AORTA WITHOUT RUPTURE: Primary | ICD-10-CM

## 2025-05-28 DIAGNOSIS — I50.32 CHRONIC DIASTOLIC HEART FAILURE: ICD-10-CM

## 2025-05-28 DIAGNOSIS — I25.10 CORONARY ARTERY DISEASE INVOLVING NATIVE CORONARY ARTERY OF NATIVE HEART WITHOUT ANGINA PECTORIS: ICD-10-CM

## 2025-05-28 RX ORDER — NITROGLYCERIN 0.4 MG/1
TABLET SUBLINGUAL
COMMUNITY

## 2025-05-28 RX ORDER — IBUPROFEN 200 MG
TABLET ORAL AS NEEDED
COMMUNITY

## 2025-05-28 RX ORDER — HYDROCHLOROTHIAZIDE 12.5 MG/1
12.5 CAPSULE ORAL DAILY
Qty: 90 CAPSULE | Refills: 3 | Status: SHIPPED | OUTPATIENT
Start: 2025-05-28

## 2025-05-28 NOTE — PROGRESS NOTES
Raghav Olivier  [id number]  1955  70 y.o.  male    Referring Provider: Paras Bliss MD  Here for routine follow up   Cardiac workup test results as below: echo, stress test      Subjective    Overall feels the same   No new events or complaints since last visit   Overall the patient feels no major change from baseline symptoms   Similar symptoms as during last visit       Has trigger finger   Chronic mild  low back pain    Mild chronic exertional shortness of breath on exertion relieved with rest  No significant cough or wheezing    No palpitations  No associated chest pain  No significant pedal edema    No fever or chills  No significant expectoration    No hemoptysis  No presyncope or syncope    Tolerating current medications well with no untoward side effects   Compliant with prescribed medication regimen. Tries to adhere to cardiac diet.     CT chest as below   Dilated aorta 4.2 cm   Seeing CT surgery   Notes reviewed   Was not taking BP meds before and now back on this   BP not well controlled at home at times .    BP in clinic as below        History of present illness:  Raghav Olivier is a 70 y.o. yo male with paroxysmal atrial fibrillation  who presents today for   Chief Complaint   Patient presents with    LVH (left ventricular hypertrophy) moderate     2 mo f/u - results   .    History  Past Medical History:   Diagnosis Date    Abnormal ECG     Allergic     Arrhythmia     Arthritis     Atrial fibrillation     Cancer     SKIN    Cataract     Diverticulitis     Hyperlipidemia     Hypertension     Kidney stones     Migraines     Nonrheumatic mitral (valve) insufficiency     Obesity     Pulmonary embolism 8-30-21    [facility]    Sleep apnea     C-PAP    Spinal headache    ,   Past Surgical History:   Procedure Laterality Date    ABLATION OF DYSRHYTHMIC FOCUS      APPENDECTOMY      BACK SURGERY  01/2022    CARDIAC ABLATION  07/26/2024    CARDIAC ABLATION  10/2024    with      CARDIAC CATHETERIZATION      CARDIAC ELECTROPHYSIOLOGY PROCEDURE N/A 10/01/2021    Procedure: NEW PACEMAKER IMPLANTATION;  Surgeon: Mitch Hernández MD;  Location:  [facility] CATH INVASIVE LOCATION;  Service: Cardiology;  Laterality: N/A;    CARDIOVERSION      CHOLECYSTECTOMY      COLONOSCOPY N/A 2017    Procedure: COLONOSCOPY;  Surgeon: Billy Robbins DO;  Location: [facility] ENDOSCOPY;  Service:     COLONOSCOPY N/A 2021    Procedure: COLONOSCOPY;  Surgeon: Billy Robbins DO;  Location: [facility] ENDOSCOPY;  Service: Gastroenterology;  Laterality: N/A;    EP STUDY      INSERT / REPLACE / REMOVE PACEMAKER  10-1-21    Dr Hernández    JOINT REPLACEMENT  23    KNEE SURGERY Right     X2    SHOULDER SURGERY Left     SPINE SURGERY      TOTAL KNEE ARTHROPLASTY Right 2023    Procedure: RIGHT TOTAL KNEE ARTHROPLASTY;  Surgeon: Pepe Vaughn MD;  Location: [facility] OR;  Service: Orthopedics;  Laterality: Right;   ,   Family History   Problem Relation Age of Onset    Cancer Mother         Colon    Hypertension Mother     Diabetes Father     Cancer Father         Prostate    Dementia Father     Hypertension Father     Colon cancer Father     Diabetes Sister     Diabetes Brother     Cancer Brother         Prostate    Cancer Brother         Prostate    Diabetes Brother     Colon polyps Neg Hx     Esophageal cancer Neg Hx    ,   Social History     Tobacco Use    Smoking status: Former     Current packs/day: 0.00     Average packs/day: 0.3 packs/day for 4.0 years (1.0 ttl pk-yrs)     Types: Cigarettes     Start date: 1969     Quit date: 1973     Years since quittin.4     Passive exposure: Past    Smokeless tobacco: Never   Vaping Use    Vaping status: Never Used   Substance Use Topics    Alcohol use: No    Drug use: No   ,     Medications  Current Outpatient Medications   Medication Sig Dispense Refill    acetaminophen (TYLENOL) 650 MG 8 hr tablet Take 1 tablet by mouth Every 8 (Eight)  Hours As Needed for Mild Pain. 1000mg twice a day      amiodarone (PACERONE) 200 MG tablet Take 1 tablet by mouth Daily.      apixaban (ELIQUIS) 5 MG tablet tablet Take 1 tablet by mouth Every 12 (Twelve) Hours. Indications: Atrial Fibrillation, resume eliquis saturday morning      Cholecalciferol (Vitamin D3) 50 MCG (2000 UT) capsule Daily.      fexofenadine (ALLEGRA) 180 MG tablet Take 1 tablet by mouth Daily. 30 tablet 11    fluticasone (FLONASE) 50 MCG/ACT nasal spray Administer 1 spray into the nostril(s) as directed by provider As Needed for Allergies. 16 g 11    furosemide (LASIX) 40 MG tablet TAKE 1 TABLET BY MOUTH EVERY DAY FOR 30 DAYS      ibuprofen (Advil) 200 MG tablet As Needed.      meloxicam (MOBIC) 15 MG tablet Take 1 tablet by mouth As Needed for Mild Pain.      metFORMIN (GLUCOPHAGE) 500 MG tablet Take 1 tablet by mouth Daily With Breakfast. 30 tablet 11    nitroglycerin (NITROSTAT) 0.4 MG SL tablet Place 1 tablet as needed by sublingual route as directed.      potassium chloride 10 MEQ CR tablet TAKE 1 TABLET BY MOUTH EVERY DAY FOR 30 DAYS      rosuvastatin (CRESTOR) 20 MG tablet Take 1 tablet by mouth Every Night. 90 tablet 3    triamcinolone (KENALOG) 0.1 % cream       valsartan (DIOVAN) 160 MG tablet Take 1 tablet by mouth Daily. 90 tablet 3     No current facility-administered medications for this visit.       Allergies:  Levofloxacin, Penicillins, and Vancomycin    Review of Systems  Review of Systems   Constitutional: Negative.   HENT: Negative.     Eyes: Negative.    Cardiovascular:  Positive for dyspnea on exertion and leg swelling. Negative for chest pain, claudication, cyanosis, irregular heartbeat, near-syncope, orthopnea, palpitations, paroxysmal nocturnal dyspnea and syncope.   Respiratory: Negative.     Endocrine: Negative.    Hematologic/Lymphatic: Negative.    Skin: Negative.    Musculoskeletal:  Positive for arthritis and joint pain.   Gastrointestinal:  Negative for anorexia.  "  Genitourinary: Negative.    Neurological: Negative.    Psychiatric/Behavioral: Negative.         Objective     Physical Exam:      Vitals:    25 1010   BP: 146/76   Pulse: 76   Weight: 133 kg (294 lb)   Height: 188 cm (74\")         /76   Pulse 76   Ht 188 cm (74\")   Wt 133 kg (294 lb)   BMI 37.75 kg/m²     Physical Exam  Constitutional:       Appearance: He is well-developed.   HENT:      Head: Normocephalic.   Neck:      Vascular: Normal carotid pulses. No carotid bruit or JVD.      Trachea: No tracheal tenderness or tracheal deviation.   Cardiovascular:      Rate and Rhythm: Regular rhythm.      Pulses: Normal pulses.      Heart sounds: Normal heart sounds.   Pulmonary:      Effort: Pulmonary effort is normal.      Breath sounds: No stridor.   Abdominal:      General: There is no distension.      Palpations: Abdomen is soft.      Tenderness: There is no abdominal tenderness.   Musculoskeletal:      Cervical back: No edema.      Right lower le+ Pitting Edema present.      Left lower le+ Pitting Edema present.   Skin:     General: Skin is warm.   Neurological:      Mental Status: He is alert.      Cranial Nerves: No cranial nerve deficit.      Sensory: No sensory deficit.   Psychiatric:         Speech: Speech normal.         Behavior: Behavior normal.         Results Review:    Results for orders placed during the hospital encounter of 25    Adult Transthoracic Echo Complete W/ Cont if Necessary Per Protocol    Interpretation Summary    Left ventricular systolic function is low normal. Automated 2D EF = 53%  Left ventricular ejection fraction appears to be 51 - 55%.    Left ventricular diastolic function was normal.    Estimated right ventricular systolic pressure from tricuspid regurgitation is normal (<35 mmHg).    Compared to prior echo LVEF is decreased from 61-65% to 51-55%    Definition is limited.  May consider cardiac MRI with contrast for further evaluation of LVEF and wall " "motion.     Stress Test with PET Myocardial Perfusion with CT (Single Study)    Patient Name: Raghav Olivier \"Don\"   Patient MRN: [id number]   Patient : 1955 (69 y.o.)   Legal Sex: Male    Accession Number: [id number]   Date of Study: 3/25/25   Ordering Provider: Titi Kramer MD   Clinical Indications: Known Coronary Artery Disease       Reading Physicians  Performing Staff   ECG Belt, SPECT Belt: Titi Kramer MD    Tech: Yisel Kwon    Support Staff: Cheryl Peck RN             Interpretation Summary         Myocardial perfusion imaging indicates a normal myocardial perfusion study with no evidence of ischemia. Impressions are consistent with a low risk study.    Left ventricular ejection fraction is normal (Calculated EF = 60%).    Normal coronary flow reserve.  Total 1.81, LAD 1.83, LCx 1.8 and RCA 1.9       IMPRESSION:     1.  No evidence of pulmonary embolus.     2.  No evidence of aortic dissection. Ascending aorta is mildly dilated  measuring 4.2 cm diameter, similar to the prior study. Moderate coronary  artery atherosclerotic calcification.     3.  Lungs are clear other than for mild atelectasis.     4.  No change in small left lower lobe pulmonary nodules measure up to 5  mm.        This report was signed and finalized on 2025 11:37 AM by Dr. Rory Kaplan MD.      Raghav Olivier  Regadenoson Stress Test With Myocardial Perfusion SPECT (Multi Study)  Order# [id number]  Reading physician: Titi Kramer MD Ordering physician: Titi Kramer MD Study date: 21       Patient Information    Patient Name   Raghav Olivier MRN   [id number] Legal Sex   Male  (Age)   1955 (66 y.o.)     Interpretation Summary       Diaphragmatic attenuation artifact is present.  Myocardial perfusion imaging indicates a normal myocardial perfusion study with no evidence of ischemia.  Impressions are consistent with a low risk study.  Left ventricular ejection fraction is normal. " (Calculated EF = 55%).  Physiological apical thinning noted       Raghav Olivier  Holter Monitor Greater than 7 days up to 15 days  Order# [id number]  Reading physician: Ttii Kramer MD Ordering physician: Titi Kramer MD Study date: 8/3/21   Patient Information    Patient Name   Raghav Olivier MRN   [id number] Legal Sex   Male  (Age)   1955 (66 y.o.)   Interpretation Summary       Average HR: 83. Min HR: 38. Max HR: 216.     Entire report was reviewed.  Monitoring in days: ~13  In atrial fibrillation during entire monitoring duration     Rare premature ventricular contractions with a PVC burden of: < 1%     No correlated arrhythmia  No significant pauses                  Conclusion:      In atrial fibrillation during entire monitoring duration  Intermittent rapid ventricular response with rates between  bpm  Multiple pauses total of 2062 longest 7.9 seconds at 1:39 AM              ____________________________________________________________________________________________________________________________________________  Health maintenance and recommendations    Low salt/ HTN/ Heart healthy carbohydrate restricted cardiac diet   The patient is advised to reduce or avoid caffeine or other cardiac stimulants.   Minimize or avoid  NSAID-type medications      Monitor for any signs of bleeding including red or dark stools. Fall precautions.   Advised staying uptodate with immunizations per established standard guidelines.    Offered to give patient  a copy of my notes     Questions were encouraged, asked and answered to the patient's  understanding and satisfaction. Questions if any regarding current medications and side effects, need for refills and importance of compliance to medications stressed.    Reviewed available prior notes, consults, prior visits, laboratory findings, radiology and cardiology relevant reports. Updated chart as applicable. I have reviewed the patient's medical history in  detail and updated the computerized patient record as relevant.      Updated patient regarding any new or relevant abnormalities on review of records or any new findings on physical exam. Mentioned to patient about purpose of visit and desirable health short and long term goals and objectives.    Primary to monitor CBC CMP Lipid panel and TSH as applicable    ___________________________________________________________________________________________________________________________________________       Procedures    Assessment & Plan   Diagnoses and all orders for this visit:    1. Aneurysm of ascending aorta without rupture (Primary)    2. Chronic diastolic heart failure    3. Coronary artery disease involving native coronary artery of native heart without angina pectoris    4. Dilated aortic root    5. Mixed hyperlipidemia    6. LVH (left ventricular hypertrophy) moderate     7. Nonrheumatic mitral (valve) insufficiency        Plan    Patient expressed understanding  Encouraged and answered all questions   Discussed with the patient and all questioned fully answered. He will call me if any problems arise.   Discussed results of prior testing with patient : echo and cardiac PET   as well as electrocardiogram available for review   Resumed BP meds now   Needs better BP control    Requested Prescriptions     Signed Prescriptions Disp Refills    hydroCHLOROthiazide (MICROZIDE) 12.5 MG capsule 90 capsule 3     Sig: Take 1 capsule by mouth Daily.        Monitor left ventricular ejection fraction by serial echo      Weigh yourself frequently, at least weekly, preferably daily, call me if more than 2 pounds a day or 5 pounds a week weight gain.  Flexible diuretic dosing   Patient was advised to continue CPAP daily.     Monitor cardiac rhythm device function regularly per established schedule or PRN    Will get cardiac MRI in future when we have wide band scanning available as he has a pacemaker     Check BP and heart  rates twice daily initially till blood pressures and heart rates under good control and then at least 3x / week,   If blood pressures continue to be well-controlled then can check week a month  at home and bring a recording for review next visit  If BP >130/85 or < 100/60 persistently over 3 reading 30 mins apart or if heart rates persistently above 100 bpm or less than 55 bpm call sooner for evaluation and advise           Return in about 6 months (around 11/28/2025).

## 2025-06-02 ENCOUNTER — TELEPHONE (OUTPATIENT)
Dept: CARDIOLOGY | Facility: CLINIC | Age: 70
End: 2025-06-02

## 2025-06-02 NOTE — TELEPHONE ENCOUNTER
"Caller: Raghav Olivier \"Don\"    Relationship to patient: Self    Best call back number: [phone]     Chief complaint: PATIENT SAW  ON 05.28.25. THE PATIENT IS CURRENTLY ON VACATION IN [place] WHERE HE WAS SEEN AT THE [facility] EMERGENCY ROOM FOR SHORTNESS OF BREATH UPON LIGHT EXERTION, FATIGUE, AND AFIB. HE HAS A WEIRD SENSATION ON THE LEFT SIDE OF HIS CHEST AND UNDER HIS LEFT ARM. THE PATIENT WILL RETURN HOME LATE 06.06.25.    Type of visit: FOLLOW UP    Requested date: 06.09.25       "

## 2025-06-09 ENCOUNTER — OFFICE VISIT (OUTPATIENT)
Age: 70
End: 2025-06-09
Payer: MEDICARE

## 2025-06-09 ENCOUNTER — OFFICE VISIT (OUTPATIENT)
Dept: CARDIOLOGY | Facility: CLINIC | Age: 70
End: 2025-06-09
Payer: MEDICARE

## 2025-06-09 VITALS
WEIGHT: 303 LBS | BODY MASS INDEX: 38.89 KG/M2 | DIASTOLIC BLOOD PRESSURE: 83 MMHG | HEIGHT: 74 IN | SYSTOLIC BLOOD PRESSURE: 127 MMHG | HEART RATE: 80 BPM

## 2025-06-09 VITALS — WEIGHT: 294 LBS | BODY MASS INDEX: 37.73 KG/M2 | HEIGHT: 74 IN

## 2025-06-09 DIAGNOSIS — I25.10 CORONARY ARTERY DISEASE INVOLVING NATIVE CORONARY ARTERY OF NATIVE HEART WITHOUT ANGINA PECTORIS: ICD-10-CM

## 2025-06-09 DIAGNOSIS — M65.332 TRIGGER MIDDLE FINGER OF LEFT HAND: Primary | ICD-10-CM

## 2025-06-09 DIAGNOSIS — I51.7 LVH (LEFT VENTRICULAR HYPERTROPHY): ICD-10-CM

## 2025-06-09 DIAGNOSIS — I71.21 ANEURYSM OF ASCENDING AORTA WITHOUT RUPTURE: Primary | ICD-10-CM

## 2025-06-09 DIAGNOSIS — E78.2 MIXED HYPERLIPIDEMIA: ICD-10-CM

## 2025-06-09 DIAGNOSIS — I34.0 NONRHEUMATIC MITRAL (VALVE) INSUFFICIENCY: ICD-10-CM

## 2025-06-09 DIAGNOSIS — I48.0 AF (PAROXYSMAL ATRIAL FIBRILLATION): ICD-10-CM

## 2025-06-09 PROCEDURE — 93000 ELECTROCARDIOGRAM COMPLETE: CPT | Performed by: INTERNAL MEDICINE

## 2025-06-09 PROCEDURE — 3074F SYST BP LT 130 MM HG: CPT | Performed by: INTERNAL MEDICINE

## 2025-06-09 PROCEDURE — 3079F DIAST BP 80-89 MM HG: CPT | Performed by: INTERNAL MEDICINE

## 2025-06-09 PROCEDURE — 1160F RVW MEDS BY RX/DR IN RCRD: CPT | Performed by: INTERNAL MEDICINE

## 2025-06-09 PROCEDURE — 1159F MED LIST DOCD IN RCRD: CPT | Performed by: INTERNAL MEDICINE

## 2025-06-09 PROCEDURE — 99214 OFFICE O/P EST MOD 30 MIN: CPT | Performed by: INTERNAL MEDICINE

## 2025-06-09 RX ORDER — NITROGLYCERIN 0.4 MG/1
TABLET SUBLINGUAL
Qty: 25 TABLET | Refills: 3 | Status: SHIPPED | OUTPATIENT
Start: 2025-06-09

## 2025-06-09 NOTE — PROGRESS NOTES
[facility]  Johnathon Severino MD, PhD  Sj Severino PA-C    CHIEF COMPLAINT  Follow-up of the Left Hand (Patient presents to the office today for left hand middle finger. Ultrasound-guided trigger point injection given on 05/08/2025. Patient states finger is feeling better since injection, but still locking up at times.)       HISTORY OF PRESENT ILLNESS    History of Present Illness  The patient is a 70-year-old male who presents for follow-up on trigger finger.    He reports a significant improvement in his condition following the administration of an injection approximately one month ago. He experiences minimal discomfort, which is a marked contrast to his previous state. He retains full extension of his finger. Despite the improvement, he still notices some triggering, although it is less severe than before.       HISTORY    Current Outpatient Medications   Medication Instructions    acetaminophen (TYLENOL) 650 mg, Every 8 Hours PRN    amiodarone (PACERONE) 200 MG tablet 1 tablet, Daily    apixaban (ELIQUIS) 5 mg, Every 12 Hours Scheduled    Cholecalciferol (Vitamin D3) 50 MCG (2000 UT) capsule Daily    fexofenadine (ALLEGRA) 180 mg, Oral, Daily    fluticasone (FLONASE) 50 MCG/ACT nasal spray 1 spray, Nasal, As Needed    furosemide (LASIX) 40 MG tablet TAKE 1 TABLET BY MOUTH EVERY DAY FOR 30 DAYS    hydroCHLOROthiazide (MICROZIDE) 12.5 mg, Oral, Daily    ibuprofen (Advil) 200 MG tablet As Needed    meloxicam (MOBIC) 15 mg, As Needed    metFORMIN (GLUCOPHAGE) 500 mg, Oral, Daily With Breakfast    nitroglycerin (NITROSTAT) 0.4 MG SL tablet Place 1 tablet as needed by sublingual route as directed.    potassium chloride 10 MEQ CR tablet TAKE 1 TABLET BY MOUTH EVERY DAY FOR 30 DAYS    rosuvastatin (CRESTOR) 20 mg, Oral, Nightly    triamcinolone (KENALOG) 0.1 % cream     valsartan (DIOVAN) 160 mg, Oral, Daily         reports that he quit smoking about 52 years ago. His smoking  use included cigarettes. He started smoking about 56 years ago. He has a 1 pack-year smoking history. He has been exposed to tobacco smoke. He has never used smokeless tobacco. He reports that he does not drink alcohol and does not use drugs.    Past Medical History:   Diagnosis Date    Abnormal ECG     Allergic     Arrhythmia     Arthritis     Atrial fibrillation     Cancer     SKIN    Cataract     Diverticulitis     Hyperlipidemia     Hypertension     Kidney stones     Migraines     Nonrheumatic mitral (valve) insufficiency     Obesity     Pulmonary embolism 8-30-21    [facility]    Sleep apnea     C-PAP    Spinal headache         Past Surgical History:   Procedure Laterality Date    ABLATION OF DYSRHYTHMIC FOCUS      APPENDECTOMY      BACK SURGERY  01/2022    CARDIAC ABLATION  07/26/2024    CARDIAC ABLATION  10/2024    with     CARDIAC CATHETERIZATION      CARDIAC ELECTROPHYSIOLOGY PROCEDURE N/A 10/01/2021    Procedure: NEW PACEMAKER IMPLANTATION;  Surgeon: Mitch Hernández MD;  Location: [facility] CATH INVASIVE LOCATION;  Service: Cardiology;  Laterality: N/A;    CARDIOVERSION      CHOLECYSTECTOMY      COLONOSCOPY N/A 07/31/2017    Procedure: COLONOSCOPY;  Surgeon: Billy Robbins DO;  Location: [facility] ENDOSCOPY;  Service:     COLONOSCOPY N/A 07/28/2021    Procedure: COLONOSCOPY;  Surgeon: Billy Robbins DO;  Location: [facility] ENDOSCOPY;  Service: Gastroenterology;  Laterality: N/A;    EP STUDY      INSERT / REPLACE / REMOVE PACEMAKER  10-1-21    Dr Hernández    JOINT REPLACEMENT  04/09/23    KNEE SURGERY Right     X2    SHOULDER SURGERY Left 2009    SPINE SURGERY  00/00/22    TOTAL KNEE ARTHROPLASTY Right 04/06/2023    Procedure: RIGHT TOTAL KNEE ARTHROPLASTY;  Surgeon: Pepe Vaughn MD;  Location: [facility] OR;  Service: Orthopedics;  Laterality: Right;        PHYSICAL EXAM  Constitutional: The patient is in no apparent distress and generally well-appearing. The patient hears me clearly and answers  questions appropriately.   Musculoskeletal:  Physical Exam  Musculoskeletal:  Left hand: Trigger finger with palpable nodule causing intermittent locking      IMAGING    No results found.     Results         ASSESSMENT & PLAN  Diagnoses and all orders for this visit:    1. Trigger middle finger of left hand (Primary)         Assessment & Plan  1. Trigger finger:    The symptoms have improved following the initial injection, but some triggering persists. Steroid injections can take up to a month to fully manifest effects. If symptoms persist or worsen, contact the office for a potential second injection. If the condition becomes significantly painful or bothersome, a referral to a hand surgeon will be considered for surgical intervention.    Follow-up: As needed    Avoid activities that cause excessive triggering  Follow up: As needed        Patient or patient representative verbalized consent for the use of Ambient Listening during the visit with  Johnathon Severino MD for chart documentation. 6/10/2025  16:37 CDT      This document has been signed by Johnathon Severino MD on June 9, 2025 09:41 CDT

## 2025-06-09 NOTE — PROGRESS NOTES
Raghav Olivier  [id number]  1955  70 y.o.  male    Referring Provider: Paras Bliss MD  Here for routine follow up   Cardiac workup test results as below: echo, stress test      Subjective    Now with moderate shortness of breath   Was in ER in [place] 1 week ago and was told has shortness of breath   Has had irregular heart beats   Now ECG atrial fibrillation    Prior ablation 2024  Dr Hernández    Chronic mild  low back pain    exertional shortness of breath on exertion relieved with rest  No significant cough or wheezing  No palpitations  No associated chest pain  No significant pedal edema    No fever or chills  No significant expectoration    No hemoptysis  No presyncope or syncope    Tolerating current medications well with no untoward side effects   Compliant with prescribed medication regimen. Tries to adhere to cardiac diet.     CT chest as below   Dilated aorta 4.2 cm   Seeing CT surgery   Notes reviewed   Was not taking BP meds before and now back on this   BP not well controlled at home at times .    BP in clinic as below        History of present illness:  Raghav Olivier is a 70 y.o. yo male with paroxysmal atrial fibrillation  who presents today for   Chief Complaint   Patient presents with    Follow-up     Patient was on vacation in [facility] and he was out of breathe and still feels like he is out of air. His EKG from [place] has been requested he had an ablation a year ago and the EKG from [facility] showed AFIB    .    History  Past Medical History:   Diagnosis Date    Abnormal ECG     Allergic     Arrhythmia     Arthritis     Atrial fibrillation     Cancer     SKIN    Cataract     Diverticulitis     Hyperlipidemia     Hypertension     Kidney stones     Migraines     Nonrheumatic mitral (valve) insufficiency     Obesity     Pulmonary embolism 8-30-21    [facility] ER    Sleep apnea     C-PAP    Spinal headache    ,   Past Surgical History:   Procedure Laterality Date     ABLATION OF DYSRHYTHMIC FOCUS      APPENDECTOMY      BACK SURGERY  2022    CARDIAC ABLATION  2024    CARDIAC ABLATION  10/2024    with     CARDIAC CATHETERIZATION      CARDIAC ELECTROPHYSIOLOGY PROCEDURE N/A 10/01/2021    Procedure: NEW PACEMAKER IMPLANTATION;  Surgeon: Mitch Hernández MD;  Location: [facility] CATH INVASIVE LOCATION;  Service: Cardiology;  Laterality: N/A;    CARDIOVERSION      CHOLECYSTECTOMY      COLONOSCOPY N/A 2017    Procedure: COLONOSCOPY;  Surgeon: Billy Robbins DO;  Location: [facility] ENDOSCOPY;  Service:     COLONOSCOPY N/A 2021    Procedure: COLONOSCOPY;  Surgeon: Billy Robbins DO;  Location: [facility] ENDOSCOPY;  Service: Gastroenterology;  Laterality: N/A;    EP STUDY      INSERT / REPLACE / REMOVE PACEMAKER  10-1-21    Dr Hernández    JOINT REPLACEMENT  23    KNEE SURGERY Right         SHOULDER SURGERY Left     SPINE SURGERY      TOTAL KNEE ARTHROPLASTY Right 2023    Procedure: RIGHT TOTAL KNEE ARTHROPLASTY;  Surgeon: Pepe Vaughn MD;  Location: [facility] OR;  Service: Orthopedics;  Laterality: Right;   ,   Family History   Problem Relation Age of Onset    Cancer Mother         Colon    Hypertension Mother     Diabetes Father     Cancer Father         Prostate    Dementia Father     Hypertension Father     Colon cancer Father     Diabetes Sister     Diabetes Brother     Cancer Brother         Prostate    Cancer Brother         Prostate    Diabetes Brother     Colon polyps Neg Hx     Esophageal cancer Neg Hx    ,   Social History     Tobacco Use    Smoking status: Former     Current packs/day: 0.00     Average packs/day: 0.3 packs/day for 4.0 years (1.0 ttl pk-yrs)     Types: Cigarettes     Start date: 1969     Quit date: 1973     Years since quittin.4     Passive exposure: Past    Smokeless tobacco: Never   Vaping Use    Vaping status: Never Used   Substance Use Topics    Alcohol use: No    Drug use: No   ,      Medications  Current Outpatient Medications   Medication Sig Dispense Refill    acetaminophen (TYLENOL) 650 MG 8 hr tablet Take 1 tablet by mouth Every 8 (Eight) Hours As Needed for Mild Pain. 1000mg twice a day      amiodarone (PACERONE) 200 MG tablet Take 1 tablet by mouth Daily.      apixaban (ELIQUIS) 5 MG tablet tablet Take 1 tablet by mouth Every 12 (Twelve) Hours. Indications: Atrial Fibrillation, resume eliquis saturday morning      Cholecalciferol (Vitamin D3) 50 MCG (2000 UT) capsule Daily.      fexofenadine (ALLEGRA) 180 MG tablet Take 1 tablet by mouth Daily. 30 tablet 11    fluticasone (FLONASE) 50 MCG/ACT nasal spray Administer 1 spray into the nostril(s) as directed by provider As Needed for Allergies. 16 g 11    furosemide (LASIX) 40 MG tablet TAKE 1 TABLET BY MOUTH EVERY DAY FOR 30 DAYS      hydroCHLOROthiazide (MICROZIDE) 12.5 MG capsule Take 1 capsule by mouth Daily. 90 capsule 3    ibuprofen (Advil) 200 MG tablet As Needed.      meloxicam (MOBIC) 15 MG tablet Take 1 tablet by mouth As Needed for Mild Pain.      metFORMIN (GLUCOPHAGE) 500 MG tablet Take 1 tablet by mouth Daily With Breakfast. 30 tablet 11    nitroglycerin (NITROSTAT) 0.4 MG SL tablet Place 1 tablet as needed by sublingual route as directed.      potassium chloride 10 MEQ CR tablet TAKE 1 TABLET BY MOUTH EVERY DAY FOR 30 DAYS      rosuvastatin (CRESTOR) 20 MG tablet Take 1 tablet by mouth Every Night. 90 tablet 3    triamcinolone (KENALOG) 0.1 % cream       valsartan (DIOVAN) 160 MG tablet Take 1 tablet by mouth Daily. 90 tablet 3    nitroglycerin (NITROSTAT) 0.4 MG SL tablet 1 under the tongue as needed for angina, may repeat q5mins for up three doses 25 tablet 3     No current facility-administered medications for this visit.       Allergies:  Levofloxacin, Penicillins, and Vancomycin    Review of Systems  Review of Systems   Constitutional: Negative.   HENT: Negative.     Eyes: Negative.    Cardiovascular:  Positive for  "dyspnea on exertion and leg swelling. Negative for chest pain, claudication, cyanosis, irregular heartbeat, near-syncope, orthopnea, palpitations, paroxysmal nocturnal dyspnea and syncope.   Respiratory: Negative.     Endocrine: Negative.    Hematologic/Lymphatic: Negative.    Skin: Negative.    Musculoskeletal:  Positive for arthritis and joint pain.   Gastrointestinal:  Negative for anorexia.   Genitourinary: Negative.    Neurological: Negative.    Psychiatric/Behavioral: Negative.         Objective     Physical Exam:      Vitals:    25 1044   BP: 127/83   Pulse: 80   Weight: (!) 137 kg (303 lb)   Height: 188 cm (74\")         /83   Pulse 80   Ht 188 cm (74\")   Wt (!) 137 kg (303 lb)   BMI 38.90 kg/m²     Physical Exam  Constitutional:       Appearance: He is well-developed.   HENT:      Head: Normocephalic.   Neck:      Vascular: Normal carotid pulses. No carotid bruit or JVD.      Trachea: No tracheal tenderness or tracheal deviation.   Cardiovascular:      Rate and Rhythm: Rhythm regularly irregular.      Pulses: Normal pulses.      Heart sounds: Normal heart sounds.   Pulmonary:      Effort: Pulmonary effort is normal.      Breath sounds: No stridor.   Abdominal:      General: There is no distension.      Palpations: Abdomen is soft.      Tenderness: There is no abdominal tenderness.   Musculoskeletal:      Cervical back: No edema.      Right lower le+ Pitting Edema present.      Left lower le+ Pitting Edema present.   Skin:     General: Skin is warm.   Neurological:      Mental Status: He is alert.      Cranial Nerves: No cranial nerve deficit.      Sensory: No sensory deficit.   Psychiatric:         Speech: Speech normal.         Behavior: Behavior normal.         Results Review:    Results for orders placed during the hospital encounter of 25    Adult Transthoracic Echo Complete W/ Cont if Necessary Per Protocol    Interpretation Summary    Left ventricular systolic function is " "low normal. Automated 2D EF = 53%  Left ventricular ejection fraction appears to be 51 - 55%.    Left ventricular diastolic function was normal.    Estimated right ventricular systolic pressure from tricuspid regurgitation is normal (<35 mmHg).    Compared to prior echo LVEF is decreased from 61-65% to 51-55%    Definition is limited.  May consider cardiac MRI with contrast for further evaluation of LVEF and wall motion.     Stress Test with PET Myocardial Perfusion with CT (Single Study)    Patient Name: Raghav Olivier \"Don\"   Patient MRN: [id number]   Patient : 1955 (69 y.o.)   Legal Sex: Male    Accession Number: [id number]   Date of Study: 3/25/25   Ordering Provider: Titi Kramer MD   Clinical Indications: Known Coronary Artery Disease       [place] [facility]  Performing Staff   ECG Union, SPECT Union: Titi Kramer MD    Tech: Yisel Kwon    Support Staff: Cheryl Peck RN             Interpretation Summary         Myocardial perfusion imaging indicates a normal myocardial perfusion study with no evidence of ischemia. Impressions are consistent with a low risk study.    Left ventricular ejection fraction is normal (Calculated EF = 60%).    Normal coronary flow reserve.  Total 1.81, LAD 1.83, LCx 1.8 and RCA 1.9       IMPRESSION:     1.  No evidence of pulmonary embolus.     2.  No evidence of aortic dissection. Ascending aorta is mildly dilated  measuring 4.2 cm diameter, similar to the prior study. Moderate coronary  artery atherosclerotic calcification.     3.  Lungs are clear other than for mild atelectasis.     4.  No change in small left lower lobe pulmonary nodules measure up to 5  mm.        This report was signed and finalized on 2025 11:37 AM by Dr. Rory Kaplan MD.      Raghav Olivier  Regadenoson Stress Test With Myocardial Perfusion SPECT (Multi Study)  Order# [id number]  [place] physician: Titi Kramer MD Ordering physician: Titi Kramer MD Study date: 21 "       Patient Information    Patient Name   Raghav Olivier MRN   [id number] Legal Sex   Male  (Age)   1955 (66 y.o.)     Interpretation Summary       Diaphragmatic attenuation artifact is present.  Myocardial perfusion imaging indicates a normal myocardial perfusion study with no evidence of ischemia.  Impressions are consistent with a low risk study.  Left ventricular ejection fraction is normal. (Calculated EF = 55%).  Physiological apical thinning noted       Raghav Olivier  Holter Monitor Greater than 7 days up to 15 days  Order# [id number]  [place] physician: Titi Kramer MD Ordering physician: Titi Kramer MD Study date: 8/3/21   Patient Information    Patient Name   Raghav Olivier MRN   [id number] Legal Sex   Male  (Age)   1955 (66 y.o.)   Interpretation Summary       Average HR: 83. Min HR: 38. Max HR: 216.     Entire report was reviewed.  Monitoring in days: ~13  In atrial fibrillation during entire monitoring duration     Rare premature ventricular contractions with a PVC burden of: < 1%     No correlated arrhythmia  No significant pauses                  Conclusion:      In atrial fibrillation during entire monitoring duration  Intermittent rapid ventricular response with rates between  bpm  Multiple pauses total of 2062 longest 7.9 seconds at 1:39 AM              ____________________________________________________________________________________________________________________________________________  Health maintenance and recommendations    Low salt/ HTN/ Heart healthy carbohydrate restricted cardiac diet   The patient is advised to reduce or avoid caffeine or other cardiac stimulants.   Minimize or avoid  NSAID-type medications      Monitor for any signs of bleeding including red or dark stools. Fall precautions.   Advised staying uptodate with immunizations per established standard guidelines.    Offered to give patient  a copy of my notes     Questions were  encouraged, asked and answered to the patient's  understanding and satisfaction. Questions if any regarding current medications and side effects, need for refills and importance of compliance to medications stressed.    Reviewed available prior notes, consults, prior visits, laboratory findings, radiology and cardiology relevant reports. Updated chart as applicable. I have reviewed the patient's medical history in detail and updated the computerized patient record as relevant.      Updated patient regarding any new or relevant abnormalities on review of records or any new findings on physical exam. Mentioned to patient about purpose of visit and desirable health short and long term goals and objectives.    Primary to monitor CBC CMP Lipid panel and TSH as applicable    ___________________________________________________________________________________________________________________________________________         ECG 12 Lead    Date/Time: 6/9/2025 11:14 AM  Performed by: Titi Kramer MD    Authorized by: Titi Kramer MD  Comparison: compared with previous ECG from 2/2/2025  Comparison to previous ECG: atrial fibrillation has replaced   sinus rhythm     Rhythm: atrial fibrillation and paced  Rate: normal  QRS axis: normal    Clinical impression: abnormal EKG          Assessment & Plan   Diagnoses and all orders for this visit:    1. Aneurysm of ascending aorta without rupture (Primary)    2. Coronary artery disease involving native coronary artery of native heart without angina pectoris    3. LVH (left ventricular hypertrophy) moderate     4. Nonrheumatic mitral (valve) insufficiency    5. Mixed hyperlipidemia    6. AF (paroxysmal atrial fibrillation)  -     Cardioversion External in Cardiology Department; Future    Other orders  -     nitroglycerin (NITROSTAT) 0.4 MG SL tablet; 1 under the tongue as needed for angina, may repeat q5mins for up three doses  Dispense: 25 tablet; Refill: 3  -     ECG 12  Lead        Plan    Patient expressed understanding  Encouraged and answered all questions   Discussed with the patient and all questioned fully answered. He will call me if any problems arise.   Discussed results of prior testing with patient : echo and cardiac PET   as well as electrocardiogram available for review     Will arrange DC cardioversion   Has been on non stop Eliquis 5 mg BID > 30 dose     He will call Dr Hernández for a sooner F/U   Orders Placed This Encounter   Procedures    Cardioversion External in Cardiology Department     Standing Status:   Future     Expected Date:   6/14/2025     Expiration Date:   9/9/2026     What type of sedation does the patient require?:   Monitored Anesthesia Care     At which hospital location will this be performed?:   [facility]     Reason for Exam::   af     Release to patient:   Routine Release [[id number]]    ECG 12 Lead     This order was created via procedure documentation     Release to patient:   Routine Release [[id number]]        Monitor left ventricular ejection fraction by serial echo      Weigh yourself frequently, at least weekly, preferably daily, call me if more than 2 pounds a day or 5 pounds a week weight gain.  Flexible diuretic dosing   Patient was advised to continue CPAP daily.     Monitor cardiac rhythm device function regularly per established schedule or PRN    Will get cardiac MRI in future when we have wide band scanning available as he has a pacemaker     Check BP and heart rates twice daily initially till blood pressures and heart rates under good control and then at least 3x / week,   If blood pressures continue to be well-controlled then can check week a month  at home and bring a recording for review next visit  If BP >130/85 or < 100/60 persistently over 3 reading 30 mins apart or if heart rates persistently above 100 bpm or less than 55 bpm call sooner for evaluation and advise           Return in about 4 weeks (around 7/7/2025).

## 2025-06-12 ENCOUNTER — HOSPITAL ENCOUNTER (OUTPATIENT)
Dept: CARDIOLOGY | Facility: HOSPITAL | Age: 70
Discharge: HOME | End: 2025-06-12
Payer: MEDICARE

## 2025-06-12 ENCOUNTER — ANESTHESIA EVENT (OUTPATIENT)
Dept: CARDIOLOGY | Facility: HOSPITAL | Age: 70
End: 2025-06-12
Payer: MEDICARE

## 2025-06-12 ENCOUNTER — ANESTHESIA (OUTPATIENT)
Dept: CARDIOLOGY | Facility: HOSPITAL | Age: 70
End: 2025-06-12
Payer: MEDICARE

## 2025-06-12 VITALS
WEIGHT: 302 LBS | BODY MASS INDEX: 38.76 KG/M2 | HEIGHT: 74 IN | RESPIRATION RATE: 16 BRPM | HEART RATE: 72 BPM | SYSTOLIC BLOOD PRESSURE: 147 MMHG | DIASTOLIC BLOOD PRESSURE: 84 MMHG | OXYGEN SATURATION: 96 %

## 2025-06-12 DIAGNOSIS — I48.0 AF (PAROXYSMAL ATRIAL FIBRILLATION): ICD-10-CM

## 2025-06-12 PROCEDURE — 25010000002 PROPOFOL 10 MG/ML EMULSION: Performed by: NURSE ANESTHETIST, CERTIFIED REGISTERED

## 2025-06-12 PROCEDURE — 25010000002 LIDOCAINE PF 2% 2 % SOLUTION: Performed by: NURSE ANESTHETIST, CERTIFIED REGISTERED

## 2025-06-12 PROCEDURE — 92960 CARDIOVERSION ELECTRIC EXT: CPT

## 2025-06-12 RX ORDER — LIDOCAINE HYDROCHLORIDE 20 MG/ML
INJECTION, SOLUTION EPIDURAL; INFILTRATION; INTRACAUDAL; PERINEURAL AS NEEDED
Status: DISCONTINUED | OUTPATIENT
Start: 2025-06-12 | End: 2025-06-12 | Stop reason: SURG

## 2025-06-12 RX ORDER — PROPOFOL 10 MG/ML
VIAL (ML) INTRAVENOUS AS NEEDED
Status: DISCONTINUED | OUTPATIENT
Start: 2025-06-12 | End: 2025-06-12 | Stop reason: SURG

## 2025-06-12 RX ADMIN — PROPOFOL 80 MG: 10 INJECTION, EMULSION INTRAVENOUS at 12:18

## 2025-06-12 RX ADMIN — LIDOCAINE HYDROCHLORIDE 80 MG: 20 INJECTION, SOLUTION EPIDURAL; INFILTRATION; INTRACAUDAL; PERINEURAL at 12:18

## 2025-06-12 NOTE — ANESTHESIA POSTPROCEDURE EVALUATION
"Patient: Raghav Olivier    Procedure Summary       Date: 06/12/25 Room / Location: [facility] CATH LAB    Anesthesia Start: 1210 Anesthesia Stop:     Procedure: CARDIOVERSION EXTERNAL IN CARDIOLOGY DEPARTMENT Diagnosis:       AF (paroxysmal atrial fibrillation)      (af)    Scheduled Providers: Titi Kramer MD Provider: Hilario Guillaume CRNA    Anesthesia Type: MAC ASA Status: 3            Anesthesia Type: MAC    Vitals  Vitals Value Taken Time   /96 06/12/25 12:17   Temp     Pulse 81 06/12/25 12:17   Resp 20 06/12/25 12:17   SpO2 97 % 06/12/25 12:17           Post Anesthesia Care and Evaluation    Patient location during evaluation: PACU  Patient participation: complete - patient participated  Level of consciousness: awake and alert  Pain management: adequate    Airway patency: patent  Anesthetic complications: No anesthetic complications    Cardiovascular status: acceptable  Respiratory status: acceptable  Hydration status: acceptable    Comments: Blood pressure 154/96, pulse 81, resp. rate 20, height 188 cm (74\"), weight (!) 137 kg (302 lb), SpO2 97%.    Pt discharged from PACU based on dinesh score >8    "

## 2025-06-12 NOTE — ANESTHESIA PREPROCEDURE EVALUATION
Anesthesia Evaluation     Patient summary reviewed and Nursing notes reviewed   no history of anesthetic complications:   NPO Solid Status: > 8 hours  NPO Liquid Status: > 8 hours           Airway   Mallampati: II  TM distance: >3 FB  Neck ROM: full  No difficulty expected  Dental      Pulmonary    (+) pulmonary embolism (1 year ago following COVID vaccine),sleep apnea on CPAP  (-) asthma  Cardiovascular   Exercise tolerance: good (4-7 METS)    (+) pacemaker (st nora) pacemaker, hypertension, valvular problems/murmurs (dilated aortic root), CAD, dysrhythmias Atrial Fib, hyperlipidemia    ROS comment: Echo:  Left ventricular ejection fraction appears to be 56 - 60%. Left ventricular systolic function is normal.  Left ventricular wall thickness is consistent with moderate concentric hypertrophy.  The following left ventricular wall segments are hypokinetic: apical inferior.  Estimated right ventricular systolic pressure from tricuspid regurgitation is normal (<35 mmHg).      Neuro/Psych- negative ROS  (-) seizures, TIA, CVA  GI/Hepatic/Renal/Endo    (+) morbid obesity, renal disease- stones  (-) liver disease, diabetes    Musculoskeletal (-) negative ROS    Abdominal    Substance History - negative use     OB/GYN negative ob/gyn ROS         Other                      Anesthesia Plan    ASA 3     MAC   total IV anesthesia  intravenous induction     Anesthetic plan, risks, benefits, and alternatives have been provided, discussed and informed consent has been obtained with: patient.    Plan discussed with CRNA.      CODE STATUS:

## 2025-06-18 ENCOUNTER — TELEPHONE (OUTPATIENT)
Dept: GASTROENTEROLOGY | Facility: CLINIC | Age: 70
End: 2025-06-18
Payer: MEDICARE

## 2025-06-24 ENCOUNTER — ANESTHESIA (OUTPATIENT)
Dept: GASTROENTEROLOGY | Facility: HOSPITAL | Age: 70
End: 2025-06-24
Payer: MEDICARE

## 2025-06-24 ENCOUNTER — TELEPHONE (OUTPATIENT)
Dept: GASTROENTEROLOGY | Facility: CLINIC | Age: 70
End: 2025-06-24
Payer: MEDICARE

## 2025-06-24 ENCOUNTER — ANESTHESIA EVENT (OUTPATIENT)
Dept: GASTROENTEROLOGY | Facility: HOSPITAL | Age: 70
End: 2025-06-24
Payer: MEDICARE

## 2025-06-24 ENCOUNTER — HOSPITAL ENCOUNTER (OUTPATIENT)
Facility: HOSPITAL | Age: 70
Setting detail: HOSPITAL OUTPATIENT SURGERY
Discharge: HOME | End: 2025-06-24
Attending: INTERNAL MEDICINE | Admitting: INTERNAL MEDICINE
Payer: MEDICARE

## 2025-06-24 VITALS
BODY MASS INDEX: 37.86 KG/M2 | TEMPERATURE: 97.8 F | WEIGHT: 295 LBS | HEIGHT: 74 IN | OXYGEN SATURATION: 96 % | DIASTOLIC BLOOD PRESSURE: 76 MMHG | HEART RATE: 54 BPM | SYSTOLIC BLOOD PRESSURE: 127 MMHG | RESPIRATION RATE: 15 BRPM

## 2025-06-24 DIAGNOSIS — Z86.0101 HISTORY OF ADENOMATOUS POLYP OF COLON: ICD-10-CM

## 2025-06-24 PROCEDURE — 45385 COLONOSCOPY W/LESION REMOVAL: CPT | Performed by: INTERNAL MEDICINE

## 2025-06-24 PROCEDURE — 88305 TISSUE EXAM BY PATHOLOGIST: CPT | Performed by: INTERNAL MEDICINE

## 2025-06-24 PROCEDURE — 25010000002 LIDOCAINE PF 2% 2 % SOLUTION: Performed by: NURSE ANESTHETIST, CERTIFIED REGISTERED

## 2025-06-24 PROCEDURE — 25810000003 SODIUM CHLORIDE 0.9 % SOLUTION: Performed by: ANESTHESIOLOGY

## 2025-06-24 PROCEDURE — 25010000002 PROPOFOL 10 MG/ML EMULSION: Performed by: NURSE ANESTHETIST, CERTIFIED REGISTERED

## 2025-06-24 RX ORDER — SODIUM CHLORIDE 9 MG/ML
500 INJECTION, SOLUTION INTRAVENOUS CONTINUOUS PRN
Status: DISCONTINUED | OUTPATIENT
Start: 2025-06-24 | End: 2025-06-24 | Stop reason: HOSPADM

## 2025-06-24 RX ORDER — LIDOCAINE HYDROCHLORIDE 20 MG/ML
INJECTION, SOLUTION EPIDURAL; INFILTRATION; INTRACAUDAL; PERINEURAL AS NEEDED
Status: DISCONTINUED | OUTPATIENT
Start: 2025-06-24 | End: 2025-06-24 | Stop reason: SURG

## 2025-06-24 RX ORDER — SODIUM CHLORIDE 0.9 % (FLUSH) 0.9 %
10 SYRINGE (ML) INJECTION AS NEEDED
Status: DISCONTINUED | OUTPATIENT
Start: 2025-06-24 | End: 2025-06-24 | Stop reason: HOSPADM

## 2025-06-24 RX ORDER — LIDOCAINE HYDROCHLORIDE 10 MG/ML
0.5 INJECTION, SOLUTION EPIDURAL; INFILTRATION; INTRACAUDAL; PERINEURAL ONCE AS NEEDED
Status: DISCONTINUED | OUTPATIENT
Start: 2025-06-24 | End: 2025-06-24 | Stop reason: HOSPADM

## 2025-06-24 RX ORDER — PROPOFOL 10 MG/ML
VIAL (ML) INTRAVENOUS AS NEEDED
Status: DISCONTINUED | OUTPATIENT
Start: 2025-06-24 | End: 2025-06-24 | Stop reason: SURG

## 2025-06-24 RX ADMIN — PROPOFOL 30 MG: 10 INJECTION, EMULSION INTRAVENOUS at 10:46

## 2025-06-24 RX ADMIN — PROPOFOL 30 MG: 10 INJECTION, EMULSION INTRAVENOUS at 10:49

## 2025-06-24 RX ADMIN — PROPOFOL 30 MG: 10 INJECTION, EMULSION INTRAVENOUS at 10:47

## 2025-06-24 RX ADMIN — PROPOFOL 30 MG: 10 INJECTION, EMULSION INTRAVENOUS at 10:44

## 2025-06-24 RX ADMIN — LIDOCAINE HYDROCHLORIDE 100 MG: 20 INJECTION, SOLUTION EPIDURAL; INFILTRATION; INTRACAUDAL; PERINEURAL at 10:42

## 2025-06-24 RX ADMIN — PROPOFOL 30 MG: 10 INJECTION, EMULSION INTRAVENOUS at 10:45

## 2025-06-24 RX ADMIN — PROPOFOL 30 MG: 10 INJECTION, EMULSION INTRAVENOUS at 10:48

## 2025-06-24 RX ADMIN — SODIUM CHLORIDE 500 ML: 9 INJECTION, SOLUTION INTRAVENOUS at 09:54

## 2025-06-24 RX ADMIN — PROPOFOL 70 MG: 10 INJECTION, EMULSION INTRAVENOUS at 10:42

## 2025-06-24 RX ADMIN — PROPOFOL 40 MG: 10 INJECTION, EMULSION INTRAVENOUS at 10:43

## 2025-06-24 NOTE — ANESTHESIA PREPROCEDURE EVALUATION
Anesthesia Evaluation     Patient summary reviewed and Nursing notes reviewed   no history of anesthetic complications:   NPO Solid Status: > 8 hours  NPO Liquid Status: > 8 hours           Airway   Mallampati: II  TM distance: >3 FB  Neck ROM: full  No difficulty expected  Dental      Pulmonary    (+) pulmonary embolism (following COVID vaccine),sleep apnea on CPAP  (-) asthma  Cardiovascular   Exercise tolerance: good (4-7 METS)    (+) pacemaker (st nora) pacemaker, hypertension, valvular problems/murmurs (dilated aortic root), CAD, dysrhythmias Atrial Fib, hyperlipidemia  (-) cardiac stents    ROS comment: Echo:  Left ventricular ejection fraction appears to be 56 - 60%. Left ventricular systolic function is normal.  Left ventricular wall thickness is consistent with moderate concentric hypertrophy.  The following left ventricular wall segments are hypokinetic: apical inferior.  Estimated right ventricular systolic pressure from tricuspid regurgitation is normal (<35 mmHg).      Neuro/Psych- negative ROS  (-) seizures, TIA, CVA  GI/Hepatic/Renal/Endo    (+) morbid obesity, renal disease- stones  (-) liver disease, diabetes    Musculoskeletal (-) negative ROS    Abdominal    Substance History - negative use     OB/GYN negative ob/gyn ROS         Other                      Anesthesia Plan    ASA 3     MAC     intravenous induction     Anesthetic plan, risks, benefits, and alternatives have been provided, discussed and informed consent has been obtained with: patient and spouse/significant other.      CODE STATUS:

## 2025-06-24 NOTE — ANESTHESIA POSTPROCEDURE EVALUATION
Patient: Raghav Olivier    Procedure Summary       Date: 06/24/25 Room / Location: [facility] ENDOSCOPY 5 / [facility] ENDOSCOPY    Anesthesia Start: 1038 Anesthesia Stop: 1059    Procedure: COLONOSCOPY WITH ANESTHESIA- (examination of colon) Diagnosis:       History of adenomatous polyp of colon      (History of adenomatous polyp of colon [Z86.0101])    Surgeons: Luis Bright DO Provider: Kev Black CRNA    Anesthesia Type: MAC ASA Status: 3            Anesthesia Type: MAC    Vitals  Vitals Value Taken Time   /82 06/24/25 11:16   Temp     Pulse 55 06/24/25 11:17   Resp 16 06/24/25 11:10   SpO2 97 % 06/24/25 11:17   Vitals shown include unfiled device data.        Post Anesthesia Care and Evaluation    Patient location during evaluation: PACU  Patient participation: complete - patient participated  Level of consciousness: awake, awake and alert and responsive to verbal stimuli  Pain management: adequate    Airway patency: patent  Anesthetic complications: No anesthetic complications    Cardiovascular status: acceptable and hemodynamically stable  Respiratory status: acceptable and spontaneous ventilation  Hydration status: acceptable

## 2025-06-24 NOTE — H&P
[facility]  Pre Procedure History & Physical    Chief Complaint:   Polyps    Subjective     HPI:   Polyps    Past Medical History:   Past Medical History:   Diagnosis Date    Abnormal ECG     Allergic     Arrhythmia     Arthritis     Atrial fibrillation     Cancer     SKIN    Cataract     Diverticulitis     Hyperlipidemia     Hypertension     Kidney stones     Migraines     Nonrheumatic mitral (valve) insufficiency     Obesity     Pulmonary embolism 8-30-21    [place] [facility]    Sleep apnea     C-PAP    Spinal headache        Past Surgical History:  Past Surgical History:   Procedure Laterality Date    ABLATION OF DYSRHYTHMIC FOCUS      APPENDECTOMY      BACK SURGERY  01/2022    CARDIAC ABLATION  07/26/2024    CARDIAC ABLATION  10/2024    with     CARDIAC CATHETERIZATION      CARDIAC ELECTROPHYSIOLOGY PROCEDURE N/A 10/01/2021    Procedure: NEW PACEMAKER IMPLANTATION;  Surgeon: Mitch Hernández MD;  Location: [facility] CATH INVASIVE LOCATION;  Service: Cardiology;  Laterality: N/A;    CARDIOVERSION      CHOLECYSTECTOMY      COLONOSCOPY N/A 07/31/2017    Procedure: COLONOSCOPY;  Surgeon: Billy Robbins DO;  Location: [facility] ENDOSCOPY;  Service:     COLONOSCOPY N/A 07/28/2021    Procedure: COLONOSCOPY;  Surgeon: Billy Robbins DO;  Location: [facility] ENDOSCOPY;  Service: Gastroenterology;  Laterality: N/A;    EP STUDY      INSERT / REPLACE / REMOVE PACEMAKER  10-1-21    geraldine mcintyre pacemaker    JOINT REPLACEMENT  04/09/23    KNEE SURGERY Right     X2    SHOULDER SURGERY Left 2009    SPINE SURGERY  00/00/22    TOTAL KNEE ARTHROPLASTY Right 04/06/2023    Procedure: RIGHT TOTAL KNEE ARTHROPLASTY;  Surgeon: Pepe Vaughn MD;  Location: [facility] OR;  Service: Orthopedics;  Laterality: Right;       Family History:  Family History   Problem Relation Age of Onset    Cancer Mother         Colon    Hypertension Mother     Diabetes Father     Cancer Father         Prostate    Dementia Father      Hypertension Father     Colon cancer Father     Diabetes Sister     Diabetes Brother     Cancer Brother         Prostate    Cancer Brother         Prostate    Diabetes Brother     Colon polyps Neg Hx     Esophageal cancer Neg Hx        Social History:   reports that he quit smoking about 52 years ago. His smoking use included cigarettes. He started smoking about 56 years ago. He has a 1 pack-year smoking history. He has been exposed to tobacco smoke. He has never used smokeless tobacco. He reports that he does not drink alcohol and does not use drugs.    Medications:   Prior to Admission medications    Medication Sig Start Date End Date Taking? Authorizing Provider   hydroCHLOROthiazide (MICROZIDE) 12.5 MG capsule Take 1 capsule by mouth Daily. 5/28/25  Yes Titi Kramer MD   valsartan (DIOVAN) 160 MG tablet Take 1 tablet by mouth Daily. 2/13/25  Yes Titi Kramer MD   acetaminophen (TYLENOL) 650 MG 8 hr tablet Take 1 tablet by mouth Every 8 (Eight) Hours As Needed for Mild Pain. 1000mg twice a day    ProviderTristen MD   apixaban (ELIQUIS) 5 MG tablet tablet Take 1 tablet by mouth Every 12 (Twelve) Hours. Indications: Atrial Fibrillation, resume eliquis saturday morning    ProviderTristen MD   Cholecalciferol (Vitamin D3) 50 MCG (2000 UT) capsule Daily. 4/3/23   Tristen Mo MD   fexofenadine (ALLEGRA) 180 MG tablet Take 1 tablet by mouth Daily. 4/15/25   Paras Bliss MD   fluticasone (FLONASE) 50 MCG/ACT nasal spray Administer 1 spray into the nostril(s) as directed by provider As Needed for Allergies. 4/15/25   Paras Bliss MD   ibuprofen (Advil) 200 MG tablet As Needed.    ProviderTristen MD   meloxicam (MOBIC) 15 MG tablet Take 1 tablet by mouth As Needed for Mild Pain.    ProviderTristen MD   metFORMIN (GLUCOPHAGE) 500 MG tablet Take 1 tablet by mouth Daily With Breakfast. 1/30/25   Paras Bliss MD   nitroglycerin (NITROSTAT) 0.4 MG SL  "tablet 1 under the tongue as needed for angina, may repeat q5mins for up three doses 6/9/25   Titi Kramer MD   potassium chloride 10 MEQ CR tablet TAKE 1 TABLET BY MOUTH EVERY DAY FOR 30 DAYS    ProviderTristen MD   rosuvastatin (CRESTOR) 20 MG tablet Take 1 tablet by mouth Every Night. 3/19/25   Titi Kramer MD   triamcinolone (KENALOG) 0.1 % cream  3/24/25   ProviderTristen MD       Allergies:  Levofloxacin, Penicillins, and Vancomycin    ROS:    General: Weight stable  Resp: No SOA  Cardiovascular: No CP    Objective     Blood pressure 154/73, pulse 57, temperature 97.8 °F (36.6 °C), temperature source Temporal, resp. rate 20, height 188 cm (74\"), weight 134 kg (295 lb), SpO2 95%.    Physical Exam   Constitutional: Pt is oriented to person, place, and in no distress.   HENT: Mouth/Throat: Oropharynx is clear.   Cardiovascular: Normal rate, regular rhythm.    Pulmonary/Chest: Effort normal. No respiratory distress. No  wheezes.   Abdominal: Soft. Non-distended.  Skin: Skin is warm and dry.   Psychiatric: Mood, memory, affect and judgment appear normal.     Assessment & Plan     Diagnosis:  Polyps    Anticipated Surgical Procedure:  C-scope    The risks, benefits, and alternatives of this procedure have been discussed with the patient or the responsible party- the patient understands and agrees to proceed.        "

## 2025-06-26 LAB
CYTO UR: NORMAL
LAB AP CASE REPORT: NORMAL
Lab: NORMAL
PATH REPORT.FINAL DX SPEC: NORMAL
PATH REPORT.GROSS SPEC: NORMAL

## 2025-07-10 NOTE — PROGRESS NOTES
Subjective:     Encounter Date: 07/11/2025      Patient ID: Raghav Olivier is a 70 y.o. male with paroxysmal atrial fibrillation, obstructive sleep apnea, hypertension, hyperlipidemia and aneurysm of ascending aorta.    Chief Complaint: no complaints  History of Present Illness  Patient presents today for management of atrial fibrillation. Patient was seen in office on 6/9/2025 by Dr Kramer after being to the ER while on vacation to [place]. He was found to be in atrial fibrillation rate of 85. He underwent cardioversion on 6/21/2025.   Today he reports that he has been feeling better. He denies any chest pain. He reports that he weedeated yesterday with no issues. He denies any dyspnea on exertion. He denies any heart racing or palpitations. He reports some dizziness. He denies any near syncope. He reports that his BP has remained controlled. He reports that he contacted Dr Hernández and if there is a recurrence he would consider a repeat ablation. Patient is on Eliquis and denies any bleeding issues. Patient follows with Dr Bliss as PCP.     The following portions of the patient's history were reviewed and updated as appropriate: allergies, current medications, past family history, past medical history, past social history, past surgical history and problem list.    Allergies   Allergen Reactions    Levofloxacin Other (See Comments)     Pain in legs     Penicillins Unknown - Low Severity     Pt states is was a childhood allergy    Vancomycin Itching       Current Outpatient Medications:     acetaminophen (TYLENOL) 650 MG 8 hr tablet, Take 1 tablet by mouth Every 8 (Eight) Hours As Needed for Mild Pain. 1000mg twice a day, Disp: , Rfl:     apixaban (ELIQUIS) 5 MG tablet tablet, Take 1 tablet by mouth Every 12 (Twelve) Hours. Indications: Atrial Fibrillation, resume eliquis saturday morning, Disp: , Rfl:     Cholecalciferol (Vitamin D3) 50 MCG (2000 UT) capsule, Daily., Disp: , Rfl:     hydroCHLOROthiazide (MICROZIDE)  12.5 MG capsule, Take 1 capsule by mouth Daily., Disp: 90 capsule, Rfl: 3    meloxicam (MOBIC) 15 MG tablet, Take 1 tablet by mouth As Needed for Mild Pain., Disp: , Rfl:     nitroglycerin (NITROSTAT) 0.4 MG SL tablet, 1 under the tongue as needed for angina, may repeat q5mins for up three doses, Disp: 25 tablet, Rfl: 3    rosuvastatin (CRESTOR) 20 MG tablet, Take 1 tablet by mouth Every Night., Disp: 90 tablet, Rfl: 3    triamcinolone (KENALOG) 0.1 % cream, , Disp: , Rfl:     valsartan (DIOVAN) 160 MG tablet, Take 1 tablet by mouth Daily., Disp: 90 tablet, Rfl: 3  Past Medical History:   Diagnosis Date    Abnormal ECG     Allergic     Arrhythmia     Arthritis     Atrial fibrillation     Cancer     SKIN    Cataract     Diverticulitis     Hyperlipidemia     Hypertension     Kidney stones     Migraines     Nonrheumatic mitral (valve) insufficiency     Obesity     Pulmonary embolism 21    [facility]    Sleep apnea     C-PAP    Spinal headache      Social History     Socioeconomic History    Marital status:     Highest education level: GED or equivalent   Tobacco Use    Smoking status: Former     Current packs/day: 0.00     Average packs/day: 0.3 packs/day for 4.0 years (1.0 ttl pk-yrs)     Types: Cigarettes     Start date: 1969     Quit date: 1973     Years since quittin.5     Passive exposure: Past    Smokeless tobacco: Never   Vaping Use    Vaping status: Never Used   Substance and Sexual Activity    Alcohol use: No    Drug use: No    Sexual activity: Defer       Review of Systems   Constitutional: Negative for malaise/fatigue.   HENT:  Negative for nosebleeds.    Cardiovascular:  Negative for chest pain, dyspnea on exertion, irregular heartbeat, leg swelling, near-syncope, orthopnea, palpitations, paroxysmal nocturnal dyspnea and syncope.   Respiratory:  Negative for shortness of breath.    Hematologic/Lymphatic: Bruises/bleeds easily.   Genitourinary:  Negative for hematuria.  "  Neurological:  Negative for dizziness and weakness.   All other systems reviewed and are negative.         Objective:     Vitals reviewed.   Constitutional:       General: Not in acute distress.     Appearance: Normal appearance. Well-developed. Obese.   Eyes:      Pupils: Pupils are equal, round, and reactive to light.   HENT:      Head: Normocephalic and atraumatic.      Nose: Nose normal.   Neck:      Vascular: No carotid bruit.   Pulmonary:      Effort: Pulmonary effort is normal. No respiratory distress.      Breath sounds: Normal breath sounds. No wheezing. No rales.   Cardiovascular:      Bradycardia present. Regular rhythm.      Murmurs: There is no murmur.   Edema:     Peripheral edema absent.   Abdominal:      General: There is no distension.      Palpations: Abdomen is soft.   Musculoskeletal: Normal range of motion.      Cervical back: Normal range of motion and neck supple. Skin:     General: Skin is warm.      Findings: No erythema or rash.   Neurological:      General: No focal deficit present.      Mental Status: Alert and oriented to person, place, and time.   Psychiatric:         Attention and Perception: Attention normal.         Mood and Affect: Mood normal.         Speech: Speech normal.         Behavior: Behavior normal.         Thought Content: Thought content normal.         Judgment: Judgment normal.         /78   Pulse 58   Ht 188 cm (74\")   Wt 133 kg (294 lb)   BMI 37.75 kg/m²       ECG 12 Lead    Date/Time: 7/11/2025 9:16 AM  Performed by: Sebastian Kulkarni APRN    Authorized by: Sebastian Kulkarni APRN  Comparison: compared with previous ECG from 6/9/2025  Rhythm: sinus bradycardia  Rate: bradycardic  BPM: 58    Clinical impression: non-specific ECG          Lab Review:       Results for orders placed during the hospital encounter of 04/29/25    Adult Transthoracic Echo Complete W/ Cont if Necessary Per Protocol 05/21/2025  4:34 AM    Interpretation Summary    Left ventricular " systolic function is low normal. Automated 2D EF = 53%  Left ventricular ejection fraction appears to be 51 - 55%.    Left ventricular diastolic function was normal.    Estimated right ventricular systolic pressure from tricuspid regurgitation is normal (<35 mmHg).    Compared to prior echo LVEF is decreased from 61-65% to 51-55%    Definition is limited.  May consider cardiac MRI with contrast for further evaluation of LVEF and wall motion.      Lab Results   Component Value Date    CHLPL 174 01/21/2025    TRIG 235 (H) 01/21/2025    HDL 32 (L) 01/21/2025     (H) 01/21/2025       Lab Results   Component Value Date    HGBA1C 6.6 (H) 01/21/2025       I have personally reviewed echo, interrogation and past office notes prior to patients visit   Assessment:          Diagnosis Plan   1. AF (paroxysmal atrial fibrillation)        2. Chronic anticoagulation        3. Essential hypertension        4. Mixed hyperlipidemia        5. Sinoatrial node dysfunction        6. Presence of cardiac pacemaker        7. TAMARA on CPAP        8. Aneurysm of ascending aorta without rupture        9. Severe obesity (BMI 35.0-39.9) with comorbidity                 Plan:       Paroxysmal atrial fibrillation: s/p ablation Dr Hernández. S/p Cardioversion 6/9/2025. EKG today shows sinus bradycardia rates 58. Continues to follow with Dr Hernández    2. Chronic anticoagulation: patient is on eliquis and denies any bleeding issues. Discussed potential left atrial appendage occlusion with Watchman device if bleeding issues arise.     3. Hypertension: Controlled in office. Continue current medications. Recommend to continue to monitor routinely and follow up with PCP.     4. Hyperlipidemia: managed and followed by PCP. Continue rosuvastatin    5/6. SSS: s/p Pacemaker: Interrogation 4/6/2023 noted that battery is 10 years. A pacing 31%, V pacing <1%. Atrial fibrillation burden of 0% since 2/2023. Followed by Dr Hernández    7. TAMARA on BiPAP: patient reports  compliance    8. aneurysm of ascending aorta: Follows with [facility]. Scheduled to see them in 3/2026    9. Obesity: Class 2 Severe Obesity (BMI >=35 and <=39.9). Obesity-related health conditions include the following: obstructive sleep apnea. Obesity is unchanged. BMI is is above average; no BMI management plan is appropriate. We discussed portion control and increasing exercise.    I attest that all portions of this note reviewed and all information has been updated by myself to reflect the patient's current status.      I spent 35 minutes caring for Raghav on this date of service. This time includes time spent by me in the following activities:preparing for the visit, reviewing tests, obtaining and/or reviewing a separately obtained history, performing a medically appropriate examination and/or evaluation , counseling and educating the patient/family/caregiver, and documenting information in the medical record    Patient is to follow up as previously scheduled 12/4/2025 or sooner if needed

## 2025-07-11 ENCOUNTER — OFFICE VISIT (OUTPATIENT)
Dept: CARDIOLOGY | Facility: CLINIC | Age: 70
End: 2025-07-11
Payer: MEDICARE

## 2025-07-11 VITALS
BODY MASS INDEX: 37.73 KG/M2 | DIASTOLIC BLOOD PRESSURE: 78 MMHG | SYSTOLIC BLOOD PRESSURE: 138 MMHG | HEIGHT: 74 IN | HEART RATE: 58 BPM | WEIGHT: 294 LBS

## 2025-07-11 DIAGNOSIS — I10 ESSENTIAL HYPERTENSION: ICD-10-CM

## 2025-07-11 DIAGNOSIS — E78.2 MIXED HYPERLIPIDEMIA: ICD-10-CM

## 2025-07-11 DIAGNOSIS — Z79.01 CHRONIC ANTICOAGULATION: ICD-10-CM

## 2025-07-11 DIAGNOSIS — E66.01 SEVERE OBESITY (BMI 35.0-39.9) WITH COMORBIDITY: ICD-10-CM

## 2025-07-11 DIAGNOSIS — I71.21 ANEURYSM OF ASCENDING AORTA WITHOUT RUPTURE: ICD-10-CM

## 2025-07-11 DIAGNOSIS — G47.33 OSA ON CPAP: ICD-10-CM

## 2025-07-11 DIAGNOSIS — Z95.0 PRESENCE OF CARDIAC PACEMAKER: ICD-10-CM

## 2025-07-11 DIAGNOSIS — I49.5 SINOATRIAL NODE DYSFUNCTION: ICD-10-CM

## 2025-07-11 DIAGNOSIS — I48.0 AF (PAROXYSMAL ATRIAL FIBRILLATION): Primary | ICD-10-CM

## 2025-07-21 ENCOUNTER — OFFICE VISIT (OUTPATIENT)
Dept: INTERNAL MEDICINE | Facility: CLINIC | Age: 70
End: 2025-07-21
Payer: MEDICARE

## 2025-07-21 VITALS
TEMPERATURE: 98.2 F | BODY MASS INDEX: 38.5 KG/M2 | SYSTOLIC BLOOD PRESSURE: 159 MMHG | WEIGHT: 300 LBS | HEIGHT: 74 IN | HEART RATE: 55 BPM | OXYGEN SATURATION: 93 % | DIASTOLIC BLOOD PRESSURE: 77 MMHG

## 2025-07-21 DIAGNOSIS — Z12.5 SCREENING PSA (PROSTATE SPECIFIC ANTIGEN): ICD-10-CM

## 2025-07-21 DIAGNOSIS — M48.061 SPINAL STENOSIS OF LUMBAR REGION, UNSPECIFIED WHETHER NEUROGENIC CLAUDICATION PRESENT: ICD-10-CM

## 2025-07-21 DIAGNOSIS — E78.2 MIXED HYPERLIPIDEMIA: ICD-10-CM

## 2025-07-21 DIAGNOSIS — R73.03 BORDERLINE DIABETES: Primary | ICD-10-CM

## 2025-07-21 DIAGNOSIS — I48.0 PAROXYSMAL ATRIAL FIBRILLATION: ICD-10-CM

## 2025-07-21 PROCEDURE — 96372 THER/PROPH/DIAG INJ SC/IM: CPT | Performed by: FAMILY MEDICINE

## 2025-07-21 PROCEDURE — 1125F AMNT PAIN NOTED PAIN PRSNT: CPT | Performed by: FAMILY MEDICINE

## 2025-07-21 PROCEDURE — 3077F SYST BP >= 140 MM HG: CPT | Performed by: FAMILY MEDICINE

## 2025-07-21 PROCEDURE — 3078F DIAST BP <80 MM HG: CPT | Performed by: FAMILY MEDICINE

## 2025-07-21 PROCEDURE — 99214 OFFICE O/P EST MOD 30 MIN: CPT | Performed by: FAMILY MEDICINE

## 2025-07-21 RX ORDER — METHYLPREDNISOLONE SODIUM SUCCINATE 125 MG/2ML
125 INJECTION, POWDER, LYOPHILIZED, FOR SOLUTION INTRAMUSCULAR; INTRAVENOUS ONCE
Status: COMPLETED | OUTPATIENT
Start: 2025-07-21 | End: 2025-07-21

## 2025-07-21 RX ADMIN — METHYLPREDNISOLONE SODIUM SUCCINATE 125 MG: 125 INJECTION, POWDER, LYOPHILIZED, FOR SOLUTION INTRAMUSCULAR; INTRAVENOUS at 09:19

## 2025-07-21 NOTE — PROGRESS NOTES
Subjective     Chief Complaint   Patient presents with    Hypertension       History of Present Illness    Patient's PMR from outside medical facility reviewed and noted.    Raghav Olivier is a 70 y.o. male who presents for a routine office visit.  Patient presents for follow up of Diabetes type 2. Current symptoms include: none. Patient denies polydipsia and polyuria.  Home sugars: BGs consistently in an acceptable range. Current treatments: no recent interventions. Patient is due for his diabetic labs at this time, so we will go ahead and order Cmp, HBa1c, Lipid Profile, and Microalbumin urine for evaluation.     Patient also complains of chronic lower back pain as well as pain in his shoulder.  Patient would like something to help with this at this time.  Go ahead and give him a steroid shot and see if this helps with his discomfort he is unable to take anti-inflammatories because currently on a blood thinner however we will start him on a muscle relaxant to see if this also helps.  Patient was offered new x-rays to evaluate this but declined at this time.    Patient is also due to recheck his lipid profile at this time and would like to get a screening PSA done.        Past Medical History:   Past Medical History:   Diagnosis Date    Abnormal ECG     Allergic     Arrhythmia     Arthritis     Atrial fibrillation     Cancer     SKIN    Cataract     Diverticulitis     Hyperlipidemia     Hypertension     Kidney stones     Migraines     Nonrheumatic mitral (valve) insufficiency     Obesity     Pulmonary embolism 8-30-21    [facility]    Sleep apnea     C-PAP    Spinal headache      Past Surgical History:  Past Surgical History:   Procedure Laterality Date    ABLATION OF DYSRHYTHMIC FOCUS      APPENDECTOMY      BACK SURGERY  01/2022    CARDIAC ABLATION  07/26/2024    CARDIAC ABLATION  10/2024    with     CARDIAC CATHETERIZATION      CARDIAC ELECTROPHYSIOLOGY PROCEDURE N/A 10/01/2021     Procedure: NEW PACEMAKER IMPLANTATION;  Surgeon: Mitch Hernández MD;  Location: [facility] CATH INVASIVE LOCATION;  Service: Cardiology;  Laterality: N/A;    CARDIOVERSION      CHOLECYSTECTOMY      COLONOSCOPY N/A 07/31/2017    Procedure: COLONOSCOPY;  Surgeon: Billy Robbins DO;  Location: [facility] ENDOSCOPY;  Service:     COLONOSCOPY N/A 07/28/2021    Procedure: COLONOSCOPY;  Surgeon: Billy Robbins DO;  Location: [facility] ENDOSCOPY;  Service: Gastroenterology;  Laterality: N/A;    COLONOSCOPY N/A 6/24/2025    Procedure: COLONOSCOPY WITH ANESTHESIA- (examination of colon);  Surgeon: Luis Bright DO;  Location: [facility] ENDOSCOPY;  Service: Gastroenterology;  Laterality: N/A;  pre polyp  post diverticulosis    EP STUDY      INSERT / REPLACE / REMOVE PACEMAKER  10-1-21    geraldine mcintyre pacemaker    JOINT REPLACEMENT  04/09/23    KNEE SURGERY Right     X2    SHOULDER SURGERY Left 2009    SPINE SURGERY  00/00/22    TOTAL KNEE ARTHROPLASTY Right 04/06/2023    Procedure: RIGHT TOTAL KNEE ARTHROPLASTY;  Surgeon: Pepe Vaughn MD;  Location: [facility] OR;  Service: Orthopedics;  Laterality: Right;     Social History:  reports that he quit smoking about 52 years ago. His smoking use included cigarettes. He started smoking about 56 years ago. He has a 1 pack-year smoking history. He has been exposed to tobacco smoke. He has never used smokeless tobacco. He reports that he does not drink alcohol and does not use drugs.    Family History: family history includes Cancer in his brother, brother, father, and mother; Colon cancer in his father; Dementia in his father; Diabetes in his brother, brother, father, and sister; Hypertension in his father and mother.      Allergies:  Allergies   Allergen Reactions    Levofloxacin Other (See Comments)     Pain in legs     Penicillins Unknown - Low Severity     Pt states is was a childhood allergy    Vancomycin Itching     Medications:  Prior to Admission medications    Medication  "Sig Start Date End Date Taking? Authorizing Provider   acetaminophen (TYLENOL) 650 MG 8 hr tablet Take 1 tablet by mouth Every 8 (Eight) Hours As Needed for Mild Pain. 1000mg twice a day   Yes Tristen Mo MD   apixaban (ELIQUIS) 5 MG tablet tablet Take 1 tablet by mouth Every 12 (Twelve) Hours. Indications: Atrial Fibrillation, resume eliquis saturday morning   Yes Tristen Mo MD   Cholecalciferol (Vitamin D3) 50 MCG (2000 UT) capsule Daily. 4/3/23  Yes Tristen Mo MD   hydroCHLOROthiazide (MICROZIDE) 12.5 MG capsule Take 1 capsule by mouth Daily. 5/28/25  Yes Titi Kramer MD   meloxicam (MOBIC) 15 MG tablet Take 1 tablet by mouth As Needed for Mild Pain.   Yes Tristen Mo MD   nitroglycerin (NITROSTAT) 0.4 MG SL tablet 1 under the tongue as needed for angina, may repeat q5mins for up three doses 6/9/25  Yes Titi Kramer MD   rosuvastatin (CRESTOR) 20 MG tablet Take 1 tablet by mouth Every Night. 3/19/25  Yes Titi Kramer MD   triamcinolone (KENALOG) 0.1 % cream  3/24/25  Yes Tristen Mo MD   valsartan (DIOVAN) 160 MG tablet Take 1 tablet by mouth Daily. 2/13/25  Yes Titi Kramer MD       JAMES:      PHQ:  The PHQ has not been completed during this encounter.          Review of systems   negative unless otherwise specified above in HPI    Objective     Vital Signs: /77 (BP Location: Right arm, Patient Position: Sitting, Cuff Size: Large Adult)   Pulse 55   Temp 98.2 °F (36.8 °C) (Infrared)   Ht 188 cm (74\")   Wt 136 kg (300 lb)   SpO2 93%   BMI 38.52 kg/m²     Physical Exam  Vitals and nursing note reviewed.   Constitutional:       General: He is not in acute distress.     Appearance: Normal appearance.   HENT:      Head: Normocephalic.   Eyes:      Extraocular Movements: Extraocular movements intact.      Pupils: Pupils are equal, round, and reactive to light.   Cardiovascular:      Rate and Rhythm: Normal rate and regular rhythm.      Heart sounds: " Normal heart sounds. No murmur heard.  Pulmonary:      Effort: Pulmonary effort is normal. No respiratory distress.      Breath sounds: Normal breath sounds. No rhonchi or rales.   Abdominal:      General: Bowel sounds are normal. There is no distension.      Palpations: Abdomen is soft.   Neurological:      General: No focal deficit present.      Mental Status: He is alert.                Results Reviewed:  Glucose   Date Value Ref Range Status   02/02/2025 111 (H) 65 - 99 mg/dL Final     BUN   Date Value Ref Range Status   02/02/2025 19 8 - 23 mg/dL Final   01/05/2022 21 8 - 26 mg/dL Final     Creatinine   Date Value Ref Range Status   02/02/2025 0.98 0.76 - 1.27 mg/dL Final   05/24/2024 1.50 (H) 0.60 - 1.30 mg/dL Final     Comment:     Serial Number: [id number]:  [id number]   01/05/2022 0.99 0.72 - 1.25 mg/dL Final     Sodium   Date Value Ref Range Status   02/02/2025 141 136 - 145 mmol/L Final   01/05/2022 140 136 - 145 mmol/L Final     Potassium   Date Value Ref Range Status   02/02/2025 4.0 3.5 - 5.2 mmol/L Final   01/05/2022 4.0 3.3 - 4.8 mmol/L Final     Comment:     Plasma reference ranges are shown, please note that serum reference ranges are higher than plasma.     Chloride   Date Value Ref Range Status   02/02/2025 105 98 - 107 mmol/L Final   01/05/2022 105 98 - 107 mmol/L Final     CO2   Date Value Ref Range Status   02/02/2025 25.0 22.0 - 29.0 mmol/L Final     Total CO2   Date Value Ref Range Status   01/05/2022 25 23 - 31 mmol/L Final     Calcium   Date Value Ref Range Status   02/02/2025 8.9 8.6 - 10.5 mg/dL Final   01/05/2022 8.4 8.4 - 10.5 mg/dL Final     ALT (SGPT)   Date Value Ref Range Status   02/02/2025 26 1 - 41 U/L Final     AST (SGOT)   Date Value Ref Range Status   02/02/2025 21 1 - 40 U/L Final     WBC   Date Value Ref Range Status   02/02/2025 6.88 3.40 - 10.80 10*3/mm3 Final   01/21/2025 7.8 3.4 - 10.8 x10E3/uL Final     Hematocrit   Date Value Ref Range Status   02/02/2025 39.9 37.5 -  51.0 % Final     Platelets   Date Value Ref Range Status   02/02/2025 219 140 - 450 10*3/mm3 Final     Triglycerides   Date Value Ref Range Status   01/21/2025 235 (H) 0 - 149 mg/dL Final     HDL Cholesterol   Date Value Ref Range Status   01/21/2025 32 (L) >39 mg/dL Final     LDL Chol Calc (NIH)   Date Value Ref Range Status   01/21/2025 102 (H) 0 - 99 mg/dL Final     Hemoglobin A1C   Date Value Ref Range Status   01/21/2025 6.6 (H) 4.8 - 5.6 % Final     Comment:              Prediabetes: 5.7 - 6.4           Diabetes: >6.4           Glycemic control for adults with diabetes: <7.0               Procedure   Procedures       Assessment / Plan     Assessment/Plan:   Diagnosis Plan   1. Borderline diabetes  Microalbumin / Creatinine Urine Ratio - Urine, Clean Catch    Lipid Panel    Comprehensive Metabolic Panel    Hemoglobin A1c      2. Spinal stenosis of lumbar region, unspecified whether neurogenic claudication present  methylPREDNISolone sodium succinate (SOLU-Medrol) injection 125 mg    tiZANidine (ZANAFLEX) 4 MG tablet      3. Screening PSA (prostate specific antigen)  PSA Screen      4. Paroxysmal atrial fibrillation  Lipid Panel      5. Mixed hyperlipidemia  Lipid Panel            Return in about 3 months (around 10/21/2025) for Recheck. unless patient needs to be seen sooner or acute issues arise.      I have discussed the patient results/orders and and plan/recommendation with them at today's visit.      Signed by:    Paras Bliss MD Date: 07/21/25

## 2025-07-22 LAB
ALBUMIN SERPL-MCNC: 4.3 G/DL (ref 3.8–4.8)
ALBUMIN/CREAT UR: 13 MG/G CREAT (ref 0–29)
ALP SERPL-CCNC: 41 IU/L (ref 44–121)
ALT SERPL-CCNC: 26 IU/L (ref 0–44)
AST SERPL-CCNC: 26 IU/L (ref 0–40)
BILIRUB SERPL-MCNC: 0.2 MG/DL (ref 0–1.2)
BUN SERPL-MCNC: 16 MG/DL (ref 8–27)
BUN/CREAT SERPL: 15 (ref 10–24)
CALCIUM SERPL-MCNC: 9.4 MG/DL (ref 8.6–10.2)
CHLORIDE SERPL-SCNC: 104 MMOL/L (ref 96–106)
CHOLEST SERPL-MCNC: 179 MG/DL (ref 100–199)
CO2 SERPL-SCNC: 21 MMOL/L (ref 20–29)
CREAT SERPL-MCNC: 1.05 MG/DL (ref 0.76–1.27)
CREAT UR-MCNC: 282.5 MG/DL
EGFRCR SERPLBLD CKD-EPI 2021: 75 ML/MIN/1.73
GLOBULIN SER CALC-MCNC: 2.5 G/DL (ref 1.5–4.5)
GLUCOSE SERPL-MCNC: 124 MG/DL (ref 70–99)
HBA1C MFR BLD: 6.6 % (ref 4.8–5.6)
HDLC SERPL-MCNC: 38 MG/DL
LDLC SERPL CALC-MCNC: 116 MG/DL (ref 0–99)
MICROALBUMIN UR-MCNC: 35.5 UG/ML
POTASSIUM SERPL-SCNC: 4.1 MMOL/L (ref 3.5–5.2)
PROT SERPL-MCNC: 6.8 G/DL (ref 6–8.5)
PSA SERPL-MCNC: 1.3 NG/ML (ref 0–4)
SODIUM SERPL-SCNC: 143 MMOL/L (ref 134–144)
TRIGL SERPL-MCNC: 142 MG/DL (ref 0–149)
VLDLC SERPL CALC-MCNC: 25 MG/DL (ref 5–40)

## 2025-07-25 ENCOUNTER — OFFICE VISIT (OUTPATIENT)
Dept: INTERNAL MEDICINE | Facility: CLINIC | Age: 70
End: 2025-07-25
Payer: MEDICARE

## 2025-07-25 VITALS
WEIGHT: 297 LBS | BODY MASS INDEX: 38.12 KG/M2 | OXYGEN SATURATION: 96 % | HEIGHT: 74 IN | SYSTOLIC BLOOD PRESSURE: 144 MMHG | DIASTOLIC BLOOD PRESSURE: 80 MMHG | HEART RATE: 51 BPM | TEMPERATURE: 98.4 F

## 2025-07-25 DIAGNOSIS — J02.9 ACUTE PHARYNGITIS, UNSPECIFIED ETIOLOGY: Primary | ICD-10-CM

## 2025-07-25 DIAGNOSIS — J02.9 SORE THROAT: ICD-10-CM

## 2025-07-25 LAB
EXPIRATION DATE: NORMAL
INTERNAL CONTROL: NORMAL
Lab: NORMAL
S PYO AG THROAT QL: NEGATIVE

## 2025-07-25 PROCEDURE — 99213 OFFICE O/P EST LOW 20 MIN: CPT

## 2025-07-25 PROCEDURE — 3077F SYST BP >= 140 MM HG: CPT

## 2025-07-25 PROCEDURE — 3078F DIAST BP <80 MM HG: CPT

## 2025-07-25 PROCEDURE — 87880 STREP A ASSAY W/OPTIC: CPT

## 2025-07-25 PROCEDURE — 1160F RVW MEDS BY RX/DR IN RCRD: CPT

## 2025-07-25 PROCEDURE — 1125F AMNT PAIN NOTED PAIN PRSNT: CPT

## 2025-07-25 PROCEDURE — 1159F MED LIST DOCD IN RCRD: CPT

## 2025-07-25 RX ORDER — AZITHROMYCIN 250 MG/1
TABLET, FILM COATED ORAL
Qty: 6 TABLET | Refills: 0 | Status: ON HOLD | OUTPATIENT
Start: 2025-07-25 | End: 2025-07-28

## 2025-07-25 NOTE — PROGRESS NOTES
Chief Complaint  Sore Throat and Dizziness    Subjective        Raghav Olivier presents to [facility]  History of Present Illness  Patient is a 70-year-old male presenting today with complaints of sore throat. First notice sore throat a few days ago. No known fever. Having difficulty swallowing with sore throat. He has also had dizziness. He reports dizziness having worsened after taking medication last night for the sore throat. There is an adult female present with him who mentions he has been having dizziness for a while with patient reporting he is always a little dizzy but not worried about that. He reports always having some congestion as well. He has a nasal spray but only uses if needed and has not used it recently.       Past Medical History:   Diagnosis Date    Abnormal ECG     Allergic     Arrhythmia     Arthritis     Atrial fibrillation     Cancer     SKIN    Cataract     Diverticulitis     Hyperlipidemia     Hypertension     Kidney stones     Migraines     Nonrheumatic mitral (valve) insufficiency     Obesity     Pulmonary embolism 8-30-21    [facility]    Sleep apnea     C-PAP    Spinal headache      Past Surgical History:   Procedure Laterality Date    ABLATION OF DYSRHYTHMIC FOCUS      APPENDECTOMY      BACK SURGERY  01/2022    CARDIAC ABLATION  07/26/2024    CARDIAC ABLATION  10/2024    with     CARDIAC CATHETERIZATION      CARDIAC ELECTROPHYSIOLOGY PROCEDURE N/A 10/01/2021    Procedure: NEW PACEMAKER IMPLANTATION;  Surgeon: Mitch Hernández MD;  Location: [facility] CATH INVASIVE LOCATION;  Service: Cardiology;  Laterality: N/A;    CARDIOVERSION      CHOLECYSTECTOMY      COLONOSCOPY N/A 07/31/2017    Procedure: COLONOSCOPY;  Surgeon: Billy Robbins DO;  Location: [facility] ENDOSCOPY;  Service:     COLONOSCOPY N/A 07/28/2021    Procedure: COLONOSCOPY;  Surgeon: Billy Robbins DO;  Location: [facility] ENDOSCOPY;  Service: Gastroenterology;  Laterality:  N/A;    COLONOSCOPY N/A 2025    Procedure: COLONOSCOPY WITH ANESTHESIA- (examination of colon);  Surgeon: Lius Bright DO;  Location: [facility] ENDOSCOPY;  Service: Gastroenterology;  Laterality: N/A;  pre polyp  post diverticulosis    EP STUDY      INSERT / REPLACE / REMOVE PACEMAKER  10-1-21    geraldine mcintyre pacemaker    JOINT REPLACEMENT  23    KNEE SURGERY Right     X2    SHOULDER SURGERY Left     SPINE SURGERY      TOTAL KNEE ARTHROPLASTY Right 2023    Procedure: RIGHT TOTAL KNEE ARTHROPLASTY;  Surgeon: Pepe Vaughn MD;  Location: [facility] OR;  Service: Orthopedics;  Laterality: Right;     Social History     Socioeconomic History    Marital status:     Highest education level: GED or equivalent   Tobacco Use    Smoking status: Former     Current packs/day: 0.00     Average packs/day: 0.3 packs/day for 4.0 years (1.0 ttl pk-yrs)     Types: Cigarettes     Start date: 1969     Quit date: 1973     Years since quittin.5     Passive exposure: Past    Smokeless tobacco: Never   Vaping Use    Vaping status: Never Used   Substance and Sexual Activity    Alcohol use: No    Drug use: No    Sexual activity: Defer     Family History   Problem Relation Age of Onset    Cancer Mother         Colon    Hypertension Mother     Diabetes Father     Cancer Father         Prostate    Dementia Father     Hypertension Father     Colon cancer Father     Diabetes Sister     Diabetes Brother     Cancer Brother         Prostate    Cancer Brother         Prostate    Diabetes Brother     Colon polyps Neg Hx     Esophageal cancer Neg Hx        Medications:  Current Outpatient Medications   Medication Sig Dispense Refill    acetaminophen (TYLENOL) 650 MG 8 hr tablet Take 1 tablet by mouth Every 8 (Eight) Hours As Needed for Mild Pain. 1000mg twice a day      apixaban (ELIQUIS) 5 MG tablet tablet Take 1 tablet by mouth Every 12 (Twelve) Hours. Indications: Atrial Fibrillation, resume eliquis  "saturday morning      Cholecalciferol (Vitamin D3) 50 MCG (2000 UT) capsule Daily.      hydroCHLOROthiazide (MICROZIDE) 12.5 MG capsule Take 1 capsule by mouth Daily. 90 capsule 3    nitroglycerin (NITROSTAT) 0.4 MG SL tablet 1 under the tongue as needed for angina, may repeat q5mins for up three doses 25 tablet 3    rosuvastatin (CRESTOR) 20 MG tablet Take 1 tablet by mouth Every Night. 90 tablet 3    tiZANidine (ZANAFLEX) 4 MG tablet Take 1 tablet by mouth Every 8 (Eight) Hours As Needed for Muscle Spasms. 90 tablet 0    valsartan (DIOVAN) 160 MG tablet Take 1 tablet by mouth Daily. 90 tablet 3    azithromycin (Zithromax Z-Sterling) 250 MG tablet Take 2 tablets by mouth on day 1, then 1 tablet daily on days 2-5 6 tablet 0    phenol (CHLORASEPTIC) 1.4 % liquid liquid Apply 1 spray to the mouth or throat Every 2 (Two) Hours As Needed (sore throat). 118 mL 1     No current facility-administered medications for this visit.       Review of Systems   HENT:  Positive for congestion and sore throat.    Neurological:  Positive for dizziness.   Review of systems is negative unless otherwise specified in HPI.      Objective   Vital Signs:  /80 (BP Location: Right arm, Patient Position: Sitting, Cuff Size: Large Adult)   Pulse 51   Temp 98.4 °F (36.9 °C) (Infrared)   Ht 188 cm (74\")   Wt 135 kg (297 lb)   SpO2 96%   BMI 38.13 kg/m²   Estimated body mass index is 38.13 kg/m² as calculated from the following:    Height as of this encounter: 188 cm (74\").    Weight as of this encounter: 135 kg (297 lb).          Physical Exam  Vitals and nursing note reviewed.   Constitutional:       General: He is not in acute distress.     Appearance: He is obese.   HENT:      Head: Normocephalic.      Right Ear: A middle ear effusion is present.      Left Ear: A middle ear effusion is present.      Nose: Nose normal.      Mouth/Throat:      Mouth: Mucous membranes are moist.      Pharynx: Postnasal drip present.   Cardiovascular:      " Rate and Rhythm: Normal rate and regular rhythm.      Pulses: Normal pulses.   Pulmonary:      Effort: Pulmonary effort is normal. No respiratory distress.   Musculoskeletal:         General: Normal range of motion.      Cervical back: Normal range of motion and neck supple. Tenderness present.   Lymphadenopathy:      Cervical: No cervical adenopathy.   Skin:     General: Skin is warm and dry.      Capillary Refill: Capillary refill takes less than 2 seconds.   Neurological:      General: No focal deficit present.      Mental Status: He is alert.          Result Review :  The following data was reviewed by: EMPERATRIZ Valle on 07/25/2025:  CMP          1/21/2025    08:57 2/2/2025    09:52 7/21/2025    08:22   CMP   Glucose 122  111  124    BUN 23  19  16    Creatinine 1.24  0.98  1.05    EGFR 62  82.4  75    Sodium 142  141  143    Potassium 4.3  4.0  4.1    Chloride 105  105  104    Calcium 9.5  8.9  9.4    Total Protein 6.8  6.7  6.8    Albumin 4.4  4.1  4.3    Globulin 2.4  2.6  2.5    Total Bilirubin <0.2  0.3  0.2    Alkaline Phosphatase 48  35  41    AST (SGOT) 25  21  26    ALT (SGPT) 33  26  26    Albumin/Globulin Ratio  1.6     BUN/Creatinine Ratio 19  19.4  15    Anion Gap  11.0       CBC w/diff          1/21/2025    08:57 2/2/2025    09:52   CBC w/Diff   WBC 7.8  6.88    RBC 4.66  4.40    Hemoglobin 13.6  12.9    Hematocrit 43.4  39.9    MCV 93  90.7    MCH 29.2  29.3    MCHC 31.3  32.3    RDW 13.4  13.9    Platelets 243  219    Neutrophil Rel % 68  62.7    Immature Granulocyte Rel %  0.3    Lymphocyte Rel % 19  25.3    Monocyte Rel % 10  9.9    Eosinophil Rel % 1  1.2    Basophil Rel % 1  0.6      Lipid Panel          1/21/2025    08:57 7/21/2025    08:22   Lipid Panel   Total Cholesterol 174  179    Triglycerides 235  142    HDL Cholesterol 32  38    VLDL Cholesterol 40  25    LDL Cholesterol  102  116      A1C Last 3 Results          1/21/2025    08:57 7/21/2025    08:22   HGBA1C Last 3 Results    Hemoglobin A1C 6.6  6.6             Assessment and Plan   Diagnoses and all orders for this visit:    1. Acute pharyngitis, unspecified etiology (Primary)  -     phenol (CHLORASEPTIC) 1.4 % liquid liquid; Apply 1 spray to the mouth or throat Every 2 (Two) Hours As Needed (sore throat).  Dispense: 118 mL; Refill: 1  -     azithromycin (Zithromax Z-Sterling) 250 MG tablet; Take 2 tablets by mouth on day 1, then 1 tablet daily on days 2-5  Dispense: 6 tablet; Refill: 0    2. Sore throat  -     POCT rapid strep A      - With finding of congestion with mid ear effusion and post nasal drainage will proceed with antibiotic. Will order chloraseptic spray for sore throat. Encouraged use of Flonase consistently as this will help with congestion and may improve sore throat as well.            Follow Up   Return if symptoms worsen or fail to improve.  Patient was given instructions and counseling regarding his condition or for health maintenance advice. Please see specific information pulled into the AVS if appropriate.     Signed by:    EMPERATRIZ Valle Date: 07/25/25

## 2025-07-26 ENCOUNTER — HOSPITAL ENCOUNTER (OUTPATIENT)
Facility: HOSPITAL | Age: 70
LOS: 1 days | Discharge: HOME | End: 2025-07-28
Admitting: INTERNAL MEDICINE
Payer: MEDICARE

## 2025-07-26 ENCOUNTER — APPOINTMENT (OUTPATIENT)
Dept: CT IMAGING | Facility: HOSPITAL | Age: 70
End: 2025-07-26
Payer: MEDICARE

## 2025-07-26 ENCOUNTER — ANESTHESIA EVENT (OUTPATIENT)
Dept: PERIOP | Facility: HOSPITAL | Age: 70
End: 2025-07-26
Payer: MEDICARE

## 2025-07-26 ENCOUNTER — APPOINTMENT (OUTPATIENT)
Dept: GENERAL RADIOLOGY | Facility: HOSPITAL | Age: 70
End: 2025-07-26
Payer: MEDICARE

## 2025-07-26 ENCOUNTER — ANESTHESIA (OUTPATIENT)
Dept: PERIOP | Facility: HOSPITAL | Age: 70
End: 2025-07-26
Payer: MEDICARE

## 2025-07-26 DIAGNOSIS — R13.19 ESOPHAGEAL DYSPHAGIA: ICD-10-CM

## 2025-07-26 DIAGNOSIS — R13.10 DYSPHAGIA, UNSPECIFIED TYPE: Primary | ICD-10-CM

## 2025-07-26 DIAGNOSIS — K22.2 ESOPHAGEAL OBSTRUCTION: ICD-10-CM

## 2025-07-26 LAB
ALBUMIN SERPL-MCNC: 4.3 G/DL (ref 3.5–5.2)
ALBUMIN/GLOB SERPL: 1.4 G/DL
ALP SERPL-CCNC: 46 U/L (ref 39–117)
ALT SERPL W P-5'-P-CCNC: 25 U/L (ref 1–41)
ANION GAP SERPL CALCULATED.3IONS-SCNC: 14 MMOL/L (ref 5–15)
AST SERPL-CCNC: 14 U/L (ref 1–40)
BASOPHILS # BLD AUTO: 0.04 10*3/MM3 (ref 0–0.2)
BASOPHILS NFR BLD AUTO: 0.3 % (ref 0–1.5)
BILIRUB SERPL-MCNC: 0.6 MG/DL (ref 0–1.2)
BUN SERPL-MCNC: 11.4 MG/DL (ref 8–23)
BUN/CREAT SERPL: 11.2 (ref 7–25)
CALCIUM SPEC-SCNC: 9.5 MG/DL (ref 8.6–10.5)
CHLORIDE SERPL-SCNC: 101 MMOL/L (ref 98–107)
CO2 SERPL-SCNC: 25 MMOL/L (ref 22–29)
CREAT SERPL-MCNC: 1.02 MG/DL (ref 0.76–1.27)
CRP SERPL-MCNC: 4.58 MG/DL (ref 0–0.5)
D-LACTATE SERPL-SCNC: 1.5 MMOL/L (ref 0.5–2)
DEPRECATED RDW RBC AUTO: 47.8 FL (ref 37–54)
EGFRCR SERPLBLD CKD-EPI 2021: 78.1 ML/MIN/1.73
EOSINOPHIL # BLD AUTO: 0.09 10*3/MM3 (ref 0–0.4)
EOSINOPHIL NFR BLD AUTO: 0.6 % (ref 0.3–6.2)
ERYTHROCYTE [DISTWIDTH] IN BLOOD BY AUTOMATED COUNT: 14.2 % (ref 12.3–15.4)
GLOBULIN UR ELPH-MCNC: 3 GM/DL
GLUCOSE SERPL-MCNC: 106 MG/DL (ref 65–99)
HCT VFR BLD AUTO: 43.2 % (ref 37.5–51)
HGB BLD-MCNC: 13.8 G/DL (ref 13–17.7)
IMM GRANULOCYTES # BLD AUTO: 0.06 10*3/MM3 (ref 0–0.05)
IMM GRANULOCYTES NFR BLD AUTO: 0.4 % (ref 0–0.5)
LYMPHOCYTES # BLD AUTO: 2.07 10*3/MM3 (ref 0.7–3.1)
LYMPHOCYTES NFR BLD AUTO: 14 % (ref 19.6–45.3)
MCH RBC QN AUTO: 29 PG (ref 26.6–33)
MCHC RBC AUTO-ENTMCNC: 31.9 G/DL (ref 31.5–35.7)
MCV RBC AUTO: 90.8 FL (ref 79–97)
MONOCYTES # BLD AUTO: 1.28 10*3/MM3 (ref 0.1–0.9)
MONOCYTES NFR BLD AUTO: 8.7 % (ref 5–12)
NEUTROPHILS NFR BLD AUTO: 11.23 10*3/MM3 (ref 1.7–7)
NEUTROPHILS NFR BLD AUTO: 76 % (ref 42.7–76)
NRBC BLD AUTO-RTO: 0 /100 WBC (ref 0–0.2)
PLATELET # BLD AUTO: 214 10*3/MM3 (ref 140–450)
PMV BLD AUTO: 9.6 FL (ref 6–12)
POTASSIUM SERPL-SCNC: 4.1 MMOL/L (ref 3.5–5.2)
PROCALCITONIN SERPL-MCNC: 0.08 NG/ML (ref 0–0.25)
PROT SERPL-MCNC: 7.3 G/DL (ref 6–8.5)
RBC # BLD AUTO: 4.76 10*6/MM3 (ref 4.14–5.8)
SODIUM SERPL-SCNC: 140 MMOL/L (ref 136–145)
WBC NRBC COR # BLD AUTO: 14.77 10*3/MM3 (ref 3.4–10.8)

## 2025-07-26 PROCEDURE — 85025 COMPLETE CBC W/AUTO DIFF WBC: CPT | Performed by: FAMILY MEDICINE

## 2025-07-26 PROCEDURE — 96375 TX/PRO/DX INJ NEW DRUG ADDON: CPT

## 2025-07-26 PROCEDURE — 25510000001 IOPAMIDOL 61 % SOLUTION: Performed by: FAMILY MEDICINE

## 2025-07-26 PROCEDURE — 83605 ASSAY OF LACTIC ACID: CPT | Performed by: FAMILY MEDICINE

## 2025-07-26 PROCEDURE — 70491 CT SOFT TISSUE NECK W/DYE: CPT

## 2025-07-26 PROCEDURE — 25010000002 LIDOCAINE PF 2% 2 % SOLUTION: Performed by: NURSE ANESTHETIST, CERTIFIED REGISTERED

## 2025-07-26 PROCEDURE — 25010000002 MORPHINE PER 10 MG: Performed by: FAMILY MEDICINE

## 2025-07-26 PROCEDURE — 93005 ELECTROCARDIOGRAM TRACING: CPT

## 2025-07-26 PROCEDURE — 25010000002 CEFTRIAXONE PER 250 MG: Performed by: PHYSICIAN ASSISTANT

## 2025-07-26 PROCEDURE — 84145 PROCALCITONIN (PCT): CPT

## 2025-07-26 PROCEDURE — 43235 EGD DIAGNOSTIC BRUSH WASH: CPT | Performed by: INTERNAL MEDICINE

## 2025-07-26 PROCEDURE — 25810000003 SODIUM CHLORIDE 0.9 % SOLUTION: Performed by: FAMILY MEDICINE

## 2025-07-26 PROCEDURE — 80053 COMPREHEN METABOLIC PANEL: CPT | Performed by: FAMILY MEDICINE

## 2025-07-26 PROCEDURE — 99285 EMERGENCY DEPT VISIT HI MDM: CPT

## 2025-07-26 PROCEDURE — G0378 HOSPITAL OBSERVATION PER HR: HCPCS

## 2025-07-26 PROCEDURE — 86140 C-REACTIVE PROTEIN: CPT

## 2025-07-26 PROCEDURE — 25810000003 LACTATED RINGERS PER 1000 ML: Performed by: NURSE ANESTHETIST, CERTIFIED REGISTERED

## 2025-07-26 PROCEDURE — 25010000002 DEXAMETHASONE PER 1 MG: Performed by: PHYSICIAN ASSISTANT

## 2025-07-26 PROCEDURE — 25010000002 ONDANSETRON PER 1 MG: Performed by: FAMILY MEDICINE

## 2025-07-26 PROCEDURE — 93010 ELECTROCARDIOGRAM REPORT: CPT | Performed by: INTERNAL MEDICINE

## 2025-07-26 PROCEDURE — 25010000002 PROPOFOL 1000 MG/100ML EMULSION: Performed by: NURSE ANESTHETIST, CERTIFIED REGISTERED

## 2025-07-26 PROCEDURE — 71045 X-RAY EXAM CHEST 1 VIEW: CPT

## 2025-07-26 PROCEDURE — 87040 BLOOD CULTURE FOR BACTERIA: CPT | Performed by: FAMILY MEDICINE

## 2025-07-26 PROCEDURE — 96365 THER/PROPH/DIAG IV INF INIT: CPT

## 2025-07-26 PROCEDURE — 25810000003 SODIUM CHLORIDE 0.9 % SOLUTION

## 2025-07-26 RX ORDER — SODIUM CHLORIDE 0.9 % (FLUSH) 0.9 %
10 SYRINGE (ML) INJECTION AS NEEDED
Status: DISCONTINUED | OUTPATIENT
Start: 2025-07-26 | End: 2025-07-28

## 2025-07-26 RX ORDER — SODIUM CHLORIDE 9 MG/ML
40 INJECTION, SOLUTION INTRAVENOUS AS NEEDED
Status: CANCELLED | OUTPATIENT
Start: 2025-07-26

## 2025-07-26 RX ORDER — IOPAMIDOL 612 MG/ML
100 INJECTION, SOLUTION INTRAVASCULAR
Status: COMPLETED | OUTPATIENT
Start: 2025-07-26 | End: 2025-07-26

## 2025-07-26 RX ORDER — DEXAMETHASONE SODIUM PHOSPHATE 10 MG/ML
10 INJECTION, SOLUTION INTRA-ARTICULAR; INTRALESIONAL; INTRAMUSCULAR; INTRAVENOUS; SOFT TISSUE ONCE
Status: COMPLETED | OUTPATIENT
Start: 2025-07-26 | End: 2025-07-26

## 2025-07-26 RX ORDER — SODIUM CHLORIDE 9 MG/ML
100 INJECTION, SOLUTION INTRAVENOUS CONTINUOUS
Status: CANCELLED | OUTPATIENT
Start: 2025-07-26 | End: 2025-07-27

## 2025-07-26 RX ORDER — FLUMAZENIL 0.1 MG/ML
0.2 INJECTION INTRAVENOUS AS NEEDED
Status: DISCONTINUED | OUTPATIENT
Start: 2025-07-26 | End: 2025-07-26 | Stop reason: HOSPADM

## 2025-07-26 RX ORDER — SODIUM CHLORIDE, SODIUM LACTATE, POTASSIUM CHLORIDE, CALCIUM CHLORIDE 600; 310; 30; 20 MG/100ML; MG/100ML; MG/100ML; MG/100ML
INJECTION, SOLUTION INTRAVENOUS CONTINUOUS PRN
Status: DISCONTINUED | OUTPATIENT
Start: 2025-07-26 | End: 2025-07-26 | Stop reason: SURG

## 2025-07-26 RX ORDER — LABETALOL HYDROCHLORIDE 5 MG/ML
5 INJECTION, SOLUTION INTRAVENOUS
Status: DISCONTINUED | OUTPATIENT
Start: 2025-07-26 | End: 2025-07-26 | Stop reason: HOSPADM

## 2025-07-26 RX ORDER — HYDROCODONE BITARTRATE AND ACETAMINOPHEN 10; 325 MG/1; MG/1
1 TABLET ORAL EVERY 4 HOURS PRN
Status: DISCONTINUED | OUTPATIENT
Start: 2025-07-26 | End: 2025-07-26 | Stop reason: HOSPADM

## 2025-07-26 RX ORDER — SODIUM CHLORIDE 9 MG/ML
100 INJECTION, SOLUTION INTRAVENOUS CONTINUOUS
Status: DISCONTINUED | OUTPATIENT
Start: 2025-07-26 | End: 2025-07-27

## 2025-07-26 RX ORDER — ONDANSETRON 2 MG/ML
4 INJECTION INTRAMUSCULAR; INTRAVENOUS ONCE
Status: COMPLETED | OUTPATIENT
Start: 2025-07-26 | End: 2025-07-26

## 2025-07-26 RX ORDER — FENTANYL CITRATE 50 UG/ML
50 INJECTION, SOLUTION INTRAMUSCULAR; INTRAVENOUS
Status: DISCONTINUED | OUTPATIENT
Start: 2025-07-26 | End: 2025-07-26 | Stop reason: HOSPADM

## 2025-07-26 RX ORDER — SODIUM CHLORIDE 0.9 % (FLUSH) 0.9 %
10 SYRINGE (ML) INJECTION EVERY 12 HOURS SCHEDULED
Status: DISCONTINUED | OUTPATIENT
Start: 2025-07-26 | End: 2025-07-28 | Stop reason: HOSPADM

## 2025-07-26 RX ORDER — VALSARTAN 80 MG/1
160 TABLET ORAL DAILY
Status: DISCONTINUED | OUTPATIENT
Start: 2025-07-27 | End: 2025-07-28 | Stop reason: HOSPADM

## 2025-07-26 RX ORDER — SODIUM CHLORIDE 0.9 % (FLUSH) 0.9 %
10 SYRINGE (ML) INJECTION AS NEEDED
Status: DISCONTINUED | OUTPATIENT
Start: 2025-07-26 | End: 2025-07-28 | Stop reason: HOSPADM

## 2025-07-26 RX ORDER — IBUPROFEN 600 MG/1
600 TABLET, FILM COATED ORAL EVERY 6 HOURS PRN
Status: DISCONTINUED | OUTPATIENT
Start: 2025-07-26 | End: 2025-07-26 | Stop reason: HOSPADM

## 2025-07-26 RX ORDER — PROPOFOL 10 MG/ML
INJECTION, EMULSION INTRAVENOUS AS NEEDED
Status: DISCONTINUED | OUTPATIENT
Start: 2025-07-26 | End: 2025-07-26 | Stop reason: SURG

## 2025-07-26 RX ORDER — NITROGLYCERIN 0.4 MG/1
0.4 TABLET SUBLINGUAL
Status: DISCONTINUED | OUTPATIENT
Start: 2025-07-26 | End: 2025-07-28 | Stop reason: HOSPADM

## 2025-07-26 RX ORDER — NALOXONE HCL 0.4 MG/ML
0.4 VIAL (ML) INJECTION AS NEEDED
Status: DISCONTINUED | OUTPATIENT
Start: 2025-07-26 | End: 2025-07-26 | Stop reason: HOSPADM

## 2025-07-26 RX ORDER — ONDANSETRON 2 MG/ML
4 INJECTION INTRAMUSCULAR; INTRAVENOUS ONCE AS NEEDED
Status: DISCONTINUED | OUTPATIENT
Start: 2025-07-26 | End: 2025-07-26 | Stop reason: HOSPADM

## 2025-07-26 RX ORDER — SODIUM CHLORIDE 0.9 % (FLUSH) 0.9 %
10 SYRINGE (ML) INJECTION AS NEEDED
Status: CANCELLED | OUTPATIENT
Start: 2025-07-26

## 2025-07-26 RX ORDER — SODIUM CHLORIDE 0.9 % (FLUSH) 0.9 %
10 SYRINGE (ML) INJECTION EVERY 12 HOURS SCHEDULED
Status: CANCELLED | OUTPATIENT
Start: 2025-07-26

## 2025-07-26 RX ORDER — ROSUVASTATIN CALCIUM 20 MG/1
20 TABLET, COATED ORAL NIGHTLY
Status: DISCONTINUED | OUTPATIENT
Start: 2025-07-26 | End: 2025-07-28 | Stop reason: HOSPADM

## 2025-07-26 RX ORDER — HYDROCODONE BITARTRATE AND ACETAMINOPHEN 5; 325 MG/1; MG/1
1 TABLET ORAL EVERY 4 HOURS PRN
Status: DISCONTINUED | OUTPATIENT
Start: 2025-07-26 | End: 2025-07-26 | Stop reason: HOSPADM

## 2025-07-26 RX ORDER — LIDOCAINE HYDROCHLORIDE 20 MG/ML
INJECTION, SOLUTION EPIDURAL; INFILTRATION; INTRACAUDAL; PERINEURAL AS NEEDED
Status: DISCONTINUED | OUTPATIENT
Start: 2025-07-26 | End: 2025-07-26 | Stop reason: SURG

## 2025-07-26 RX ORDER — LIDOCAINE HYDROCHLORIDE 40 MG/ML
SOLUTION TOPICAL EVERY 4 HOURS PRN
Status: DISCONTINUED | OUTPATIENT
Start: 2025-07-26 | End: 2025-07-28 | Stop reason: HOSPADM

## 2025-07-26 RX ORDER — SODIUM CHLORIDE 9 MG/ML
40 INJECTION, SOLUTION INTRAVENOUS AS NEEDED
Status: DISCONTINUED | OUTPATIENT
Start: 2025-07-26 | End: 2025-07-28 | Stop reason: HOSPADM

## 2025-07-26 RX ADMIN — CEFTRIAXONE SODIUM 1000 MG: 1 INJECTION, POWDER, FOR SOLUTION INTRAMUSCULAR; INTRAVENOUS at 19:07

## 2025-07-26 RX ADMIN — Medication 10 ML: at 22:33

## 2025-07-26 RX ADMIN — SODIUM CHLORIDE, POTASSIUM CHLORIDE, SODIUM LACTATE AND CALCIUM CHLORIDE: 600; 310; 30; 20 INJECTION, SOLUTION INTRAVENOUS at 20:12

## 2025-07-26 RX ADMIN — PROPOFOL 100 MG: 10 INJECTION, EMULSION INTRAVENOUS at 20:16

## 2025-07-26 RX ADMIN — MORPHINE SULFATE 4 MG: 4 INJECTION, SOLUTION INTRAMUSCULAR; INTRAVENOUS at 15:37

## 2025-07-26 RX ADMIN — LIDOCAINE HYDROCHLORIDE 80 MG: 20 INJECTION, SOLUTION EPIDURAL; INFILTRATION; INTRACAUDAL; PERINEURAL at 20:16

## 2025-07-26 RX ADMIN — IOPAMIDOL 100 ML: 612 INJECTION, SOLUTION INTRAVENOUS at 15:57

## 2025-07-26 RX ADMIN — DEXAMETHASONE SODIUM PHOSPHATE 10 MG: 10 INJECTION INTRAMUSCULAR; INTRAVENOUS at 15:37

## 2025-07-26 RX ADMIN — SODIUM CHLORIDE 1000 ML: 9 INJECTION, SOLUTION INTRAVENOUS at 15:35

## 2025-07-26 RX ADMIN — SODIUM CHLORIDE 100 ML/HR: 9 INJECTION, SOLUTION INTRAVENOUS at 22:31

## 2025-07-26 RX ADMIN — ONDANSETRON 4 MG: 2 INJECTION, SOLUTION INTRAMUSCULAR; INTRAVENOUS at 15:37

## 2025-07-26 NOTE — ED PROVIDER NOTES
Subjective   History of Present Illness 68-year-old male presents to the ER with difficulty swallowing and sore throat that began on Wednesday and progressively worsened till 4 AM this morning.  He states that he cannot get any solid foods down and cannot drink water it comes right back up can handle secretions they come back up.  Is not vomiting any thing but he certainly cannot get anything down.  No fever chills cough congestion chest pain or shortness of breath.  Patient has history of hyperlipidemia A-fib obstructive sleep apnea diverticulitis hypertension.  Allergic to penicillin as a child just a rash.  Current medications are Eliquis for A-fib valsartan rosuvastatin HCTZ and vitamin D3.    Review of Systems possible retropharyngeal abscess.  Positive for dysphagia.  No fever chills cough congestion chest pain shortness of breath.  No nausea vomit diarrhea abdominal pain.    Past Medical History:   Diagnosis Date    Abnormal ECG     Allergic     Arrhythmia     Arthritis     Atrial fibrillation     Cancer     SKIN    Cataract     Diverticulitis     Hyperlipidemia     Hypertension     Kidney stones     Migraines     Nonrheumatic mitral (valve) insufficiency     Obesity     Pulmonary embolism 8-30-21    [facility] ER    Sleep apnea     C-PAP    Spinal headache        Allergies   Allergen Reactions    Levofloxacin Other (See Comments)     Pain in legs     Penicillins Unknown - Low Severity     Pt states is was a childhood allergy    Vancomycin Itching       Past Surgical History:   Procedure Laterality Date    ABLATION OF DYSRHYTHMIC FOCUS      APPENDECTOMY      BACK SURGERY  01/2022    CARDIAC ABLATION  07/26/2024    CARDIAC ABLATION  10/2024    with     CARDIAC CATHETERIZATION      CARDIAC ELECTROPHYSIOLOGY PROCEDURE N/A 10/01/2021    Procedure: NEW PACEMAKER IMPLANTATION;  Surgeon: Mitch Hernández MD;  Location:  [facility] CATH INVASIVE LOCATION;  Service: Cardiology;  Laterality: N/A;     CARDIOVERSION      CHOLECYSTECTOMY      COLONOSCOPY N/A 2017    Procedure: COLONOSCOPY;  Surgeon: Billy Robbins DO;  Location: [facility] ENDOSCOPY;  Service:     COLONOSCOPY N/A 2021    Procedure: COLONOSCOPY;  Surgeon: Billy Robbins DO;  Location: [facility] ENDOSCOPY;  Service: Gastroenterology;  Laterality: N/A;    COLONOSCOPY N/A 2025    Procedure: COLONOSCOPY WITH ANESTHESIA- (examination of colon);  Surgeon: Luis Bright DO;  Location: [facility] ENDOSCOPY;  Service: Gastroenterology;  Laterality: N/A;  pre polyp  post diverticulosis    EP STUDY      INSERT / REPLACE / REMOVE PACEMAKER  10-1-21    geraldine mcintyre pacemaker    JOINT REPLACEMENT  23    KNEE SURGERY Right     X2    SHOULDER SURGERY Left     SPINE SURGERY      TOTAL KNEE ARTHROPLASTY Right 2023    Procedure: RIGHT TOTAL KNEE ARTHROPLASTY;  Surgeon: Pepe Vaughn MD;  Location: [facility] OR;  Service: Orthopedics;  Laterality: Right;       Family History   Problem Relation Age of Onset    Cancer Mother         Colon    Hypertension Mother     Diabetes Father     Cancer Father         Prostate    Dementia Father     Hypertension Father     Colon cancer Father     Diabetes Sister     Diabetes Brother     Cancer Brother         Prostate    Cancer Brother         Prostate    Diabetes Brother     Colon polyps Neg Hx     Esophageal cancer Neg Hx        Social History     Socioeconomic History    Marital status:     Highest education level: GED or equivalent   Tobacco Use    Smoking status: Former     Current packs/day: 0.00     Average packs/day: 0.3 packs/day for 4.0 years (1.0 ttl pk-yrs)     Types: Cigarettes     Start date: 1969     Quit date: 1973     Years since quittin.6     Passive exposure: Past    Smokeless tobacco: Never   Vaping Use    Vaping status: Never Used   Substance and Sexual Activity    Alcohol use: No    Drug use: No    Sexual activity: Defer           Objective    Physical Exam  HEENT: Atraumatic normocephalic, EOMI PERRL, nares patent, mucous membranes moist, TMs and external auditory canals are without erythema edema or exudate bilaterally, tonsils are without erythema edema or exudate bilaterally and uvula is midline.  Neck: Bilateral anterior cervical lymphadenopathy  Chest: Clear to auscultation bilaterally without wheezing rhonchi or rales  Cardiovascular: Regular rate and rhythm without murmur  Abdomen: Soft nontender good bowel sounds no hernia and no CVA tenderness.  Skeletal: Full active range of motion with good distal pulses.  Skin: No rash  Neuro: Alert and orient x 3.    Procedures           ED Course                                        CMP is negative, lactic is 1.5, CBC shows white blood count of 14.77 hemoglobin 13.8, hematocrit 43.2 and platelets are 214.      CT soft tissue of the neck with IV contrast IMPRESSION:  1. No soft tissue mass is seen in the neck. No lymphadenopathy is seen.  There is no abnormal fluid collection.  2. The visualized esophagus extending into the upper chest is normal in  caliber.  3. Bilateral thyroid nodules. The largest is on the right measuring 2.4  cm. Follow-up outpatient ultrasound recommended to further evaluate if  not performed previously.  4. Degenerative changes of the visualized spine. Narrowing and mild  spurring of the right AC joint.  5. Minimal atheromatous calcification of the visualized thoracic aorta.  Minimal calcified plaque in the left carotid bifurcation.    The CT soft tissue of the neck with IV contrast was negative for an abscess or lesion or mass that is compressing the esophagus.  So at this time it could be a obstruction that occurred slowly over time to where he is unable to eat foods drink water handle his secretions.  Spoke with Dr. Lee about this and he recommended calling GI.  I then spoke with Dr. Bright and he said he could come in and see the patient just check with house supervisor  about OR availability and have him call him back and we will go from there.  They have supervisor called us back and let us know that the OR is available.  I texted Dr. Bright to the phone number he gave me that everything is ready for him to come in and see this patient.  I will place patient to the OR Endo disposition. patient is agreeable to go to GI lab to have this evaluated and possibly a foreign body removed/food bolus removed.    Dr. Bright evaluated this patient and he did not have a esophageal foreign body.  He was concerned because patient saw the dysphagia so patient is up in PACU and needs to be transferred to hospitalist service for evaluation.  I spoke with Dr. Contreras and he needed to be added to the treatment team.  So we went ahead and ordered a bed for hospital service with a diagnosis of dysphagia.  The foreign body obstruction was ruled out by Dr. Bright.  While patient was in the ER he did not have any strokelike symptoms.    Medical Decision Making  Problems Addressed:  Dysphagia, unspecified type: complicated acute illness or injury  Esophageal obstruction: complicated acute illness or injury    Risk  Prescription drug management.        Final diagnoses:   Esophageal obstruction   Dysphagia, unspecified type       ED Disposition  ED Disposition       ED Disposition   Send to OR / Endo    Condition   --    Comment   --               No follow-up provider specified.       Medication List      No changes were made to your prescriptions during this visit.            Heraclio Peck PA-C  07/26/25 1920       Heraclio Peck PA-C  07/26/25 2118

## 2025-07-27 PROBLEM — J06.9 VIRAL UPPER RESPIRATORY TRACT INFECTION: Status: ACTIVE | Noted: 2025-07-27

## 2025-07-27 PROBLEM — K21.9 LARYNGOPHARYNGEAL REFLUX: Status: ACTIVE | Noted: 2025-07-27

## 2025-07-27 PROBLEM — E04.2 NONTOXIC MULTINODULAR GOITER: Status: ACTIVE | Noted: 2025-07-27

## 2025-07-27 LAB
ALBUMIN SERPL-MCNC: 3.9 G/DL (ref 3.5–5.2)
ALBUMIN/GLOB SERPL: 1.3 G/DL
ALP SERPL-CCNC: 46 U/L (ref 39–117)
ALT SERPL W P-5'-P-CCNC: 23 U/L (ref 1–41)
ANION GAP SERPL CALCULATED.3IONS-SCNC: 14 MMOL/L (ref 5–15)
AST SERPL-CCNC: 14 U/L (ref 1–40)
BILIRUB SERPL-MCNC: 0.5 MG/DL (ref 0–1.2)
BUN SERPL-MCNC: 12.7 MG/DL (ref 8–23)
BUN/CREAT SERPL: 14.6 (ref 7–25)
CALCIUM SPEC-SCNC: 9 MG/DL (ref 8.6–10.5)
CHLORIDE SERPL-SCNC: 101 MMOL/L (ref 98–107)
CO2 SERPL-SCNC: 24 MMOL/L (ref 22–29)
CREAT SERPL-MCNC: 0.87 MG/DL (ref 0.76–1.27)
DEPRECATED RDW RBC AUTO: 47.7 FL (ref 37–54)
EGFRCR SERPLBLD CKD-EPI 2021: 91.7 ML/MIN/1.73
ERYTHROCYTE [DISTWIDTH] IN BLOOD BY AUTOMATED COUNT: 14.1 % (ref 12.3–15.4)
GLOBULIN UR ELPH-MCNC: 3 GM/DL
GLUCOSE SERPL-MCNC: 162 MG/DL (ref 65–99)
HCT VFR BLD AUTO: 41.7 % (ref 37.5–51)
HETEROPH AB SER QL LA: NEGATIVE
HGB BLD-MCNC: 13 G/DL (ref 13–17.7)
MAGNESIUM SERPL-MCNC: 2.2 MG/DL (ref 1.6–2.4)
MCH RBC QN AUTO: 28.8 PG (ref 26.6–33)
MCHC RBC AUTO-ENTMCNC: 31.2 G/DL (ref 31.5–35.7)
MCV RBC AUTO: 92.3 FL (ref 79–97)
PHOSPHATE SERPL-MCNC: 3.4 MG/DL (ref 2.5–4.5)
PLATELET # BLD AUTO: 204 10*3/MM3 (ref 140–450)
PMV BLD AUTO: 10 FL (ref 6–12)
POTASSIUM SERPL-SCNC: 4.4 MMOL/L (ref 3.5–5.2)
PROT SERPL-MCNC: 6.9 G/DL (ref 6–8.5)
QT INTERVAL: 394 MS
QTC INTERVAL: 413 MS
RBC # BLD AUTO: 4.52 10*6/MM3 (ref 4.14–5.8)
SODIUM SERPL-SCNC: 139 MMOL/L (ref 136–145)
WBC NRBC COR # BLD AUTO: 14 10*3/MM3 (ref 3.4–10.8)

## 2025-07-27 PROCEDURE — 84100 ASSAY OF PHOSPHORUS: CPT

## 2025-07-27 PROCEDURE — 87081 CULTURE SCREEN ONLY: CPT | Performed by: NURSE PRACTITIONER

## 2025-07-27 PROCEDURE — 63710000001 DEXAMETHASONE PER 0.25 MG: Performed by: INTERNAL MEDICINE

## 2025-07-27 PROCEDURE — 31575 DIAGNOSTIC LARYNGOSCOPY: CPT | Performed by: OTOLARYNGOLOGY

## 2025-07-27 PROCEDURE — 80053 COMPREHEN METABOLIC PANEL: CPT

## 2025-07-27 PROCEDURE — G0378 HOSPITAL OBSERVATION PER HR: HCPCS

## 2025-07-27 PROCEDURE — 92610 EVALUATE SWALLOWING FUNCTION: CPT | Performed by: SPEECH-LANGUAGE PATHOLOGIST

## 2025-07-27 PROCEDURE — 99222 1ST HOSP IP/OBS MODERATE 55: CPT | Performed by: OTOLARYNGOLOGY

## 2025-07-27 PROCEDURE — 86308 HETEROPHILE ANTIBODY SCREEN: CPT | Performed by: NURSE PRACTITIONER

## 2025-07-27 PROCEDURE — 85027 COMPLETE CBC AUTOMATED: CPT

## 2025-07-27 PROCEDURE — 83735 ASSAY OF MAGNESIUM: CPT

## 2025-07-27 RX ORDER — HYDROCHLOROTHIAZIDE 25 MG/1
12.5 TABLET ORAL DAILY
Status: DISCONTINUED | OUTPATIENT
Start: 2025-07-27 | End: 2025-07-28 | Stop reason: HOSPADM

## 2025-07-27 RX ORDER — DEXAMETHASONE 4 MG/1
6 TABLET ORAL
Status: COMPLETED | OUTPATIENT
Start: 2025-07-27 | End: 2025-07-28

## 2025-07-27 RX ORDER — PANTOPRAZOLE SODIUM 40 MG/1
40 TABLET, DELAYED RELEASE ORAL
Status: DISCONTINUED | OUTPATIENT
Start: 2025-07-27 | End: 2025-07-28 | Stop reason: HOSPADM

## 2025-07-27 RX ADMIN — Medication 10 ML: at 21:35

## 2025-07-27 RX ADMIN — Medication 10 ML: at 09:20

## 2025-07-27 RX ADMIN — APIXABAN 5 MG: 5 TABLET, FILM COATED ORAL at 21:35

## 2025-07-27 RX ADMIN — ROSUVASTATIN CALCIUM 20 MG: 20 TABLET, FILM COATED ORAL at 21:35

## 2025-07-27 RX ADMIN — VALSARTAN 160 MG: 80 TABLET, FILM COATED ORAL at 09:16

## 2025-07-27 RX ADMIN — PANTOPRAZOLE SODIUM 40 MG: 40 TABLET, DELAYED RELEASE ORAL at 17:33

## 2025-07-27 RX ADMIN — APIXABAN 5 MG: 5 TABLET, FILM COATED ORAL at 08:19

## 2025-07-27 RX ADMIN — DEXAMETHASONE 6 MG: 4 TABLET ORAL at 17:33

## 2025-07-27 RX ADMIN — HYDROCHLOROTHIAZIDE 12.5 MG: 25 TABLET ORAL at 12:44

## 2025-07-27 RX ADMIN — PHENOL 1 SPRAY: 1.5 LIQUID ORAL at 09:17

## 2025-07-27 NOTE — CONSULTS
Consults    History of Present Illness  The patient, a 70-year-old male, presents for evaluation of pharyngitis. He is accompanied by his wife, niece, and nephew.    The patient reports the onset of severe pharyngitis on Wednesday, with a marked increase in severity by Thursday. He describes the pain as resembling the sensation of swallowing razor blades. A medical consultation on Friday revealed erythema in the pharyngeal region, drainage, and effusion in the ears. The patient reports odynophagia, though the pain does not radiate to the chest. He also describes a sensation of aural fullness without otalgia. He denies pyrexia but reports malaise akin to influenza. Additionally, he has experienced dysphonia since the onset of symptoms. He was prescribed azithromycin and did not receive corticosteroids due to a recent steroid injection for his back.    The patient occasionally experiences pyrosis but does not suffer from gastroesophageal reflux disease (GERD). He has had nocturnal episodes of eructation. He is not currently on any medication for GERD.    The patient recalls a tick bite approximately 3 months ago, which did not result in any complications such as pruritus or rash.    PAST SURGICAL HISTORY:  Steroid injection for back pain: 07/2025    SOCIAL HISTORY  He is a former smoker.      ENT Physical Exam  Constitutional  Appearance: patient appears well-developed and well-nourished,  Communication/Voice: communication appropriate for developmental age; vocal quality normal;  Head and Face  Appearance: head appears normal, face appears normal and face appears atraumatic;  Palpation: facial palpation normal;  Salivary: glands normal;  Ear  Hearing: intact;  Auricles: right auricle normal; left auricle normal;  External Mastoids: right external mastoid normal; left external mastoid normal;  Nose  External Nose: nares patent bilaterally; external nose normal;  Internal Nose: bilateral intranasal mucosa edematous;  bilateral inferior turbinates edematous;  Oral Cavity/Oropharynx  Lips: normal;  Neck  Neck: neck normal;  Respiratory  Inspection: breathing unlabored; normal breathing rate;  Cardiovascular  Inspection: extremities are warm and well perfused; no peripheral edema present;  Neurovestibular  Mental Status: alert and oriented;  Psychiatric: mood normal; affect is appropriate;     PROCEDURE  Flexible Laryngoscopy    All questions were answered and agreement to proceed was given after the following Pre-Procedure details were reviewed:  - Risks and Benefits: Discussed potential discomfort during the procedure, benefits of visualizing the throat and vocal cords to identify any abnormalities.  - Side effects: Potential minor discomfort or gagging during the procedure.  - Consent: Verbal consent obtained.    Intra-Procedure:  - Time-Out: Confirmed patient identity and procedure to be performed.    Procedure description:  Multiple laryngoscopy was carried out through the right nasal cavity examining the structures of the nasopharynx, oropharynx and larynx.  Findings were as noted  Secretions and inflammation in the nasopharynx  Base of tongue and vallecula within normal limits.  Supraglottic structures within normal limits including the epiglottis and aryepiglottic folds  False cords and true vocal cords were within normal limits  Post cricoid edema and pachydermia change of the interarytenoid area suggestive of inflammation and gastroesophageal reflux  No mass or lesion seen  Findings consistent with laryngitis    Post-Procedure:  - Tolerance Level: Patient tolerated the procedure well.  - Complications: None    Results  - Labs:    - White Blood Cell Count (07/27/2025): Elevated at 14 this morning and 14.77 at 15:09    - Absolute Neutrophil Count (07/27/2025): Elevated at 11.23, percentage normal at 76%    - Rapid Strep Test (07/25/2025): Negative    - Monospot Evaluation (07/27/2025): Negative    - Imaging:    - CT scan of  the neck, soft tissue with contrast (07/26/2025):      - No soft tissue mass in the neck      - No lymphadenopathy      - Normal esophagus      - Bilateral thyroid nodules with the largest measuring 2.4 cm      - Degenerative changes of the visualized spine      - Atheromatous calcification of the thoracic aorta and left carotid bifurcation      - Postcricoid edema      - Mild right-sided level 5 lymphadenopathy and left-sided level 4 and level 5 lymphadenopathy      - No oropharyngeal mass      - No laryngeal mass visualized       Hospital Problems:     Dysphagia    Viral upper respiratory tract infection    Laryngopharyngeal reflux    Nontoxic multinodular goiter       Assessment & Plan  Acute laryngitis  Symptoms suggest an upper respiratory illness leading to laryngeal inflammation.  Flexible laryngoscopy revealed irritation and thick secretions.  A dose of steroids is recommended to reduce inflammation. Acid reflux medication will be initiated to manage potential contributing factors. Adequate hydration is advised to prevent dehydration.    Bilateral thyroid nodules  CT scan of the neck revealed bilateral thyroid nodules, with the largest measuring 2.4 cm.  Continued monitoring of the nodules is recommended.  Consider outpatient ultrasound.    Mild lymphadenopathy  CT scan showed mild right-sided level 5 lymphadenopathy and left-sided level 4 and level 5 lymphadenopathy.  No immediate intervention is required.  This is probably secondary to the acute inflammation and reactive in nature.    Gastroesophageal reflux disease  Occasional heartburn and a history of acid reflux were reported.  Acid reflux medication will be initiated to manage symptoms and prevent further irritation of the larynx.    Follow-up: Okay to discharge from ENT standpoint since he is swallowing liquids currently.  Follow-up in clinic to monitor progress.    - Home Care Instructions: Advised to follow up with acid reflux medication and  steroids as discussed. Monitor for any new or worsening symptoms and maintain adequate hydration.

## 2025-07-27 NOTE — H&P
[facility] Gastroenterology  Initial Inpatient Consult Note    Referring Provider: No ref. provider found    Reason for Consultation: Probable Food Bolus    Subjective     History of present illness:      Raghav Olivier is a 70 y.o. male that presented to [facility] ER with complaint of sudden onset in difficulty swallowing.  GI was called to evaluate for probable food bolus.  He is unable to tolerate anything by mouth at this time.  No alleviating factors.   Raghav Olivier has not had similar episodes in the past.  He does not have difficulty with GERD related symptoms.  He does not know have a history of peptic ulcer disease.        Past Medical History:  Past Medical History:   Diagnosis Date    Abnormal ECG     Allergic     Arrhythmia     Arthritis     Atrial fibrillation     Cancer     SKIN    Cataract     Diverticulitis     Hyperlipidemia     Hypertension     Kidney stones     Migraines     Nonrheumatic mitral (valve) insufficiency     Obesity     Pulmonary embolism 8-30-21    [facility] ER    Sleep apnea     C-PAP    Spinal headache        Past Surgical History:  Past Surgical History:   Procedure Laterality Date    ABLATION OF DYSRHYTHMIC FOCUS      APPENDECTOMY      BACK SURGERY  01/2022    CARDIAC ABLATION  07/26/2024    CARDIAC ABLATION  10/2024    with     CARDIAC CATHETERIZATION      CARDIAC ELECTROPHYSIOLOGY PROCEDURE N/A 10/01/2021    Procedure: NEW PACEMAKER IMPLANTATION;  Surgeon: Mitch Hernández MD;  Location: [facility] CATH INVASIVE LOCATION;  Service: Cardiology;  Laterality: N/A;    CARDIOVERSION      CHOLECYSTECTOMY      COLONOSCOPY N/A 07/31/2017    Procedure: COLONOSCOPY;  Surgeon: Billy Robbins DO;  Location: [facility] ENDOSCOPY;  Service:     COLONOSCOPY N/A 07/28/2021    Procedure: COLONOSCOPY;  Surgeon: Billy Robbins DO;  Location: [facility] ENDOSCOPY;  Service: Gastroenterology;  Laterality: N/A;    COLONOSCOPY N/A 6/24/2025    Procedure: COLONOSCOPY WITH  ANESTHESIA- (examination of colon);  Surgeon: Luis Bright DO;  Location:  [facility] ENDOSCOPY;  Service: Gastroenterology;  Laterality: N/A;  pre polyp  post diverticulosis    EP STUDY      INSERT / REPLACE / REMOVE PACEMAKER  10-1-21    geraldine mcintyre pacemaker    JOINT REPLACEMENT  23    KNEE SURGERY Right     X2    SHOULDER SURGERY Left     SPINE SURGERY      TOTAL KNEE ARTHROPLASTY Right 2023    Procedure: RIGHT TOTAL KNEE ARTHROPLASTY;  Surgeon: Pepe Vaughn MD;  Location: [facility] OR;  Service: Orthopedics;  Laterality: Right;        Social History:   Social History     Tobacco Use    Smoking status: Former     Current packs/day: 0.00     Average packs/day: 0.3 packs/day for 4.0 years (1.0 ttl pk-yrs)     Types: Cigarettes     Start date: 1969     Quit date: 1973     Years since quittin.6     Passive exposure: Past    Smokeless tobacco: Never   Substance Use Topics    Alcohol use: No        Family History:  Family History   Problem Relation Age of Onset    Cancer Mother         Colon    Hypertension Mother     Diabetes Father     Cancer Father         Prostate    Dementia Father     Hypertension Father     Colon cancer Father     Diabetes Sister     Diabetes Brother     Cancer Brother         Prostate    Cancer Brother         Prostate    Diabetes Brother     Colon polyps Neg Hx     Esophageal cancer Neg Hx        Home Meds:  (Not in a hospital admission)      Current Meds:     Current Facility-Administered Medications:     [COMPLETED] Insert Peripheral IV, , , Once **AND** sodium chloride 0.9 % flush 10 mL, 10 mL, Intravenous, PRN, Rich Lee MD    Current Outpatient Medications:     acetaminophen (TYLENOL) 650 MG 8 hr tablet, Take 1 tablet by mouth Every 8 (Eight) Hours As Needed for Mild Pain. 1000mg twice a day, Disp: , Rfl:     apixaban (ELIQUIS) 5 MG tablet tablet, Take 1 tablet by mouth Every 12 (Twelve) Hours. Indications: Atrial Fibrillation, resume eliquis  saturday morning, Disp: , Rfl:     azithromycin (Zithromax Z-Sterling) 250 MG tablet, Take 2 tablets by mouth on day 1, then 1 tablet daily on days 2-5, Disp: 6 tablet, Rfl: 0    Cholecalciferol (Vitamin D3) 50 MCG (2000 UT) capsule, Daily., Disp: , Rfl:     hydroCHLOROthiazide (MICROZIDE) 12.5 MG capsule, Take 1 capsule by mouth Daily., Disp: 90 capsule, Rfl: 3    nitroglycerin (NITROSTAT) 0.4 MG SL tablet, 1 under the tongue as needed for angina, may repeat q5mins for up three doses, Disp: 25 tablet, Rfl: 3    phenol (CHLORASEPTIC) 1.4 % liquid liquid, Apply 1 spray to the mouth or throat Every 2 (Two) Hours As Needed (sore throat)., Disp: 118 mL, Rfl: 1    rosuvastatin (CRESTOR) 20 MG tablet, Take 1 tablet by mouth Every Night., Disp: 90 tablet, Rfl: 3    tiZANidine (ZANAFLEX) 4 MG tablet, Take 1 tablet by mouth Every 8 (Eight) Hours As Needed for Muscle Spasms., Disp: 90 tablet, Rfl: 0    valsartan (DIOVAN) 160 MG tablet, Take 1 tablet by mouth Daily., Disp: 90 tablet, Rfl: 3    Allergies:  Allergies   Allergen Reactions    Levofloxacin Other (See Comments)     Pain in legs     Penicillins Unknown - Low Severity     Pt states is was a childhood allergy    Vancomycin Itching       Vital Signs  Temp:  [97.7 °F (36.5 °C)] 97.7 °F (36.5 °C)  Heart Rate:  [70] 70  Resp:  [16] 16  BP: (148)/(78) 148/78      Review of Systems     Constitution:  negative for chills, fatigue and fevers  Eyes:  negative for blurriness and change of vision  ENT:   negative for sore throat and voice change  Respiratory: negative for  cough and shortness of air  Cardiovascular:  Negative for chest pain or palpitations  Gastrointestinal:  negative for  See HPI  Genitourinary:  negative for  blood in urine and painful urination  Integument: negative for  rash and redness  Hematologic / Lymphatic: negative for  excessive bleeding and easy bruising  Musculoskeletal: negative for  joint pain and joint stiffness out of the ordinary  Neurological:   negative for seizures and speech abnormal  Behavioral/Psych:  negative for anxiety and depression out of the ordinary  Endocrine: negative for  diabetes and weight loss, unintended  Allergies / Immunologic:  negative for  anaphylaxis and rash      Objective     Physical Exam:  General :    Alert, cooperative, in no acute distress   Head:    Normocephalic, without obvious abnormality, atraumatic   Eyes:            Lids and lashes normal, conjunctivae and sclerae normal, no   Icterus, conjunctival pallor   Throat:   No oral lesions, no thrush, oral mucosa moist, posterior oropharynx clear   Neck:   No adenopathy, supple, trachea midline, no thyromegaly, no   carotid bruit, no JVD   Lungs:     Clear to auscultation, respirations regular, even and                   unlabored    Heart:    Regular rhythm and normal rate, normal S1 and S2, no            murmur   Chest Wall:    No abnormalities observed   Abdomen:     Normal bowel sounds, no masses, no organomegaly, soft        nontender, nondistended, no guarding, no rebound                 tenderness   Rectal:     Deferred   Extremities:   no edema, no cyanosis   Skin:   No open lesions, bruising or rash   Lymph nodes:   No palpable cervical adenopathy   Psychiatric:  Judgement and insight: normal   Orientation to person place and time: normal   Mood and affect: normal        Results Review:    I have reviewed all of the patient's current test results  Results from last 7 days   Lab Units 07/26/25  1509   WBC 10*3/mm3 14.77*   HEMOGLOBIN g/dL 13.8   HEMATOCRIT % 43.2   PLATELETS 10*3/mm3 214       Results from last 7 days   Lab Units 07/26/25  1509 07/21/25  0822   SODIUM mmol/L 140 143   POTASSIUM mmol/L 4.1 4.1   BUN mg/dL 11.4 16   CREATININE mg/dL 1.02 1.05               Assessment & Plan       * No active hospital problems. *        Impression/Plan    Food Bolus    Plan for EGD in OR for removal.    Risks, benefits discussed with patient, He wishes to  proceed.      Luis Bright,   07/26/25  20:09 CDT

## 2025-07-27 NOTE — THERAPY EVALUATION
Acute Care - Speech Language Pathology   Swallow Initial Evaluation [facility]     Patient Name: Raghav Olivier  : 1955  MRN: [id number]  Today's Date: 2025               Admit Date: 2025  SPEECH-LANGUAGE PATHOLOGY EVALUATION - SWALLOW  Subjective: The patient was seen on this date for a Clinical Swallow evaluation.  Patient was alert and cooperative. Wife present. Patient repositioned himself on the edge of the bed for improved positioning for PO intake.   Significant history: Presented due to acute issues with swallowing. Reports sore throat since Wed. Unable to eat solids and liquids are coming back up. GI consult with upper GI completed  with normal findings. GI recommended clear liquids but patient help NPO due to failed dysphagia screening and difficulty tolerating his own secretions through the night. Medical history of sleep apnea with CPAP usage and prior surgical intervention likely UPPP surgery years ago.   Objective: Oral motor examination results: WFL.  Patient does not have uvula due to what sounds like UPPP surgery years ago. Reports mild difficulty swallowing following this surgery but baseline and well-managed since. Textures given during assessment of swallow function included thin liquid.  Assessment:   Observations: Consistent throat clearing noted prior to PO trials being presented. Patient reports his throat pain is improved and toleration of own secretions is much improved. He completed sips of thin water with throat clearing noted. Facial grimacing with swallows. No regurgitation of liquids. 1x delayed cough noted. Voice remained clear throughout. I suspect patient is either having irritation from throat pain vs an esophageal motility issue rather than an oropharyngeal dysphagia but I cannot rule this out at the bedside.   SLP Findings:  Patient presents with suspected esophageal dysphagia.   Recommendations: Diet Textures: clear liquid diet  Medications should be  taken whole with thin liquids.   Recommended Strategies: upright for PO, small bites and sips, and reflux precautions. Oral care 2x a day.  Other Recommended Evaluations: Dedicated esophageal assessment to assess motility of the esophagus.     Dysphagia therapy is recommended for diet toleration and education on compensations as needed.     Kim Brooks, MS CCC-SLP 7/27/2025 09:18 CDT    Visit Dx:     ICD-10-CM ICD-9-CM   1. Dysphagia, unspecified type  R13.10 787.20   2. Esophageal obstruction  K22.2 530.3   3. Esophageal dysphagia [R13.19]  R13.19 787.29     Patient Active Problem List   Diagnosis    Paroxysmal atrial fibrillation    Nonrheumatic mitral (valve) insufficiency    Essential hypertension    Hyperlipidemia    Dilated aortic root    TAMARA on CPAP    Severe obesity (BMI 35.0-39.9) with comorbidity    Lipoma    Long-term use of high-risk medication    Coronary artery disease involving native coronary artery of native heart without angina pectoris    LVH (left ventricular hypertrophy) moderate     Sinoatrial node dysfunction    Presence of cardiac pacemaker    Chronic obstructive pulmonary disease    Primary osteoarthritis of both knees    Spinal stenosis of lumbar region    Chronic diastolic heart failure    Chronic renal failure, stage 3a    Borderline diabetes    Aneurysm of ascending aorta without rupture    Aortic root aneurysm    History of adenomatous polyp of colon    Difficulty swallowing    Dysphagia     Past Medical History:   Diagnosis Date    Abnormal ECG     Allergic     Arrhythmia     Arthritis     Atrial fibrillation     Cancer     SKIN    Cataract     Diverticulitis     Hyperlipidemia     Hypertension     Kidney stones     Migraines     Nonrheumatic mitral (valve) insufficiency     Obesity     Pulmonary embolism 8-30-21    [place] [facility]    Sleep apnea     C-PAP    Spinal headache      Past Surgical History:   Procedure Laterality Date    ABLATION OF DYSRHYTHMIC FOCUS      APPENDECTOMY       BACK SURGERY  01/2022    CARDIAC ABLATION  07/26/2024    CARDIAC ABLATION  10/2024    with     CARDIAC CATHETERIZATION      CARDIAC ELECTROPHYSIOLOGY PROCEDURE N/A 10/01/2021    Procedure: NEW PACEMAKER IMPLANTATION;  Surgeon: Mitch Hernández MD;  Location: [facility] CATH INVASIVE LOCATION;  Service: Cardiology;  Laterality: N/A;    CARDIOVERSION      CHOLECYSTECTOMY      COLONOSCOPY N/A 07/31/2017    Procedure: COLONOSCOPY;  Surgeon: Billy Robbins DO;  Location: [facility] ENDOSCOPY;  Service:     COLONOSCOPY N/A 07/28/2021    Procedure: COLONOSCOPY;  Surgeon: Billy Robbins DO;  Location: [facility] ENDOSCOPY;  Service: Gastroenterology;  Laterality: N/A;    COLONOSCOPY N/A 6/24/2025    Procedure: COLONOSCOPY WITH ANESTHESIA- (examination of colon);  Surgeon: Luis Bright DO;  Location: [facility] ENDOSCOPY;  Service: Gastroenterology;  Laterality: N/A;  pre polyp  post diverticulosis    EP STUDY      INSERT / REPLACE / REMOVE PACEMAKER  10-1-21    geraldine mcintyre pacemaker    JOINT REPLACEMENT  04/09/23    KNEE SURGERY Right     X2    SHOULDER SURGERY Left 2009    SPINE SURGERY  00/00/22    TOTAL KNEE ARTHROPLASTY Right 04/06/2023    Procedure: RIGHT TOTAL KNEE ARTHROPLASTY;  Surgeon: Pepe Vaughn MD;  Location: [facility] OR;  Service: Orthopedics;  Laterality: Right;       SLP Recommendation and Plan  SLP Swallowing Diagnosis: functional oral phase, R/O pharyngeal dysphagia, suspected esophageal dysphagia (07/27/25 0818)  SLP Diet Recommendation: thin liquids, clear liquid diet (07/27/25 0818)  Recommended Precautions and Strategies: upright posture during/after eating, small bites of food and sips of liquid, general aspiration precautions, reflux precautions (07/27/25 0818)  SLP Rec. for Method of Medication Administration: meds whole, with thin liquids (07/27/25 0818)     Monitor for Signs of Aspiration: yes, notify SLP if any concerns (07/27/25 0818)  Recommended Diagnostics: other (see  comments) (dedicated esophageal assessment to assess motility) (07/27/25 0818)  Swallow Criteria for Skilled Therapeutic Interventions Met: demonstrates skilled criteria (07/27/25 0818)  Anticipated Discharge Disposition (SLP): home (07/27/25 0818)  Rehab Potential/Prognosis, Swallowing: adequate, monitor progress closely (07/27/25 0818)  Therapy Frequency (Swallow): PRN (07/27/25 0818)  Predicted Duration Therapy Intervention (Days): until discharge (07/27/25 0818)  Oral Care Recommendations: Oral Care BID/PRN, Toothbrush (07/27/25 0818)  Demonstrates Need for Referral to Another Service: dedicated esophageal assessment (to assess motility) (07/27/25 0818)  Swallowing Considerations per Physician Discretion: medical management of suspected esophageal dysphagia, as indicated (07/27/25 0818)                                  Progress: no change (Initial Evaluation)      SWALLOW EVALUATION (Last 72 Hours)       SLP Adult Swallow Evaluation       Row Name 07/27/25 0818                   Rehab Evaluation    Document Type evaluation  -MG        Subjective Information complains of  sore throat  -MG        Patient Observations alert;cooperative;agree to therapy  -MG        Patient/Family/Caregiver Comments/Observations Spouse present.  -MG        Patient Effort good  -MG        Symptoms Noted During/After Treatment none  -MG           General Information    Patient Profile Reviewed yes  -MG        Pertinent History Of Current Problem Presented due to acute issues with swallowing. Reports sore throat since Wed. Unable to eat solids and liquids are coming back up. GI consult with upper GI completed 7/26 with normal findings. GI recommended clear liquids but patient help NPO due to failed dysphagia screening and difficulty tolerating his own secretions through the night. Medical history of sleep apnea with CPAP usage and prior surgical intervention likely UPPP surgery years ago.  -MG        Current Method of Nutrition NPO  -MG         Precautions/Limitations, Vision WFL;for purposes of eval  -MG        Precautions/Limitations, Hearing WFL;for purposes of eval  -MG        Prior Level of Function-Communication WFL  -MG        Prior Level of Function-Swallowing no diet consistency restrictions  -MG        Plans/Goals Discussed with patient;spouse/S.O.;agreed upon  -MG        Barriers to Rehab none identified  -MG        Patient's Goals for Discharge return to PO diet  -MG        Family Goals for Discharge patient able to return to PO diet  -MG           Pain    Additional Documentation Pain Scale: FACES Pre/Post-Treatment (Group)  -MG           Pain Scale: FACES Pre/Post-Treatment    Pain: FACES Scale, Pretreatment 0-->no hurt  -MG        Posttreatment Pain Rating 0-->no hurt  -MG           Oral Motor Structure and Function    Dentition Assessment natural, present and adequate  -MG        Secretion Management WNL/WFL  -MG        Mucosal Quality moist, healthy  -MG        Volitional Swallow WFL  -MG        Volitional Cough WFL  -MG           Oral Musculature and Cranial Nerve Assessment    Oral Motor General Assessment WFL  -MG           General Eating/Swallowing Observations    Respiratory Support Currently in Use room air  -MG        Eating/Swallowing Skills self-fed  -MG        Positioning During Eating upright in bed  edge of bed  -MG        Utensils Used straw  -MG        Consistencies Trialed thin liquids  -MG           Clinical Swallow Eval    Oral Prep Phase WFL  -MG        Oral Transit WFL  -MG        Oral Residue WFL  -MG        Pharyngeal Phase no overt signs/symptoms of pharyngeal impairment  throat clearing present but suspect not related to intake as it was present prior to PO trials.  -MG        Esophageal Phase suspected esophageal impairment  -MG        Clinical Swallow Evaluation Summary See note  -MG           Esophageal Phase Concerns    Esophageal Phase Concerns sensation of material sticking  -MG        Sensation of  Material Sticking thin  -MG           SLP Evaluation Clinical Impression    SLP Swallowing Diagnosis functional oral phase;R/O pharyngeal dysphagia;suspected esophageal dysphagia  -MG        Functional Impact risk of aspiration/pneumonia;risk of malnutrition;risk of dehydration  -MG        Rehab Potential/Prognosis, Swallowing adequate, monitor progress closely  -MG        Swallow Criteria for Skilled Therapeutic Interventions Met demonstrates skilled criteria  -MG           Recommendations    Therapy Frequency (Swallow) PRN  -MG        Predicted Duration Therapy Intervention (Days) until discharge  -MG        SLP Diet Recommendation thin liquids;clear liquid diet  -MG        Recommended Diagnostics other (see comments)  dedicated esophageal assessment to assess motility  -MG        Recommended Precautions and Strategies upright posture during/after eating;small bites of food and sips of liquid;general aspiration precautions;reflux precautions  -MG        Oral Care Recommendations Oral Care BID/PRN;Toothbrush  -MG        SLP Rec. for Method of Medication Administration meds whole;with thin liquids  -MG        Monitor for Signs of Aspiration yes;notify SLP if any concerns  -MG        Anticipated Discharge Disposition (SLP) home  -MG        Demonstrates Need for Referral to Another Service dedicated esophageal assessment  to assess motility  -MG        Swallowing Considerations per Physician Discretion medical management of suspected esophageal dysphagia, as indicated  -MG                  User Key  (r) = Recorded By, (t) = Taken By, (c) = Cosigned By      Initials Name Effective Dates    MG Kim Brooks, MS [facility]-SLP 07/11/23 -                     EDUCATION  The patient has been educated in the following areas:   Dysphagia (Swallowing Impairment) Oral Care/Hydration Modified Diet Instruction.                Time Calculation:    Time Calculation- SLP       Row Name 07/27/25 0917             Time Calculation- SLP     SLP Start Time 0818  -MG      SLP Stop Time 0917  -MG      SLP Time Calculation (min) 59 min  -MG      SLP Received On 07/27/25  -MG      SLP Goal Re-Cert Due Date 08/06/25  -MG         Untimed Charges    [id number] Eval Oral Pharyng Swallow Minutes 59  -MG         Total Minutes    Untimed Charges Total Minutes 59  -MG       Total Minutes 59  -MG                User Key  (r) = Recorded By, (t) = Taken By, (c) = Cosigned By      Initials Name Provider Type    MG Kim Brooks, MS CCC-SLP Speech and Language Pathologist                    Therapy Charges for Today       Code Description Service Date Service Provider Modifiers Qty    [id number] HC ST EVAL ORAL PHARYNG SWALLOW 4 7/27/2025 Kim Brooks MS CCC-SLP GN 1                 Kim Brooks MS CCC-SLP  7/27/2025

## 2025-07-27 NOTE — PLAN OF CARE
Goal Outcome Evaluation:                 Pt arrived from surgery this evening. A/Ox4. Pt reported he wears CPAP at home for TAMARA. Offered to call RT for CPAP, pt declined & opted for 2L NC at HS. Pt had clear liquid diet orders upon arrival to floor however, performed swallow screen & pt failed. NPO d/t difficulty swallowing. Noted even swallowing saliva was difficult for the pt at times. IVF infusing per orders. Placed on tele-NSR. EKG complete. SLP eval to be completed in the morning. Up ad tolu in room. Voiding. Spouse at bedside. Call light in reach. Safety maintained.

## 2025-07-27 NOTE — ANESTHESIA PREPROCEDURE EVALUATION
Anesthesia Evaluation     Patient summary reviewed and Nursing notes reviewed   history of anesthetic complications:   NPO Solid Status: > 8 hours  NPO Liquid Status: > 8 hours           Airway   Mallampati: II  TM distance: >3 FB  Neck ROM: full  No difficulty expected  Dental      Pulmonary    (+) pulmonary embolism, COPD,sleep apnea on CPAP  (-) asthma  Cardiovascular   Exercise tolerance: good (4-7 METS)    (+) pacemaker (st nora) pacemaker, hypertension, valvular problems/murmurs, CAD, dysrhythmias Atrial Fib, hyperlipidemia  (-) cardiac stents    ROS comment: Echo:  Left ventricular ejection fraction appears to be 56 - 60%. Left ventricular systolic function is normal.  Left ventricular wall thickness is consistent with moderate concentric hypertrophy.  The following left ventricular wall segments are hypokinetic: apical inferior.  Estimated right ventricular systolic pressure from tricuspid regurgitation is normal (<35 mmHg).      Neuro/Psych  (+) headaches  (-) seizures, TIA, CVA  GI/Hepatic/Renal/Endo    (+) obesity, morbid obesity, renal disease- stones  (-) liver disease, diabetes    Musculoskeletal     Abdominal    Substance History - negative use     OB/GYN negative ob/gyn ROS         Other   arthritis,   history of cancer                  Anesthesia Plan    ASA 3 - emergent     MAC     intravenous induction     Anesthetic plan, risks, benefits, and alternatives have been provided, discussed and informed consent has been obtained with: patient.      CODE STATUS:

## 2025-07-27 NOTE — PLAN OF CARE
Goal Outcome Evaluation:  Plan of Care Reviewed With: patient, spouse, caregiver (RN [place])        Progress: no change (Initial Evaluation)       Anticipated Discharge Disposition (SLP): home          SLP Swallowing Diagnosis: functional oral phase, R/O pharyngeal dysphagia, suspected esophageal dysphagia (07/27/25 0818)             SPEECH-LANGUAGE PATHOLOGY EVALUATION - SWALLOW  Subjective: The patient was seen on this date for a Clinical Swallow evaluation.  Patient was alert and cooperative. Wife present. Patient repositioned himself on the edge of the bed for improved positioning for PO intake.   Significant history: Presented due to acute issues with swallowing. Reports sore throat since Wed. Unable to eat solids and liquids are coming back up. GI consult with upper GI completed 7/26 with normal findings. GI recommended clear liquids but patient help NPO due to failed dysphagia screening and difficulty tolerating his own secretions through the night. Medical history of sleep apnea with CPAP usage and prior surgical intervention likely UPPP surgery years ago.   Objective: Oral motor examination results: WFL.  Patient does not have uvula due to what sounds like UPPP surgery years ago. Reports mild difficulty swallowing following this surgery but baseline and well-managed since. Textures given during assessment of swallow function included thin liquid.  Assessment:   Observations: Consistent throat clearing noted prior to PO trials being presented. Patient reports his throat pain is improved and toleration of own secretions is much improved. He completed sips of thin water with throat clearing noted. Facial grimacing with swallows. No regurgitation of liquids. 1x delayed cough noted. Voice remained clear throughout. I suspect patient is either having irritation from throat pain vs an esophageal motility issue rather than an oropharyngeal dysphagia but I cannot rule this out at the bedside.   SLP Findings:   Patient presents with suspected esophageal dysphagia.   Recommendations: Diet Textures: clear liquid diet  Medications should be taken whole with thin liquids.   Recommended Strategies: upright for PO, small bites and sips, and reflux precautions. Oral care 2x a day.  Other Recommended Evaluations: Dedicated esophageal assessment to assess motility of the esophagus.     Dysphagia therapy is recommended for diet toleration and education on compensations as needed.     Kim Brooks MS CCC-SLP 7/27/2025 09:16 CDT

## 2025-07-27 NOTE — ANESTHESIA POSTPROCEDURE EVALUATION
"Patient: Raghav Olivier    Procedure Summary       Date: 07/26/25 Room / Location:  [facility] OR 02 /  [facility]    Anesthesia Start: 2012 Anesthesia Stop: 2026    Procedure: ESOPHAGOGASTRODUODENOSCOPY WITH ANESTHESIA Diagnosis:     Surgeons: Luis Bright DO Provider: Hilario Guillaume CRNA    Anesthesia Type: MAC ASA Status: 3 - Emergent            Anesthesia Type: MAC    Vitals  Vitals Value Taken Time   /58 07/26/25 20:25   Temp     Pulse 72 07/26/25 20:26   Resp     SpO2 96 % 07/26/25 20:26   Vitals shown include unfiled device data.        Post Anesthesia Care and Evaluation    Patient location during evaluation: PACU  Patient participation: complete - patient participated  Level of consciousness: awake and alert  Pain management: adequate    Airway patency: patent  Anesthetic complications: No anesthetic complications    Cardiovascular status: acceptable  Respiratory status: acceptable  Hydration status: acceptable    Comments: Blood pressure 148/78, pulse 70, temperature 97.7 °F (36.5 °C), temperature source Oral, resp. rate 16, height 188 cm (74\"), weight 135 kg (298 lb), SpO2 97%.    Pt discharged from PACU based on dinesh score >8    "

## 2025-07-27 NOTE — PROGRESS NOTES
[facility] Medicine Services  INPATIENT PROGRESS NOTE    Patient Name: Raghav Olivier  Date of Admission: 7/26/2025  Today's Date: 07/27/25  Length of Stay: 1  Primary Care Physician: Paras Bliss MD    Subjective   Chief Complaint: Odynophagia    Difficulty Swallowing     Patient states his odynophagia is improving since admission.  He received Decadron 10 mg and IV ceftriaxone 1 g x 1 in the ER.  He is able to swallow liquids now.    Review of Systems   All pertinent negatives and positives are as above. All other systems have been reviewed and are negative unless otherwise stated.     Objective    Temp:  [97.3 °F (36.3 °C)-98.6 °F (37 °C)] 97.8 °F (36.6 °C)  Heart Rate:  [57-76] 57  Resp:  [16-18] 18  BP: (109-148)/(55-80) 143/75  Physical Exam  Constitutional:       General: He is not in acute distress.     Appearance: He is well-developed. He is not diaphoretic.   HENT:      Head: Normocephalic.      Mouth/Throat:      Mouth: Mucous membranes are moist.      Pharynx: Posterior oropharyngeal erythema present.      Comments: Uvulectomy present.  Eyes:      Conjunctiva/sclera: Conjunctivae normal.      Pupils: Pupils are equal, round, and reactive to light.   Neck:      Thyroid: No thyromegaly.      Vascular: No JVD.      Trachea: No tracheal deviation.   Cardiovascular:      Rate and Rhythm: Normal rate and regular rhythm.      Heart sounds: Normal heart sounds. No murmur heard.     No friction rub. No gallop.   Pulmonary:      Effort: Pulmonary effort is normal. No respiratory distress.      Breath sounds: Normal breath sounds. No stridor. No wheezing or rales.   Chest:      Chest wall: No tenderness.   Abdominal:      General: Bowel sounds are normal. There is no distension.      Palpations: Abdomen is soft. There is no mass.      Tenderness: There is no abdominal tenderness. There is no guarding or rebound.      Hernia: No hernia is present.  "  Musculoskeletal:         General: No tenderness or deformity. Normal range of motion.      Cervical back: Normal range of motion and neck supple.      Right lower leg: No edema.      Left lower leg: No edema.   Lymphadenopathy:      Cervical: No cervical adenopathy.   Skin:     General: Skin is warm and dry.      Coloration: Skin is not pale.      Findings: No erythema or rash.   Neurological:      General: No focal deficit present.      Mental Status: He is alert and oriented to person, place, and time.      Cranial Nerves: No cranial nerve deficit.      Sensory: No sensory deficit.      Motor: No abnormal muscle tone.      Coordination: Coordination normal.   Psychiatric:         Mood and Affect: Mood normal.         Behavior: Behavior normal.         Results Review:  I have reviewed the labs, radiology results, and diagnostic studies.    Laboratory Data:   Results from last 7 days   Lab Units 07/27/25  0430 07/26/25  1509   WBC 10*3/mm3 14.00* 14.77*   HEMOGLOBIN g/dL 13.0 13.8   HEMATOCRIT % 41.7 43.2   PLATELETS 10*3/mm3 204 214        Results from last 7 days   Lab Units 07/27/25  0430 07/26/25  1509 07/21/25  0822   SODIUM mmol/L 139 140 143   POTASSIUM mmol/L 4.4 4.1 4.1   CHLORIDE mmol/L 101 101 104   CO2 mmol/L 24.0 25.0 21   BUN mg/dL 12.7 11.4 16   CREATININE mg/dL 0.87 1.02 1.05   CALCIUM mg/dL 9.0 9.5 9.4   BILIRUBIN mg/dL 0.5 0.6 0.2   ALK PHOS U/L 46 46 41*   ALT (SGPT) U/L 23 25 26   AST (SGOT) U/L 14 14 26   GLUCOSE mg/dL 162* 106* 124*       Culture Data:   No results found for: \"BLOODCX\", \"URINECX\", \"WOUNDCX\", \"MRSACX\", \"RESPCX\", \"STOOLCX\"    Radiology Data:   Imaging Results (Last 24 Hours)       Procedure Component Value Units Date/Time    XR Chest 1 View [[id number]] Collected: 07/27/25 0615     Updated: 07/27/25 0620    Narrative:      XR CHEST 1 VW-      HISTORY: dysphagia; R13.10-Dysphagia, unspecified; K22.2-Esophageal  obstruction       COMPARISON: 10/1/2021     FINDINGS:  Upright " frontal radiograph of the chest was obtained     No lung consolidation. No pleural effusion or pneumothorax. The  cardiomediastinal silhouette and pulmonary vascularity are within normal  limits. Dual-chamber pacemaker. No acute bony abnormality.       Impression:      1.  Lungs are clear and well expanded. No acute process.     This report was signed and finalized on 7/27/2025 6:16 AM by Dr Yunior Weathers.       CT Soft Tissue Neck With Contrast [[id number]] Collected: 07/26/25 1610     Updated: 07/26/25 1619    Narrative:      EXAMINATION:  CT SOFT TISSUE NECK W CONTRAST-       HISTORY: Dysphagia. Unable to swallow food or fluids.     TECHNIQUE: Spiral CT was performed of the neck with IV contrast.  Sagittal and coronal images were reconstructed.     DLP: 295.86 mGy.cm Automated exposure control was utilized to reduce  patient dose.     COMPARISON: No comparison.     VISUALIZED BRAIN, ORBITS AND SINUSES: The globes, optic nerves and  ocular muscles are symmetric. The visualized paranasal sinuses and  mastoid air cells are clear. The visualized enhanced brain demonstrates  no focal abnormality.     GLANDULAR STRUCTURES: The parotid and submandibular glands demonstrate  no focal abnormality. The left submandibular gland is slightly smaller  than the right. There are multiple thyroid nodules. The largest is on  the right measuring 2.4 cm in AP dimension.     AIRWAY: The nasopharynx, oropharynx, hypopharynx and larynx demonstrate  no soft tissue mass or asymmetry. The visualized esophagus extending  into the thorax has a normal appearance.     LYMPH NODES: There are small level 1, level 2, level 3 and level 5 lymph  nodes in the neck that are not pathologically enlarged.     BONES: There are degenerative changes of the visualized spine. There is  narrowing and mild spurring of the right AC joint.     OTHER FINDINGS: The lung apices are clear. There is minimal atheromatous  calcification of the visualized thoracic  aorta. There is minimal  calcified plaque at the left carotid bifurcation. The vertebral arteries  are patent.          Impression:      1. No soft tissue mass is seen in the neck. No lymphadenopathy is seen.  There is no abnormal fluid collection.  2. The visualized esophagus extending into the upper chest is normal in  caliber.  3. Bilateral thyroid nodules. The largest is on the right measuring 2.4  cm. Follow-up outpatient ultrasound recommended to further evaluate if  not performed previously.  4. Degenerative changes of the visualized spine. Narrowing and mild  spurring of the right AC joint.  5. Minimal atheromatous calcification of the visualized thoracic aorta.  Minimal calcified plaque in the left carotid bifurcation.        The full report of this exam was immediately signed and available to the  emergency room. The patient is currently in the emergency room.     This report was signed and finalized on 7/26/2025 4:16 PM by Dr. Pierre Acosta MD.               I have reviewed the patient's current medications.     Assessment/Plan   Assessment  Active Hospital Problems    Diagnosis     **Difficulty swallowing     Dysphagia     Chronic obstructive pulmonary disease     Essential hypertension     Paroxysmal atrial fibrillation      Treatment Plan  Patient had improvement in his odynophagia after receiving Decadron 10 mg and IV ceftriaxone x 1 dose in the ER.  He is able to tolerate liquids.  ENT consult input appreciated.  Plan for bedside direct laryngoscopy, Monospot, throat culture and respiratory panel.  Outpatient follow-up recommended for thyroid nodules.  Will consider short course of steroids and antibiotics based on above results.    Continue home medications for paroxysmal atrial fibrillation.  Anticoagulation with Eliquis.    DVT prophylaxis with Eliquis    CODE STATUS is full code    Medical Decision Making  Number and Complexity of problems: 4  Differential Diagnosis: None    Conditions and  Status        Condition is improving.     [facility] Data  External documents reviewed: None  Cardiac tracing (EKG, telemetry) interpretation: None  Radiology interpretation: CT scan of the neck reviewed by me.  Labs reviewed: CBC, BMP reviewed by me  Any tests that were considered but not ordered: None     Decision rules/scores evaluated (example [id number], Wells, etc): N/A     Discussed with: Patient and his wife     Care Planning  Shared decision making: Patient and his wife  Code status and discussions: CODE STATUS is full code    Disposition  Social Determinants of Health that impact treatment or disposition: None  I expect the patient to be discharged to home in 1 to 2 days    Electronically signed by Antoinette Dao MD, 07/27/25, 10:52 CDT.

## 2025-07-27 NOTE — CONSULTS
"[facility]   Consult Note    Patient Name: Raghav Olivier  : 1955  MRN: [id number]  Primary Care Physician:  Paras Bliss MD  Referring Physician: No ref. provider found  Date of admission: 2025    Inpatient ENT Consult  Consult performed by: Ernesto Ovalle APRN  Consult ordered by: Janae Contreras MD        Subjective   Subjective     Reason for Consult/ Chief Complaint: Sore throat, dysphagia    History of Present Illness  Raghav Olivier is a 70 y.o. male who presented to the ER with difficulty swallowing and sore throat that began on Wednesday.  A CT soft tissue neck has been performed and was relatively normal.  He has had an endoscopy with Dr. Bright which was normal.  Speech therapy consulted today and released him on a clear liquid diet with recommendations of a dedicated esophageal assessment to assess motility of the esophagus.  Dysphagia therapy is recommended for diet toleration.  He is sitting up in bed today tolerating clear liquid diet.  He does state his throat feels like \"razor blades\". Patient does admit to take bites over the past few months and wonders if this has any correlation with symptoms.  There were no rashes observed at time of tick bites or currently.    Review of Systems   Constitutional: Negative.    HENT:  Positive for sore throat and trouble swallowing.         Personal History     Past Medical History:   Diagnosis Date    Abnormal ECG     Allergic     Arrhythmia     Arthritis     Atrial fibrillation     Cancer     SKIN    Cataract     Diverticulitis     Hyperlipidemia     Hypertension     Kidney stones     Migraines     Nonrheumatic mitral (valve) insufficiency     Obesity     Pulmonary embolism 21    [facility] ER    Sleep apnea     C-PAP    Spinal headache        Past Surgical History:   Procedure Laterality Date    ABLATION OF DYSRHYTHMIC FOCUS      APPENDECTOMY      BACK SURGERY  2022    CARDIAC ABLATION  2024    CARDIAC " ABLATION  10/2024    with     CARDIAC CATHETERIZATION      CARDIAC ELECTROPHYSIOLOGY PROCEDURE N/A 10/01/2021    Procedure: NEW PACEMAKER IMPLANTATION;  Surgeon: Mitch Hernández MD;  Location: [facility] CATH INVASIVE LOCATION;  Service: Cardiology;  Laterality: N/A;    CARDIOVERSION      CHOLECYSTECTOMY      COLONOSCOPY N/A 07/31/2017    Procedure: COLONOSCOPY;  Surgeon: Billy Robbins DO;  Location: [facility] ENDOSCOPY;  Service:     COLONOSCOPY N/A 07/28/2021    Procedure: COLONOSCOPY;  Surgeon: Billy Robbins DO;  Location: [facility] ENDOSCOPY;  Service: Gastroenterology;  Laterality: N/A;    COLONOSCOPY N/A 6/24/2025    Procedure: COLONOSCOPY WITH ANESTHESIA- (examination of colon);  Surgeon: Luis Bright DO;  Location: [facility] ENDOSCOPY;  Service: Gastroenterology;  Laterality: N/A;  pre polyp  post diverticulosis    EP STUDY      INSERT / REPLACE / REMOVE PACEMAKER  10-1-21    geraldine mcintyre pacemaker    JOINT REPLACEMENT  04/09/23    KNEE SURGERY Right     X2    SHOULDER SURGERY Left 2009    SPINE SURGERY  00/00/22    TOTAL KNEE ARTHROPLASTY Right 04/06/2023    Procedure: RIGHT TOTAL KNEE ARTHROPLASTY;  Surgeon: Pepe Vaughn MD;  Location: [facility] OR;  Service: Orthopedics;  Laterality: Right;       Family History: His family history includes Cancer in his brother, brother, father, and mother; Colon cancer in his father; Dementia in his father; Diabetes in his brother, brother, father, and sister; Hypertension in his father and mother.     Social History: He  reports that he quit smoking about 52 years ago. His smoking use included cigarettes. He started smoking about 56 years ago. He has a 1 pack-year smoking history. He has been exposed to tobacco smoke. He has never used smokeless tobacco. He reports that he does not drink alcohol and does not use drugs.    Home Medications:  Vitamin D3, acetaminophen, apixaban, azithromycin, hydroCHLOROthiazide, nitroglycerin, phenol, rosuvastatin,  tiZANidine, and valsartan    Allergies:  He is allergic to levofloxacin, penicillins, and vancomycin.    Objective    Objective     Vitals:    Temp:  [97.3 °F (36.3 °C)-98.6 °F (37 °C)] 97.8 °F (36.6 °C)  Heart Rate:  [57-76] 57  Resp:  [16-18] 18  BP: (109-148)/(55-80) 143/75    Physical Exam  Vitals reviewed.   Constitutional:       Appearance: Normal appearance. He is obese.   HENT:      Head: Normocephalic.      Right Ear: External ear normal.      Left Ear: External ear normal.      Nose: Nose normal.      Mouth/Throat:      Lips: Pink.      Mouth: Mucous membranes are moist.      Pharynx: Oropharynx is clear. Posterior oropharyngeal erythema present. No uvula swelling.      Tonsils: No tonsillar exudate or tonsillar abscesses.   Neck:      Comments: Thyroid nodule noted on CT neck  Musculoskeletal:      Cervical back: Full passive range of motion without pain.   Lymphadenopathy:      Cervical: No cervical adenopathy.   Neurological:      Mental Status: He is alert.   Psychiatric:         Behavior: Behavior is cooperative.         Result Review    Result Review:  I have personally reviewed the results from the time of this admission to 7/27/2025 10:28 CDT and agree with these findings:  []  Laboratory list / accordion  []  Microbiology  []  Radiology  []  EKG/Telemetry   []  Cardiology/Vascular   []  Pathology  []  Old records  []  Other:  Most notable findings include: Study Result    Narrative & Impression   EXAMINATION:  CT SOFT TISSUE NECK W CONTRAST-       HISTORY: Dysphagia. Unable to swallow food or fluids.     TECHNIQUE: Spiral CT was performed of the neck with IV contrast.  Sagittal and coronal images were reconstructed.     DLP: 295.86 mGy.cm Automated exposure control was utilized to reduce  patient dose.     COMPARISON: No comparison.     VISUALIZED BRAIN, ORBITS AND SINUSES: The globes, optic nerves and  ocular muscles are symmetric. The visualized paranasal sinuses and  mastoid air cells are  clear. The visualized enhanced brain demonstrates  no focal abnormality.     GLANDULAR STRUCTURES: The parotid and submandibular glands demonstrate  no focal abnormality. The left submandibular gland is slightly smaller  than the right. There are multiple thyroid nodules. The largest is on  the right measuring 2.4 cm in AP dimension.     AIRWAY: The nasopharynx, oropharynx, hypopharynx and larynx demonstrate  no soft tissue mass or asymmetry. The visualized esophagus extending  into the thorax has a normal appearance.     LYMPH NODES: There are small level 1, level 2, level 3 and level 5 lymph  nodes in the neck that are not pathologically enlarged.     BONES: There are degenerative changes of the visualized spine. There is  narrowing and mild spurring of the right AC joint.     OTHER FINDINGS: The lung apices are clear. There is minimal atheromatous  calcification of the visualized thoracic aorta. There is minimal  calcified plaque at the left carotid bifurcation. The vertebral arteries  are patent.        IMPRESSION:  1. No soft tissue mass is seen in the neck. No lymphadenopathy is seen.  There is no abnormal fluid collection.  2. The visualized esophagus extending into the upper chest is normal in  caliber.  3. Bilateral thyroid nodules. The largest is on the right measuring 2.4  cm. Follow-up outpatient ultrasound recommended to further evaluate if  not performed previously.  4. Degenerative changes of the visualized spine. Narrowing and mild  spurring of the right AC joint.  5. Minimal atheromatous calcification of the visualized thoracic aorta.  Minimal calcified plaque in the left carotid bifurcation.        The full report of this exam was immediately signed and available to the  emergency room. The patient is currently in the emergency room.     This report was signed and finalized on 7/26/2025 4:16 PM by Dr. Pierre Acosta MD.            Assessment & Plan   Assessment / Plan     Brief Patient  Summary:  Raghav Olivier is a 70 y.o. male with a 4-day history of sore throat and difficulty swallowing.  He has had a CT neck and endoscopy that was normal.  Speech evaluation was performed today who recommended dysphagia therapy and dedicated esophageal screening.  He is tolerating clear liquids well this morning.  We will plan to perform a direct laryngoscopy at bedside.  We will obtain Monospot, respiratory panel, and throat culture today.    Active Hospital Problems:  Active Hospital Problems    Diagnosis     **Difficulty swallowing     Dysphagia        Plan:   We will plan to perform a direct laryngoscopy at bedside  We will obtain Monospot, respiratory panel, and throat culture today  We will follow thyroid nodules outpatient      Ernesto Ovalle, EMPERATRIZ

## 2025-07-27 NOTE — H&P
[facility] Medicine Services  HISTORY AND PHYSICAL    Date of Admission: 7/26/2025  Primary Care Physician: Paras Bliss MD    Subjective   Primary Historian: Patient    Chief Complaint: Difficulty swallowing and sore throat    History of Present Illness  This is a 70-year-old male patient with a medical history of A-fib on Eliquis, pacemaker, hypertension, hyperlipidemia, presenting to the ED for the evaluation of dysphagia and severe sore throat.  As reported, patient started having sore throat and dysphagia on Wednesday that has been progressing.  Patient states that he could not get any solids or liquids, as he is not able to handle any secretions.  He is not having any nausea or vomiting, no chest pain, no fever or chills, no shortness of breath.  He feels like his throat has a razor with a 10 out of 10 pain.  He never had this before.  He takes Eliquis for A-fib.  To note that patient was evaluated by PCP and was given Chloraseptic and azithromycin empirically.  Also a rapid strep test was done and negative.    Patient initially evaluated in the ED, and was later on evaluated by GI, who performed upper endoscopy with no acute findings in the esophagus.  Per ED, GI recommendation were to admit patient for further workup.  Hospitalist service was called to admit patient from PACU.        Review of Systems   Otherwise complete ROS reviewed and negative except as mentioned in the HPI.    Past Medical History:   Past Medical History:   Diagnosis Date    Abnormal ECG     Allergic     Arrhythmia     Arthritis     Atrial fibrillation     Cancer     SKIN    Cataract     Diverticulitis     Hyperlipidemia     Hypertension     Kidney stones     Migraines     Nonrheumatic mitral (valve) insufficiency     Obesity     Pulmonary embolism 8-30-21    [facility] ER    Sleep apnea     C-PAP    Spinal headache      Past Surgical History:  Past Surgical History:   Procedure  Laterality Date    ABLATION OF DYSRHYTHMIC FOCUS      APPENDECTOMY      BACK SURGERY  01/2022    CARDIAC ABLATION  07/26/2024    CARDIAC ABLATION  10/2024    with     CARDIAC CATHETERIZATION      CARDIAC ELECTROPHYSIOLOGY PROCEDURE N/A 10/01/2021    Procedure: NEW PACEMAKER IMPLANTATION;  Surgeon: Mitch Hernández MD;  Location: [facility] CATH INVASIVE LOCATION;  Service: Cardiology;  Laterality: N/A;    CARDIOVERSION      CHOLECYSTECTOMY      COLONOSCOPY N/A 07/31/2017    Procedure: COLONOSCOPY;  Surgeon: Billy Robbins DO;  Location: [facility] ENDOSCOPY;  Service:     COLONOSCOPY N/A 07/28/2021    Procedure: COLONOSCOPY;  Surgeon: Billy Robbins DO;  Location: [facility] ENDOSCOPY;  Service: Gastroenterology;  Laterality: N/A;    COLONOSCOPY N/A 6/24/2025    Procedure: COLONOSCOPY WITH ANESTHESIA- (examination of colon);  Surgeon: Luis Bright DO;  Location: [facility] ENDOSCOPY;  Service: Gastroenterology;  Laterality: N/A;  pre polyp  post diverticulosis    EP STUDY      INSERT / REPLACE / REMOVE PACEMAKER  10-1-21    geraldine mcintyre pacemaker    JOINT REPLACEMENT  04/09/23    KNEE SURGERY Right     X2    SHOULDER SURGERY Left 2009    SPINE SURGERY  00/00/22    TOTAL KNEE ARTHROPLASTY Right 04/06/2023    Procedure: RIGHT TOTAL KNEE ARTHROPLASTY;  Surgeon: Pepe Vaughn MD;  Location: [facility] OR;  Service: Orthopedics;  Laterality: Right;     Social History:  reports that he quit smoking about 52 years ago. His smoking use included cigarettes. He started smoking about 56 years ago. He has a 1 pack-year smoking history. He has been exposed to tobacco smoke. He has never used smokeless tobacco. He reports that he does not drink alcohol and does not use drugs.    Family History: family history includes Cancer in his brother, brother, father, and mother; Colon cancer in his father; Dementia in his father; Diabetes in his brother, brother, father, and sister; Hypertension in his father and mother.        Allergies:  Allergies   Allergen Reactions    Levofloxacin Other (See Comments)     Pain in legs     Penicillins Unknown - Low Severity     Pt states is was a childhood allergy    Vancomycin Itching       Medications:  Prior to Admission medications    Medication Sig Start Date End Date Taking? Authorizing Provider   acetaminophen (TYLENOL) 650 MG 8 hr tablet Take 1 tablet by mouth Every 8 (Eight) Hours As Needed for Mild Pain. 1000mg twice a day    Tristen Mo MD   apixaban (ELIQUIS) 5 MG tablet tablet Take 1 tablet by mouth Every 12 (Twelve) Hours. Indications: Atrial Fibrillation, resume eliquis saturday morning    Tristen Mo MD   azithromycin (Zithromax Z-Sterling) 250 MG tablet Take 2 tablets by mouth on day 1, then 1 tablet daily on days 2-5 7/25/25   Giancarlo Manzo APRN   Cholecalciferol (Vitamin D3) 50 MCG (2000 UT) capsule Daily. 4/3/23   Tristen Mo MD   hydroCHLOROthiazide (MICROZIDE) 12.5 MG capsule Take 1 capsule by mouth Daily. 5/28/25   Titi Kramer MD   nitroglycerin (NITROSTAT) 0.4 MG SL tablet 1 under the tongue as needed for angina, may repeat q5mins for up three doses 6/9/25   Titi Kramer MD   phenol (CHLORASEPTIC) 1.4 % liquid liquid Apply 1 spray to the mouth or throat Every 2 (Two) Hours As Needed (sore throat). 7/25/25   Giancarlo Manzo APRN   rosuvastatin (CRESTOR) 20 MG tablet Take 1 tablet by mouth Every Night. 3/19/25   Titi Kramer MD   tiZANidine (ZANAFLEX) 4 MG tablet Take 1 tablet by mouth Every 8 (Eight) Hours As Needed for Muscle Spasms. 7/21/25   Paras Bliss MD   valsartan (DIOVAN) 160 MG tablet Take 1 tablet by mouth Daily. 2/13/25   Titi Kramer MD     I have utilized all available immediate resources to obtain, update, or review the patient's current medications (including all prescriptions, over-the-counter products, herbals, cannabis/cannabidiol products, and vitamin/mineral/dietary (nutritional) supplements).    Objective  "    Vital Signs: /55 (BP Location: Right arm, Patient Position: Lying)   Pulse 61   Temp 98 °F (36.7 °C) (Oral)   Resp 18   Ht 188 cm (74\")   Wt 135 kg (298 lb)   SpO2 93%   BMI 38.26 kg/m²   Physical Exam  Constitutional:       Appearance: He is ill-appearing.   Cardiovascular:      Rate and Rhythm: Normal rate and regular rhythm.      Pulses: Normal pulses.      Heart sounds: Normal heart sounds. No murmur heard.  Pulmonary:      Effort: Pulmonary effort is normal. No respiratory distress.      Breath sounds: Normal breath sounds. No wheezing or rales.   Abdominal:      General: Bowel sounds are normal. There is no distension.      Palpations: Abdomen is soft.      Tenderness: There is no abdominal tenderness. There is no guarding.   Musculoskeletal:      Right lower leg: No edema.      Left lower leg: No edema.   Skin:     General: Skin is warm.   Neurological:      Mental Status: He is alert and oriented to person, place, and time. Mental status is at baseline.              Results Reviewed:  Lab Results (last 24 hours)       Procedure Component Value Units Date/Time    Comprehensive Metabolic Panel [[id number]]  (Abnormal) Collected: 07/27/25 0430    Specimen: Blood Updated: 07/27/25 0514     Glucose 162 mg/dL      BUN 12.7 mg/dL      Creatinine 0.87 mg/dL      Sodium 139 mmol/L      Potassium 4.4 mmol/L      Chloride 101 mmol/L      CO2 24.0 mmol/L      Calcium 9.0 mg/dL      Total Protein 6.9 g/dL      Albumin 3.9 g/dL      ALT (SGPT) 23 U/L      AST (SGOT) 14 U/L      Alkaline Phosphatase 46 U/L      Total Bilirubin 0.5 mg/dL      Globulin 3.0 gm/dL      A/G Ratio 1.3 g/dL      BUN/Creatinine Ratio 14.6     Anion Gap 14.0 mmol/L      eGFR 91.7 mL/min/1.73     Narrative:      GFR Categories in Chronic Kidney Disease (CKD)              GFR Category          GFR (mL/min/1.73)    Interpretation  G1                    90 or greater        Normal or high (1)  G2                    60-89                " "Mild decrease (1)  G3a                   45-59                Mild to moderate decrease  G3b                   30-44                Moderate to severe decrease  G4                    15-29                Severe decrease  G5                    14 or less           Kidney failure    (1)In the absence of evidence of kidney disease, neither GFR category G1 or G2 fulfill the criteria for CKD.    eGFR calculation 2021 CKD-EPI creatinine equation, which does not include race as a factor    Magnesium [[id number]]  (Normal) Collected: 07/27/25 0430    Specimen: Blood Updated: 07/27/25 0514     Magnesium 2.2 mg/dL     Phosphorus [[id number]]  (Normal) Collected: 07/27/25 0430    Specimen: Blood Updated: 07/27/25 0514     Phosphorus 3.4 mg/dL     CBC (No Diff) [[id number]]  (Abnormal) Collected: 07/27/25 0430    Specimen: Blood Updated: 07/27/25 0448     WBC 14.00 10*3/mm3      RBC 4.52 10*6/mm3      Hemoglobin 13.0 g/dL      Hematocrit 41.7 %      MCV 92.3 fL      MCH 28.8 pg      MCHC 31.2 g/dL      RDW 14.1 %      RDW-SD 47.7 fl      MPV 10.0 fL      Platelets 204 10*3/mm3     Procalcitonin [[id number]]  (Normal) Collected: 07/26/25 1509    Specimen: Blood Updated: 07/26/25 2331     Procalcitonin 0.08 ng/mL     Narrative:      As a Marker for Sepsis (Non-Neonates):    1. <0.5 ng/mL represents a low risk of severe sepsis and/or septic shock.  2. >2 ng/mL represents a high risk of severe sepsis and/or septic shock.    As a Marker for Lower Respiratory Tract Infections that require antibiotic therapy:    PCT on Admission    Antibiotic Therapy       6-12 Hrs later    >0.5                Strongly Recommended  >0.25 - <0.5        Recommended   0.1 - 0.25          Discouraged              Remeasure/reassess PCT  <0.1                Strongly Discouraged     Remeasure/reassess PCT    As 28 day mortality risk marker: \"Change in Procalcitonin Result\" (>80% or <=80%) if Day 0 (or Day 1) and Day 4 values are available. Refer to " http://www.[facility]-pct-calculator.com    Change in PCT <=80%  A decrease of PCT levels below or equal to 80% defines a positive change in PCT test result representing a higher risk for 28-day all-cause mortality of patients diagnosed with severe sepsis for septic shock.    Change in PCT >80%  A decrease of PCT levels of more than 80% defines a negative change in PCT result representing a lower risk for 28-day all-cause mortality of patients diagnosed with severe sepsis or septic shock.       C-reactive Protein [[id number]]  (Abnormal) Collected: 07/26/25 1509    Specimen: Blood Updated: 07/26/25 2325     C-Reactive Protein 4.58 mg/dL     Comprehensive Metabolic Panel [[id number]]  (Abnormal) Collected: 07/26/25 1509    Specimen: Blood Updated: 07/26/25 1542     Glucose 106 mg/dL      BUN 11.4 mg/dL      Creatinine 1.02 mg/dL      Sodium 140 mmol/L      Potassium 4.1 mmol/L      Chloride 101 mmol/L      CO2 25.0 mmol/L      Calcium 9.5 mg/dL      Total Protein 7.3 g/dL      Albumin 4.3 g/dL      ALT (SGPT) 25 U/L      AST (SGOT) 14 U/L      Alkaline Phosphatase 46 U/L      Total Bilirubin 0.6 mg/dL      Globulin 3.0 gm/dL      A/G Ratio 1.4 g/dL      BUN/Creatinine Ratio 11.2     Anion Gap 14.0 mmol/L      eGFR 78.1 mL/min/1.73     Narrative:      GFR Categories in Chronic Kidney Disease (CKD)              GFR Category          GFR (mL/min/1.73)    Interpretation  G1                    90 or greater        Normal or high (1)  G2                    60-89                Mild decrease (1)  G3a                   45-59                Mild to moderate decrease  G3b                   30-44                Moderate to severe decrease  G4                    15-29                Severe decrease  G5                    14 or less           Kidney failure    (1)In the absence of evidence of kidney disease, neither GFR category G1 or G2 fulfill the criteria for CKD.    eGFR calculation 2021 CKD-EPI creatinine equation, which does not  include race as a factor    Lactic Acid, Plasma [[id number]]  (Normal) Collected: 07/26/25 1509    Specimen: Blood Updated: 07/26/25 1538     Lactate 1.5 mmol/L     Blood Culture - Blood, Arm, Left [[id number]] Collected: 07/26/25 1528    Specimen: Blood from Arm, Left Updated: 07/26/25 1534    CBC & Differential [[id number]]  (Abnormal) Collected: 07/26/25 1509    Specimen: Blood Updated: 07/26/25 1523    Narrative:      The following orders were created for panel order CBC & Differential.  Procedure                               Abnormality         Status                     ---------                               -----------         ------                     CBC Auto Differential[[id number]]        Abnormal            Final result                 Please view results for these tests on the individual orders.    CBC Auto Differential [[id number]]  (Abnormal) Collected: 07/26/25 1509    Specimen: Blood Updated: 07/26/25 1523     WBC 14.77 10*3/mm3      RBC 4.76 10*6/mm3      Hemoglobin 13.8 g/dL      Hematocrit 43.2 %      MCV 90.8 fL      MCH 29.0 pg      MCHC 31.9 g/dL      RDW 14.2 %      RDW-SD 47.8 fl      MPV 9.6 fL      Platelets 214 10*3/mm3      Neutrophil % 76.0 %      Lymphocyte % 14.0 %      Monocyte % 8.7 %      Eosinophil % 0.6 %      Basophil % 0.3 %      Immature Grans % 0.4 %      Neutrophils, Absolute 11.23 10*3/mm3      Lymphocytes, Absolute 2.07 10*3/mm3      Monocytes, Absolute 1.28 10*3/mm3      Eosinophils, Absolute 0.09 10*3/mm3      Basophils, Absolute 0.04 10*3/mm3      Immature Grans, Absolute 0.06 10*3/mm3      nRBC 0.0 /100 WBC     Blood Culture - Blood, Arm, Left [[id number]] Collected: 07/26/25 1509    Specimen: Blood from Arm, Left Updated: 07/26/25 1520          Imaging Results (Last 24 Hours)       Procedure Component Value Units Date/Time    XR Chest 1 View - In process [[id number]] Resulted: 07/26/25 2324     Updated: 07/26/25 2325    This result has not been signed. Information might  be incomplete.      CT Soft Tissue Neck With Contrast [[id number]] Collected: 07/26/25 1610     Updated: 07/26/25 1619    Narrative:      EXAMINATION:  CT SOFT TISSUE NECK W CONTRAST-       HISTORY: Dysphagia. Unable to swallow food or fluids.     TECHNIQUE: Spiral CT was performed of the neck with IV contrast.  Sagittal and coronal images were reconstructed.     DLP: 295.86 mGy.cm Automated exposure control was utilized to reduce  patient dose.     COMPARISON: No comparison.     VISUALIZED BRAIN, ORBITS AND SINUSES: The globes, optic nerves and  ocular muscles are symmetric. The visualized paranasal sinuses and  mastoid air cells are clear. The visualized enhanced brain demonstrates  no focal abnormality.     GLANDULAR STRUCTURES: The parotid and submandibular glands demonstrate  no focal abnormality. The left submandibular gland is slightly smaller  than the right. There are multiple thyroid nodules. The largest is on  the right measuring 2.4 cm in AP dimension.     AIRWAY: The nasopharynx, oropharynx, hypopharynx and larynx demonstrate  no soft tissue mass or asymmetry. The visualized esophagus extending  into the thorax has a normal appearance.     LYMPH NODES: There are small level 1, level 2, level 3 and level 5 lymph  nodes in the neck that are not pathologically enlarged.     BONES: There are degenerative changes of the visualized spine. There is  narrowing and mild spurring of the right AC joint.     OTHER FINDINGS: The lung apices are clear. There is minimal atheromatous  calcification of the visualized thoracic aorta. There is minimal  calcified plaque at the left carotid bifurcation. The vertebral arteries  are patent.          Impression:      1. No soft tissue mass is seen in the neck. No lymphadenopathy is seen.  There is no abnormal fluid collection.  2. The visualized esophagus extending into the upper chest is normal in  caliber.  3. Bilateral thyroid nodules. The largest is on the right measuring  2.4  cm. Follow-up outpatient ultrasound recommended to further evaluate if  not performed previously.  4. Degenerative changes of the visualized spine. Narrowing and mild  spurring of the right AC joint.  5. Minimal atheromatous calcification of the visualized thoracic aorta.  Minimal calcified plaque in the left carotid bifurcation.        The full report of this exam was immediately signed and available to the  emergency room. The patient is currently in the emergency room.     This report was signed and finalized on 7/26/2025 4:16 PM by Dr. Pierre Acosta MD.             I have personally reviewed and interpreted the radiology studies and ECG obtained at time of admission.     Assessment / Plan   Assessment:   Active Hospital Problems    Diagnosis     **Difficulty swallowing     Dysphagia        Treatment Plan  The patient will be admitted to my service here at [facility].     Assessment and plan:  #Dysphagia.  #Severe sore throat/odynophagia.  #A-fib on Eliquis.    - Admitting to floor.  - Consulting with ENT given the severe sore throat.  - Consulting with speech therapy.  - N.p.o. as patient failed dysphagia screen.  - IV fluids.  - DVT prophylaxis: Continue with Eliquis for A-fib.  - Patient was given a dose of ceftriaxone in the ED empirically.  This would cover for 24 hours.  CT soft tissues neck did not show any findings of infection.  Throat did not look infected on bedside exam.  Rapid strep test was done yesterday and negative.  Will watch off antibiotics for now while awaiting for further assessment.  Day team to reassess antibiotics during the day.  Patient was given steroids in ED as well.  No evidence of stridor or respiratory distress.    Medical Decision Making  Number and Complexity of problems: 2 acute on moderate  Differential Diagnosis: As above    Conditions and Status        Condition is worsening.     [facility] Data  External documents reviewed: Yes  Cardiac tracing (EKG, telemetry)  interpretation: Yes  Radiology interpretation: Yes  Labs reviewed: Yes  Any tests that were considered but not ordered: No     Decision rules/scores evaluated (example [id number], Wells, etc): No     Discussed with: Patient and ED provider     Care Planning  Shared decision making: Discussed the plan with the patient and he was agreeable  Code status and discussions: Full code    Disposition  Social Determinants of Health that impact treatment or disposition: Not at the moment  Estimated length of stay is 1 to 2 days.     I confirmed that the patient's advanced care plan is present, code status is documented, and a surrogate decision maker is listed in the patient's medical record.       The patient was seen and examined by me on 7/26 at 10 PM.    Electronically signed by Janae Contreras MD, 07/27/25, 05:53 CDT.

## 2025-07-27 NOTE — CASE MANAGEMENT/SOCIAL WORK
Discharge Planning Assessment  [facility]     Patient Name: Raghav Olivier  MRN: [id number]  Today's Date: 7/27/2025    Admit Date: 7/26/2025        Discharge Needs Assessment       Row Name 07/27/25 1543       Living Environment    People in Home spouse    Current Living Arrangements home    Potentially Unsafe Housing Conditions none    Primary Care Provided by self    Provides Primary Care For no one    Family Caregiver if Needed spouse    Quality of Family Relationships helpful;involved;supportive    Able to Return to Prior Arrangements yes       Resource/Environmental Concerns    Resource/Environmental Concerns none    Transportation Concerns none       Transition Planning    Patient/Family Anticipates Transition to home with family    Patient/Family Anticipated Services at Transition none    Transportation Anticipated family or friend will provide       Discharge Needs Assessment    Readmission Within the Last 30 Days no previous admission in last 30 days    Equipment Currently Used at Home cpap    Concerns to be Addressed no discharge needs identified;denies needs/concerns at this time    Anticipated Changes Related to Illness none    Equipment Needed After Discharge none    Discharge Coordination/Progress Spoke with pt for DC planning.  Pt plans to return home with spouse upon DC.  Has a PCP and Rx coverage.  Will follow for any DC needs.                   Discharge Plan    No documentation.                 Continued Care and Services - Admitted Since 7/26/2025    No active coordination exists.          Demographic Summary    No documentation.                  Functional Status    No documentation.                  Psychosocial    No documentation.                  Abuse/Neglect    No documentation.                  Legal    No documentation.                  Substance Abuse    No documentation.                  Patient Forms    No documentation.                     Gina Tidwell RN

## 2025-07-27 NOTE — PLAN OF CARE
Goal Outcome Evaluation:  Plan of Care Reviewed With: patient, spouse        Progress: improving  Outcome Evaluation: pt AOx4 on RA. VSS. C/o throat pain, PRN spray given. Full liquid diet. SLP to reeval in the morning. Wife at bedside. Pt up ad tolu. Call light in reach

## 2025-07-28 ENCOUNTER — APPOINTMENT (OUTPATIENT)
Dept: GENERAL RADIOLOGY | Facility: HOSPITAL | Age: 70
End: 2025-07-28
Payer: MEDICARE

## 2025-07-28 ENCOUNTER — READMISSION MANAGEMENT (OUTPATIENT)
Dept: CALL CENTER | Facility: HOSPITAL | Age: 70
End: 2025-07-28
Payer: MEDICARE

## 2025-07-28 VITALS
RESPIRATION RATE: 18 BRPM | TEMPERATURE: 99 F | SYSTOLIC BLOOD PRESSURE: 131 MMHG | HEART RATE: 61 BPM | OXYGEN SATURATION: 93 % | BODY MASS INDEX: 38.24 KG/M2 | WEIGHT: 298 LBS | HEIGHT: 74 IN | DIASTOLIC BLOOD PRESSURE: 63 MMHG

## 2025-07-28 PROCEDURE — 92611 MOTION FLUOROSCOPY/SWALLOW: CPT

## 2025-07-28 PROCEDURE — G0378 HOSPITAL OBSERVATION PER HR: HCPCS

## 2025-07-28 PROCEDURE — 74230 X-RAY XM SWLNG FUNCJ C+: CPT

## 2025-07-28 PROCEDURE — 63710000001 DEXAMETHASONE PER 0.25 MG: Performed by: INTERNAL MEDICINE

## 2025-07-28 RX ORDER — FLUTICASONE PROPIONATE 50 MCG
2 SPRAY, SUSPENSION (ML) NASAL DAILY PRN
COMMUNITY

## 2025-07-28 RX ORDER — TRIAMCINOLONE ACETONIDE 1 MG/G
1 CREAM TOPICAL 2 TIMES DAILY PRN
COMMUNITY

## 2025-07-28 RX ORDER — POTASSIUM CHLORIDE 750 MG/1
10 TABLET, EXTENDED RELEASE ORAL DAILY
COMMUNITY
End: 2025-07-28 | Stop reason: HOSPADM

## 2025-07-28 RX ADMIN — VALSARTAN 160 MG: 80 TABLET, FILM COATED ORAL at 08:35

## 2025-07-28 RX ADMIN — APIXABAN 5 MG: 5 TABLET, FILM COATED ORAL at 08:35

## 2025-07-28 RX ADMIN — BARIUM SULFATE 25 ML: 400 PASTE ORAL at 11:58

## 2025-07-28 RX ADMIN — Medication 10 ML: at 08:38

## 2025-07-28 RX ADMIN — DEXAMETHASONE 6 MG: 4 TABLET ORAL at 08:35

## 2025-07-28 RX ADMIN — HYDROCHLOROTHIAZIDE 12.5 MG: 25 TABLET ORAL at 08:35

## 2025-07-28 RX ADMIN — BARIUM SULFATE 55 ML: 0.81 POWDER, FOR SUSPENSION ORAL at 11:58

## 2025-07-28 RX ADMIN — BARIUM SULFATE 25 ML: 400 SUSPENSION ORAL at 11:58

## 2025-07-28 RX ADMIN — BARIUM SULFATE 55 ML: 400 SUSPENSION ORAL at 11:57

## 2025-07-28 NOTE — PLAN OF CARE
"Goal Outcome Evaluation:                 Pt is A/Ox4. VSS. 2L NC at HS. Very minimal c/o sore throat. Pt states \"it gets easier to swallow all the time.\" Tolerating liquid diet well. Voiding. Up ad tolu in room. Spouse at bedside. Call light in reach. Safety maintained.                            "

## 2025-07-28 NOTE — MBS/VFSS/FEES
Acute Care - Speech Language Pathology   Swallow Initial Evaluation [facility]     Patient Name: Raghav Olivier  : 1955  MRN: [id number]  Today's Date: 2025               Admit Date: 2025  SPEECH-LANGUAGE PATHOLOGY EVALUTION - VFSS  Subjective: The patient was seen on this date for a VFSS(Videofluoroscopic Swallowing Study).  Patient was alert and cooperative.    Significant history: Severely sore throat, unable to swallow solids at home, liquids coming back up. Hx UPPP, COPD, essential HTN, PAF. Pt reports sore throat and swallowing have greatly improved.   Objective: Risks/benefits were reviewed with the patient, and consent was obtained. The study was completed with SLP and Radiologist present. The patient was seen in lateral view(s). Textures given included pudding thick, thin liquid, nectar thick liquid, honey thick liquid, mechanical soft consistency, and regular consistency.  Assessment: Consistencies were presented in the following order with the following results:  Trial 1: Honey Thick - Spoon  No definite laryngeal penetration or aspiration. Mild residue remained in the vallecula post-swallow.      Trial 2: Nectar Thick - Single Straw  Premature loss to the vallecula secondary to decreased back of tongue control. No definite laryngeal penetration or aspiration. Mild residue remained at the back of tongue and vallecula. Pt completed a spontaneous multiple swallow which reduced residue.      Trial 3: Thin Liquid - Single Straw  No definite laryngeal penetration or aspiration. Mild oropharyngeal residue remained.      Trial 4: Esophageal screen after thin  See radiology report for details.      Trial 8: Pudding Thick Liquid - Spoon  Decreased bolus formation. No definite laryngeal penetration or aspiration. Mild residue remained from the back of tongue to the vallecula.      Trial 9: Soft Solids  Vallecular aggregation during mastication. No definite laryngeal penetration or aspiration.  Mild residue remained from the back of tongue to the vallecula.      Trial 10: Regular Solids  Vallecular aggregation during mastication. No definite laryngeal penetration or aspiration. Minimal residue remained from the back of tongue to the vallecula.      Trial 11: Thin Liquid - Single Straw with esophageal screen  Large bolus obtained. Premature loss to the vallecula and lateral channels secondary to decreased back of tongue control. Vallecular aggregation during mastication. No definite laryngeal penetration or aspiration. Mild oropharyngeal residue remained. Esophageal screen appeared within functional limits. See radiology report for further details.     SLP Findings: Patient presents with functional swallow.   Recommendations: Diet Textures: thin liquid, regular consistency food. Medications should be taken whole with thin liquids.   Recommended Strategies: Upright for PO, small bites and sips, and alternate liquids and solids. Oral care before breakfast, after all meals and PRN.  Dysphagia therapy is not recommended.   Percy Schwarz, CCC-SLP 7/28/2025 12:42 CDT     Visit Dx:     ICD-10-CM ICD-9-CM   1. Dysphagia, unspecified type  R13.10 787.20   2. Esophageal obstruction  K22.2 530.3   3. Esophageal dysphagia [R13.19]  R13.19 787.29     Patient Active Problem List   Diagnosis    Paroxysmal atrial fibrillation    Nonrheumatic mitral (valve) insufficiency    Essential hypertension    Hyperlipidemia    Dilated aortic root    TAMARA on CPAP    Severe obesity (BMI 35.0-39.9) with comorbidity    Lipoma    Long-term use of high-risk medication    Coronary artery disease involving native coronary artery of native heart without angina pectoris    LVH (left ventricular hypertrophy) moderate     Sinoatrial node dysfunction    Presence of cardiac pacemaker    Chronic obstructive pulmonary disease    Primary osteoarthritis of both knees    Spinal stenosis of lumbar region    Chronic diastolic heart failure    Chronic  renal failure, stage 3a    Borderline diabetes    Aneurysm of ascending aorta without rupture    Aortic root aneurysm    History of adenomatous polyp of colon    Dysphagia    Viral upper respiratory tract infection    Laryngopharyngeal reflux    Nontoxic multinodular goiter     Past Medical History:   Diagnosis Date    Abnormal ECG     Allergic     Arrhythmia     Arthritis     Atrial fibrillation     Cancer     SKIN    Cataract     Diverticulitis     Hyperlipidemia     Hypertension     Kidney stones     Migraines     Nonrheumatic mitral (valve) insufficiency     Obesity     Pulmonary embolism 8-30-21    [facility]    Sleep apnea     C-PAP    Spinal headache      Past Surgical History:   Procedure Laterality Date    ABLATION OF DYSRHYTHMIC FOCUS      APPENDECTOMY      BACK SURGERY  01/2022    CARDIAC ABLATION  07/26/2024    CARDIAC ABLATION  10/2024    with     CARDIAC CATHETERIZATION      CARDIAC ELECTROPHYSIOLOGY PROCEDURE N/A 10/01/2021    Procedure: NEW PACEMAKER IMPLANTATION;  Surgeon: Mitch Hernández MD;  Location: [facility] CATH INVASIVE LOCATION;  Service: Cardiology;  Laterality: N/A;    CARDIOVERSION      CHOLECYSTECTOMY      COLONOSCOPY N/A 07/31/2017    Procedure: COLONOSCOPY;  Surgeon: Billy Robbins DO;  Location: [facility] ENDOSCOPY;  Service:     COLONOSCOPY N/A 07/28/2021    Procedure: COLONOSCOPY;  Surgeon: Billy Robbins DO;  Location: [facility] ENDOSCOPY;  Service: Gastroenterology;  Laterality: N/A;    COLONOSCOPY N/A 6/24/2025    Procedure: COLONOSCOPY WITH ANESTHESIA- (examination of colon);  Surgeon: Luis Bright DO;  Location: [facility] ENDOSCOPY;  Service: Gastroenterology;  Laterality: N/A;  pre polyp  post diverticulosis    ENDOSCOPY N/A 7/26/2025    Procedure: ESOPHAGOGASTRODUODENOSCOPY WITH ANESTHESIA;  Surgeon: Luis Bright DO;  Location: [facility] OR;  Service: Gastroenterology;  Laterality: N/A;  pre food bolus  post normal  Dr. KEATING STUDY      INSERT / REPLACE  / REMOVE PACEMAKER  10-1-21    geraldine mcintyre pacemaker    JOINT REPLACEMENT  04/09/23    KNEE SURGERY Right     X2    SHOULDER SURGERY Left 2009    SPINE SURGERY  00/00/22    TOTAL KNEE ARTHROPLASTY Right 04/06/2023    Procedure: RIGHT TOTAL KNEE ARTHROPLASTY;  Surgeon: Pepe Vaughn MD;  Location: [facility];  Service: Orthopedics;  Laterality: Right;       SLP Recommendation and Plan  SLP Swallowing Diagnosis: swallow WFL/no suspected pharyngeal impairment (07/28/25 1142)  SLP Diet Recommendation: regular textures, thin liquids (07/28/25 1142)  Recommended Precautions and Strategies: upright posture during/after eating, small bites of food and sips of liquid, alternate between small bites of food and sips of liquid (07/28/25 1142)  SLP Rec. for Method of Medication Administration: meds whole, with thin liquids (07/28/25 1142)     Monitor for Signs of Aspiration: yes, cough, gurgly voice, throat clearing (07/28/25 1142)     Swallow Criteria for Skilled Therapeutic Interventions Met: no problems identified which require skilled intervention (07/28/25 1142)  Anticipated Discharge Disposition (SLP): home (07/28/25 1241)     Therapy Frequency (Swallow): evaluation only (07/28/25 1142)     Oral Care Recommendations: Oral Care before breakfast, after meals and PRN (07/28/25 1142)                                   Reason for Discharge: all goals and outcomes met, no further needs identified (07/28/25 1241)    Progress: improving  Outcome Evaluation: See MBS note      SWALLOW EVALUATION (Last 72 Hours)       SLP Adult Swallow Evaluation       Row Name 07/28/25 1142 07/27/25 0818                Rehab Evaluation    Document Type evaluation  -MB evaluation  -MG       Subjective Information no complaints  -MB complains of  sore throat  -MG       Patient Observations alert;cooperative  -MB alert;cooperative;agree to therapy  -MG       Patient/Family/Caregiver Comments/Observations No present  -MB Spouse present.  -MG        Patient Effort -- good  -MG       Symptoms Noted During/After Treatment -- none  -MG          General Information    Patient Profile Reviewed yes  -MB yes  -MG       Pertinent History Of Current Problem Severely sore throat, unable to swallow solids at home, liquids coming back up. Hx UPPP, COPD, essential HTN, PAF. Pt reports sore throat and swallowing have greatly improved.  -MB Presented due to acute issues with swallowing. Reports sore throat since Wed. Unable to eat solids and liquids are coming back up. GI consult with upper GI completed 7/26 with normal findings. GI recommended clear liquids but patient help NPO due to failed dysphagia screening and difficulty tolerating his own secretions through the night. Medical history of sleep apnea with CPAP usage and prior surgical intervention likely UPPP surgery years ago.  -MG       Current Method of Nutrition thin liquids;clear liquids  -MB NPO  -MG       Precautions/Limitations, Vision WFL;for purposes of eval  -MB WFL;for purposes of eval  -MG       Precautions/Limitations, Hearing WFL;for purposes of eval  -MB WFL;for purposes of eval  -MG       Prior Level of Function-Communication WFL  -MB WFL  -MG       Prior Level of Function-Swallowing no diet consistency restrictions  -MB no diet consistency restrictions  -MG       Plans/Goals Discussed with patient  -MB patient;spouse/S.O.;agreed upon  -MG       Barriers to Rehab none identified  -MB none identified  -MG       Patient's Goals for Discharge return to regular diet  -MB return to PO diet  -MG       Family Goals for Discharge -- patient able to return to PO diet  -MG          Pain    Pretreatment Pain Rating 3/10  -MB --       Pain Location other (see comments)  throat  -MB --       Additional Documentation -- Pain Scale: FACES Pre/Post-Treatment (Group)  -MG          Pain Scale: FACES Pre/Post-Treatment    Pain: FACES Scale, Pretreatment -- 0-->no hurt  -MG       Posttreatment Pain Rating -- 0-->no hurt   -MG          Oral Motor Structure and Function    Dentition Assessment natural, present and adequate  -MB natural, present and adequate  -MG       Secretion Management WNL/WFL  -MB WNL/WFL  -MG       Mucosal Quality moist, healthy  -MB moist, healthy  -MG       Volitional Swallow -- WFL  -MG       Volitional Cough -- WFL  -MG          Oral Musculature and Cranial Nerve Assessment    Oral Motor General Assessment WFL  -MB WFL  -MG          General Eating/Swallowing Observations    Respiratory Support Currently in Use room air  -MB room air  -MG       Eating/Swallowing Skills -- self-fed  -MG       Positioning During Eating -- upright in bed  edge of bed  -MG       Utensils Used -- straw  -MG       Consistencies Trialed -- thin liquids  -MG          Clinical Swallow Eval    Oral Prep Phase -- WFL  -MG       Oral Transit -- WFL  -MG       Oral Residue -- WFL  -MG       Pharyngeal Phase -- no overt signs/symptoms of pharyngeal impairment  throat clearing present but suspect not related to intake as it was present prior to PO trials.  -MG       Esophageal Phase -- suspected esophageal impairment  -MG       Clinical Swallow Evaluation Summary -- See note  -MG          Esophageal Phase Concerns    Esophageal Phase Concerns -- sensation of material sticking  -MG       Sensation of Material Sticking -- thin  -MG          MBS/VFSS    Utensils Used spoon;straw  -MB --       Consistencies Trialed regular textures;soft to chew textures;thin liquids;nectar/syrup-thick liquids;honey-thick liquids;pudding thick  -MB --          MBS/VFSS Interpretation    Oral Prep Phase WFL  -MB --       Oral Transit Phase impaired  -MB --       Oral Residue WFL  -MB --       VFSS Summary See note  -MB --          Oral Transit Phase    Impaired Oral Transit Phase premature spillage of liquids into pharynx  -MB --          Initiation of Pharyngeal Swallow    Initiation of Pharyngeal Swallow bolus in valleculae  -MB --       Pharyngeal Phase  functional pharyngeal phase of swallowing  -MB --          Esophageal Phase    Esophageal Phase see radiology report for further details  -MB --          SLP Evaluation Clinical Impression    SLP Swallowing Diagnosis swallow WFL/no suspected pharyngeal impairment  -MB functional oral phase;R/O pharyngeal dysphagia;suspected esophageal dysphagia  -MG       Functional Impact no impact on function  -MB risk of aspiration/pneumonia;risk of malnutrition;risk of dehydration  -MG       Rehab Potential/Prognosis, Swallowing -- adequate, monitor progress closely  -MG       Swallow Criteria for Skilled Therapeutic Interventions Met no problems identified which require skilled intervention  -MB demonstrates skilled criteria  -MG          Recommendations    Therapy Frequency (Swallow) evaluation only  -MB PRN  -MG       Predicted Duration Therapy Intervention (Days) -- until discharge  -MG       SLP Diet Recommendation regular textures;thin liquids  -MB thin liquids;clear liquid diet  -MG       Recommended Diagnostics -- other (see comments)  dedicated esophageal assessment to assess motility  -MG       Recommended Precautions and Strategies upright posture during/after eating;small bites of food and sips of liquid;alternate between small bites of food and sips of liquid  -MB upright posture during/after eating;small bites of food and sips of liquid;general aspiration precautions;reflux precautions  -MG       Oral Care Recommendations Oral Care before breakfast, after meals and PRN  -MB Oral Care BID/PRN;Toothbrush  -MG       SLP Rec. for Method of Medication Administration meds whole;with thin liquids  -MB meds whole;with thin liquids  -MG       Monitor for Signs of Aspiration yes;cough;gurgly voice;throat clearing  -MB yes;notify SLP if any concerns  -MG       Anticipated Discharge Disposition (SLP) home  -MB home  -MG       Demonstrates Need for Referral to Another Service -- dedicated esophageal assessment  to assess  motility  -MG       Swallowing Considerations per Physician Discretion -- medical management of suspected esophageal dysphagia, as indicated  -MG                 User Key  (r) = Recorded By, (t) = Taken By, (c) = Cosigned By      Initials Name Effective Dates    Percy Ibarra CCC-SLP 02/03/23 -     MG Kim Brooks, MS FRANDY-SLP 07/11/23 -                     EDUCATION  The patient has been educated in the following areas:   Dysphagia (Swallowing Impairment).                Time Calculation:    Time Calculation- SLP       Row Name 07/28/25 1241             Time Calculation- SLP    SLP Start Time 1145  -MB      SLP Stop Time 1241  -MB      SLP Time Calculation (min) 56 min  -MB      SLP Received On 07/28/25  -MB         Untimed Charges    27895-NM Motion Fluoro Eval Swallow Minutes 56  -MB         Total Minutes    Untimed Charges Total Minutes 56  -MB       Total Minutes 56  -MB                User Key  (r) = Recorded By, (t) = Taken By, (c) = Cosigned By      Initials Name Provider Type    Percy Ibarra CCC-SLP Speech and Language Pathologist                    Therapy Charges for Today       Code Description Service Date Service Provider Modifiers Qty    [id number]  ST MOTION FLUORO EVAL SWALLOW 4 7/28/2025 Percy Schwarz CCC-LISA GN 1                 KRISTIN Mahan  7/28/2025

## 2025-07-28 NOTE — PLAN OF CARE
Goal Outcome Evaluation:  Plan of Care Reviewed With: patient        Progress: improving  Outcome Evaluation: See MBS note    Anticipated Discharge Disposition (SLP): home          SLP Swallowing Diagnosis: swallow WFL/no suspected pharyngeal impairment (07/28/25 4750)

## 2025-07-28 NOTE — DISCHARGE SUMMARY
[facility] Medicine Services  DISCHARGE SUMMARY       Date of Admission: 7/26/2025  Date of Discharge:  7/28/2025  Primary Care Physician: Paras Bliss MD    Presenting Problem/History of Present Illness:    Difficulty swallowing and sore throat    Final Discharge Diagnoses:  Active Hospital Problems    Diagnosis     Viral upper respiratory tract infection     Laryngopharyngeal reflux     Bilateral thyroid nodules     Dysphagia     Chronic obstructive pulmonary disease     Essential hypertension     Paroxysmal atrial fibrillation    In addition to above  Gastroesophageal reflux disease  Obesity with BMI of 38.3    Consults:   ENT    Procedures Performed: Laryngoscopy    Pertinent Test Results:   Results for orders placed during the hospital encounter of 04/29/25    Adult Transthoracic Echo Complete W/ Cont if Necessary Per Protocol    Interpretation Summary    Left ventricular systolic function is low normal. Automated 2D EF = 53%  Left ventricular ejection fraction appears to be 51 - 55%.    Left ventricular diastolic function was normal.    Estimated right ventricular systolic pressure from tricuspid regurgitation is normal (<35 mmHg).    Compared to prior echo LVEF is decreased from 61-65% to 51-55%    Definition is limited.  May consider cardiac MRI with contrast for further evaluation of LVEF and wall motion.      Imaging Results (All)       Procedure Component Value Units Date/Time    XR Chest 1 View [[id number]] Collected: 07/27/25 0615     Updated: 07/27/25 0620    Narrative:      XR CHEST 1 VW-      HISTORY: dysphagia; R13.10-Dysphagia, unspecified; K22.2-Esophageal  obstruction       COMPARISON: 10/1/2021     FINDINGS:  Upright frontal radiograph of the chest was obtained     No lung consolidation. No pleural effusion or pneumothorax. The  cardiomediastinal silhouette and pulmonary vascularity are within normal  limits. Dual-chamber pacemaker. No acute  bony abnormality.       Impression:      1.  Lungs are clear and well expanded. No acute process.     This report was signed and finalized on 7/27/2025 6:16 AM by Dr Yunior Weathers.       CT Soft Tissue Neck With Contrast [[id number]] Collected: 07/26/25 1610     Updated: 07/26/25 1619    Narrative:      EXAMINATION:  CT SOFT TISSUE NECK W CONTRAST-       HISTORY: Dysphagia. Unable to swallow food or fluids.     TECHNIQUE: Spiral CT was performed of the neck with IV contrast.  Sagittal and coronal images were reconstructed.     DLP: 295.86 mGy.cm Automated exposure control was utilized to reduce  patient dose.     COMPARISON: No comparison.     VISUALIZED BRAIN, ORBITS AND SINUSES: The globes, optic nerves and  ocular muscles are symmetric. The visualized paranasal sinuses and  mastoid air cells are clear. The visualized enhanced brain demonstrates  no focal abnormality.     GLANDULAR STRUCTURES: The parotid and submandibular glands demonstrate  no focal abnormality. The left submandibular gland is slightly smaller  than the right. There are multiple thyroid nodules. The largest is on  the right measuring 2.4 cm in AP dimension.     AIRWAY: The nasopharynx, oropharynx, hypopharynx and larynx demonstrate  no soft tissue mass or asymmetry. The visualized esophagus extending  into the thorax has a normal appearance.     LYMPH NODES: There are small level 1, level 2, level 3 and level 5 lymph  nodes in the neck that are not pathologically enlarged.     BONES: There are degenerative changes of the visualized spine. There is  narrowing and mild spurring of the right AC joint.     OTHER FINDINGS: The lung apices are clear. There is minimal atheromatous  calcification of the visualized thoracic aorta. There is minimal  calcified plaque at the left carotid bifurcation. The vertebral arteries  are patent.          Impression:      1. No soft tissue mass is seen in the neck. No lymphadenopathy is seen.  There is no abnormal  fluid collection.  2. The visualized esophagus extending into the upper chest is normal in  caliber.  3. Bilateral thyroid nodules. The largest is on the right measuring 2.4  cm. Follow-up outpatient ultrasound recommended to further evaluate if  not performed previously.  4. Degenerative changes of the visualized spine. Narrowing and mild  spurring of the right AC joint.  5. Minimal atheromatous calcification of the visualized thoracic aorta.  Minimal calcified plaque in the left carotid bifurcation.        The full report of this exam was immediately signed and available to the  emergency room. The patient is currently in the emergency room.     This report was signed and finalized on 7/26/2025 4:16 PM by Dr. Pierre Acosta MD.             LAB RESULTS:      Lab 07/27/25  0430 07/26/25  1509   WBC 14.00* 14.77*   HEMOGLOBIN 13.0 13.8   HEMATOCRIT 41.7 43.2   PLATELETS 204 214   NEUTROS ABS  --  11.23*   IMMATURE GRANS (ABS)  --  0.06*   LYMPHS ABS  --  2.07   MONOS ABS  --  1.28*   EOS ABS  --  0.09   MCV 92.3 90.8   CRP  --  4.58*   PROCALCITONIN  --  0.08   LACTATE  --  1.5         Lab 07/27/25  0430 07/26/25  1509   SODIUM 139 140   POTASSIUM 4.4 4.1   CHLORIDE 101 101   CO2 24.0 25.0   ANION GAP 14.0 14.0   BUN 12.7 11.4   CREATININE 0.87 1.02   EGFR 91.7 78.1   GLUCOSE 162* 106*   CALCIUM 9.0 9.5   MAGNESIUM 2.2  --    PHOSPHORUS 3.4  --          Lab 07/27/25  0430 07/26/25  1509   TOTAL PROTEIN 6.9 7.3   ALBUMIN 3.9 4.3   GLOBULIN 3.0 3.0   ALT (SGPT) 23 25   AST (SGOT) 14 14   BILIRUBIN 0.5 0.6   ALK PHOS 46 46                     Brief Urine Lab Results  (Last result in the past 365 days)        Color   Clarity   Blood   Leuk Est   Nitrite   Protein   CREAT   Urine HCG        07/21/25 0822             282.5               Microbiology Results (last 10 days)       Procedure Component Value - Date/Time    Beta Strep Culture, Throat - Swab, Throat [[id number]]  (Normal) Collected: 07/27/25 1352    Lab Status:  Preliminary result Specimen: Swab from Throat Updated: 07/28/25 0853     Throat Culture, Beta Strep No Beta Hemolytic Streptococcus Isolated    Narrative:      Group A Strep incidence is low in adults. Positive culture for Beta hemolytic Streptococcus species can reflect colonization and not true infection. Please correlate clinically.    Blood Culture - Blood, Arm, Left [[id number]]  (Normal) Collected: 07/26/25 1528    Lab Status: Preliminary result Specimen: Blood from Arm, Left Updated: 07/27/25 1545     Blood Culture No growth at 24 hours    Blood Culture - Blood, Arm, Left [[id number]]  (Normal) Collected: 07/26/25 1509    Lab Status: Preliminary result Specimen: Blood from Arm, Left Updated: 07/27/25 1531     Blood Culture No growth at 24 hours            Hospital Course:   Sore throat has significantly improved  He is tolerating diet.  He is feeling better and expressed readiness to go home.  He has been cleared by ENT for discharge as well.  Dr. Pickens did flexible laryngoscopy on July 27.  With findings as mentioned below      Intra-Procedure:  - Time-Out: Confirmed patient identity and procedure to be performed.     Procedure description:  Multiple laryngoscopy was carried out through the right nasal cavity examining the structures of the nasopharynx, oropharynx and larynx.  Findings were as noted  Secretions and inflammation in the nasopharynx  Base of tongue and vallecula within normal limits.  Supraglottic structures within normal limits including the epiglottis and aryepiglottic folds  False cords and true vocal cords were within normal limits  Post cricoid edema and pachydermia change of the interarytenoid area suggestive of inflammation and gastroesophageal reflux  No mass or lesion seen  Findings consistent with laryngitis     Post-Procedure:  - Tolerance Level: Patient tolerated the procedure well.  - Complications: None     Patient was started on PPI for acid reflux disease.  He was advised on  "adequate hydration.    He should follow-up with primary care provider and/or ENT regarding bilateral thyroid nodules.  It is recommended to consider outpatient ultrasound    He has been found with mild lymphadenopathy on imaging study.  No immediate intervention needed per ENT.  This is believed secondary to acute inflammation and reactive in nature    ENT recommends follow-up in clinic to monitor progress.    All questions were answered to the best of my abilities.    I reviewed his medications at bedside.  I resumed medications that I felt appropriate.  I noticed that Giancarlo Manzo recently prescribed him with Zithromax.  I am not certain what it was exactly for.  I defer this to his primary care provider.    He takes Eliquis and has known history of paroxysmal atrial fibrillation.    Patient has BMI of 38.2.  He is feature puts him at risk for obstructive sleep apnea.    I noticed that he had recent upper GI endoscopy by Dr. Bright on July 26.  It is my understanding that the esophagus, the stomach and the examined part of duodenum are all normal.  The interested reader is referred to procedure note for details.        Physical Exam on Discharge:  /63 (BP Location: Right arm, Patient Position: Lying)   Pulse 61   Temp 99 °F (37.2 °C) (Oral)   Resp 18   Ht 188 cm (74\")   Wt 135 kg (298 lb)   SpO2 93%   BMI 38.26 kg/m²   Physical Exam  Obese  Short neck  Fullness of cheek/neck area  GEN: Awake, alert, interactive, in NAD  HEENT: Atraumatic, PERRLA, EOMI, Anicteric, Trachea midline  Lungs: Distant heart sound and breath sounds due to body habitus but otherwise no gross adventitious sounds.    Heart: RRR, +S1/s2, I did not appreciate any murmur ABD: soft, nt/nd, +BS, no guarding/rebound  Extremities: atraumatic, no cyanosis, no edema  Skin: no rashes or lesions  Neuro: AAOx3, no focal deficits  Psych: normal mood & affect    Condition on Discharge: Stable    Discharge Disposition:  Home or Self " Care    Discharge Medications:     Discharge Medications        Continue These Medications        Instructions Start Date   acetaminophen 650 MG 8 hr tablet  Commonly known as: TYLENOL   650 mg, Every 8 Hours PRN      apixaban 5 MG tablet tablet  Commonly known as: ELIQUIS   5 mg, Every 12 Hours Scheduled      azithromycin 250 MG tablet  Commonly known as: Zithromax Z-Sterling   Take 2 tablets by mouth on day 1, then 1 tablet daily on days 2-5      fluticasone 50 MCG/ACT nasal spray  Commonly known as: FLONASE   2 sprays, Nasal, Daily PRN      hydroCHLOROthiazide 12.5 MG capsule  Commonly known as: MICROZIDE   12.5 mg, Oral, Daily      nitroglycerin 0.4 MG SL tablet  Commonly known as: NITROSTAT   1 under the tongue as needed for angina, may repeat q5mins for up three doses      phenol 1.4 % liquid liquid  Commonly known as: CHLORASEPTIC   1 spray, Mouth/Throat, Every 2 Hours PRN      rosuvastatin 20 MG tablet  Commonly known as: CRESTOR   20 mg, Oral, Nightly      tiZANidine 4 MG tablet  Commonly known as: ZANAFLEX   4 mg, Oral, Every 8 Hours PRN      triamcinolone 0.1 % cream  Commonly known as: KENALOG   1 Application, Topical, 2 Times Daily PRN      valsartan 160 MG tablet  Commonly known as: DIOVAN   160 mg, Oral, Daily      Vitamin D3 50 MCG (2000 UT) capsule   1 capsule, Daily             Stop These Medications      potassium chloride 10 MEQ CR tablet              This patient has current or prior documentation of an left ventricular ejection fraction (LVEF) of less than or equal to 40%.-Not applicable    Results for orders placed during the hospital encounter of 04/29/25    Adult Transthoracic Echo Complete W/ Cont if Necessary Per Protocol    Interpretation Summary    Left ventricular systolic function is low normal. Automated 2D EF = 53%  Left ventricular ejection fraction appears to be 51 - 55%.    Left ventricular diastolic function was normal.    Estimated right ventricular systolic pressure from tricuspid  regurgitation is normal (<35 mmHg).    Compared to prior echo LVEF is decreased from 61-65% to 51-55%    Definition is limited.  May consider cardiac MRI with contrast for further evaluation of LVEF and wall motion.          Discharge Diet:   Diet Instructions       Advance Diet As Tolerated -Target Diet: cardiac      Target Diet: cardiac            Activity at Discharge:   Activity Instructions       Gradually Increase Activity Until at Pre-Hospitalization Level              Follow-up Appointments:   PCP within 1 week  ENT per the recommendation    Future Appointments   Date Time Provider Department Center   12/4/2025 10:15 AM Sebastian Kulkarni APRN [facility] CD  [facility]   1/21/2026  8:30 AM Paras Bliss MD [facility] PC [facility]   3/30/2026  1:00 PM Cammy Shah APRN [facility] CTS  [facility]       Test Results Pending at Discharge: None    Electronically signed by Danie Bryson MD, 07/28/25, 11:15 CDT.    Time: Greater than 30 minutes.

## 2025-07-28 NOTE — OUTREACH NOTE
Prep Survey      Flowsheet Row Responses   [facility] facility patient discharged from? [place]   Is LACE score < 7 ? No   Eligibility [facility]   Date of Admission 07/26/25   Date of Discharge 07/28/25   Discharge Disposition Home or Self Care   Discharge diagnosis Viral upper resp tract infection   Does the patient have one of the following disease processes/diagnoses(primary or secondary)? Other   Does the patient have Home health ordered? No   Is there a DME ordered? No   Prep survey completed? Yes            HARITHA GARCIA - Registered Nurse

## 2025-07-29 ENCOUNTER — TRANSITIONAL CARE MANAGEMENT TELEPHONE ENCOUNTER (OUTPATIENT)
Dept: CALL CENTER | Facility: HOSPITAL | Age: 70
End: 2025-07-29
Payer: MEDICARE

## 2025-07-29 DIAGNOSIS — E04.2 NONTOXIC MULTINODULAR GOITER: Primary | ICD-10-CM

## 2025-07-29 LAB — BACTERIA SPEC AEROBE CULT: NORMAL

## 2025-07-29 NOTE — OUTREACH NOTE
Call Center TCM Note      Flowsheet Row Responses   [facility] patient discharged from? [place]   Does the patient have one of the following disease processes/diagnoses(primary or secondary)? Other   TCM attempt successful? Yes   Call start time 1541   Call end time 1543   Discharge diagnosis Viral upper resp tract infection   Person spoke with today (if not patient) and relationship Patient   Meds reviewed with patient/caregiver? Yes   Is the patient having any side effects they believe may be caused by any medication additions or changes? No   Does the patient have all medications ordered at discharge? Yes   Is the patient taking all medications as directed (includes completed medication regime)? Yes   Comments Patient has a followup scheduled on 8/8/2025 with ENT. Declines scheduling further followups.   Does the patient have an appointment with their PCP within 7-14 days of discharge? Other   Nursing Interventions Patient desires to follow up with specialty only   Psychosocial issues? No   Did the patient receive a copy of their discharge instructions? Yes   Nursing interventions Reviewed instructions with patient   What is the patient's perception of their health status since discharge? Improving   Is the patient/caregiver able to teach back signs and symptoms related to disease process for when to call PCP? Yes   Is the patient/caregiver able to teach back signs and symptoms related to disease process for when to call 911? Yes   Is the patient/caregiver able to teach back the hierarchy of who to call/visit for symptoms/problems? PCP, Specialist, Home health nurse, Urgent Care, ED, 911 Yes   If the patient is a current smoker, are they able to teach back resources for cessation? Not a smoker   TCM call completed? Yes   Wrap up additional comments Patient is doing well. Has all medications. Denies any needs or questions.   Call end time 1543   Would this patient benefit from a Referral to [facility] Social Work?  No   Is the patient interested in additional calls from an ambulatory ? No            Brigido HOOVER - Registered Nurse    7/29/2025, 15:44 EDT

## 2025-07-31 LAB
BACTERIA SPEC AEROBE CULT: NORMAL
BACTERIA SPEC AEROBE CULT: NORMAL

## 2025-08-11 ENCOUNTER — LAB (OUTPATIENT)
Dept: LAB | Facility: HOSPITAL | Age: 70
End: 2025-08-11
Payer: MEDICARE

## 2025-08-11 ENCOUNTER — OFFICE VISIT (OUTPATIENT)
Dept: OTOLARYNGOLOGY | Facility: CLINIC | Age: 70
End: 2025-08-11
Payer: MEDICARE

## 2025-08-11 VITALS
DIASTOLIC BLOOD PRESSURE: 79 MMHG | BODY MASS INDEX: 38.24 KG/M2 | WEIGHT: 298 LBS | HEIGHT: 74 IN | SYSTOLIC BLOOD PRESSURE: 133 MMHG

## 2025-08-11 DIAGNOSIS — E04.2 MULTIPLE THYROID NODULES: Primary | ICD-10-CM

## 2025-08-11 DIAGNOSIS — J02.9 SORE THROAT: ICD-10-CM

## 2025-08-11 DIAGNOSIS — E04.2 MULTIPLE THYROID NODULES: ICD-10-CM

## 2025-08-11 LAB
T3 SERPL-MCNC: 143 NG/DL (ref 80–200)
T4 SERPL-MCNC: 8.16 MCG/DL (ref 4.5–11.7)
TSH SERPL DL<=0.05 MIU/L-ACNC: 1.14 UIU/ML (ref 0.27–4.2)

## 2025-08-11 PROCEDURE — 3075F SYST BP GE 130 - 139MM HG: CPT | Performed by: NURSE PRACTITIONER

## 2025-08-11 PROCEDURE — 1159F MED LIST DOCD IN RCRD: CPT | Performed by: NURSE PRACTITIONER

## 2025-08-11 PROCEDURE — 84443 ASSAY THYROID STIM HORMONE: CPT

## 2025-08-11 PROCEDURE — 84480 ASSAY TRIIODOTHYRONINE (T3): CPT

## 2025-08-11 PROCEDURE — 3078F DIAST BP <80 MM HG: CPT | Performed by: NURSE PRACTITIONER

## 2025-08-11 PROCEDURE — 86376 MICROSOMAL ANTIBODY EACH: CPT | Performed by: NURSE PRACTITIONER

## 2025-08-11 PROCEDURE — 1160F RVW MEDS BY RX/DR IN RCRD: CPT | Performed by: NURSE PRACTITIONER

## 2025-08-11 PROCEDURE — 36415 COLL VENOUS BLD VENIPUNCTURE: CPT | Performed by: NURSE PRACTITIONER

## 2025-08-11 PROCEDURE — 99214 OFFICE O/P EST MOD 30 MIN: CPT | Performed by: NURSE PRACTITIONER

## 2025-08-11 PROCEDURE — 84436 ASSAY OF TOTAL THYROXINE: CPT | Performed by: NURSE PRACTITIONER

## 2025-08-12 DIAGNOSIS — M48.061 SPINAL STENOSIS OF LUMBAR REGION, UNSPECIFIED WHETHER NEUROGENIC CLAUDICATION PRESENT: ICD-10-CM

## 2025-08-12 LAB — THYROPEROXIDASE AB SERPL-ACNC: <9 IU/ML (ref 0–34)

## 2025-08-19 ENCOUNTER — TELEPHONE (OUTPATIENT)
Dept: INTERVENTIONAL RADIOLOGY/VASCULAR | Facility: HOSPITAL | Age: 70
End: 2025-08-19
Payer: MEDICARE

## 2025-08-20 ENCOUNTER — HOSPITAL ENCOUNTER (OUTPATIENT)
Dept: ULTRASOUND IMAGING | Facility: HOSPITAL | Age: 70
Discharge: HOME | End: 2025-08-20
Payer: MEDICARE

## 2025-08-20 DIAGNOSIS — E04.2 MULTIPLE THYROID NODULES: ICD-10-CM

## 2025-08-20 PROCEDURE — 88173 CYTOPATH EVAL FNA REPORT: CPT | Performed by: NURSE PRACTITIONER

## 2025-08-26 ENCOUNTER — TELEPHONE (OUTPATIENT)
Dept: INTERVENTIONAL RADIOLOGY/VASCULAR | Facility: HOSPITAL | Age: 70
End: 2025-08-26
Payer: MEDICARE

## (undated) DEVICE — SINGLE-USE BIOPSY FORCEPS: Brand: RADIAL JAW 4

## (undated) DEVICE — CONMED SCOPE SAVER BITE BLOCK, 20X27 MM: Brand: SCOPE SAVER

## (undated) DEVICE — MASK,OXYGEN,MED CONC,ADLT,7' TUB, UC: Brand: PENDING

## (undated) DEVICE — TBG PENCL TELESCP MEGADYNE SMOKE EVAC 10FT

## (undated) DEVICE — DUAL CUT SAGITTAL BLADE

## (undated) DEVICE — PATIENT RETURN ELECTRODE, SINGLE-USE, CONTACT QUALITY MONITORING, ADULT, WITH 9FT CORD, FOR PATIENTS WEIGING OVER 33LBS. (15KG): Brand: MEGADYNE

## (undated) DEVICE — Device: Brand: DISPOSABLE ELECTROSURGICAL SNARE

## (undated) DEVICE — ARGYLE YANKAUER BULB TIP WITH VENT: Brand: ARGYLE

## (undated) DEVICE — TRAP SXN POLYP QUICKCATCH LF

## (undated) DEVICE — CVR BRD ARM 13X30

## (undated) DEVICE — 3M™ IOBAN™ 2 ANTIMICROBIAL INCISE DRAPE 6650EZ: Brand: IOBAN™ 2

## (undated) DEVICE — GLV SURG DERMASSURE GRN LF PF 8.0

## (undated) DEVICE — 4-PORT MANIFOLD: Brand: NEPTUNE 2

## (undated) DEVICE — SENSR O2 OXIMAX FNGR A/ 18IN NONSTR

## (undated) DEVICE — ANTIBACTERIAL UNDYED BRAIDED (POLYGLACTIN 910), SYNTHETIC ABSORBABLE SUTURE: Brand: COATED VICRYL

## (undated) DEVICE — SOL IRR NACL 0.9PCT BT 1000ML

## (undated) DEVICE — BAPTIST TURNOVER KIT: Brand: MEDLINE INDUSTRIES, INC.

## (undated) DEVICE — SYS CLS SKIN PREMIERPRO EXOFINFUSION 22CM

## (undated) DEVICE — GOWN,PREVENTION PLUS,XLONG/XLARGE,STRL: Brand: MEDLINE

## (undated) DEVICE — OPTIFOAM GENTLE SA, POSTOP, 4X12: Brand: MEDLINE

## (undated) DEVICE — ADHS SKIN PREMIERPRO EXOFIN TOPICAL HI/VISC .5ML

## (undated) DEVICE — GLV SURG BIOGEL LTX PF 7 1/2

## (undated) DEVICE — PK KN TOTL 30

## (undated) DEVICE — INTRO TEAR AWAY/LVD W/SD PRT 6F 13CM

## (undated) DEVICE — THE CHANNEL CLEANING BRUSH IS A NYLON FLEXI BRUSH ATTACHED TO A FLEXIBLE PLASTIC SHEATH DESIGNED TO SAFELY REMOVE DEBRIS FROM FLEXIBLE ENDOSCOPES.

## (undated) DEVICE — CMT BONE R 1X40: Type: IMPLANTABLE DEVICE | Site: KNEE | Status: NON-FUNCTIONAL

## (undated) DEVICE — STERILE PATIENT PROTECTIVE PAD FOR IMP® KNEE POSITIONERS & COHESIVE WRAP (10 / CASE): Brand: DE MAYO KNEE POSITIONER®

## (undated) DEVICE — SPONGE,LAP,12"X12",XR,ST,5/PK,40PK/CS: Brand: MEDLINE

## (undated) DEVICE — GLV SURG TRIUMPH MICRO PF LTX 6.5 STRL

## (undated) DEVICE — CANN SMPL SOFTECH BIFLO ETCO2 A/M 7FT

## (undated) DEVICE — SHEET,DRAPE,53X77,STERILE: Brand: MEDLINE

## (undated) DEVICE — SYR LUERLOK 20CC BX/50

## (undated) DEVICE — Device: Brand: POWER-FLO®

## (undated) DEVICE — Device

## (undated) DEVICE — PAD, DEFIB, ADULT, RADIOTRANS, PHILIPS: Brand: MEDLINE

## (undated) DEVICE — CABL BIPOL W/ALLGTR CLIP/SM 12FT

## (undated) DEVICE — GLV SURG TRIUMPH MICRO PF LTX 7.5 STRL

## (undated) DEVICE — CUFF,BP,DISP,1 TUBE,ADULT,HP: Brand: MEDLINE

## (undated) DEVICE — POOLE SUCTION INSTRUMENT WITH REMOVABLE SHEATH: Brand: POOLE

## (undated) DEVICE — DEFENDO AIR WATER SUCTION AND BIOPSY VALVE KIT FOR  OLYMPUS: Brand: DEFENDO AIR/WATER/SUCTION AND BIOPSY VALVE

## (undated) DEVICE — GLV SURG TRIUMPH MICRO PF LTX 7 STRL

## (undated) DEVICE — PK PM 30

## (undated) DEVICE — SOL NS 500ML

## (undated) DEVICE — KT MINI ACC MAK401

## (undated) DEVICE — NEEDLE, QUINCKE, 18GX3.5": Brand: MEDLINE